# Patient Record
Sex: FEMALE | Race: WHITE | Employment: OTHER | URBAN - METROPOLITAN AREA
[De-identification: names, ages, dates, MRNs, and addresses within clinical notes are randomized per-mention and may not be internally consistent; named-entity substitution may affect disease eponyms.]

---

## 2017-01-17 ENCOUNTER — TELEPHONE (OUTPATIENT)
Dept: INTERNAL MEDICINE CLINIC | Age: 72
End: 2017-01-17

## 2017-01-17 RX ORDER — METHYLPREDNISOLONE 4 MG/1
TABLET ORAL
Qty: 1 KIT | Refills: 0 | Status: SHIPPED | OUTPATIENT
Start: 2017-01-17 | End: 2017-01-23

## 2017-01-17 RX ORDER — AZITHROMYCIN 250 MG/1
TABLET, FILM COATED ORAL
Qty: 1 PACKET | Refills: 0 | Status: SHIPPED | OUTPATIENT
Start: 2017-01-17 | End: 2017-01-27

## 2017-01-20 ENCOUNTER — OFFICE VISIT (OUTPATIENT)
Dept: INTERNAL MEDICINE CLINIC | Age: 72
End: 2017-01-20

## 2017-01-20 VITALS
SYSTOLIC BLOOD PRESSURE: 110 MMHG | DIASTOLIC BLOOD PRESSURE: 60 MMHG | BODY MASS INDEX: 20.89 KG/M2 | WEIGHT: 112.4 LBS | HEART RATE: 80 BPM | RESPIRATION RATE: 16 BRPM

## 2017-01-20 DIAGNOSIS — M19.90 ARTHRITIS: ICD-10-CM

## 2017-01-20 DIAGNOSIS — I10 ESSENTIAL HYPERTENSION: ICD-10-CM

## 2017-01-20 DIAGNOSIS — J44.9 CHRONIC OBSTRUCTIVE PULMONARY DISEASE, UNSPECIFIED COPD TYPE (HCC): ICD-10-CM

## 2017-01-20 DIAGNOSIS — M35.3 PMR (POLYMYALGIA RHEUMATICA) (HCC): Primary | ICD-10-CM

## 2017-01-20 LAB
ANION GAP SERPL CALCULATED.3IONS-SCNC: 12 MMOL/L (ref 3–16)
BUN BLDV-MCNC: 24 MG/DL (ref 7–20)
CALCIUM SERPL-MCNC: 8.5 MG/DL (ref 8.3–10.6)
CHLORIDE BLD-SCNC: 101 MMOL/L (ref 99–110)
CO2: 30 MMOL/L (ref 21–32)
CREAT SERPL-MCNC: 0.9 MG/DL (ref 0.6–1.2)
GFR AFRICAN AMERICAN: >60
GFR NON-AFRICAN AMERICAN: >60
GLUCOSE BLD-MCNC: 90 MG/DL (ref 70–99)
POTASSIUM SERPL-SCNC: 3.6 MMOL/L (ref 3.5–5.1)
SEDIMENTATION RATE, ERYTHROCYTE: 66 MM/HR (ref 0–30)
SODIUM BLD-SCNC: 143 MMOL/L (ref 136–145)

## 2017-01-20 PROCEDURE — 99213 OFFICE O/P EST LOW 20 MIN: CPT | Performed by: INTERNAL MEDICINE

## 2017-01-20 PROCEDURE — 36415 COLL VENOUS BLD VENIPUNCTURE: CPT | Performed by: INTERNAL MEDICINE

## 2017-02-01 LAB
ANA TITER: NORMAL
BASOPHILS ABSOLUTE: 0 /ΜL
BASOPHILS RELATIVE PERCENT: 0 %
C-REACTIVE PROTEIN: 1
EOSINOPHILS ABSOLUTE: 1 /ΜL
EOSINOPHILS RELATIVE PERCENT: 0.2 %
HCT VFR BLD CALC: 35.1 % (ref 36–46)
HEMOGLOBIN: 11.1 G/DL (ref 12–16)
LYMPHOCYTES ABSOLUTE: 61 /ΜL
LYMPHOCYTES RELATIVE PERCENT: 12.7 %
MCH RBC QN AUTO: 28.8 PG
MCHC RBC AUTO-ENTMCNC: 31.8 G/DL
MCV RBC AUTO: 90.8 FL
MONOCYTES ABSOLUTE: 2 /ΜL
MONOCYTES RELATIVE PERCENT: 0.4 %
NEUTROPHILS ABSOLUTE: 36 /ΜL
NEUTROPHILS RELATIVE PERCENT: 7.5 %
PDW BLD-RTO: 13.9 %
PLATELET # BLD: 226 K/ΜL
PMV BLD AUTO: ABNORMAL FL
RBC # BLD: 3.86 10^6/ΜL
RHEUMATOID FACTOR: <10
SEDIMENTATION RATE, ERYTHROCYTE: 54
TOTAL CK: NORMAL U/L
VITAMIN D 25-HYDROXY: 41
VITAMIN D2, 25 HYDROXY: NORMAL
VITAMIN D3,25 HYDROXY: NORMAL
WBC # BLD: 20.9 10^3/ML

## 2017-02-03 RX ORDER — TRIAMTERENE AND HYDROCHLOROTHIAZIDE 37.5; 25 MG/1; MG/1
CAPSULE ORAL
Qty: 90 CAPSULE | Refills: 3 | Status: SHIPPED | OUTPATIENT
Start: 2017-02-03 | End: 2018-01-31 | Stop reason: SDUPTHER

## 2017-02-03 RX ORDER — ESOMEPRAZOLE MAGNESIUM 40 MG/1
CAPSULE, DELAYED RELEASE ORAL
Qty: 90 CAPSULE | Refills: 3 | Status: SHIPPED | OUTPATIENT
Start: 2017-02-03 | End: 2018-01-31 | Stop reason: SDUPTHER

## 2017-02-20 ENCOUNTER — OFFICE VISIT (OUTPATIENT)
Dept: INTERNAL MEDICINE CLINIC | Age: 72
End: 2017-02-20

## 2017-02-20 VITALS
SYSTOLIC BLOOD PRESSURE: 130 MMHG | WEIGHT: 113.2 LBS | BODY MASS INDEX: 21.04 KG/M2 | HEART RATE: 86 BPM | RESPIRATION RATE: 14 BRPM | DIASTOLIC BLOOD PRESSURE: 70 MMHG

## 2017-02-20 DIAGNOSIS — R79.82 ELEVATED C-REACTIVE PROTEIN (CRP): Primary | ICD-10-CM

## 2017-02-20 DIAGNOSIS — I10 ESSENTIAL HYPERTENSION: ICD-10-CM

## 2017-02-20 DIAGNOSIS — M19.90 ARTHRITIS: ICD-10-CM

## 2017-02-20 DIAGNOSIS — J44.9 CHRONIC OBSTRUCTIVE PULMONARY DISEASE, UNSPECIFIED COPD TYPE (HCC): ICD-10-CM

## 2017-02-20 PROCEDURE — 99213 OFFICE O/P EST LOW 20 MIN: CPT | Performed by: INTERNAL MEDICINE

## 2017-02-23 ENCOUNTER — TELEPHONE (OUTPATIENT)
Dept: INTERNAL MEDICINE CLINIC | Age: 72
End: 2017-02-23

## 2017-02-23 ENCOUNTER — HOSPITAL ENCOUNTER (OUTPATIENT)
Dept: OTHER | Age: 72
Discharge: HOME | End: 2017-02-23
Attending: INTERNAL MEDICINE | Admitting: INTERNAL MEDICINE

## 2017-02-23 LAB
ALBUMIN SERPL-MCNC: 3.9 GM/DL (ref 3.4–5)
ALP BLD-CCNC: 92 IU/L (ref 40–128)
ALT SERPL-CCNC: 22 U/L (ref 10–40)
ANION GAP SERPL CALCULATED.3IONS-SCNC: 14 MMOL/L (ref 4–16)
AST SERPL-CCNC: 18 IU/L (ref 15–37)
BANDED NEUTROPHILS ABSOLUTE COUNT: 0.25 K/CU MM
BANDED NEUTROPHILS RELATIVE PERCENT: 1 % (ref 5–11)
BILIRUB SERPL-MCNC: 0.2 MG/DL (ref 0–1)
BUN BLDV-MCNC: 28 MG/DL (ref 6–23)
CALCIUM SERPL-MCNC: 8.8 MG/DL (ref 8.3–10.6)
CHLORIDE BLD-SCNC: 100 MMOL/L (ref 99–110)
CO2: 29 MMOL/L (ref 21–32)
CREAT SERPL-MCNC: 1 MG/DL (ref 0.6–1.1)
DIFFERENTIAL TYPE: ABNORMAL
EOSINOPHILS ABSOLUTE: 0.3 K/CU MM
EOSINOPHILS RELATIVE PERCENT: 1 % (ref 0–3)
GFR AFRICAN AMERICAN: >60 ML/MIN/1.73M2
GFR NON-AFRICAN AMERICAN: 55 ML/MIN/1.73M2
GLUCOSE FASTING: 101 MG/DL (ref 70–99)
HAV IGM SER IA-ACNC: NON REACTIVE
HCT VFR BLD CALC: 36 % (ref 37–47)
HEMOGLOBIN: 11.3 GM/DL (ref 12.5–16)
HEPATITIS B CORE IGM ANTIBODY: NON REACTIVE
HEPATITIS B SURFACE ANTIGEN: NON REACTIVE
HEPATITIS C ANTIBODY: NON REACTIVE
IGA: 84 MG/DL (ref 69–382)
IGG,SERUM: 433 MG/DL (ref 723–1685)
IGM,SERUM: 1792 MG/DL (ref 62–277)
LACTATE DEHYDROGENASE: 134 IU/L (ref 120–246)
LYMPHOCYTES ABSOLUTE: 18.5 K/CU MM
LYMPHOCYTES RELATIVE PERCENT: 73 % (ref 24–44)
MACROCYTES: ABNORMAL
MCH RBC QN AUTO: 30 PG (ref 27–31)
MCHC RBC AUTO-ENTMCNC: 31.4 % (ref 32–36)
MCV RBC AUTO: 95.5 FL (ref 78–100)
MONOCYTES ABSOLUTE: 0.5 K/CU MM
MONOCYTES RELATIVE PERCENT: 2 % (ref 0–4)
PDW BLD-RTO: 12.9 % (ref 11.7–14.9)
PLATELET # BLD: 228 K/CU MM (ref 140–440)
PLT MORPHOLOGY: ABNORMAL
PMV BLD AUTO: 9.6 FL (ref 7.5–11.1)
POLYCHROMASIA: ABNORMAL
POTASSIUM SERPL-SCNC: 3.8 MMOL/L (ref 3.5–5.1)
RBC # BLD: 3.77 M/CU MM (ref 4.2–5.4)
SEGMENTED NEUTROPHILS ABSOLUTE COUNT: 5.8 K/CU MM
SEGMENTED NEUTROPHILS RELATIVE PERCENT: 23 % (ref 36–66)
SMUDGE CELLS: PRESENT
SODIUM BLD-SCNC: 143 MMOL/L (ref 135–145)
TOTAL PROTEIN: 6.9 GM/DL (ref 6.4–8.2)
WBC # BLD: 25.4 K/CU MM (ref 4–10.5)

## 2017-02-24 LAB
ALBUMIN ELP: 3.4 GM/DL (ref 3.2–5.6)
ALPHA-1-GLOBULIN: 0.2 GM/DL (ref 0.1–0.4)
ALPHA-2-GLOBULIN: 0.9 GM/DL (ref 0.4–1.2)
BETA GLOBULIN: 1 GM/DL (ref 0.5–1.3)
GAMMA GLOBULIN: 1.5 GM/DL (ref 0.5–1.6)
IMMUNOFIXATION ELECTROPHORESIS 1: NORMAL
TOTAL PROTEIN: 6.9 GM/DL (ref 6.4–8.2)

## 2017-02-25 LAB
BETA-2 MICROGLOBULIN: 4
KAPPA QUANT FREE LIGHT CHAINS: 1.62
KAPPA/LAMBDA FREE LIGHT CHAIN RATIO: 0.08
LAMBDA FREE LIGHT CHAINS URINE/ VOL: 21.2

## 2017-03-06 ENCOUNTER — OFFICE VISIT (OUTPATIENT)
Dept: INTERNAL MEDICINE CLINIC | Age: 72
End: 2017-03-06

## 2017-03-06 ENCOUNTER — TELEPHONE (OUTPATIENT)
Dept: INTERNAL MEDICINE CLINIC | Age: 72
End: 2017-03-06

## 2017-03-06 VITALS
RESPIRATION RATE: 14 BRPM | SYSTOLIC BLOOD PRESSURE: 130 MMHG | BODY MASS INDEX: 21.15 KG/M2 | DIASTOLIC BLOOD PRESSURE: 62 MMHG | HEART RATE: 80 BPM | WEIGHT: 113.8 LBS

## 2017-03-06 DIAGNOSIS — Z12.31 SCREENING MAMMOGRAM, ENCOUNTER FOR: ICD-10-CM

## 2017-03-06 DIAGNOSIS — D72.820 LYMPHOCYTOSIS: Primary | ICD-10-CM

## 2017-03-06 DIAGNOSIS — J44.9 CHRONIC OBSTRUCTIVE PULMONARY DISEASE, UNSPECIFIED COPD TYPE (HCC): ICD-10-CM

## 2017-03-06 DIAGNOSIS — R06.02 SHORTNESS OF BREATH: ICD-10-CM

## 2017-03-06 PROCEDURE — 99213 OFFICE O/P EST LOW 20 MIN: CPT | Performed by: INTERNAL MEDICINE

## 2017-03-07 ENCOUNTER — TELEPHONE (OUTPATIENT)
Dept: INTERNAL MEDICINE CLINIC | Age: 72
End: 2017-03-07

## 2017-03-09 ENCOUNTER — HOSPITAL ENCOUNTER (OUTPATIENT)
Dept: GENERAL RADIOLOGY | Age: 72
Discharge: HOME | End: 2017-03-09
Attending: INTERNAL MEDICINE | Admitting: INTERNAL MEDICINE

## 2017-03-09 DIAGNOSIS — R06.02 SHORTNESS OF BREATH: ICD-10-CM

## 2017-03-13 ENCOUNTER — TELEPHONE (OUTPATIENT)
Dept: INTERNAL MEDICINE CLINIC | Age: 72
End: 2017-03-13

## 2017-03-13 RX ORDER — LEVOTHYROXINE SODIUM 88 MCG
88 TABLET ORAL DAILY
Qty: 90 TABLET | Refills: 3 | Status: SHIPPED | OUTPATIENT
Start: 2017-03-13 | End: 2018-03-08 | Stop reason: SDUPTHER

## 2017-03-20 ENCOUNTER — TELEPHONE (OUTPATIENT)
Dept: INTERNAL MEDICINE CLINIC | Age: 72
End: 2017-03-20

## 2017-03-21 ENCOUNTER — OFFICE VISIT (OUTPATIENT)
Dept: INTERNAL MEDICINE CLINIC | Age: 72
End: 2017-03-21

## 2017-03-21 VITALS
WEIGHT: 117 LBS | RESPIRATION RATE: 14 BRPM | DIASTOLIC BLOOD PRESSURE: 68 MMHG | SYSTOLIC BLOOD PRESSURE: 120 MMHG | BODY MASS INDEX: 21.75 KG/M2 | HEART RATE: 88 BPM

## 2017-03-21 DIAGNOSIS — J44.9 CHRONIC OBSTRUCTIVE PULMONARY DISEASE, UNSPECIFIED COPD TYPE (HCC): ICD-10-CM

## 2017-03-21 DIAGNOSIS — J40 BRONCHITIS: Primary | ICD-10-CM

## 2017-03-21 DIAGNOSIS — I10 ESSENTIAL HYPERTENSION: ICD-10-CM

## 2017-03-21 DIAGNOSIS — C91.10 CLL (CHRONIC LYMPHOCYTIC LEUKEMIA) (HCC): ICD-10-CM

## 2017-03-21 PROCEDURE — 99213 OFFICE O/P EST LOW 20 MIN: CPT | Performed by: INTERNAL MEDICINE

## 2017-03-21 RX ORDER — AZITHROMYCIN 250 MG/1
TABLET, FILM COATED ORAL
Qty: 1 PACKET | Refills: 0 | Status: SHIPPED | OUTPATIENT
Start: 2017-03-21 | End: 2017-03-31

## 2017-03-23 ENCOUNTER — TELEPHONE (OUTPATIENT)
Dept: INTERNAL MEDICINE CLINIC | Age: 72
End: 2017-03-23

## 2017-03-23 RX ORDER — CEFUROXIME AXETIL 250 MG/1
250 TABLET ORAL 2 TIMES DAILY
Qty: 20 TABLET | Refills: 0 | Status: SHIPPED | OUTPATIENT
Start: 2017-03-23 | End: 2017-04-02

## 2017-03-27 ENCOUNTER — TELEPHONE (OUTPATIENT)
Dept: INTERNAL MEDICINE CLINIC | Age: 72
End: 2017-03-27

## 2017-04-01 PROBLEM — C91.10 CLL (CHRONIC LYMPHOCYTIC LEUKEMIA) (HCC): Status: ACTIVE | Noted: 2017-04-01

## 2017-04-06 RX ORDER — ZOLPIDEM TARTRATE 5 MG/1
5 TABLET ORAL NIGHTLY PRN
Qty: 90 TABLET | Refills: 0 | Status: SHIPPED | OUTPATIENT
Start: 2017-04-06 | End: 2017-07-13 | Stop reason: SDUPTHER

## 2017-04-14 ENCOUNTER — TELEPHONE (OUTPATIENT)
Dept: INTERNAL MEDICINE CLINIC | Age: 72
End: 2017-04-14

## 2017-04-17 ENCOUNTER — HOSPITAL ENCOUNTER (OUTPATIENT)
Dept: WOMENS IMAGING | Age: 72
Discharge: HOME | End: 2017-04-17
Attending: INTERNAL MEDICINE | Admitting: INTERNAL MEDICINE

## 2017-04-17 DIAGNOSIS — Z12.39 SCREENING BREAST EXAMINATION: ICD-10-CM

## 2017-05-03 ENCOUNTER — HOSPITAL ENCOUNTER (OUTPATIENT)
Dept: OTHER | Age: 72
Discharge: HOME | End: 2017-05-03
Attending: INTERNAL MEDICINE | Admitting: INTERNAL MEDICINE

## 2017-05-03 LAB
ALBUMIN SERPL-MCNC: 4 GM/DL (ref 3.4–5)
ALP BLD-CCNC: 73 IU/L (ref 40–129)
ALT SERPL-CCNC: 11 U/L (ref 10–40)
ANION GAP SERPL CALCULATED.3IONS-SCNC: 16 MMOL/L (ref 4–16)
AST SERPL-CCNC: 12 IU/L (ref 15–37)
BASOPHILS ABSOLUTE: ABNORMAL /ΜL
BASOPHILS RELATIVE PERCENT: ABNORMAL %
BILIRUB SERPL-MCNC: 0.5 MG/DL (ref 0–1)
BUN BLDV-MCNC: 22 MG/DL (ref 6–23)
CALCIUM SERPL-MCNC: 9 MG/DL (ref 8.3–10.6)
CHLORIDE BLD-SCNC: 101 MMOL/L (ref 99–110)
CO2: 27 MMOL/L (ref 21–32)
CREAT SERPL-MCNC: 1 MG/DL (ref 0.6–1.1)
EOSINOPHILS ABSOLUTE: ABNORMAL /ΜL
EOSINOPHILS RELATIVE PERCENT: ABNORMAL %
GFR AFRICAN AMERICAN: >60 ML/MIN/1.73M2
GFR NON-AFRICAN AMERICAN: 55 ML/MIN/1.73M2
GLUCOSE BLD-MCNC: 100 MG/DL (ref 70–140)
HCT VFR BLD CALC: 34.4 % (ref 36–46)
HEMOGLOBIN: 11.3 G/DL (ref 12–16)
LACTATE DEHYDROGENASE: 135 IU/L (ref 120–246)
LYMPHOCYTES ABSOLUTE: ABNORMAL /ΜL
LYMPHOCYTES RELATIVE PERCENT: 8.8 %
MCH RBC QN AUTO: 28.9 PG
MCHC RBC AUTO-ENTMCNC: 31.9 G/DL
MCV RBC AUTO: 90.5 FL
MONOCYTES ABSOLUTE: ABNORMAL /ΜL
MONOCYTES RELATIVE PERCENT: 3 %
NEUTROPHILS ABSOLUTE: ABNORMAL /ΜL
NEUTROPHILS RELATIVE PERCENT: 8 %
PDW BLD-RTO: 16.5 %
PLATELET # BLD: 258 K/ΜL
PMV BLD AUTO: 6.8 FL
POTASSIUM SERPL-SCNC: 3.6 MMOL/L (ref 3.5–5.1)
RBC # BLD: 3.9 10^6/ΜL
SODIUM BLD-SCNC: 144 MMOL/L (ref 135–145)
TOTAL PROTEIN: 7.2 GM/DL (ref 6.4–8.2)
WBC # BLD: 17.3 10^3/ML

## 2017-05-05 ENCOUNTER — TELEPHONE (OUTPATIENT)
Dept: INTERNAL MEDICINE CLINIC | Age: 72
End: 2017-05-05

## 2017-05-08 ENCOUNTER — OFFICE VISIT (OUTPATIENT)
Dept: INTERNAL MEDICINE CLINIC | Age: 72
End: 2017-05-08

## 2017-05-08 VITALS
WEIGHT: 114.4 LBS | DIASTOLIC BLOOD PRESSURE: 70 MMHG | HEART RATE: 80 BPM | SYSTOLIC BLOOD PRESSURE: 130 MMHG | BODY MASS INDEX: 22.46 KG/M2 | RESPIRATION RATE: 14 BRPM | HEIGHT: 60 IN

## 2017-05-08 DIAGNOSIS — E55.9 VITAMIN D DEFICIENCY: ICD-10-CM

## 2017-05-08 DIAGNOSIS — I10 ESSENTIAL HYPERTENSION: ICD-10-CM

## 2017-05-08 DIAGNOSIS — R73.02 GLUCOSE INTOLERANCE (IMPAIRED GLUCOSE TOLERANCE): ICD-10-CM

## 2017-05-08 DIAGNOSIS — C91.10 CLL (CHRONIC LYMPHOCYTIC LEUKEMIA) (HCC): Primary | ICD-10-CM

## 2017-05-08 DIAGNOSIS — E78.2 MIXED HYPERLIPIDEMIA: ICD-10-CM

## 2017-05-08 DIAGNOSIS — M19.90 ARTHRITIS: ICD-10-CM

## 2017-05-08 DIAGNOSIS — M81.0 OSTEOPOROSIS: ICD-10-CM

## 2017-05-08 DIAGNOSIS — J44.9 CHRONIC OBSTRUCTIVE PULMONARY DISEASE, UNSPECIFIED COPD TYPE (HCC): ICD-10-CM

## 2017-05-08 DIAGNOSIS — E03.4 HYPOTHYROIDISM DUE TO ACQUIRED ATROPHY OF THYROID: ICD-10-CM

## 2017-05-08 DIAGNOSIS — R00.2 PALPITATIONS: ICD-10-CM

## 2017-05-08 LAB
BILIRUBIN URINE: NEGATIVE
BLOOD, URINE: NEGATIVE
CLARITY: CLEAR
COLOR: YELLOW
EPITHELIAL CELLS, UA: 1 /HPF (ref 0–5)
GLUCOSE URINE: NEGATIVE MG/DL
HYALINE CASTS: 0 /LPF (ref 0–8)
KETONES, URINE: NEGATIVE MG/DL
LEUKOCYTE ESTERASE, URINE: ABNORMAL
MICROSCOPIC EXAMINATION: YES
NITRITE, URINE: NEGATIVE
PH UA: 7
PROTEIN UA: 30 MG/DL
RBC UA: 2 /HPF (ref 0–4)
SPECIFIC GRAVITY UA: 1.02
UROBILINOGEN, URINE: 0.2 E.U./DL
WBC UA: 1 /HPF (ref 0–5)

## 2017-05-08 PROCEDURE — 36415 COLL VENOUS BLD VENIPUNCTURE: CPT | Performed by: INTERNAL MEDICINE

## 2017-05-08 PROCEDURE — 81001 URINALYSIS AUTO W/SCOPE: CPT | Performed by: INTERNAL MEDICINE

## 2017-05-08 PROCEDURE — 99215 OFFICE O/P EST HI 40 MIN: CPT | Performed by: INTERNAL MEDICINE

## 2017-05-08 PROCEDURE — 93000 ELECTROCARDIOGRAM COMPLETE: CPT | Performed by: INTERNAL MEDICINE

## 2017-05-08 RX ORDER — PREDNISONE 1 MG/1
5 TABLET ORAL 2 TIMES DAILY
COMMUNITY
End: 2017-05-15 | Stop reason: SDUPTHER

## 2017-05-08 ASSESSMENT — PATIENT HEALTH QUESTIONNAIRE - PHQ9
1. LITTLE INTEREST OR PLEASURE IN DOING THINGS: 0
SUM OF ALL RESPONSES TO PHQ QUESTIONS 1-9: 0
2. FEELING DOWN, DEPRESSED OR HOPELESS: 0
SUM OF ALL RESPONSES TO PHQ9 QUESTIONS 1 & 2: 0

## 2017-05-09 LAB
CHOLESTEROL, TOTAL: 109 MG/DL (ref 0–199)
ESTIMATED AVERAGE GLUCOSE: 114 MG/DL
HBA1C MFR BLD: 5.6 %
HDLC SERPL-MCNC: 37 MG/DL (ref 40–60)
LDL CHOLESTEROL CALCULATED: 46 MG/DL
SEDIMENTATION RATE, ERYTHROCYTE: 79 MM/HR (ref 0–30)
TRIGL SERPL-MCNC: 129 MG/DL (ref 0–150)
TSH SERPL DL<=0.05 MIU/L-ACNC: 0.19 UIU/ML (ref 0.27–4.2)
VITAMIN D 25-HYDROXY: 38.2 NG/ML
VLDLC SERPL CALC-MCNC: 26 MG/DL

## 2017-05-11 ENCOUNTER — HOSPITAL ENCOUNTER (OUTPATIENT)
Dept: WOMENS IMAGING | Age: 72
Discharge: HOME | End: 2017-05-11
Attending: INTERNAL MEDICINE | Admitting: INTERNAL MEDICINE

## 2017-05-11 DIAGNOSIS — M81.0 OSTEOPOROSIS: ICD-10-CM

## 2017-05-11 DIAGNOSIS — M81.0 AGE-RELATED OSTEOPOROSIS WITHOUT CURRENT PATHOLOGICAL FRACTURE: ICD-10-CM

## 2017-05-15 RX ORDER — PREDNISONE 1 MG/1
5 TABLET ORAL 2 TIMES DAILY
Qty: 60 TABLET | Refills: 5 | Status: SHIPPED | OUTPATIENT
Start: 2017-05-15 | End: 2017-08-22

## 2017-05-19 ENCOUNTER — TELEPHONE (OUTPATIENT)
Dept: INTERNAL MEDICINE CLINIC | Age: 72
End: 2017-05-19

## 2017-05-22 ENCOUNTER — OFFICE VISIT (OUTPATIENT)
Dept: INTERNAL MEDICINE CLINIC | Age: 72
End: 2017-05-22

## 2017-05-22 VITALS — DIASTOLIC BLOOD PRESSURE: 70 MMHG | SYSTOLIC BLOOD PRESSURE: 130 MMHG | HEART RATE: 60 BPM

## 2017-05-22 DIAGNOSIS — R73.02 GLUCOSE INTOLERANCE (IMPAIRED GLUCOSE TOLERANCE): ICD-10-CM

## 2017-05-22 DIAGNOSIS — G89.29 CHRONIC RIGHT SHOULDER PAIN: Primary | ICD-10-CM

## 2017-05-22 DIAGNOSIS — E03.4 HYPOTHYROIDISM DUE TO ACQUIRED ATROPHY OF THYROID: ICD-10-CM

## 2017-05-22 DIAGNOSIS — M25.511 CHRONIC RIGHT SHOULDER PAIN: Primary | ICD-10-CM

## 2017-05-22 DIAGNOSIS — J44.9 CHRONIC OBSTRUCTIVE PULMONARY DISEASE, UNSPECIFIED COPD TYPE (HCC): ICD-10-CM

## 2017-05-22 PROCEDURE — 99213 OFFICE O/P EST LOW 20 MIN: CPT | Performed by: INTERNAL MEDICINE

## 2017-05-23 ENCOUNTER — TELEPHONE (OUTPATIENT)
Dept: INTERNAL MEDICINE CLINIC | Age: 72
End: 2017-05-23

## 2017-05-23 DIAGNOSIS — G89.29 CHRONIC RIGHT SHOULDER PAIN: Primary | ICD-10-CM

## 2017-05-23 DIAGNOSIS — M25.511 CHRONIC RIGHT SHOULDER PAIN: Primary | ICD-10-CM

## 2017-05-26 ENCOUNTER — HOSPITAL ENCOUNTER (OUTPATIENT)
Dept: GENERAL RADIOLOGY | Age: 72
Discharge: HOME | End: 2017-05-26
Attending: INTERNAL MEDICINE | Admitting: INTERNAL MEDICINE

## 2017-05-26 LAB
T4 FREE: 1.61 NG/DL (ref 0.9–1.8)
TSH HIGH SENSITIVITY: 0.63 UIU/ML (ref 0.27–4.2)

## 2017-05-30 ENCOUNTER — TELEPHONE (OUTPATIENT)
Dept: INTERNAL MEDICINE CLINIC | Age: 72
End: 2017-05-30

## 2017-07-13 RX ORDER — ZOLPIDEM TARTRATE 5 MG/1
5 TABLET ORAL NIGHTLY PRN
Qty: 90 TABLET | Refills: 0 | Status: SHIPPED | OUTPATIENT
Start: 2017-07-13 | End: 2017-09-28 | Stop reason: SDUPTHER

## 2017-08-09 ENCOUNTER — HOSPITAL ENCOUNTER (OUTPATIENT)
Dept: OTHER | Age: 72
Discharge: HOME | End: 2017-08-09
Attending: INTERNAL MEDICINE | Admitting: INTERNAL MEDICINE

## 2017-08-09 LAB
ALBUMIN SERPL-MCNC: 3.8 GM/DL (ref 3.4–5)
ALP BLD-CCNC: 85 IU/L (ref 40–129)
ALT SERPL-CCNC: 8 U/L (ref 10–40)
ANION GAP SERPL CALCULATED.3IONS-SCNC: 14 MMOL/L (ref 4–16)
AST SERPL-CCNC: 13 IU/L (ref 15–37)
BILIRUB SERPL-MCNC: 0.5 MG/DL (ref 0–1)
BUN BLDV-MCNC: 22 MG/DL (ref 6–23)
CALCIUM SERPL-MCNC: 9 MG/DL (ref 8.3–10.6)
CHLORIDE BLD-SCNC: 100 MMOL/L (ref 99–110)
CO2: 28 MMOL/L (ref 21–32)
CREAT SERPL-MCNC: 1 MG/DL (ref 0.6–1.1)
FERRITIN: 24 NG/ML (ref 15–150)
FOLATE: >20 NG/ML (ref 3.1–17.5)
GFR AFRICAN AMERICAN: >60 ML/MIN/1.73M2
GFR NON-AFRICAN AMERICAN: 54 ML/MIN/1.73M2
GLUCOSE BLD-MCNC: 99 MG/DL (ref 70–140)
LACTATE DEHYDROGENASE: 121 IU/L (ref 120–246)
POTASSIUM SERPL-SCNC: 4.1 MMOL/L (ref 3.5–5.1)
SODIUM BLD-SCNC: 142 MMOL/L (ref 135–145)
TOTAL PROTEIN: 6.9 GM/DL (ref 6.4–8.2)
VITAMIN B-12: 724.7 PG/ML (ref 211–911)

## 2017-08-10 LAB
ALBUMIN ELP: 3.3 GM/DL (ref 3.2–5.6)
ALPHA-1-GLOBULIN: 0.3 GM/DL (ref 0.1–0.4)
ALPHA-2-GLOBULIN: 0.9 GM/DL (ref 0.4–1.2)
BETA GLOBULIN: 0.9 GM/DL (ref 0.5–1.3)
GAMMA GLOBULIN: 1.5 GM/DL (ref 0.5–1.6)
IMMUNOFIXATION ELECTROPHORESIS 1: NORMAL
TOTAL PROTEIN: 6.9 GM/DL (ref 6.4–8.2)

## 2017-08-11 LAB
KAPPA QUANT FREE LIGHT CHAINS: 1.23
KAPPA/LAMBDA FREE LIGHT CHAIN RATIO: 0.06
LAMBDA FREE LIGHT CHAINS URINE/ VOL: 20.3

## 2017-08-21 ENCOUNTER — TELEPHONE (OUTPATIENT)
Dept: INTERNAL MEDICINE CLINIC | Age: 72
End: 2017-08-21

## 2017-08-22 ENCOUNTER — OFFICE VISIT (OUTPATIENT)
Dept: INTERNAL MEDICINE CLINIC | Age: 72
End: 2017-08-22

## 2017-08-22 VITALS
WEIGHT: 118.2 LBS | DIASTOLIC BLOOD PRESSURE: 60 MMHG | SYSTOLIC BLOOD PRESSURE: 120 MMHG | HEART RATE: 88 BPM | BODY MASS INDEX: 22.89 KG/M2

## 2017-08-22 DIAGNOSIS — C91.10 CLL (CHRONIC LYMPHOCYTIC LEUKEMIA) (HCC): ICD-10-CM

## 2017-08-22 DIAGNOSIS — I10 ESSENTIAL HYPERTENSION: ICD-10-CM

## 2017-08-22 DIAGNOSIS — J44.9 CHRONIC OBSTRUCTIVE PULMONARY DISEASE, UNSPECIFIED COPD TYPE (HCC): Primary | ICD-10-CM

## 2017-08-22 DIAGNOSIS — E03.4 HYPOTHYROIDISM DUE TO ACQUIRED ATROPHY OF THYROID: ICD-10-CM

## 2017-08-22 PROCEDURE — 99213 OFFICE O/P EST LOW 20 MIN: CPT | Performed by: INTERNAL MEDICINE

## 2017-08-22 RX ORDER — TRAMADOL HYDROCHLORIDE 50 MG/1
50 TABLET ORAL 2 TIMES DAILY PRN
Qty: 60 TABLET | Refills: 2 | Status: SHIPPED | OUTPATIENT
Start: 2017-08-22 | End: 2018-04-09 | Stop reason: SDUPTHER

## 2017-09-28 RX ORDER — ZOLPIDEM TARTRATE 5 MG/1
5 TABLET ORAL NIGHTLY PRN
Qty: 90 TABLET | Refills: 0 | Status: SHIPPED | OUTPATIENT
Start: 2017-09-28 | End: 2018-01-03 | Stop reason: SDUPTHER

## 2017-10-26 ENCOUNTER — TELEPHONE (OUTPATIENT)
Dept: INTERNAL MEDICINE CLINIC | Age: 72
End: 2017-10-26

## 2017-10-26 DIAGNOSIS — R60.9 EDEMA, UNSPECIFIED TYPE: ICD-10-CM

## 2017-10-26 DIAGNOSIS — I10 ESSENTIAL HYPERTENSION: ICD-10-CM

## 2017-10-26 DIAGNOSIS — R73.02 GLUCOSE INTOLERANCE (IMPAIRED GLUCOSE TOLERANCE): ICD-10-CM

## 2017-10-26 DIAGNOSIS — E03.8 OTHER SPECIFIED HYPOTHYROIDISM: ICD-10-CM

## 2017-10-26 DIAGNOSIS — E03.4 HYPOTHYROIDISM DUE TO ACQUIRED ATROPHY OF THYROID: ICD-10-CM

## 2017-10-26 DIAGNOSIS — E78.2 MIXED HYPERLIPIDEMIA: Primary | ICD-10-CM

## 2017-11-06 ENCOUNTER — HOSPITAL ENCOUNTER (OUTPATIENT)
Dept: GENERAL RADIOLOGY | Age: 72
Discharge: HOME | End: 2017-11-06
Attending: INTERNAL MEDICINE | Admitting: INTERNAL MEDICINE

## 2017-11-06 LAB
ANION GAP SERPL CALCULATED.3IONS-SCNC: 13 MMOL/L (ref 4–16)
BANDED NEUTROPHILS ABSOLUTE COUNT: 0.11 K/CU MM
BANDED NEUTROPHILS RELATIVE PERCENT: 1 % (ref 5–11)
BUN BLDV-MCNC: 23 MG/DL (ref 6–23)
CALCIUM SERPL-MCNC: 8.7 MG/DL (ref 8.3–10.6)
CHLORIDE BLD-SCNC: 102 MMOL/L (ref 99–110)
CHOLESTEROL: 108 MG/DL
CO2: 29 MMOL/L (ref 21–32)
CREAT SERPL-MCNC: 1 MG/DL (ref 0.6–1.1)
DIFFERENTIAL TYPE: ABNORMAL
ESTIMATED AVERAGE GLUCOSE: 105 MG/DL
GFR AFRICAN AMERICAN: >60 ML/MIN/1.73M2
GFR NON-AFRICAN AMERICAN: 54 ML/MIN/1.73M2
GLUCOSE BLD-MCNC: 92 MG/DL (ref 70–140)
HBA1C MFR BLD: 5.3 % (ref 4.2–6.3)
HCT VFR BLD CALC: 34.6 % (ref 37–47)
HDLC SERPL-MCNC: 31 MG/DL
HEMOGLOBIN: 10.8 GM/DL (ref 12.5–16)
LDL CHOLESTEROL DIRECT: 50 MG/DL
LYMPHOCYTES ABSOLUTE: 5.6 K/CU MM
LYMPHOCYTES RELATIVE PERCENT: 53 % (ref 24–44)
MCH RBC QN AUTO: 30.7 PG (ref 27–31)
MCHC RBC AUTO-ENTMCNC: 31.2 % (ref 32–36)
MCV RBC AUTO: 98.3 FL (ref 78–100)
MONOCYTES ABSOLUTE: 0.1 K/CU MM
MONOCYTES RELATIVE PERCENT: 1 % (ref 0–4)
PDW BLD-RTO: 13.8 % (ref 11.7–14.9)
PLATELET # BLD: 137 K/CU MM (ref 140–440)
PMV BLD AUTO: 10.1 FL (ref 7.5–11.1)
POTASSIUM SERPL-SCNC: 3.8 MMOL/L (ref 3.5–5.1)
RBC # BLD: 3.52 M/CU MM (ref 4.2–5.4)
SEGMENTED NEUTROPHILS ABSOLUTE COUNT: 4.8 K/CU MM
SEGMENTED NEUTROPHILS RELATIVE PERCENT: 45 % (ref 36–66)
SODIUM BLD-SCNC: 144 MMOL/L (ref 135–145)
TRIGL SERPL-MCNC: 190 MG/DL
TSH HIGH SENSITIVITY: 0.98 UIU/ML (ref 0.27–4.2)
WBC # BLD: 10.6 K/CU MM (ref 4–10.5)
WBC # BLD: ABNORMAL 10*3/UL

## 2017-11-08 ENCOUNTER — NURSE ONLY (OUTPATIENT)
Dept: INTERNAL MEDICINE CLINIC | Age: 72
End: 2017-11-08

## 2017-11-08 DIAGNOSIS — Z23 NEED FOR INFLUENZA VACCINATION: Primary | ICD-10-CM

## 2017-11-08 PROCEDURE — 90662 IIV NO PRSV INCREASED AG IM: CPT | Performed by: INTERNAL MEDICINE

## 2017-11-08 PROCEDURE — G0008 ADMIN INFLUENZA VIRUS VAC: HCPCS | Performed by: INTERNAL MEDICINE

## 2017-11-08 NOTE — PROGRESS NOTES
Vaccine Information Sheet, \"Influenza - Inactivated\"  given to Ina Grajeda, or parent/legal guardian of  Ina Grajeda and verbalized understanding. Patient responses:    Have you ever had a reaction to a flu vaccine? NO  Are you able to eat eggs without adverse effects? YES  Do you have any current illness? NO  Have you ever had Guillian [place] Syndrome? NO    Flu vaccine given per order. Please see immunization tab.

## 2017-11-14 ENCOUNTER — HOSPITAL ENCOUNTER (OUTPATIENT)
Dept: OTHER | Age: 72
Discharge: HOME | End: 2017-11-14
Attending: INTERNAL MEDICINE | Admitting: INTERNAL MEDICINE

## 2017-11-14 LAB
ALBUMIN SERPL-MCNC: 4 GM/DL (ref 3.4–5)
ALP BLD-CCNC: 85 IU/L (ref 40–129)
ALT SERPL-CCNC: 11 U/L (ref 10–40)
ANION GAP SERPL CALCULATED.3IONS-SCNC: 14 MMOL/L (ref 4–16)
AST SERPL-CCNC: 19 IU/L (ref 15–37)
BILIRUB SERPL-MCNC: 0.6 MG/DL (ref 0–1)
BUN BLDV-MCNC: 22 MG/DL (ref 6–23)
CALCIUM SERPL-MCNC: 8.8 MG/DL (ref 8.3–10.6)
CHLORIDE BLD-SCNC: 100 MMOL/L (ref 99–110)
CO2: 26 MMOL/L (ref 21–32)
CREAT SERPL-MCNC: 1.1 MG/DL (ref 0.6–1.1)
FERRITIN: 85 NG/ML (ref 15–150)
GFR AFRICAN AMERICAN: 59 ML/MIN/1.73M2
GFR NON-AFRICAN AMERICAN: 49 ML/MIN/1.73M2
GLUCOSE BLD-MCNC: 92 MG/DL (ref 70–140)
POTASSIUM SERPL-SCNC: 4.3 MMOL/L (ref 3.5–5.1)
SODIUM BLD-SCNC: 140 MMOL/L (ref 135–145)
TOTAL PROTEIN: 7 GM/DL (ref 6.4–8.2)

## 2017-11-15 LAB
ALBUMIN ELP: 3.7 GM/DL (ref 3.2–5.6)
ALPHA-1-GLOBULIN: 0.3 GM/DL (ref 0.1–0.4)
ALPHA-2-GLOBULIN: 0.9 GM/DL (ref 0.4–1.2)
BETA GLOBULIN: 0.9 GM/DL (ref 0.5–1.3)
GAMMA GLOBULIN: 1.3 GM/DL (ref 0.5–1.6)
IMMUNOFIXATION ELECTROPHORESIS 1: NORMAL
TOTAL PROTEIN: 7 GM/DL (ref 6.4–8.2)

## 2017-11-17 ENCOUNTER — TELEPHONE (OUTPATIENT)
Dept: INTERNAL MEDICINE CLINIC | Age: 72
End: 2017-11-17

## 2017-11-17 LAB
KAPPA QUANT FREE LIGHT CHAINS: 1.19
KAPPA/LAMBDA FREE LIGHT CHAIN RATIO: 0.06
LAMBDA FREE LIGHT CHAINS URINE/ VOL: 18.9

## 2017-11-20 ENCOUNTER — OFFICE VISIT (OUTPATIENT)
Dept: INTERNAL MEDICINE CLINIC | Age: 72
End: 2017-11-20

## 2017-11-20 VITALS
BODY MASS INDEX: 22.08 KG/M2 | HEART RATE: 80 BPM | SYSTOLIC BLOOD PRESSURE: 112 MMHG | DIASTOLIC BLOOD PRESSURE: 60 MMHG | WEIGHT: 114 LBS

## 2017-11-20 DIAGNOSIS — I10 ESSENTIAL HYPERTENSION: ICD-10-CM

## 2017-11-20 DIAGNOSIS — J44.9 CHRONIC OBSTRUCTIVE PULMONARY DISEASE, UNSPECIFIED COPD TYPE (HCC): ICD-10-CM

## 2017-11-20 DIAGNOSIS — E78.2 MIXED HYPERLIPIDEMIA: Primary | ICD-10-CM

## 2017-11-20 DIAGNOSIS — E03.4 HYPOTHYROIDISM DUE TO ACQUIRED ATROPHY OF THYROID: ICD-10-CM

## 2017-11-20 PROCEDURE — 99213 OFFICE O/P EST LOW 20 MIN: CPT | Performed by: INTERNAL MEDICINE

## 2017-12-01 NOTE — PROGRESS NOTES
S: Patient presents with problems of CLL followed by Dr Joseph Chamberlain, HTN, COPD, Hypothyroidism on replacement, and DJD. For her COPD she has bee using Dulera 200/5 2 inhalations a day with good results. No headache, chest pain, or breathing difficulties. No fever, chills, or night sweats. Dr Joseph Chamberlain is monitoring her CLL. Her GERD symptoms are controlled with Nexium 40 mg qday. No swallowing difficulties. O:Blood pressure 112/60, pulse 80, weight 114 lb (51.7 kg), not currently breastfeeding. HEENT: normal      Neck: No palpable lymph nodes, normal thyroid examination. Lungs: Diffuse decreased BS, no wheezing       Cardio: reg pulse      Ext: no edema        Labs: from 11/6/17 CBC with wbc 10.6 hgb 10.8 platelet 568R, Lytes & Renal normal, Glucose 92, Hgba1c 5.3%, TSH 0.978, LDL 50 HDL 31 Trig 190    A: COPD stable      CLL followed by Dr Joseph Chamberlain      HTN controlled      Hypothyroidism on replacement      Hyperlipidemia control improving     P: copy of labs given      Continue present medications and diet.        Return in May for H&P

## 2017-12-20 RX ORDER — POTASSIUM CHLORIDE 600 MG/1
TABLET, FILM COATED, EXTENDED RELEASE ORAL
Qty: 90 TABLET | Refills: 3 | Status: SHIPPED | OUTPATIENT
Start: 2017-12-20 | End: 2018-12-04 | Stop reason: SDUPTHER

## 2018-01-03 DIAGNOSIS — F51.04 CHRONIC INSOMNIA: Primary | ICD-10-CM

## 2018-01-05 RX ORDER — ZOLPIDEM TARTRATE 5 MG/1
5 TABLET ORAL NIGHTLY PRN
Qty: 90 TABLET | Refills: 0 | Status: SHIPPED | OUTPATIENT
Start: 2018-01-05 | End: 2018-04-06 | Stop reason: SDUPTHER

## 2018-01-31 RX ORDER — TRIAMTERENE AND HYDROCHLOROTHIAZIDE 37.5; 25 MG/1; MG/1
CAPSULE ORAL
Qty: 90 CAPSULE | Refills: 3 | Status: SHIPPED | OUTPATIENT
Start: 2018-01-31 | End: 2019-09-25 | Stop reason: SDUPTHER

## 2018-01-31 RX ORDER — ESOMEPRAZOLE MAGNESIUM 40 MG/1
CAPSULE, DELAYED RELEASE ORAL
Qty: 90 CAPSULE | Refills: 3 | Status: SHIPPED | OUTPATIENT
Start: 2018-01-31 | End: 2019-01-15 | Stop reason: SDUPTHER

## 2018-02-22 ENCOUNTER — OFFICE VISIT (OUTPATIENT)
Dept: INTERNAL MEDICINE CLINIC | Age: 73
End: 2018-02-22

## 2018-02-22 VITALS
DIASTOLIC BLOOD PRESSURE: 60 MMHG | BODY MASS INDEX: 22.27 KG/M2 | HEART RATE: 74 BPM | OXYGEN SATURATION: 98 % | WEIGHT: 115 LBS | SYSTOLIC BLOOD PRESSURE: 110 MMHG

## 2018-02-22 DIAGNOSIS — M50.30 DDD (DEGENERATIVE DISC DISEASE), CERVICAL: ICD-10-CM

## 2018-02-22 DIAGNOSIS — C91.10 CLL (CHRONIC LYMPHOCYTIC LEUKEMIA) (HCC): ICD-10-CM

## 2018-02-22 DIAGNOSIS — I10 ESSENTIAL HYPERTENSION: ICD-10-CM

## 2018-02-22 DIAGNOSIS — R10.11 RUQ ABDOMINAL PAIN: Primary | ICD-10-CM

## 2018-02-22 DIAGNOSIS — R20.2 PARESTHESIAS: ICD-10-CM

## 2018-02-22 DIAGNOSIS — G47.9 SLEEP DISTURBANCE: ICD-10-CM

## 2018-02-22 LAB
ALBUMIN SERPL-MCNC: 4.2 G/DL (ref 3.4–5)
ALP BLD-CCNC: 76 U/L (ref 40–129)
ALT SERPL-CCNC: 8 U/L (ref 10–40)
ANION GAP SERPL CALCULATED.3IONS-SCNC: 16 MMOL/L (ref 3–16)
AST SERPL-CCNC: 14 U/L (ref 15–37)
BILIRUB SERPL-MCNC: 0.5 MG/DL (ref 0–1)
BILIRUBIN DIRECT: <0.2 MG/DL (ref 0–0.3)
BILIRUBIN, INDIRECT: ABNORMAL MG/DL (ref 0–1)
BUN BLDV-MCNC: 23 MG/DL (ref 7–20)
CALCIUM SERPL-MCNC: 9.2 MG/DL (ref 8.3–10.6)
CHLORIDE BLD-SCNC: 101 MMOL/L (ref 99–110)
CO2: 27 MMOL/L (ref 21–32)
CREAT SERPL-MCNC: 1.1 MG/DL (ref 0.6–1.2)
GFR AFRICAN AMERICAN: 59
GFR NON-AFRICAN AMERICAN: 49
GLUCOSE BLD-MCNC: 105 MG/DL (ref 70–99)
POTASSIUM SERPL-SCNC: 4.3 MMOL/L (ref 3.5–5.1)
SODIUM BLD-SCNC: 144 MMOL/L (ref 136–145)
TOTAL PROTEIN: 7 G/DL (ref 6.4–8.2)

## 2018-02-22 PROCEDURE — G8420 CALC BMI NORM PARAMETERS: HCPCS | Performed by: INTERNAL MEDICINE

## 2018-02-22 PROCEDURE — G8427 DOCREV CUR MEDS BY ELIG CLIN: HCPCS | Performed by: INTERNAL MEDICINE

## 2018-02-22 PROCEDURE — G8399 PT W/DXA RESULTS DOCUMENT: HCPCS | Performed by: INTERNAL MEDICINE

## 2018-02-22 PROCEDURE — G8484 FLU IMMUNIZE NO ADMIN: HCPCS | Performed by: INTERNAL MEDICINE

## 2018-02-22 PROCEDURE — 1036F TOBACCO NON-USER: CPT | Performed by: INTERNAL MEDICINE

## 2018-02-22 PROCEDURE — 99213 OFFICE O/P EST LOW 20 MIN: CPT | Performed by: INTERNAL MEDICINE

## 2018-02-22 PROCEDURE — 4040F PNEUMOC VAC/ADMIN/RCVD: CPT | Performed by: INTERNAL MEDICINE

## 2018-02-22 PROCEDURE — 1090F PRES/ABSN URINE INCON ASSESS: CPT | Performed by: INTERNAL MEDICINE

## 2018-02-22 PROCEDURE — 3017F COLORECTAL CA SCREEN DOC REV: CPT | Performed by: INTERNAL MEDICINE

## 2018-02-22 PROCEDURE — 36415 COLL VENOUS BLD VENIPUNCTURE: CPT | Performed by: INTERNAL MEDICINE

## 2018-02-22 PROCEDURE — 3014F SCREEN MAMMO DOC REV: CPT | Performed by: INTERNAL MEDICINE

## 2018-02-22 PROCEDURE — 1123F ACP DISCUSS/DSCN MKR DOCD: CPT | Performed by: INTERNAL MEDICINE

## 2018-02-22 RX ORDER — TRAZODONE HYDROCHLORIDE 50 MG/1
50 TABLET ORAL NIGHTLY
Qty: 30 TABLET | Refills: 5 | Status: SHIPPED | OUTPATIENT
Start: 2018-02-22 | End: 2018-05-14 | Stop reason: CLARIF

## 2018-02-22 NOTE — PROGRESS NOTES
Dr Adelaide Thomas ordered for a lab draw to be done-Cleo BETANCOURT Janeth Kyle was easily drawn from the left antecubital space-band-aid applied-tolerated well  1 sst, 1 lav tubes sent to the lab

## 2018-02-23 LAB
ATYPICAL LYMPHOCYTE RELATIVE PERCENT: 5 % (ref 0–6)
BASOPHILS ABSOLUTE: 0 K/UL (ref 0–0.2)
BASOPHILS RELATIVE PERCENT: 0 %
EOSINOPHILS ABSOLUTE: 0 K/UL (ref 0–0.6)
EOSINOPHILS RELATIVE PERCENT: 0 %
HCT VFR BLD CALC: 33.5 % (ref 36–48)
HEMATOLOGY PATH CONSULT: NO
HEMOGLOBIN: 11.1 G/DL (ref 12–16)
LYMPHOCYTES ABSOLUTE: 7 K/UL (ref 1–5.1)
LYMPHOCYTES RELATIVE PERCENT: 47 %
MCH RBC QN AUTO: 31.6 PG (ref 26–34)
MCHC RBC AUTO-ENTMCNC: 33.2 G/DL (ref 31–36)
MCV RBC AUTO: 95 FL (ref 80–100)
MONOCYTES ABSOLUTE: 0.9 K/UL (ref 0–1.3)
MONOCYTES RELATIVE PERCENT: 7 %
NEUTROPHILS ABSOLUTE: 5.1 K/UL (ref 1.7–7.7)
NEUTROPHILS RELATIVE PERCENT: 39 %
PDW BLD-RTO: 14.4 % (ref 12.4–15.4)
PLATELET # BLD: 183 K/UL (ref 135–450)
PMV BLD AUTO: 8.6 FL (ref 5–10.5)
PROLYMPHOCYTES: 2 %
RBC # BLD: 3.53 M/UL (ref 4–5.2)
WBC # BLD: 13 K/UL (ref 4–11)

## 2018-03-04 NOTE — PROGRESS NOTES
S: Patient presents with problems of CLL followed by Dr Mere Roca, HTN, COPD, Hypothyroidism on replacement, and DJD. For her COPD she has been using Dulera 200/5 2 inhalations a day with good results. No headache, chest pain, or breathing difficulties. No fever, chills, or night sweats. Dr Mere Roca is monitoring her CLL. Her GERD symptoms are controlled with Nexium 40 mg qday but she is having abdominal bloating and nausea episodes. No hematochezia or melanotic stool. She has been having sleeping difficulties and not having good results with her Ambien. We discussed a trial of Trazodone. She is also having paresthesias of her right arm but no muscular weakness or loss of use. She is known to have cervical DDD and DJD. O:Blood pressure 110/60, pulse 74, weight 115 lb (52.2 kg), SpO2 98 %, not currently breastfeeding. HEENT: normal      Neck: No palpable lymph nodes, normal thyroid examination.        Lungs: Diffuse decreased BS, no wheezing       Abd: epigastric tenderness without rebound or guarding      Cardio: reg pulse      Ext: no edema      Neuro: normal DTRs of upper exts, negative Tinel sign of right wrist         Labs: from 11/14/17 Lytes normal, BUN 22 Creat 1.1, Glucose 92, Liver tests normal.     A: COPD stable      CLL followed by Dr Mere Roca      HTN controlled      Abdominal pain evaluate for GB disorder      Sleep disturbance      Right arm paresthesias     P: Basic chem CBC Liver profile and call      Gallbladder ultrasound      Trazodone 50 mg qhs #30 RX5      EMG of right arm and call results      Return in May as scheduled

## 2018-03-08 RX ORDER — LEVOTHYROXINE SODIUM 88 MCG
88 TABLET ORAL DAILY
Qty: 90 TABLET | Refills: 3 | Status: SHIPPED | OUTPATIENT
Start: 2018-03-08 | End: 2019-02-20 | Stop reason: SDUPTHER

## 2018-03-12 ENCOUNTER — HOSPITAL ENCOUNTER (OUTPATIENT)
Dept: ULTRASOUND IMAGING | Age: 73
Discharge: HOME | End: 2018-03-12
Attending: INTERNAL MEDICINE | Admitting: INTERNAL MEDICINE

## 2018-03-12 DIAGNOSIS — R10.11 ABDOMINAL PAIN, RIGHT UPPER QUADRANT: ICD-10-CM

## 2018-03-14 ENCOUNTER — TELEPHONE (OUTPATIENT)
Dept: INTERNAL MEDICINE CLINIC | Age: 73
End: 2018-03-14

## 2018-03-15 ENCOUNTER — TELEPHONE (OUTPATIENT)
Dept: INTERNAL MEDICINE CLINIC | Age: 73
End: 2018-03-15

## 2018-03-15 DIAGNOSIS — R20.2 PARESTHESIA OF RIGHT ARM: Primary | ICD-10-CM

## 2018-03-15 DIAGNOSIS — K80.20 CALCULUS OF GALLBLADDER WITHOUT CHOLECYSTITIS WITHOUT OBSTRUCTION: Primary | ICD-10-CM

## 2018-03-20 ENCOUNTER — OFFICE VISIT (OUTPATIENT)
Dept: PHYSICAL MEDICINE AND REHAB | Age: 73
End: 2018-03-20

## 2018-03-20 DIAGNOSIS — M79.602 PARESTHESIA AND PAIN OF BOTH UPPER EXTREMITIES: ICD-10-CM

## 2018-03-20 DIAGNOSIS — G56.20 ULNAR NEUROPATHY AT WRIST, UNSPECIFIED LATERALITY: ICD-10-CM

## 2018-03-20 DIAGNOSIS — R20.2 PARESTHESIA AND PAIN OF BOTH UPPER EXTREMITIES: ICD-10-CM

## 2018-03-20 DIAGNOSIS — M79.601 PARESTHESIA AND PAIN OF BOTH UPPER EXTREMITIES: ICD-10-CM

## 2018-03-20 DIAGNOSIS — G56.03 BILATERAL CARPAL TUNNEL SYNDROME: Primary | ICD-10-CM

## 2018-03-20 PROCEDURE — 95909 NRV CNDJ TST 5-6 STUDIES: CPT | Performed by: PHYSICAL MEDICINE & REHABILITATION

## 2018-03-20 PROCEDURE — 95886 MUSC TEST DONE W/N TEST COMP: CPT | Performed by: PHYSICAL MEDICINE & REHABILITATION

## 2018-03-20 NOTE — PROGRESS NOTES
EMG REPORT     CHIEF COMPLAINT: Right shoulder pain. HISTORY OF PRESENT ILLNESS: 67 y.o. R hand dominant female with about a full year of right lateral shoulder pain and a restriction when she tries to fully abduct and externally rotate the right upper limb. She reports some radiation of the pain down into her right elbow. She has some trouble holding things in her grasp (bilaterally). No recent traumas. No reported rashes or limb discoloration. She rated the pain severity as 10/10. No similar LE symptoms. H/O a thyroid disorder. No DM. PHYSICAL EXAMINATION: Alert. Only about 65 degrees of active Cspine rotation bilaterally. Neg Spurling's maneuver. Trace/= UE DTR's. Neg Tinel's. 4+/5 right forearm pronation and thumb opposition MMT. No atrophy, tremor or clonus. Blunted perception to light touch in the palms. NERVE CONDUCTION STUDIES:     MOTOR         LATENCY NORMAL AMPLITUDE DISTANCE COND. LANIE. RIGHT  MEDIAN 6.0 < 4.2 msec 1.1 8 cm 39   LEFT  MEDIAN 5.4 < 4.2 msec 1.2 8 cm 44   RIGHT  ULNAR 3.1 < 4.2 msec 4.4 8 cm 54   LEFT  ULNAR 3.4 < 4.2 msec 4.0 8 cm >50      SENSORY  ORTHODROMIC        LATENCY NORMAL AMPLITUDE DISTANCE   RIGHT MEDIAN 4.2 <2.3 msec 6 10 cm   LEFT  MEDIAN 3.1 < 2.3 msec 9 10 cm   RIGHT  ULNAR 2.4 < 2.3 msec 5 10 cm   LEFT  ULNAR 2.9 < 2.3 msec 6 10 cm       Right dorsal ulnar sensory: Absent. NEEDLE EMG:      RIGHT   LEFT     Insertional Activity Spontaneous  Activity Volutional  MUAP's Insertional Activity Spontaneous  Activity Volutional  MUAP's   Cerv Parasp Normal None Normal      Infraspinatus Normal None Normal      Deltoid   Normal None Normal      Biceps   Normal None Normal      Triceps   Normal None Normal      Pronator Teres Normal None Normal      Extensor Indices Normal None Normal      1st Dorsal Interosseus Normal None Normal      ADM Normal None Normal      Abductor Pollicis Br.  Increased ++ Dec #, Larger, Polys          FINDINGS:   EMG of the cervical paraspinals and the right upper limb demonstrated no paraspinal muscle membrane irritability. No positive waves or fibrillations were identified in proximal musculature. Significan positive waves and fibrillations were localized to the APB muscle of the right hand. A diminished number of larger, polyphasic motor units were seen in the right APB muscle. Median sensory and motor latencies were significantly delayed across both wrists. The median motor evoked amplitudes were sharply diminished and the forearm conduction velocities were slowed. Ulnar sensory latencies were also delayed across the wrists with more normal motor conductions. IMPRESSION:      1. Abnormal EMG. 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 2. Minor ulnar sensory neuropathies at the wrists. 3. No evidence of a focal spinal nerve root injury (radiculopathy), plexopathy, generalized peripheral neuropathy or primary muscle disease.          Thank you for this interesting referral.

## 2018-03-29 ENCOUNTER — TELEPHONE (OUTPATIENT)
Dept: INTERNAL MEDICINE CLINIC | Age: 73
End: 2018-03-29

## 2018-03-29 NOTE — TELEPHONE ENCOUNTER
Call bilateral moderately severe carpal tunnel syndrome. Can refer to hand surgeon in [place].

## 2018-04-02 PROBLEM — K80.10 CCC (CHRONIC CALCULOUS CHOLECYSTITIS): Status: ACTIVE | Noted: 2018-04-02

## 2018-04-06 DIAGNOSIS — F51.04 CHRONIC INSOMNIA: ICD-10-CM

## 2018-04-06 RX ORDER — ZOLPIDEM TARTRATE 5 MG/1
5 TABLET ORAL NIGHTLY PRN
Qty: 90 TABLET | Refills: 0 | Status: CANCELLED | OUTPATIENT
Start: 2018-04-06 | End: 2018-07-05

## 2018-04-06 RX ORDER — ZOLPIDEM TARTRATE 5 MG/1
5 TABLET ORAL NIGHTLY PRN
Qty: 90 TABLET | Refills: 0 | Status: SHIPPED | OUTPATIENT
Start: 2018-04-06 | End: 2018-07-12 | Stop reason: SDUPTHER

## 2018-04-09 DIAGNOSIS — M48.061 SPINAL STENOSIS OF LUMBAR REGION WITHOUT NEUROGENIC CLAUDICATION: Primary | ICD-10-CM

## 2018-04-09 RX ORDER — TRAMADOL HYDROCHLORIDE 50 MG/1
50 TABLET ORAL 2 TIMES DAILY PRN
Qty: 60 TABLET | Refills: 2 | Status: SHIPPED | OUTPATIENT
Start: 2018-04-09 | End: 2018-08-27 | Stop reason: SDUPTHER

## 2018-04-19 DIAGNOSIS — Z12.31 SCREENING MAMMOGRAM, ENCOUNTER FOR: Primary | ICD-10-CM

## 2018-05-14 ENCOUNTER — OFFICE VISIT (OUTPATIENT)
Dept: INTERNAL MEDICINE CLINIC | Age: 73
End: 2018-05-14

## 2018-05-14 ENCOUNTER — HOSPITAL ENCOUNTER (OUTPATIENT)
Dept: OTHER | Age: 73
Discharge: HOME | End: 2018-05-14
Attending: INTERNAL MEDICINE | Admitting: INTERNAL MEDICINE

## 2018-05-14 VITALS
WEIGHT: 115 LBS | DIASTOLIC BLOOD PRESSURE: 70 MMHG | HEIGHT: 61 IN | HEART RATE: 80 BPM | SYSTOLIC BLOOD PRESSURE: 120 MMHG | BODY MASS INDEX: 21.71 KG/M2

## 2018-05-14 DIAGNOSIS — I10 ESSENTIAL HYPERTENSION: ICD-10-CM

## 2018-05-14 DIAGNOSIS — R73.02 GLUCOSE INTOLERANCE (IMPAIRED GLUCOSE TOLERANCE): ICD-10-CM

## 2018-05-14 DIAGNOSIS — Z12.11 COLON CANCER SCREENING: ICD-10-CM

## 2018-05-14 DIAGNOSIS — E03.4 HYPOTHYROIDISM DUE TO ACQUIRED ATROPHY OF THYROID: ICD-10-CM

## 2018-05-14 DIAGNOSIS — E55.9 VITAMIN D DEFICIENCY: ICD-10-CM

## 2018-05-14 DIAGNOSIS — C91.10 CLL (CHRONIC LYMPHOCYTIC LEUKEMIA) (HCC): Primary | ICD-10-CM

## 2018-05-14 DIAGNOSIS — E78.2 MIXED HYPERLIPIDEMIA: ICD-10-CM

## 2018-05-14 DIAGNOSIS — R00.2 PALPITATIONS: ICD-10-CM

## 2018-05-14 DIAGNOSIS — J44.9 CHRONIC OBSTRUCTIVE PULMONARY DISEASE, UNSPECIFIED COPD TYPE (HCC): ICD-10-CM

## 2018-05-14 LAB
ALBUMIN SERPL-MCNC: 4 GM/DL (ref 3.4–5)
ALP BLD-CCNC: 91 IU/L (ref 40–129)
ALT SERPL-CCNC: 12 U/L (ref 10–40)
ANION GAP SERPL CALCULATED.3IONS-SCNC: 13 MMOL/L (ref 4–16)
AST SERPL-CCNC: 19 IU/L (ref 15–37)
BASOPHILS ABSOLUTE: 0 /ΜL
BASOPHILS RELATIVE PERCENT: 0 %
BILIRUB SERPL-MCNC: 0.5 MG/DL (ref 0–1)
BILIRUBIN URINE: NEGATIVE
BLOOD, URINE: NEGATIVE
BUN BLDV-MCNC: 17 MG/DL (ref 6–23)
CALCIUM SERPL-MCNC: 9.1 MG/DL (ref 8.3–10.6)
CHLORIDE BLD-SCNC: 102 MMOL/L (ref 99–110)
CHOLESTEROL, TOTAL: 106 MG/DL (ref 0–199)
CLARITY: CLEAR
CO2: 29 MMOL/L (ref 21–32)
COLOR: YELLOW
CREAT SERPL-MCNC: 1.1 MG/DL (ref 0.6–1.1)
EOSINOPHILS ABSOLUTE: 0.06 /ΜL
EOSINOPHILS RELATIVE PERCENT: 0.5 %
EPITHELIAL CELLS, UA: 1 /HPF (ref 0–5)
FERRITIN: 102 NG/ML (ref 15–150)
GFR AFRICAN AMERICAN: 59 ML/MIN/1.73M2
GFR NON-AFRICAN AMERICAN: 49 ML/MIN/1.73M2
GLUCOSE BLD-MCNC: 100 MG/DL (ref 70–99)
GLUCOSE URINE: NEGATIVE MG/DL
HCT VFR BLD CALC: 32.2 % (ref 36–46)
HDLC SERPL-MCNC: 30 MG/DL (ref 40–60)
HEMOGLOBIN: 10.1 G/DL (ref 12–16)
HYALINE CASTS: 0 /LPF (ref 0–8)
KETONES, URINE: NEGATIVE MG/DL
LDL CHOLESTEROL CALCULATED: 28 MG/DL
LEUKOCYTE ESTERASE, URINE: NEGATIVE
LYMPHOCYTES ABSOLUTE: 7.26 /ΜL
LYMPHOCYTES RELATIVE PERCENT: 57.7 %
MCH RBC QN AUTO: 31.4 PG
MCHC RBC AUTO-ENTMCNC: 31.4 G/DL
MCV RBC AUTO: 100 FL
MICROSCOPIC EXAMINATION: NORMAL
MONOCYTES ABSOLUTE: 0.62 /ΜL
MONOCYTES RELATIVE PERCENT: 4.9 %
NEUTROPHILS ABSOLUTE: 4.64 /ΜL
NEUTROPHILS RELATIVE PERCENT: 36.9 %
NITRITE, URINE: NEGATIVE
PDW BLD-RTO: 13.4 %
PH UA: 6.5
PLATELET # BLD: 131 K/ΜL
PMV BLD AUTO: ABNORMAL FL
POTASSIUM SERPL-SCNC: 4.5 MMOL/L (ref 3.5–5.1)
PROTEIN UA: NEGATIVE MG/DL
RBC # BLD: 3.22 10^6/ΜL
RBC UA: 3 /HPF (ref 0–4)
SODIUM BLD-SCNC: 144 MMOL/L (ref 135–145)
SPECIFIC GRAVITY UA: 1.01
TOTAL PROTEIN: 6.7 GM/DL (ref 6.4–8.2)
TRIGL SERPL-MCNC: 240 MG/DL (ref 0–150)
TSH SERPL DL<=0.05 MIU/L-ACNC: 3.9 UIU/ML (ref 0.27–4.2)
UROBILINOGEN, URINE: 0.2 E.U./DL
VITAMIN D 25-HYDROXY: 56.6 NG/ML
VLDLC SERPL CALC-MCNC: 48 MG/DL
WBC # BLD: 12.6 10^3/ML
WBC UA: 1 /HPF (ref 0–5)

## 2018-05-14 PROCEDURE — 3023F SPIROM DOC REV: CPT | Performed by: INTERNAL MEDICINE

## 2018-05-14 PROCEDURE — 93000 ELECTROCARDIOGRAM COMPLETE: CPT | Performed by: INTERNAL MEDICINE

## 2018-05-14 PROCEDURE — 1123F ACP DISCUSS/DSCN MKR DOCD: CPT | Performed by: INTERNAL MEDICINE

## 2018-05-14 PROCEDURE — G8427 DOCREV CUR MEDS BY ELIG CLIN: HCPCS | Performed by: INTERNAL MEDICINE

## 2018-05-14 PROCEDURE — 1090F PRES/ABSN URINE INCON ASSESS: CPT | Performed by: INTERNAL MEDICINE

## 2018-05-14 PROCEDURE — 3017F COLORECTAL CA SCREEN DOC REV: CPT | Performed by: INTERNAL MEDICINE

## 2018-05-14 PROCEDURE — G8926 SPIRO NO PERF OR DOC: HCPCS | Performed by: INTERNAL MEDICINE

## 2018-05-14 PROCEDURE — 3014F SCREEN MAMMO DOC REV: CPT | Performed by: INTERNAL MEDICINE

## 2018-05-14 PROCEDURE — G8399 PT W/DXA RESULTS DOCUMENT: HCPCS | Performed by: INTERNAL MEDICINE

## 2018-05-14 PROCEDURE — G8420 CALC BMI NORM PARAMETERS: HCPCS | Performed by: INTERNAL MEDICINE

## 2018-05-14 PROCEDURE — 1036F TOBACCO NON-USER: CPT | Performed by: INTERNAL MEDICINE

## 2018-05-14 PROCEDURE — 4040F PNEUMOC VAC/ADMIN/RCVD: CPT | Performed by: INTERNAL MEDICINE

## 2018-05-14 PROCEDURE — 99215 OFFICE O/P EST HI 40 MIN: CPT | Performed by: INTERNAL MEDICINE

## 2018-05-14 ASSESSMENT — PATIENT HEALTH QUESTIONNAIRE - PHQ9
SUM OF ALL RESPONSES TO PHQ QUESTIONS 1-9: 0
2. FEELING DOWN, DEPRESSED OR HOPELESS: 0
SUM OF ALL RESPONSES TO PHQ9 QUESTIONS 1 & 2: 0
1. LITTLE INTEREST OR PLEASURE IN DOING THINGS: 0

## 2018-05-15 ENCOUNTER — HOSPITAL ENCOUNTER (OUTPATIENT)
Dept: WOMENS IMAGING | Age: 73
Discharge: HOME | End: 2018-05-15
Attending: INTERNAL MEDICINE | Admitting: INTERNAL MEDICINE

## 2018-05-15 DIAGNOSIS — Z12.31 SCREENING MAMMOGRAM, ENCOUNTER FOR: ICD-10-CM

## 2018-05-15 LAB
ESTIMATED AVERAGE GLUCOSE: 111.2 MG/DL
HBA1C MFR BLD: 5.5 %

## 2018-05-16 LAB
KAPPA QUANT FREE LIGHT CHAINS: 1.36
KAPPA/LAMBDA FREE LIGHT CHAIN RATIO: 0.06
LAMBDA FREE LIGHT CHAINS URINE/ VOL: 22.2

## 2018-05-21 LAB
ALBUMIN ELP: 3.3 GM/DL (ref 3.2–5.6)
ALPHA-1-GLOBULIN: 0.3 GM/DL (ref 0.1–0.4)
ALPHA-2-GLOBULIN: 0.9 GM/DL (ref 0.4–1.2)
BETA GLOBULIN: 0.8 GM/DL (ref 0.5–1.3)
GAMMA GLOBULIN: 1.4 GM/DL (ref 0.5–1.6)
IMMUNOFIXATION ELECTROPHORESIS 1: NORMAL
TOTAL PROTEIN: 6.7 GM/DL (ref 6.4–8.2)

## 2018-06-04 ENCOUNTER — OFFICE VISIT (OUTPATIENT)
Dept: INTERNAL MEDICINE CLINIC | Age: 73
End: 2018-06-04

## 2018-06-04 VITALS — DIASTOLIC BLOOD PRESSURE: 60 MMHG | SYSTOLIC BLOOD PRESSURE: 110 MMHG | HEART RATE: 72 BPM

## 2018-06-04 DIAGNOSIS — M75.21 BICEPS TENDINITIS OF RIGHT UPPER EXTREMITY: Primary | ICD-10-CM

## 2018-06-04 DIAGNOSIS — C91.10 CLL (CHRONIC LYMPHOCYTIC LEUKEMIA) (HCC): ICD-10-CM

## 2018-06-04 DIAGNOSIS — J44.9 CHRONIC OBSTRUCTIVE PULMONARY DISEASE, UNSPECIFIED COPD TYPE (HCC): ICD-10-CM

## 2018-06-04 DIAGNOSIS — D47.2 BICLONAL GAMMOPATHY: ICD-10-CM

## 2018-06-04 PROCEDURE — 3023F SPIROM DOC REV: CPT | Performed by: INTERNAL MEDICINE

## 2018-06-04 PROCEDURE — G8926 SPIRO NO PERF OR DOC: HCPCS | Performed by: INTERNAL MEDICINE

## 2018-06-04 PROCEDURE — 1090F PRES/ABSN URINE INCON ASSESS: CPT | Performed by: INTERNAL MEDICINE

## 2018-06-04 PROCEDURE — 3014F SCREEN MAMMO DOC REV: CPT | Performed by: INTERNAL MEDICINE

## 2018-06-04 PROCEDURE — 4040F PNEUMOC VAC/ADMIN/RCVD: CPT | Performed by: INTERNAL MEDICINE

## 2018-06-04 PROCEDURE — G8420 CALC BMI NORM PARAMETERS: HCPCS | Performed by: INTERNAL MEDICINE

## 2018-06-04 PROCEDURE — 3017F COLORECTAL CA SCREEN DOC REV: CPT | Performed by: INTERNAL MEDICINE

## 2018-06-04 PROCEDURE — 1123F ACP DISCUSS/DSCN MKR DOCD: CPT | Performed by: INTERNAL MEDICINE

## 2018-06-04 PROCEDURE — G8399 PT W/DXA RESULTS DOCUMENT: HCPCS | Performed by: INTERNAL MEDICINE

## 2018-06-04 PROCEDURE — 1036F TOBACCO NON-USER: CPT | Performed by: INTERNAL MEDICINE

## 2018-06-04 PROCEDURE — 99213 OFFICE O/P EST LOW 20 MIN: CPT | Performed by: INTERNAL MEDICINE

## 2018-06-04 PROCEDURE — G8427 DOCREV CUR MEDS BY ELIG CLIN: HCPCS | Performed by: INTERNAL MEDICINE

## 2018-07-12 ENCOUNTER — PATIENT MESSAGE (OUTPATIENT)
Dept: INTERNAL MEDICINE CLINIC | Age: 73
End: 2018-07-12

## 2018-07-12 DIAGNOSIS — F51.04 CHRONIC INSOMNIA: ICD-10-CM

## 2018-07-12 RX ORDER — ZOLPIDEM TARTRATE 5 MG/1
5 TABLET ORAL NIGHTLY PRN
Qty: 90 TABLET | Refills: 0 | Status: SHIPPED | OUTPATIENT
Start: 2018-07-12 | End: 2018-10-18 | Stop reason: SDUPTHER

## 2018-08-13 ENCOUNTER — HOSPITAL ENCOUNTER (OUTPATIENT)
Dept: OTHER | Age: 73
Discharge: HOME | End: 2018-08-13
Attending: INTERNAL MEDICINE | Admitting: INTERNAL MEDICINE

## 2018-08-13 LAB
ALBUMIN SERPL-MCNC: 4.1 GM/DL (ref 3.4–5)
ALP BLD-CCNC: 83 IU/L (ref 40–129)
ALT SERPL-CCNC: 11 U/L (ref 10–40)
ANION GAP SERPL CALCULATED.3IONS-SCNC: 12 MMOL/L (ref 4–16)
AST SERPL-CCNC: 16 IU/L (ref 15–37)
BILIRUB SERPL-MCNC: 0.4 MG/DL (ref 0–1)
BUN BLDV-MCNC: 24 MG/DL (ref 6–23)
CALCIUM SERPL-MCNC: 9.4 MG/DL (ref 8.3–10.6)
CHLORIDE BLD-SCNC: 98 MMOL/L (ref 99–110)
CO2: 29 MMOL/L (ref 21–32)
CREAT SERPL-MCNC: 1.1 MG/DL (ref 0.6–1.1)
FERRITIN: 133 NG/ML (ref 15–150)
GFR AFRICAN AMERICAN: 59 ML/MIN/1.73M2
GFR NON-AFRICAN AMERICAN: 49 ML/MIN/1.73M2
GLUCOSE BLD-MCNC: 106 MG/DL (ref 70–99)
POTASSIUM SERPL-SCNC: 4 MMOL/L (ref 3.5–5.1)
SODIUM BLD-SCNC: 139 MMOL/L (ref 135–145)
TOTAL PROTEIN: 6.9 GM/DL (ref 6.4–8.2)

## 2018-08-15 LAB
KAPPA QUANT FREE LIGHT CHAINS: 1.34
KAPPA/LAMBDA FREE LIGHT CHAIN RATIO: 0.07
LAMBDA FREE LIGHT CHAINS URINE/ VOL: 19.2

## 2018-08-16 LAB — TOTAL PROTEIN: 6.9 GM/DL (ref 6.4–8.2)

## 2018-08-18 LAB
Lab: NORMAL
Lab: NORMAL
TEST NAME: NORMAL
TEST NAME: NORMAL

## 2018-08-27 DIAGNOSIS — M48.061 SPINAL STENOSIS OF LUMBAR REGION WITHOUT NEUROGENIC CLAUDICATION: ICD-10-CM

## 2018-08-28 RX ORDER — TRAMADOL HYDROCHLORIDE 50 MG/1
50 TABLET ORAL 2 TIMES DAILY PRN
Qty: 60 TABLET | Refills: 2 | Status: SHIPPED | OUTPATIENT
Start: 2018-08-28 | End: 2019-01-30 | Stop reason: SDUPTHER

## 2018-09-14 DIAGNOSIS — R60.9 EDEMA, UNSPECIFIED TYPE: ICD-10-CM

## 2018-09-14 DIAGNOSIS — E78.2 MIXED HYPERLIPIDEMIA: ICD-10-CM

## 2018-09-14 DIAGNOSIS — R73.02 GLUCOSE INTOLERANCE (IMPAIRED GLUCOSE TOLERANCE): ICD-10-CM

## 2018-09-14 DIAGNOSIS — E03.4 HYPOTHYROIDISM DUE TO ACQUIRED ATROPHY OF THYROID: ICD-10-CM

## 2018-09-14 DIAGNOSIS — I10 ESSENTIAL HYPERTENSION: Primary | ICD-10-CM

## 2018-09-27 DIAGNOSIS — I10 ESSENTIAL HYPERTENSION: ICD-10-CM

## 2018-09-27 DIAGNOSIS — R60.9 EDEMA, UNSPECIFIED TYPE: ICD-10-CM

## 2018-09-27 DIAGNOSIS — E03.4 HYPOTHYROIDISM DUE TO ACQUIRED ATROPHY OF THYROID: ICD-10-CM

## 2018-09-27 DIAGNOSIS — E78.2 MIXED HYPERLIPIDEMIA: ICD-10-CM

## 2018-09-27 DIAGNOSIS — R73.02 GLUCOSE INTOLERANCE (IMPAIRED GLUCOSE TOLERANCE): ICD-10-CM

## 2018-09-27 LAB
ALBUMIN SERPL-MCNC: 4 G/DL (ref 3.4–5)
ALP BLD-CCNC: 81 U/L (ref 40–129)
ALT SERPL-CCNC: 11 U/L (ref 10–40)
ANION GAP SERPL CALCULATED.3IONS-SCNC: 17 MMOL/L (ref 3–16)
AST SERPL-CCNC: 16 U/L (ref 15–37)
BILIRUB SERPL-MCNC: 0.5 MG/DL (ref 0–1)
BILIRUBIN DIRECT: <0.2 MG/DL (ref 0–0.3)
BILIRUBIN, INDIRECT: NORMAL MG/DL (ref 0–1)
BUN BLDV-MCNC: 21 MG/DL (ref 7–20)
CALCIUM SERPL-MCNC: 9 MG/DL (ref 8.3–10.6)
CHLORIDE BLD-SCNC: 98 MMOL/L (ref 99–110)
CHOLESTEROL, TOTAL: 117 MG/DL (ref 0–199)
CO2: 27 MMOL/L (ref 21–32)
CREAT SERPL-MCNC: 1 MG/DL (ref 0.6–1.2)
GFR AFRICAN AMERICAN: >60
GFR NON-AFRICAN AMERICAN: 54
GLUCOSE BLD-MCNC: 66 MG/DL (ref 70–99)
HDLC SERPL-MCNC: 27 MG/DL (ref 40–60)
LDL CHOLESTEROL CALCULATED: 47 MG/DL
POTASSIUM SERPL-SCNC: 4.6 MMOL/L (ref 3.5–5.1)
SODIUM BLD-SCNC: 142 MMOL/L (ref 136–145)
TOTAL CK: 27 U/L (ref 26–192)
TOTAL PROTEIN: 7 G/DL (ref 6.4–8.2)
TRIGL SERPL-MCNC: 214 MG/DL (ref 0–150)
TSH SERPL DL<=0.05 MIU/L-ACNC: 4.15 UIU/ML (ref 0.27–4.2)
VLDLC SERPL CALC-MCNC: 43 MG/DL

## 2018-09-28 LAB
ATYPICAL LYMPHOCYTE RELATIVE PERCENT: 1 % (ref 0–6)
BASOPHILS ABSOLUTE: 0 K/UL (ref 0–0.2)
BASOPHILS RELATIVE PERCENT: 0 %
EOSINOPHILS ABSOLUTE: 0.1 K/UL (ref 0–0.6)
EOSINOPHILS RELATIVE PERCENT: 1 %
HCT VFR BLD CALC: 33.1 % (ref 36–48)
HEMATOLOGY PATH CONSULT: NO
HEMOGLOBIN: 10.6 G/DL (ref 12–16)
LYMPHOCYTES ABSOLUTE: 7.1 K/UL (ref 1–5.1)
LYMPHOCYTES RELATIVE PERCENT: 63 %
MCH RBC QN AUTO: 30.8 PG (ref 26–34)
MCHC RBC AUTO-ENTMCNC: 31.9 G/DL (ref 31–36)
MCV RBC AUTO: 96.5 FL (ref 80–100)
MONOCYTES ABSOLUTE: 0 K/UL (ref 0–1.3)
MONOCYTES RELATIVE PERCENT: 0 %
NEUTROPHILS ABSOLUTE: 3.9 K/UL (ref 1.7–7.7)
NEUTROPHILS RELATIVE PERCENT: 35 %
PDW BLD-RTO: 14.3 % (ref 12.4–15.4)
PLATELET # BLD: 155 K/UL (ref 135–450)
PMV BLD AUTO: 8.3 FL (ref 5–10.5)
RBC # BLD: 3.43 M/UL (ref 4–5.2)
RBC # BLD: NORMAL 10*6/UL
SLIDE REVIEW: ABNORMAL
WBC # BLD: 11.1 K/UL (ref 4–11)

## 2018-10-04 ENCOUNTER — OFFICE VISIT (OUTPATIENT)
Dept: INTERNAL MEDICINE CLINIC | Age: 73
End: 2018-10-04
Payer: MEDICARE

## 2018-10-04 VITALS
WEIGHT: 110 LBS | DIASTOLIC BLOOD PRESSURE: 70 MMHG | HEART RATE: 80 BPM | SYSTOLIC BLOOD PRESSURE: 120 MMHG | BODY MASS INDEX: 20.96 KG/M2

## 2018-10-04 DIAGNOSIS — Z23 NEEDS FLU SHOT: ICD-10-CM

## 2018-10-04 DIAGNOSIS — I10 ESSENTIAL HYPERTENSION: ICD-10-CM

## 2018-10-04 DIAGNOSIS — J44.9 CHRONIC OBSTRUCTIVE PULMONARY DISEASE, UNSPECIFIED COPD TYPE (HCC): Primary | ICD-10-CM

## 2018-10-04 DIAGNOSIS — C91.10 CLL (CHRONIC LYMPHOCYTIC LEUKEMIA) (HCC): ICD-10-CM

## 2018-10-04 DIAGNOSIS — R73.02 GLUCOSE INTOLERANCE (IMPAIRED GLUCOSE TOLERANCE): ICD-10-CM

## 2018-10-04 DIAGNOSIS — E78.2 MIXED HYPERLIPIDEMIA: ICD-10-CM

## 2018-10-04 PROCEDURE — 4040F PNEUMOC VAC/ADMIN/RCVD: CPT | Performed by: INTERNAL MEDICINE

## 2018-10-04 PROCEDURE — 1036F TOBACCO NON-USER: CPT | Performed by: INTERNAL MEDICINE

## 2018-10-04 PROCEDURE — 1123F ACP DISCUSS/DSCN MKR DOCD: CPT | Performed by: INTERNAL MEDICINE

## 2018-10-04 PROCEDURE — 3017F COLORECTAL CA SCREEN DOC REV: CPT | Performed by: INTERNAL MEDICINE

## 2018-10-04 PROCEDURE — G0008 ADMIN INFLUENZA VIRUS VAC: HCPCS | Performed by: INTERNAL MEDICINE

## 2018-10-04 PROCEDURE — G8427 DOCREV CUR MEDS BY ELIG CLIN: HCPCS | Performed by: INTERNAL MEDICINE

## 2018-10-04 PROCEDURE — 99213 OFFICE O/P EST LOW 20 MIN: CPT | Performed by: INTERNAL MEDICINE

## 2018-10-04 PROCEDURE — G8420 CALC BMI NORM PARAMETERS: HCPCS | Performed by: INTERNAL MEDICINE

## 2018-10-04 PROCEDURE — 3014F SCREEN MAMMO DOC REV: CPT | Performed by: INTERNAL MEDICINE

## 2018-10-04 PROCEDURE — 3023F SPIROM DOC REV: CPT | Performed by: INTERNAL MEDICINE

## 2018-10-04 PROCEDURE — 1101F PT FALLS ASSESS-DOCD LE1/YR: CPT | Performed by: INTERNAL MEDICINE

## 2018-10-04 PROCEDURE — G8399 PT W/DXA RESULTS DOCUMENT: HCPCS | Performed by: INTERNAL MEDICINE

## 2018-10-04 PROCEDURE — 90662 IIV NO PRSV INCREASED AG IM: CPT | Performed by: INTERNAL MEDICINE

## 2018-10-04 PROCEDURE — G8482 FLU IMMUNIZE ORDER/ADMIN: HCPCS | Performed by: INTERNAL MEDICINE

## 2018-10-04 PROCEDURE — 1090F PRES/ABSN URINE INCON ASSESS: CPT | Performed by: INTERNAL MEDICINE

## 2018-10-04 PROCEDURE — G8926 SPIRO NO PERF OR DOC: HCPCS | Performed by: INTERNAL MEDICINE

## 2018-10-15 ENCOUNTER — TELEPHONE (OUTPATIENT)
Dept: INTERNAL MEDICINE CLINIC | Age: 73
End: 2018-10-15

## 2018-10-15 ENCOUNTER — OFFICE VISIT (OUTPATIENT)
Dept: INTERNAL MEDICINE CLINIC | Age: 73
End: 2018-10-15
Payer: MEDICARE

## 2018-10-15 VITALS
HEART RATE: 79 BPM | DIASTOLIC BLOOD PRESSURE: 70 MMHG | OXYGEN SATURATION: 98 % | SYSTOLIC BLOOD PRESSURE: 120 MMHG | RESPIRATION RATE: 15 BRPM

## 2018-10-15 DIAGNOSIS — J40 BRONCHITIS: Primary | ICD-10-CM

## 2018-10-15 DIAGNOSIS — I10 ESSENTIAL HYPERTENSION: ICD-10-CM

## 2018-10-15 DIAGNOSIS — C91.10 CLL (CHRONIC LYMPHOCYTIC LEUKEMIA) (HCC): ICD-10-CM

## 2018-10-15 DIAGNOSIS — J44.1 COPD EXACERBATION (HCC): ICD-10-CM

## 2018-10-15 PROCEDURE — 3014F SCREEN MAMMO DOC REV: CPT | Performed by: INTERNAL MEDICINE

## 2018-10-15 PROCEDURE — G8482 FLU IMMUNIZE ORDER/ADMIN: HCPCS | Performed by: INTERNAL MEDICINE

## 2018-10-15 PROCEDURE — 1090F PRES/ABSN URINE INCON ASSESS: CPT | Performed by: INTERNAL MEDICINE

## 2018-10-15 PROCEDURE — 1123F ACP DISCUSS/DSCN MKR DOCD: CPT | Performed by: INTERNAL MEDICINE

## 2018-10-15 PROCEDURE — 1101F PT FALLS ASSESS-DOCD LE1/YR: CPT | Performed by: INTERNAL MEDICINE

## 2018-10-15 PROCEDURE — 4040F PNEUMOC VAC/ADMIN/RCVD: CPT | Performed by: INTERNAL MEDICINE

## 2018-10-15 PROCEDURE — G8926 SPIRO NO PERF OR DOC: HCPCS | Performed by: INTERNAL MEDICINE

## 2018-10-15 PROCEDURE — G8427 DOCREV CUR MEDS BY ELIG CLIN: HCPCS | Performed by: INTERNAL MEDICINE

## 2018-10-15 PROCEDURE — G8420 CALC BMI NORM PARAMETERS: HCPCS | Performed by: INTERNAL MEDICINE

## 2018-10-15 PROCEDURE — 1036F TOBACCO NON-USER: CPT | Performed by: INTERNAL MEDICINE

## 2018-10-15 PROCEDURE — 3023F SPIROM DOC REV: CPT | Performed by: INTERNAL MEDICINE

## 2018-10-15 PROCEDURE — 3017F COLORECTAL CA SCREEN DOC REV: CPT | Performed by: INTERNAL MEDICINE

## 2018-10-15 PROCEDURE — G8399 PT W/DXA RESULTS DOCUMENT: HCPCS | Performed by: INTERNAL MEDICINE

## 2018-10-15 PROCEDURE — 99213 OFFICE O/P EST LOW 20 MIN: CPT | Performed by: INTERNAL MEDICINE

## 2018-10-15 RX ORDER — AZITHROMYCIN 250 MG/1
TABLET, FILM COATED ORAL
Qty: 1 PACKET | Refills: 0 | Status: SHIPPED | OUTPATIENT
Start: 2018-10-15 | End: 2018-10-19

## 2018-10-15 RX ORDER — METHYLPREDNISOLONE 4 MG/1
TABLET ORAL
Qty: 1 KIT | Refills: 0 | Status: SHIPPED | OUTPATIENT
Start: 2018-10-15 | End: 2018-10-21

## 2018-10-16 RX ORDER — ALBUTEROL SULFATE 90 UG/1
2 AEROSOL, METERED RESPIRATORY (INHALATION) EVERY 6 HOURS PRN
Qty: 3 INHALER | Refills: 3 | Status: SHIPPED | OUTPATIENT
Start: 2018-10-16

## 2018-10-18 DIAGNOSIS — F51.04 CHRONIC INSOMNIA: ICD-10-CM

## 2018-10-19 RX ORDER — ZOLPIDEM TARTRATE 5 MG/1
5 TABLET ORAL NIGHTLY PRN
Qty: 90 TABLET | Refills: 0 | Status: SHIPPED | OUTPATIENT
Start: 2018-10-19 | End: 2019-01-24 | Stop reason: SDUPTHER

## 2018-11-01 ENCOUNTER — TELEPHONE (OUTPATIENT)
Dept: INTERNAL MEDICINE CLINIC | Age: 73
End: 2018-11-01

## 2018-11-01 RX ORDER — PREDNISONE 10 MG/1
10 TABLET ORAL 2 TIMES DAILY
Qty: 14 TABLET | Refills: 0 | Status: SHIPPED | OUTPATIENT
Start: 2018-11-01 | End: 2018-11-08

## 2018-11-12 ENCOUNTER — HOSPITAL ENCOUNTER (OUTPATIENT)
Age: 73
Setting detail: SPECIMEN
Discharge: HOME | End: 2018-11-12
Payer: MEDICARE

## 2018-11-12 LAB
ALBUMIN SERPL-MCNC: 4 GM/DL (ref 3.4–5)
ALP BLD-CCNC: 84 IU/L (ref 40–128)
ALT SERPL-CCNC: 12 U/L (ref 10–40)
ANION GAP SERPL CALCULATED.3IONS-SCNC: 13 MMOL/L (ref 4–16)
AST SERPL-CCNC: 14 IU/L (ref 15–37)
BILIRUB SERPL-MCNC: 0.5 MG/DL (ref 0–1)
BUN BLDV-MCNC: 21 MG/DL (ref 6–23)
CALCIUM SERPL-MCNC: 8.8 MG/DL (ref 8.3–10.6)
CHLORIDE BLD-SCNC: 101 MMOL/L (ref 99–110)
CO2: 29 MMOL/L (ref 21–32)
CREAT SERPL-MCNC: 1 MG/DL (ref 0.6–1.1)
FERRITIN: 165 NG/ML (ref 15–150)
GFR AFRICAN AMERICAN: >60 ML/MIN/1.73M2
GFR NON-AFRICAN AMERICAN: 54 ML/MIN/1.73M2
GLUCOSE BLD-MCNC: 107 MG/DL (ref 70–99)
IGA: <50 MG/DL (ref 69–382)
IGG,SERUM: 328 MG/DL (ref 723–1685)
IGM,SERUM: 1744 MG/DL (ref 62–277)
LACTATE DEHYDROGENASE: 128 IU/L (ref 120–246)
POTASSIUM SERPL-SCNC: 3.9 MMOL/L (ref 3.5–5.1)
SODIUM BLD-SCNC: 143 MMOL/L (ref 135–145)
TOTAL PROTEIN: 6.8 GM/DL (ref 6.4–8.2)

## 2018-11-12 PROCEDURE — 83883 ASSAY NEPHELOMETRY NOT SPEC: CPT

## 2018-11-12 PROCEDURE — 82728 ASSAY OF FERRITIN: CPT

## 2018-11-12 PROCEDURE — 80053 COMPREHEN METABOLIC PANEL: CPT

## 2018-11-12 PROCEDURE — 83615 LACTATE (LD) (LDH) ENZYME: CPT

## 2018-11-12 PROCEDURE — 86320 SERUM IMMUNOELECTROPHORESIS: CPT

## 2018-11-12 PROCEDURE — 82232 ASSAY OF BETA-2 PROTEIN: CPT

## 2018-11-12 PROCEDURE — 84165 PROTEIN E-PHORESIS SERUM: CPT

## 2018-11-12 PROCEDURE — 82784 ASSAY IGA/IGD/IGG/IGM EACH: CPT

## 2018-11-15 LAB
BETA-2 MICROGLOBULIN: 4.8
KAPPA QUANT FREE LIGHT CHAINS: 0.91
KAPPA/LAMBDA FREE LIGHT CHAIN RATIO: 0.05
LAMBDA FREE LIGHT CHAINS URINE/ VOL: 20.1

## 2018-11-20 LAB
ALBUMIN ELP: 3.3 GM/DL (ref 3.2–5.6)
ALPHA-1-GLOBULIN: 0.3 GM/DL (ref 0.1–0.4)
ALPHA-2-GLOBULIN: 0.9 GM/DL (ref 0.4–1.2)
BETA GLOBULIN: 0.8 GM/DL (ref 0.5–1.3)
GAMMA GLOBULIN: 1.5 GM/DL (ref 0.5–1.6)
TOTAL PROTEIN: 6.8 GM/DL (ref 6.4–8.2)

## 2018-12-04 RX ORDER — POTASSIUM CHLORIDE 600 MG/1
TABLET, FILM COATED, EXTENDED RELEASE ORAL
Qty: 90 TABLET | Refills: 3 | Status: SHIPPED | OUTPATIENT
Start: 2018-12-04 | End: 2019-11-21 | Stop reason: SDUPTHER

## 2018-12-13 ENCOUNTER — OFFICE VISIT (OUTPATIENT)
Dept: INTERNAL MEDICINE CLINIC | Age: 73
End: 2018-12-13
Payer: MEDICARE

## 2018-12-13 VITALS
DIASTOLIC BLOOD PRESSURE: 70 MMHG | BODY MASS INDEX: 21.22 KG/M2 | HEART RATE: 80 BPM | WEIGHT: 111.4 LBS | SYSTOLIC BLOOD PRESSURE: 110 MMHG

## 2018-12-13 DIAGNOSIS — H65.192 OTHER ACUTE NONSUPPURATIVE OTITIS MEDIA OF LEFT EAR, RECURRENCE NOT SPECIFIED: ICD-10-CM

## 2018-12-13 DIAGNOSIS — J01.00 ACUTE NON-RECURRENT MAXILLARY SINUSITIS: Primary | ICD-10-CM

## 2018-12-13 DIAGNOSIS — J44.9 CHRONIC OBSTRUCTIVE PULMONARY DISEASE, UNSPECIFIED COPD TYPE (HCC): ICD-10-CM

## 2018-12-13 DIAGNOSIS — C91.10 CLL (CHRONIC LYMPHOCYTIC LEUKEMIA) (HCC): ICD-10-CM

## 2018-12-13 PROCEDURE — 4040F PNEUMOC VAC/ADMIN/RCVD: CPT | Performed by: INTERNAL MEDICINE

## 2018-12-13 PROCEDURE — 1036F TOBACCO NON-USER: CPT | Performed by: INTERNAL MEDICINE

## 2018-12-13 PROCEDURE — G8420 CALC BMI NORM PARAMETERS: HCPCS | Performed by: INTERNAL MEDICINE

## 2018-12-13 PROCEDURE — 3023F SPIROM DOC REV: CPT | Performed by: INTERNAL MEDICINE

## 2018-12-13 PROCEDURE — 1101F PT FALLS ASSESS-DOCD LE1/YR: CPT | Performed by: INTERNAL MEDICINE

## 2018-12-13 PROCEDURE — 1090F PRES/ABSN URINE INCON ASSESS: CPT | Performed by: INTERNAL MEDICINE

## 2018-12-13 PROCEDURE — G8482 FLU IMMUNIZE ORDER/ADMIN: HCPCS | Performed by: INTERNAL MEDICINE

## 2018-12-13 PROCEDURE — 1123F ACP DISCUSS/DSCN MKR DOCD: CPT | Performed by: INTERNAL MEDICINE

## 2018-12-13 PROCEDURE — G8926 SPIRO NO PERF OR DOC: HCPCS | Performed by: INTERNAL MEDICINE

## 2018-12-13 PROCEDURE — G8399 PT W/DXA RESULTS DOCUMENT: HCPCS | Performed by: INTERNAL MEDICINE

## 2018-12-13 PROCEDURE — 3017F COLORECTAL CA SCREEN DOC REV: CPT | Performed by: INTERNAL MEDICINE

## 2018-12-13 PROCEDURE — 3014F SCREEN MAMMO DOC REV: CPT | Performed by: INTERNAL MEDICINE

## 2018-12-13 PROCEDURE — 99213 OFFICE O/P EST LOW 20 MIN: CPT | Performed by: INTERNAL MEDICINE

## 2018-12-13 PROCEDURE — G8427 DOCREV CUR MEDS BY ELIG CLIN: HCPCS | Performed by: INTERNAL MEDICINE

## 2018-12-13 RX ORDER — AMOXICILLIN AND CLAVULANATE POTASSIUM 875; 125 MG/1; MG/1
1 TABLET, FILM COATED ORAL 2 TIMES DAILY
Qty: 20 TABLET | Refills: 0 | Status: SHIPPED | OUTPATIENT
Start: 2018-12-13 | End: 2018-12-23

## 2019-01-03 ENCOUNTER — OFFICE VISIT (OUTPATIENT)
Dept: INTERNAL MEDICINE CLINIC | Age: 74
End: 2019-01-03
Payer: MEDICARE

## 2019-01-03 VITALS — HEART RATE: 64 BPM | RESPIRATION RATE: 15 BRPM | DIASTOLIC BLOOD PRESSURE: 72 MMHG | SYSTOLIC BLOOD PRESSURE: 118 MMHG

## 2019-01-03 DIAGNOSIS — J44.9 CHRONIC OBSTRUCTIVE PULMONARY DISEASE, UNSPECIFIED COPD TYPE (HCC): ICD-10-CM

## 2019-01-03 DIAGNOSIS — H72.92 LEFT OTITIS MEDIA WITH SPONTANEOUS RUPTURE OF EARDRUM: Primary | ICD-10-CM

## 2019-01-03 DIAGNOSIS — C91.10 CLL (CHRONIC LYMPHOCYTIC LEUKEMIA) (HCC): ICD-10-CM

## 2019-01-03 DIAGNOSIS — I10 ESSENTIAL HYPERTENSION: ICD-10-CM

## 2019-01-03 DIAGNOSIS — H66.92 LEFT OTITIS MEDIA WITH SPONTANEOUS RUPTURE OF EARDRUM: Primary | ICD-10-CM

## 2019-01-03 PROCEDURE — 1036F TOBACCO NON-USER: CPT | Performed by: INTERNAL MEDICINE

## 2019-01-03 PROCEDURE — 1101F PT FALLS ASSESS-DOCD LE1/YR: CPT | Performed by: INTERNAL MEDICINE

## 2019-01-03 PROCEDURE — G8427 DOCREV CUR MEDS BY ELIG CLIN: HCPCS | Performed by: INTERNAL MEDICINE

## 2019-01-03 PROCEDURE — 4040F PNEUMOC VAC/ADMIN/RCVD: CPT | Performed by: INTERNAL MEDICINE

## 2019-01-03 PROCEDURE — G8926 SPIRO NO PERF OR DOC: HCPCS | Performed by: INTERNAL MEDICINE

## 2019-01-03 PROCEDURE — 1123F ACP DISCUSS/DSCN MKR DOCD: CPT | Performed by: INTERNAL MEDICINE

## 2019-01-03 PROCEDURE — 3023F SPIROM DOC REV: CPT | Performed by: INTERNAL MEDICINE

## 2019-01-03 PROCEDURE — 99213 OFFICE O/P EST LOW 20 MIN: CPT | Performed by: INTERNAL MEDICINE

## 2019-01-03 PROCEDURE — G8399 PT W/DXA RESULTS DOCUMENT: HCPCS | Performed by: INTERNAL MEDICINE

## 2019-01-03 PROCEDURE — 1090F PRES/ABSN URINE INCON ASSESS: CPT | Performed by: INTERNAL MEDICINE

## 2019-01-03 PROCEDURE — G8420 CALC BMI NORM PARAMETERS: HCPCS | Performed by: INTERNAL MEDICINE

## 2019-01-03 PROCEDURE — 3017F COLORECTAL CA SCREEN DOC REV: CPT | Performed by: INTERNAL MEDICINE

## 2019-01-03 PROCEDURE — G8482 FLU IMMUNIZE ORDER/ADMIN: HCPCS | Performed by: INTERNAL MEDICINE

## 2019-01-15 RX ORDER — ESOMEPRAZOLE MAGNESIUM 40 MG/1
CAPSULE, DELAYED RELEASE ORAL
Qty: 90 CAPSULE | Refills: 3 | Status: SHIPPED | OUTPATIENT
Start: 2019-01-15 | End: 2020-10-21 | Stop reason: SDUPTHER

## 2019-01-24 DIAGNOSIS — F51.04 CHRONIC INSOMNIA: ICD-10-CM

## 2019-01-24 RX ORDER — ZOLPIDEM TARTRATE 5 MG/1
5 TABLET ORAL NIGHTLY PRN
Qty: 90 TABLET | Refills: 0 | Status: SHIPPED | OUTPATIENT
Start: 2019-01-24 | End: 2019-04-12 | Stop reason: SDUPTHER

## 2019-01-30 DIAGNOSIS — M48.061 SPINAL STENOSIS OF LUMBAR REGION WITHOUT NEUROGENIC CLAUDICATION: ICD-10-CM

## 2019-01-31 RX ORDER — TRAMADOL HYDROCHLORIDE 50 MG/1
50 TABLET ORAL 2 TIMES DAILY PRN
Qty: 60 TABLET | Refills: 2 | Status: SHIPPED | OUTPATIENT
Start: 2019-01-31 | End: 2019-05-01

## 2019-02-20 RX ORDER — LEVOTHYROXINE SODIUM 88 MCG
TABLET ORAL
Qty: 90 TABLET | Refills: 3 | Status: SHIPPED | OUTPATIENT
Start: 2019-02-20 | End: 2020-02-05 | Stop reason: SDUPTHER

## 2019-03-11 ENCOUNTER — HOSPITAL ENCOUNTER (OUTPATIENT)
Age: 74
Setting detail: SPECIMEN
Discharge: HOME | End: 2019-03-11
Payer: MEDICARE

## 2019-03-11 LAB
ALBUMIN SERPL-MCNC: 4 GM/DL (ref 3.4–5)
ALP BLD-CCNC: 79 IU/L (ref 40–129)
ALT SERPL-CCNC: 9 U/L (ref 10–40)
ANION GAP SERPL CALCULATED.3IONS-SCNC: 11 MMOL/L (ref 4–16)
AST SERPL-CCNC: 13 IU/L (ref 15–37)
BASOPHILS ABSOLUTE: 0.01 /ΜL
BASOPHILS RELATIVE PERCENT: 0.1 %
BILIRUB SERPL-MCNC: 0.4 MG/DL (ref 0–1)
BUN BLDV-MCNC: 22 MG/DL (ref 6–23)
CALCIUM SERPL-MCNC: 8.5 MG/DL (ref 8.3–10.6)
CHLORIDE BLD-SCNC: 103 MMOL/L (ref 99–110)
CO2: 26 MMOL/L (ref 21–32)
CREAT SERPL-MCNC: 1 MG/DL (ref 0.6–1.1)
EOSINOPHILS ABSOLUTE: 0.07 /ΜL
EOSINOPHILS RELATIVE PERCENT: 0.6 %
FERRITIN: 113 NG/ML (ref 15–150)
GFR AFRICAN AMERICAN: >60 ML/MIN/1.73M2
GFR NON-AFRICAN AMERICAN: 54 ML/MIN/1.73M2
GLUCOSE BLD-MCNC: 110 MG/DL (ref 70–99)
HCT VFR BLD CALC: 31.2 % (ref 36–46)
HEMOGLOBIN: 9.7 G/DL (ref 12–16)
IGA: <50 MG/DL (ref 69–382)
IGG,SERUM: <300 MG/DL (ref 723–1685)
IGM,SERUM: 2035 MG/DL (ref 62–277)
LACTATE DEHYDROGENASE: 117 IU/L (ref 120–246)
LYMPHOCYTES ABSOLUTE: 6.76 /ΜL
LYMPHOCYTES RELATIVE PERCENT: 59.1 %
MCH RBC QN AUTO: 30.4 PG
MCHC RBC AUTO-ENTMCNC: 31.1 G/DL
MCV RBC AUTO: 97.8 FL
MONOCYTES ABSOLUTE: 0.6 /ΜL
MONOCYTES RELATIVE PERCENT: 5.2 %
NEUTROPHILS ABSOLUTE: 4 /ΜL
NEUTROPHILS RELATIVE PERCENT: 35 %
PDW BLD-RTO: 13.9 %
PLATELET # BLD: 125 K/ΜL
PMV BLD AUTO: ABNORMAL FL
POTASSIUM SERPL-SCNC: 3.9 MMOL/L (ref 3.5–5.1)
RBC # BLD: 3.19 10^6/ΜL
SODIUM BLD-SCNC: 140 MMOL/L (ref 135–145)
TOTAL PROTEIN: 6.7 GM/DL (ref 6.4–8.2)
WBC # BLD: 11.4 10^3/ML

## 2019-03-11 PROCEDURE — 82728 ASSAY OF FERRITIN: CPT

## 2019-03-11 PROCEDURE — 82784 ASSAY IGA/IGD/IGG/IGM EACH: CPT

## 2019-03-11 PROCEDURE — 80053 COMPREHEN METABOLIC PANEL: CPT

## 2019-03-11 PROCEDURE — 83615 LACTATE (LD) (LDH) ENZYME: CPT

## 2019-03-11 PROCEDURE — 83883 ASSAY NEPHELOMETRY NOT SPEC: CPT

## 2019-03-11 PROCEDURE — 86320 SERUM IMMUNOELECTROPHORESIS: CPT

## 2019-03-11 PROCEDURE — 84165 PROTEIN E-PHORESIS SERUM: CPT

## 2019-03-12 LAB
ALBUMIN ELP: 3.4 GM/DL (ref 3.2–5.6)
ALPHA-1-GLOBULIN: 0.3 GM/DL (ref 0.1–0.4)
ALPHA-2-GLOBULIN: 0.9 GM/DL (ref 0.4–1.2)
BETA GLOBULIN: 0.8 GM/DL (ref 0.5–1.3)
GAMMA GLOBULIN: 1.4 GM/DL (ref 0.5–1.6)
SPEP INTERPRETATION: NORMAL
SPEP INTERPRETATION: NORMAL
TOTAL PROTEIN: 6.7 GM/DL (ref 6.4–8.2)

## 2019-03-13 ENCOUNTER — TELEPHONE (OUTPATIENT)
Dept: INTERNAL MEDICINE CLINIC | Age: 74
End: 2019-03-13

## 2019-03-13 RX ORDER — AZITHROMYCIN 250 MG/1
TABLET, FILM COATED ORAL
Qty: 1 PACKET | Refills: 0 | Status: SHIPPED | OUTPATIENT
Start: 2019-03-13 | End: 2019-03-23

## 2019-03-13 RX ORDER — METHYLPREDNISOLONE 4 MG/1
TABLET ORAL
Qty: 1 KIT | Refills: 0 | Status: SHIPPED | OUTPATIENT
Start: 2019-03-13 | End: 2019-03-19

## 2019-03-14 LAB
KAPPA QUANT FREE LIGHT CHAINS: 1.04 MG/DL (ref 0.33–1.94)
KAPPA/LAMBDA FREE LIGHT CHAIN RATIO: 0.05 (ref 0.26–1.65)
KAPPA/LAMBDA FREE LIGHT CHAIN RATIO: ABNORMAL (ref 0.26–1.65)
LAMBDA FREE LIGHT CHAINS URINE/ VOL: 23.1 MG/DL (ref 0.57–2.63)

## 2019-03-18 ENCOUNTER — TELEPHONE (OUTPATIENT)
Dept: INTERNAL MEDICINE CLINIC | Age: 74
End: 2019-03-18

## 2019-03-19 ENCOUNTER — OFFICE VISIT (OUTPATIENT)
Dept: INTERNAL MEDICINE CLINIC | Age: 74
End: 2019-03-19
Payer: MEDICARE

## 2019-03-19 VITALS
SYSTOLIC BLOOD PRESSURE: 124 MMHG | HEART RATE: 82 BPM | BODY MASS INDEX: 20.57 KG/M2 | DIASTOLIC BLOOD PRESSURE: 76 MMHG | OXYGEN SATURATION: 92 % | WEIGHT: 108 LBS | RESPIRATION RATE: 16 BRPM

## 2019-03-19 DIAGNOSIS — I10 ESSENTIAL HYPERTENSION: ICD-10-CM

## 2019-03-19 DIAGNOSIS — C91.10 CLL (CHRONIC LYMPHOCYTIC LEUKEMIA) (HCC): ICD-10-CM

## 2019-03-19 DIAGNOSIS — J40 BRONCHITIS: ICD-10-CM

## 2019-03-19 DIAGNOSIS — J44.1 COPD EXACERBATION (HCC): Primary | ICD-10-CM

## 2019-03-19 PROCEDURE — 3023F SPIROM DOC REV: CPT | Performed by: INTERNAL MEDICINE

## 2019-03-19 PROCEDURE — G8926 SPIRO NO PERF OR DOC: HCPCS | Performed by: INTERNAL MEDICINE

## 2019-03-19 PROCEDURE — G8482 FLU IMMUNIZE ORDER/ADMIN: HCPCS | Performed by: INTERNAL MEDICINE

## 2019-03-19 PROCEDURE — G8420 CALC BMI NORM PARAMETERS: HCPCS | Performed by: INTERNAL MEDICINE

## 2019-03-19 PROCEDURE — G8427 DOCREV CUR MEDS BY ELIG CLIN: HCPCS | Performed by: INTERNAL MEDICINE

## 2019-03-19 PROCEDURE — 3017F COLORECTAL CA SCREEN DOC REV: CPT | Performed by: INTERNAL MEDICINE

## 2019-03-19 PROCEDURE — 1090F PRES/ABSN URINE INCON ASSESS: CPT | Performed by: INTERNAL MEDICINE

## 2019-03-19 PROCEDURE — 99213 OFFICE O/P EST LOW 20 MIN: CPT | Performed by: INTERNAL MEDICINE

## 2019-03-19 PROCEDURE — 1036F TOBACCO NON-USER: CPT | Performed by: INTERNAL MEDICINE

## 2019-03-19 PROCEDURE — 1123F ACP DISCUSS/DSCN MKR DOCD: CPT | Performed by: INTERNAL MEDICINE

## 2019-03-19 PROCEDURE — G8399 PT W/DXA RESULTS DOCUMENT: HCPCS | Performed by: INTERNAL MEDICINE

## 2019-03-19 PROCEDURE — 4040F PNEUMOC VAC/ADMIN/RCVD: CPT | Performed by: INTERNAL MEDICINE

## 2019-03-19 RX ORDER — CEFUROXIME AXETIL 250 MG/1
250 TABLET ORAL 2 TIMES DAILY
Qty: 20 TABLET | Refills: 0 | Status: SHIPPED | OUTPATIENT
Start: 2019-03-19 | End: 2019-03-29

## 2019-03-19 RX ORDER — PREDNISONE 10 MG/1
TABLET ORAL
Qty: 20 TABLET | Refills: 0 | Status: SHIPPED | OUTPATIENT
Start: 2019-03-19 | End: 2019-03-29

## 2019-03-19 ASSESSMENT — PATIENT HEALTH QUESTIONNAIRE - PHQ9
SUM OF ALL RESPONSES TO PHQ QUESTIONS 1-9: 0
SUM OF ALL RESPONSES TO PHQ9 QUESTIONS 1 & 2: 0
SUM OF ALL RESPONSES TO PHQ QUESTIONS 1-9: 0
2. FEELING DOWN, DEPRESSED OR HOPELESS: 0
1. LITTLE INTEREST OR PLEASURE IN DOING THINGS: 0

## 2019-04-12 ENCOUNTER — OFFICE VISIT (OUTPATIENT)
Dept: INTERNAL MEDICINE CLINIC | Age: 74
End: 2019-04-12
Payer: MEDICARE

## 2019-04-12 VITALS — RESPIRATION RATE: 15 BRPM | DIASTOLIC BLOOD PRESSURE: 78 MMHG | HEART RATE: 76 BPM | SYSTOLIC BLOOD PRESSURE: 130 MMHG

## 2019-04-12 DIAGNOSIS — J44.9 CHRONIC OBSTRUCTIVE PULMONARY DISEASE, UNSPECIFIED COPD TYPE (HCC): Primary | ICD-10-CM

## 2019-04-12 DIAGNOSIS — I10 ESSENTIAL HYPERTENSION: ICD-10-CM

## 2019-04-12 DIAGNOSIS — C91.10 CLL (CHRONIC LYMPHOCYTIC LEUKEMIA) (HCC): ICD-10-CM

## 2019-04-12 DIAGNOSIS — F51.04 CHRONIC INSOMNIA: ICD-10-CM

## 2019-04-12 PROCEDURE — 1036F TOBACCO NON-USER: CPT | Performed by: INTERNAL MEDICINE

## 2019-04-12 PROCEDURE — G8427 DOCREV CUR MEDS BY ELIG CLIN: HCPCS | Performed by: INTERNAL MEDICINE

## 2019-04-12 PROCEDURE — 3023F SPIROM DOC REV: CPT | Performed by: INTERNAL MEDICINE

## 2019-04-12 PROCEDURE — 3017F COLORECTAL CA SCREEN DOC REV: CPT | Performed by: INTERNAL MEDICINE

## 2019-04-12 PROCEDURE — 1123F ACP DISCUSS/DSCN MKR DOCD: CPT | Performed by: INTERNAL MEDICINE

## 2019-04-12 PROCEDURE — 1090F PRES/ABSN URINE INCON ASSESS: CPT | Performed by: INTERNAL MEDICINE

## 2019-04-12 PROCEDURE — 99213 OFFICE O/P EST LOW 20 MIN: CPT | Performed by: INTERNAL MEDICINE

## 2019-04-12 PROCEDURE — G8420 CALC BMI NORM PARAMETERS: HCPCS | Performed by: INTERNAL MEDICINE

## 2019-04-12 PROCEDURE — G8926 SPIRO NO PERF OR DOC: HCPCS | Performed by: INTERNAL MEDICINE

## 2019-04-12 PROCEDURE — G8399 PT W/DXA RESULTS DOCUMENT: HCPCS | Performed by: INTERNAL MEDICINE

## 2019-04-12 PROCEDURE — 4040F PNEUMOC VAC/ADMIN/RCVD: CPT | Performed by: INTERNAL MEDICINE

## 2019-04-12 RX ORDER — ZOLPIDEM TARTRATE 5 MG/1
5 TABLET ORAL NIGHTLY PRN
Qty: 90 TABLET | Refills: 0 | Status: SHIPPED | OUTPATIENT
Start: 2019-04-12 | End: 2019-07-18 | Stop reason: SDUPTHER

## 2019-04-17 ENCOUNTER — TELEPHONE (OUTPATIENT)
Dept: INTERNAL MEDICINE CLINIC | Age: 74
End: 2019-04-17

## 2019-04-17 NOTE — TELEPHONE ENCOUNTER
Patient sent message via [email or web address]:    Doc,     Last visit, 04/12, you wrote prescription for Zolpedem, 5mg. I chose to refill at [address] vs. mail-in.   [facility] says they can't refill till 04/28.  Today, 04/17, I have 4 pills left.  This leaves me a gap of  about 8 days with no coverage.  Can you write a prescription for a min. of 8 days till I can have the original prescription refilled;- OR, can you suggest something across-counter really strong enough to equal the [place] came up short because sometimes, not too often, one 5-mg Zolpedem will not put me to sleep and about 1 a.m. I take another.      Thanks. Caitlin Agustin

## 2019-04-18 NOTE — TELEPHONE ENCOUNTER
Can not write for extra Ambien, the current monitoring will not allow me. She can try OTC Tylenol PM for sleep.

## 2019-05-16 ENCOUNTER — OFFICE VISIT (OUTPATIENT)
Dept: INTERNAL MEDICINE CLINIC | Age: 74
End: 2019-05-16
Payer: MEDICARE

## 2019-05-16 VITALS
BODY MASS INDEX: 21.14 KG/M2 | HEIGHT: 61 IN | DIASTOLIC BLOOD PRESSURE: 64 MMHG | RESPIRATION RATE: 15 BRPM | WEIGHT: 112 LBS | SYSTOLIC BLOOD PRESSURE: 118 MMHG | HEART RATE: 76 BPM

## 2019-05-16 DIAGNOSIS — Z12.31 BREAST CANCER SCREENING BY MAMMOGRAM: ICD-10-CM

## 2019-05-16 DIAGNOSIS — C91.10 CLL (CHRONIC LYMPHOCYTIC LEUKEMIA) (HCC): Primary | ICD-10-CM

## 2019-05-16 DIAGNOSIS — I10 ESSENTIAL HYPERTENSION: ICD-10-CM

## 2019-05-16 DIAGNOSIS — R73.09 ELEVATED HEMOGLOBIN A1C: ICD-10-CM

## 2019-05-16 DIAGNOSIS — Z12.11 COLON CANCER SCREENING: ICD-10-CM

## 2019-05-16 DIAGNOSIS — E03.4 HYPOTHYROIDISM DUE TO ACQUIRED ATROPHY OF THYROID: ICD-10-CM

## 2019-05-16 DIAGNOSIS — J43.8 OTHER EMPHYSEMA (HCC): ICD-10-CM

## 2019-05-16 DIAGNOSIS — E78.2 MIXED HYPERLIPIDEMIA: ICD-10-CM

## 2019-05-16 DIAGNOSIS — E55.9 VITAMIN D DEFICIENCY: ICD-10-CM

## 2019-05-16 DIAGNOSIS — R00.2 PALPITATIONS: ICD-10-CM

## 2019-05-16 PROCEDURE — 3017F COLORECTAL CA SCREEN DOC REV: CPT | Performed by: INTERNAL MEDICINE

## 2019-05-16 PROCEDURE — 1036F TOBACCO NON-USER: CPT | Performed by: INTERNAL MEDICINE

## 2019-05-16 PROCEDURE — G8399 PT W/DXA RESULTS DOCUMENT: HCPCS | Performed by: INTERNAL MEDICINE

## 2019-05-16 PROCEDURE — 99215 OFFICE O/P EST HI 40 MIN: CPT | Performed by: INTERNAL MEDICINE

## 2019-05-16 PROCEDURE — G8427 DOCREV CUR MEDS BY ELIG CLIN: HCPCS | Performed by: INTERNAL MEDICINE

## 2019-05-16 PROCEDURE — 3023F SPIROM DOC REV: CPT | Performed by: INTERNAL MEDICINE

## 2019-05-16 PROCEDURE — G8420 CALC BMI NORM PARAMETERS: HCPCS | Performed by: INTERNAL MEDICINE

## 2019-05-16 PROCEDURE — G8926 SPIRO NO PERF OR DOC: HCPCS | Performed by: INTERNAL MEDICINE

## 2019-05-16 PROCEDURE — 1090F PRES/ABSN URINE INCON ASSESS: CPT | Performed by: INTERNAL MEDICINE

## 2019-05-16 PROCEDURE — 1123F ACP DISCUSS/DSCN MKR DOCD: CPT | Performed by: INTERNAL MEDICINE

## 2019-05-16 PROCEDURE — 4040F PNEUMOC VAC/ADMIN/RCVD: CPT | Performed by: INTERNAL MEDICINE

## 2019-05-16 PROCEDURE — 93000 ELECTROCARDIOGRAM COMPLETE: CPT | Performed by: INTERNAL MEDICINE

## 2019-05-16 ASSESSMENT — PATIENT HEALTH QUESTIONNAIRE - PHQ9
SUM OF ALL RESPONSES TO PHQ9 QUESTIONS 1 & 2: 0
2. FEELING DOWN, DEPRESSED OR HOPELESS: 0
SUM OF ALL RESPONSES TO PHQ QUESTIONS 1-9: 0
SUM OF ALL RESPONSES TO PHQ QUESTIONS 1-9: 0
1. LITTLE INTEREST OR PLEASURE IN DOING THINGS: 0

## 2019-05-16 NOTE — PROGRESS NOTES
esomeprazole (NEXIUM) 40 MG delayed release capsule TAKE 1 CAPSULE DAILY 90 capsule 3    potassium chloride (KLOR-CON) 8 MEQ extended release tablet TAKE 1 TABLET DAILY 90 tablet 3    albuterol sulfate HFA (VENTOLIN HFA) 108 (90 Base) MCG/ACT inhaler Inhale 2 puffs into the lungs every 6 hours as needed for Wheezing 3 Inhaler 3    zoster recombinant adjuvanted vaccine (SHINGRIX) 50 MCG SUSR injection 50 MCG IM then repeat 2-6 months. 0.5 mL 1    triamterene-hydrochlorothiazide (DYAZIDE) 37.5-25 MG per capsule TAKE 1 CAPSULE ONCE DAILY 90 capsule 3    Cholecalciferol (VITAMIN D-3) 5000 UNITS TABS Take  by mouth twice a week.  Multiple Vitamins-Minerals (CENTRUM SILVER PO) Take 1 tablet by mouth daily.  Calcium Carbonate-Vit D-Min (CALTRATE 600+D PLUS PO) Take 600 mg by mouth 2 times daily. No current facility-administered medications for this visit. ALL:  No known drug allergies. Intolerant to [employer], flexeril, evista. Failed Prilosec therapy. SH:  Quit smoking  with 30 pack years. No alcohol usage and avoids caffeine. FH:   Mother  age [de-identified] of NIDDM. Father  age 80 of prostate cancer. ROS:  General:      No weight loss or anorexia. No fever or night sweats. Heent:         No headaches, voice change, hoarseness, or swallowing difficulties. No visual disturbance or diplopia. Resp:          No Wheezing, coughing, shortness of breath, or hemoptysis. CV:              No rest or exertional chest pain. No dizziness, or syncope. She has noted some palpitations. GI:               No abdominal pain, change in bowel habits, hematochezia, or melanotic stools. :             No frequency, urgency, or hematuria. Neuro:         No symptoms of cranial nerve dysfunction, gait difficulties, or extremities weakness.               Immun:       Up to date on TD, zostavax, pneumovax, prevnar, and influenza. PE:      VS:              Blood pressure 118/64, pulse 76, resp. rate 15, height 5' 1\" (1.549 m), weight 112 lb (50.8 kg), not currently breastfeeding. GEN:           In no acute distress. Alert & oriented X 3. HEENT:       Left cerumen impaction. Right TM and canal are clear. Pupils equal and reactive to light. EOMs intact. Fundi were clear and the discs were flat. Oropharynx is unremarkable. No facial rash or lesions. NECK:        Supple and without palpable lymph nodes. Thyroid exam is normal.              LUNGS:      Diffuse decreased BS, no wheezing               CV:              Regular pulse. Normal S1 & S2. No murmur. No carotid bruits. BREASTS:  Palpation of breasts and axillary areas reveal no palpable lymphadenopathy or skin lesions. No skin retraction or dimpling is noted. ABD:           Bowel sounds are normal.  There is no distention. No bruits were auscultated. No area of tenderness, guarding, rebound, masses, or organomegaly. PELVIC:      Deferred              RECTAL:    No masses palpated, stool guaiac negative & non melanotic               EXT:           Mild lower ext edema L>R, no calf tenderness               SKIN:          No abnormal skin lesions. Back:          Tenderness and spasms of lumbar paraspinal muscles               NEURO:     Cranial Nerves II-XII were grossly intact. The gait was normal and the patient was moving all extremities well. IMP:     1. Hypertension              2. Hyperlipidemia              3. Hypothyroidism              4. GERD              5. Osteopenia               6. COPD              7. Lumbar DDD & DJD              8. Glucose intolerance              9. CLL                              PLAN:  1. Chem Profile, Lipid Profile, CBC, TSH, Urinalysis, CPK, Vitamin D, and EKG.               2. Hgba1c              3. Ten year screening colonoscopy with Dr Josue Francisco              4. Mammogram              5. Return in two weeks                      Forty-five minutes were spent with the patient by myself. Greater than 50% of the time was in discussion with the patient to  and coordination of care.  To review medical problems,  treatment of medical problems, review of systems, update problem list, and conduct a comprehensive physical exam.

## 2019-05-17 DIAGNOSIS — E78.2 MIXED HYPERLIPIDEMIA: ICD-10-CM

## 2019-05-17 DIAGNOSIS — R73.09 ELEVATED HEMOGLOBIN A1C: ICD-10-CM

## 2019-05-17 DIAGNOSIS — E03.4 HYPOTHYROIDISM DUE TO ACQUIRED ATROPHY OF THYROID: ICD-10-CM

## 2019-05-17 DIAGNOSIS — C91.10 CLL (CHRONIC LYMPHOCYTIC LEUKEMIA) (HCC): ICD-10-CM

## 2019-05-17 DIAGNOSIS — E55.9 VITAMIN D DEFICIENCY: ICD-10-CM

## 2019-05-17 DIAGNOSIS — I10 ESSENTIAL HYPERTENSION: ICD-10-CM

## 2019-05-17 LAB
ANION GAP SERPL CALCULATED.3IONS-SCNC: 15 MMOL/L (ref 3–16)
ANISOCYTOSIS: ABNORMAL
BASOPHILS ABSOLUTE: 0 K/UL (ref 0–0.2)
BASOPHILS RELATIVE PERCENT: 0 %
BILIRUBIN URINE: NEGATIVE
BLOOD, URINE: NEGATIVE
BUN BLDV-MCNC: 21 MG/DL (ref 7–20)
CALCIUM SERPL-MCNC: 9 MG/DL (ref 8.3–10.6)
CHLORIDE BLD-SCNC: 102 MMOL/L (ref 99–110)
CHOLESTEROL, TOTAL: 111 MG/DL (ref 0–199)
CLARITY: ABNORMAL
CO2: 25 MMOL/L (ref 21–32)
COLOR: YELLOW
CREAT SERPL-MCNC: 1 MG/DL (ref 0.6–1.2)
EOSINOPHILS ABSOLUTE: 0.1 K/UL (ref 0–0.6)
EOSINOPHILS RELATIVE PERCENT: 1 %
EPITHELIAL CELLS, UA: 1 /HPF (ref 0–5)
GFR AFRICAN AMERICAN: >60
GFR NON-AFRICAN AMERICAN: 54
GLUCOSE BLD-MCNC: 114 MG/DL (ref 70–99)
GLUCOSE URINE: NEGATIVE MG/DL
HCT VFR BLD CALC: 30.4 % (ref 36–48)
HDLC SERPL-MCNC: 18 MG/DL (ref 40–60)
HEMATOLOGY PATH CONSULT: NO
HEMOGLOBIN: 10.3 G/DL (ref 12–16)
HYALINE CASTS: 3 /LPF (ref 0–8)
KETONES, URINE: NEGATIVE MG/DL
LDL CHOLESTEROL CALCULATED: ABNORMAL MG/DL
LDL CHOLESTEROL DIRECT: 23 MG/DL
LEUKOCYTE ESTERASE, URINE: NEGATIVE
LYMPHOCYTES ABSOLUTE: 5.4 K/UL (ref 1–5.1)
LYMPHOCYTES RELATIVE PERCENT: 54 %
MCH RBC QN AUTO: 31.3 PG (ref 26–34)
MCHC RBC AUTO-ENTMCNC: 33.8 G/DL (ref 31–36)
MCV RBC AUTO: 92.7 FL (ref 80–100)
MICROSCOPIC EXAMINATION: YES
MONOCYTES ABSOLUTE: 0.2 K/UL (ref 0–1.3)
MONOCYTES RELATIVE PERCENT: 2 %
NEUTROPHILS ABSOLUTE: 4.3 K/UL (ref 1.7–7.7)
NEUTROPHILS RELATIVE PERCENT: 43 %
NITRITE, URINE: NEGATIVE
PDW BLD-RTO: 14.2 % (ref 12.4–15.4)
PH UA: 6 (ref 5–8)
PLATELET # BLD: 162 K/UL (ref 135–450)
PMV BLD AUTO: 8.1 FL (ref 5–10.5)
POTASSIUM SERPL-SCNC: 3.8 MMOL/L (ref 3.5–5.1)
PROTEIN UA: ABNORMAL MG/DL
RBC # BLD: 3.29 M/UL (ref 4–5.2)
RBC UA: 3 /HPF (ref 0–4)
SLIDE REVIEW: ABNORMAL
SODIUM BLD-SCNC: 142 MMOL/L (ref 136–145)
SPECIFIC GRAVITY UA: 1.02 (ref 1–1.03)
TRIGL SERPL-MCNC: 404 MG/DL (ref 0–150)
TSH SERPL DL<=0.05 MIU/L-ACNC: 6.46 UIU/ML (ref 0.27–4.2)
UROBILINOGEN, URINE: 0.2 E.U./DL
VITAMIN D 25-HYDROXY: 48.7 NG/ML
VLDLC SERPL CALC-MCNC: ABNORMAL MG/DL
WBC # BLD: 10 K/UL (ref 4–11)
WBC UA: 1 /HPF (ref 0–5)

## 2019-05-18 LAB
ESTIMATED AVERAGE GLUCOSE: 116.9 MG/DL
HBA1C MFR BLD: 5.7 %

## 2019-05-23 ENCOUNTER — APPOINTMENT (OUTPATIENT)
Dept: GENERAL RADIOLOGY | Age: 74
End: 2019-05-23
Payer: MEDICARE

## 2019-05-23 ENCOUNTER — HOSPITAL ENCOUNTER (EMERGENCY)
Age: 74
Discharge: HOME | End: 2019-05-23
Payer: MEDICARE

## 2019-05-23 VITALS
BODY MASS INDEX: 20.58 KG/M2 | DIASTOLIC BLOOD PRESSURE: 62 MMHG | OXYGEN SATURATION: 98 % | HEART RATE: 80 BPM | RESPIRATION RATE: 16 BRPM | SYSTOLIC BLOOD PRESSURE: 135 MMHG | HEIGHT: 61 IN | TEMPERATURE: 97.9 F | WEIGHT: 109 LBS

## 2019-05-23 DIAGNOSIS — S52.502A CLOSED FRACTURE OF DISTAL END OF LEFT RADIUS, UNSPECIFIED FRACTURE MORPHOLOGY, INITIAL ENCOUNTER: Primary | ICD-10-CM
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
## 2019-05-23 PROCEDURE — 6370000000 HC RX 637 (ALT 250 FOR IP): Performed by: PHYSICIAN ASSISTANT

## 2019-05-23 PROCEDURE — 4500000028 HC INTERMEDIATE PROCEDURE

## 2019-05-23 PROCEDURE — 99283 EMERGENCY DEPT VISIT LOW MDM: CPT

## 2019-05-23 PROCEDURE — 73110 X-RAY EXAM OF WRIST: CPT

## 2019-05-23 RX ORDER — ACETAMINOPHEN 500 MG
500 TABLET ORAL EVERY 6 HOURS PRN
Qty: 20 TABLET | Refills: 0 | Status: SHIPPED | OUTPATIENT
Start: 2019-05-23 | End: 2019-05-23 | Stop reason: ALTCHOICE

## 2019-05-23 RX ORDER — TRAMADOL HYDROCHLORIDE 50 MG/1
50 TABLET ORAL ONCE
Status: DISCONTINUED | OUTPATIENT
Start: 2019-05-23 | End: 2019-05-24 | Stop reason: HOSPADM

## 2019-05-23 RX ORDER — POLYETHYLENE GLYCOL 3350 17 G/17G
17 POWDER, FOR SOLUTION ORAL 2 TIMES DAILY
Qty: 1020 G | Refills: 0 | Status: ON HOLD | OUTPATIENT
Start: 2019-05-23 | End: 2019-06-06

## 2019-05-23 RX ORDER — HYDROCODONE BITARTRATE AND ACETAMINOPHEN 5; 325 MG/1; MG/1
1 TABLET ORAL ONCE
Status: COMPLETED | OUTPATIENT
Start: 2019-05-23 | End: 2019-05-23

## 2019-05-23 RX ORDER — HYDROCODONE BITARTRATE AND ACETAMINOPHEN 5; 325 MG/1; MG/1
1 TABLET ORAL EVERY 8 HOURS PRN
Qty: 9 TABLET | Refills: 0 | Status: SHIPPED | OUTPATIENT
Start: 2019-05-23 | End: 2019-05-26

## 2019-05-23 RX ORDER — ACETAMINOPHEN 500 MG
500 TABLET ORAL ONCE
Status: DISCONTINUED | OUTPATIENT
Start: 2019-05-23 | End: 2019-05-24 | Stop reason: HOSPADM

## 2019-05-23 RX ORDER — TRAMADOL HYDROCHLORIDE 50 MG/1
50 TABLET ORAL EVERY 6 HOURS PRN
Qty: 12 TABLET | Refills: 0 | Status: SHIPPED | OUTPATIENT
Start: 2019-05-23 | End: 2019-05-23 | Stop reason: ALTCHOICE

## 2019-05-23 RX ADMIN — HYDROCODONE BITARTRATE AND ACETAMINOPHEN 1 TABLET: 5; 325 TABLET ORAL at 20:23

## 2019-05-23 ASSESSMENT — PAIN SCALES - GENERAL
PAINLEVEL_OUTOF10: 6
PAINLEVEL_OUTOF10: 9
PAINLEVEL_OUTOF10: 9
PAINLEVEL_OUTOF10: 5

## 2019-05-23 ASSESSMENT — PAIN DESCRIPTION - PAIN TYPE: TYPE: ACUTE PAIN

## 2019-05-23 ASSESSMENT — PAIN DESCRIPTION - LOCATION: LOCATION: ARM

## 2019-05-23 ASSESSMENT — PAIN DESCRIPTION - ORIENTATION: ORIENTATION: LEFT

## 2019-05-24 NOTE — ED PROVIDER NOTES
eMERGENCY dEPARTMENT eNCOUnter      PCP: Rashaad Kenny MD    [facility]    Chief Complaint   Patient presents with    Arm Injury       HPI    Lindsay Marques is a 75 y.o. female who presents with Left wrist pain. Onset was a few hours PTA. The context is patient reports that she slipped and grass and fell landing on her left wrist. Patient denies symptoms preceding fall. The pain is localized to the wrist region. The pain severity is 6/10. The patient has no associated other injuries. The pain is aggravated by wrist movement and direct palpation. Patient denies hitting her head and denies any loss consciousness. Patient denies taking blood thinners. Patient denies other injuries. Patient denies distal numbness, tingling, weakness. Patient denies headache, nausea, vomiting, vision changes, hearing changes, speech changes, gait changes, memory loss, confusion, lightheadedness, dizziness. REVIEW OF SYSTEMS    General: Denies fever or chills  Cardiac: Denies chest pain or chestwall pain. Pulmonary: Denies shortness of breath  GI: Denies abdominal pain, vomiting, or diarrhea  : No dysuria or hematuria  Musculoskeletal: See HPI. No other injuries. Denies any other musculoskeletal injuries including elbow, shoulder,chest wall and back.     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 pylori infection 8/1996    S/P Biaxin and Prilosec    Hiatal hernia 8/1994    with reflux by UGI    Hyperlipidemia     Hypertension     Hypothyroidism     Osteoporosis     Smoking hx      Past Surgical History:   Procedure Prilosec [Omeprazole]      Pt sts meds didn't work    Skelaxin [Metaxalone]      Itching    Spiriva Handihaler [Tiotropium Bromide Monohydrate]        SOCIAL & FAMILY HISTORY    Social History     Socioeconomic History    Marital status:      Spouse name: None    Number of children: None    Years of education: None    Highest education level: None   Occupational History    None   Social Needs    Financial resource strain: None    Food insecurity:     Worry: None     Inability: None    Transportation needs:     Medical: None     Non-medical: None   Tobacco Use    Smoking status: Former Smoker     Packs/day: 2.00     Years: 30.00     Pack years: 60.00     Types: Cigarettes     Start date: 1965     Last attempt to quit: 1997     Years since quittin.4    Smokeless tobacco: Never Used   Substance and Sexual Activity    Alcohol use: No    Drug use: No    Sexual activity: None   Lifestyle    Physical activity:     Days per week: None     Minutes per session: None    Stress: None   Relationships    Social connections:     Talks on phone: None     Gets together: None     Attends Scientologist service: None     Active member of club or organization: None     Attends meetings of clubs or organizations: None     Relationship status: None    Intimate partner violence:     Fear of current or ex partner: None     Emotionally abused: None     Physically abused: None     Forced sexual activity: None   Other Topics Concern    None   Social History Narrative    None     History reviewed. No pertinent family history. PHYSICAL EXAM    VITAL SIGNS: /62   Pulse 80   Temp 97.9 °F (36.6 °C)   Resp 16   Ht 5' 1\" (1.549 m)   Wt 109 lb (49.4 kg)   SpO2 98%   BMI 20.60 kg/m²   Constitutional:  Well developed, well nourished, no acute distress, non-toxic appearance   HENT:  Atraumatic    Musculoskeletal:    Left  Wrist:  No gross deformity. No discoloration.  There is moderate swelling over the dorsal aspect. Distal dorsal radius is tender to palpation. No ulnar tenderness to palpation. No snuffbox tenderness. Range of motion of left wrist is limited due to pain. Distal sensation and capillary refill intact. No mid to proximal radius ulna tenderness to palpation. Elbow, including radial head is non-tender. No swelling, discoloration, or tenderness to palpation of the ipsilateral elbow and hand/fingers. Distal motor, sensation, capillary refill intact. SPINE: There is no cervical, thoracic or lumbar midline tenderness to palpation, step-offs, or acute deformities. No posterior midline cervical pain on ROM. Integument:  Well hydrated, no rash. Skin intact. Vascular: affected extremity distally neurovascularly intact - sensation and capillary refill intact. Neurologic:  Awake alert, normal flow of speech. Affected extremity is distally neurovascularly intact and strength 5/5 in affected extremity. Psychiatric: Cooperative, pleasant affect    RADIOLOGY/PROCEDURES    XR WRIST LEFT (MIN 3 VIEWS)   Final Result   Fracture of the distal radial metaphysis with intra-articular extension and   dorsal tilt. PROCEDURES   SPLINT PLACEMENT     A sugar tong splint was applied by myself to left upper extremity. The splint is in good position. The patient's extremity is neurovascularly intact after the splint placement. ED COURSE & MEDICAL DECISION MAKING       Vital signs and nursing notes reviewed during ED course. I have independently evaluated this patient. Supervising physician present in the Emergency Department, available for consultation, throughout entirety of patient care. History and exam consistent with mechanical fall resulting in left distal radius fracture. While in the ED today, left wrist xray was obtained and reveals last distal radial metaphyseal fracture with intra-articular extension and mild dorsal tilt.  Patient placed in sugar tong splint department for any new or worsening symptoms - strict return precautions given. Clinical  IMPRESSION    1. Closed fracture of distal end of left radius, unspecified fracture morphology, initial encounter    2. Fall from slip, trip, or stumble, initial encounter        Disposition referral (if applicable): Marie Chavez MD  [address] [place] [address], [address]  [facility]   [phone]    Schedule an appointment as soon as possible for a visit   Recheck as soon as possible    [facility] Emergency Department  [place]  [id number]  [phone]  Go to   Return to ED if symptoms worsen or new symptoms    Al Sanderson MD  [address] [place] [address]  [phone]    Call   Recheck as needed      Disposition medications (if applicable):  New Prescriptions    HYDROCODONE-ACETAMINOPHEN (NORCO) 5-325 MG PER TABLET    Take 1 tablet by mouth every 8 hours as needed for Pain for up to 3 days. Take the least effective dose for the at least amount of time possible. Do not take within 12 hours of taking tramadol. POLYETHYLENE GLYCOL (MIRALAX) POWDER    Take 17 g by mouth 2 times daily When beginning to have regular daily bowel movements, then changed to 17 g by mouth once daily       Comment: Please note this report has been produced using speech recognition software and may contain errors related to that system including errors in grammar, punctuation, and spelling, as well as words and phrases that may be inappropriate. If there are any questions or concerns please feel free to contact the dictating provider for clarification.               Phoebe Aceves PA-C  05/23/19 2649

## 2019-05-24 NOTE — ED NOTES
This RN at bedside to medicate pt per orders and pt and pt's  feel like the Tramadol and Tylenol will not be effective. Wolf Lozada PA aware and verbalized for Norco 5/325 mg x 1 dose. Order placed and pt and  updated.       Lili Olszewski, RN  05/23/19 2016

## 2019-05-30 ENCOUNTER — OFFICE VISIT (OUTPATIENT)
Dept: INTERNAL MEDICINE CLINIC | Age: 74
End: 2019-05-30
Payer: MEDICARE

## 2019-05-30 VITALS — DIASTOLIC BLOOD PRESSURE: 80 MMHG | HEART RATE: 62 BPM | SYSTOLIC BLOOD PRESSURE: 134 MMHG

## 2019-05-30 DIAGNOSIS — I10 ESSENTIAL HYPERTENSION: ICD-10-CM

## 2019-05-30 DIAGNOSIS — E78.1 HYPERTRIGLYCERIDEMIA: ICD-10-CM

## 2019-05-30 DIAGNOSIS — E03.4 HYPOTHYROIDISM DUE TO ACQUIRED ATROPHY OF THYROID: Primary | ICD-10-CM

## 2019-05-30 DIAGNOSIS — S52.591D OTHER CLOSED FRACTURE OF DISTAL END OF RIGHT RADIUS WITH ROUTINE HEALING, SUBSEQUENT ENCOUNTER: ICD-10-CM

## 2019-05-30 DIAGNOSIS — K21.9 GASTROESOPHAGEAL REFLUX DISEASE WITHOUT ESOPHAGITIS: ICD-10-CM

## 2019-05-30 DIAGNOSIS — C91.10 CLL (CHRONIC LYMPHOCYTIC LEUKEMIA) (HCC): ICD-10-CM

## 2019-05-30 PROCEDURE — 4040F PNEUMOC VAC/ADMIN/RCVD: CPT | Performed by: INTERNAL MEDICINE

## 2019-05-30 PROCEDURE — G8399 PT W/DXA RESULTS DOCUMENT: HCPCS | Performed by: INTERNAL MEDICINE

## 2019-05-30 PROCEDURE — 3017F COLORECTAL CA SCREEN DOC REV: CPT | Performed by: INTERNAL MEDICINE

## 2019-05-30 PROCEDURE — 1036F TOBACCO NON-USER: CPT | Performed by: INTERNAL MEDICINE

## 2019-05-30 PROCEDURE — G8420 CALC BMI NORM PARAMETERS: HCPCS | Performed by: INTERNAL MEDICINE

## 2019-05-30 PROCEDURE — 1090F PRES/ABSN URINE INCON ASSESS: CPT | Performed by: INTERNAL MEDICINE

## 2019-05-30 PROCEDURE — 1123F ACP DISCUSS/DSCN MKR DOCD: CPT | Performed by: INTERNAL MEDICINE

## 2019-05-30 PROCEDURE — 99213 OFFICE O/P EST LOW 20 MIN: CPT | Performed by: INTERNAL MEDICINE

## 2019-05-30 PROCEDURE — G8427 DOCREV CUR MEDS BY ELIG CLIN: HCPCS | Performed by: INTERNAL MEDICINE

## 2019-05-30 RX ORDER — HYDROCODONE BITARTRATE AND ACETAMINOPHEN 5; 325 MG/1; MG/1
1 TABLET ORAL EVERY 8 HOURS PRN
Qty: 18 TABLET | Refills: 0 | Status: SHIPPED | OUTPATIENT
Start: 2019-05-30 | End: 2019-06-06

## 2019-06-02 NOTE — PROGRESS NOTES
S: Patient presents with problems of CLL followed by Dr Sara Amaya, HTN, COPD, Hypothyroidism on replacement, GERD, and DJD. On 5/23/19 she slipped on her grass at home landing on her left wrist. Xray revealed a fracture of the distal radial metaphysis. She is requesting a refill on her [place]. She has seen Dr Kuldip Dolan and now has a case. No headache, chest pain, or breathing difficulties. No palpitations, dizziness, or syncope. She is scheduled for her colonoscopy with Dr Aracelis Sierra on 6/6/19. She is taking her synthroid every AM as directed. No symptoms of hypothyroidism. Her GERD in controlled with Nexium and no swallowing difficulties. O:Blood pressure 134/80, pulse 62, not currently breastfeeding. HEENT: TMs and canals were clear, oral pharynx clear      Neck: No palpable lymph nodes, normal thyroid examination. Lungs: Diffuse decreased BS, no wheezing      Cardio: reg pulse      Ext: mild bilateral leg ext edema with support stockins on. Left forearm case on. LABS: from 5/17/19 UA with trace protein, Na 142 K 3.8 Cl 102 BUN 21 Creat 1.0, Glucose 114, Hgba1c 5.7%, TSH 6.46, Vit D 49, LDL 23 HDL 18 Trig 404        EKG: from 5/16/19 NSR, normal       A: COPD stable      GERD controlled with Nexium      Hypothyroidism on replacement with normal TSH       HTN controlled       CLL       Left distal radial fracture with case       Hypertriglyceridemia     P: copy of labs and EKG given      Continue present medications       Intensify low carb diet      Norco-5 1 tab q8hrs prn fracture pain #18 NR       Add Zantac 150 mg qPM for additional GERD control      OV in one month, H&P one year    Controlled Substances Monitoring:     RX Monitoring 5/30/2019   Attestation The Prescription Monitoring Report for this patient was reviewed today.    Chronic Pain Routine Monitoring No signs of potential drug abuse or diversion identified: otherwise, see note documentation

## 2019-06-03 NOTE — PROGRESS NOTES
.1. Hx of anesthesia complications? --N  2. Hx of difficult airway/intubation? --N  3. Hx of changed chest pain/CHF exacerbation in last 6 mo. ? --N  4. Home O2 dependent? --N  5. Able to climb one flight of stairs w/o SOB? --Y  6. Recent COPD exacerbation or pneumonia/bronchitis that has been treated in last      month? --  Bronchitis in 3/2019, denies any SOB, no coughing noted during PAT call.

## 2019-06-05 ENCOUNTER — ANESTHESIA EVENT (OUTPATIENT)
Dept: OPERATING ROOM | Age: 74
End: 2019-06-05
Payer: MEDICARE

## 2019-06-05 NOTE — ANESTHESIA PRE PROCEDURE
Department of Anesthesiology  Preprocedure Note       Name:  Ethel Pierce   Age:  75 y.o.  :  1945                                          MRN:  [id number]         Date:  2019      Surgeon: Daniel Lacey):  Noa Saldaña MD    Procedure: COLORECTAL CANCER SCREENING, NOT HIGH RISK (N/A )    Medications prior to admission:   Prior to Admission medications    Medication Sig Start Date End Date Taking? Authorizing Provider   HYDROcodone-acetaminophen (NORCO) 5-325 MG per tablet Take 1 tablet by mouth every 8 hours as needed for Pain for up to 7 days. Intended supply: 7 days. Take lowest dose possible to manage pain 19  Ga Velázquez MD   polyethylene glycol [facility]) powder Take 17 g by mouth 2 times daily When beginning to have regular daily bowel movements, then changed to 17 g by mouth once daily 19  Devan Kramer PA-C   zolpidem (AMBIEN) 5 MG tablet Take 1 tablet by mouth nightly as needed for Sleep for up to 90 days. 19  Ga Velázquez MD   SYNTHROID 88 MCG tablet TAKE 1 TABLET DAILY 19   Ga Velázquez MD   esomeprazole ([address]) 40 MG delayed release capsule TAKE 1 CAPSULE DAILY 1/15/19   Ga Velázquez MD   potassium chloride (KLOR-CON) 8 MEQ extended release tablet TAKE 1 TABLET DAILY 18   Ga Velázquez MD   albuterol sulfate HFA (VENTOLIN HFA) 108 (90 Base) MCG/ACT inhaler Inhale 2 puffs into the lungs every 6 hours as needed for Wheezing 10/16/18   Ga Velázquez MD   zoster recombinant adjuvanted vaccine [facility]) 50 MCG SUSR injection 50 MCG IM then repeat 2-6 months. 10/4/18 10/4/19  Ga Velázquez MD   triamterene-hydrochlorothiazide (DYAZIDE) 37.5-25 MG per capsule TAKE 1 CAPSULE ONCE DAILY 18   Ga Velázquez MD   Cholecalciferol (VITAMIN D-3) 5000 UNITS TABS Take  by mouth twice a week. Historical Provider, MD   Multiple Vitamins-Minerals (CENTRUM SILVER PO) Take 1 tablet by mouth daily.     Historical Provider, MD   Calcium Carbonate-Vit D-Min (CALTRATE 600+D PLUS PO) Take 600 mg by mouth 2 times daily. Historical Provider, MD       Current medications:    No current facility-administered medications for this encounter. Current Outpatient Medications   Medication Sig Dispense Refill    HYDROcodone-acetaminophen (NORCO) 5-325 MG per tablet Take 1 tablet by mouth every 8 hours as needed for Pain for up to 7 days. Intended supply: 7 days. Take lowest dose possible to manage pain 18 tablet 0    polyethylene glycol (MIRALAX) powder Take 17 g by mouth 2 times daily When beginning to have regular daily bowel movements, then changed to 17 g by mouth once daily 1020 g 0    zolpidem (AMBIEN) 5 MG tablet Take 1 tablet by mouth nightly as needed for Sleep for up to 90 days. 90 tablet 0    SYNTHROID 88 MCG tablet TAKE 1 TABLET DAILY 90 tablet 3    esomeprazole (NEXIUM) 40 MG delayed release capsule TAKE 1 CAPSULE DAILY 90 capsule 3    potassium chloride (KLOR-CON) 8 MEQ extended release tablet TAKE 1 TABLET DAILY 90 tablet 3    albuterol sulfate HFA (VENTOLIN HFA) 108 (90 Base) MCG/ACT inhaler Inhale 2 puffs into the lungs every 6 hours as needed for Wheezing 3 Inhaler 3    zoster recombinant adjuvanted vaccine (SHINGRIX) 50 MCG SUSR injection 50 MCG IM then repeat 2-6 months. 0.5 mL 1    triamterene-hydrochlorothiazide (DYAZIDE) 37.5-25 MG per capsule TAKE 1 CAPSULE ONCE DAILY 90 capsule 3    Cholecalciferol (VITAMIN D-3) 5000 UNITS TABS Take  by mouth twice a week.  Multiple Vitamins-Minerals (CENTRUM SILVER PO) Take 1 tablet by mouth daily.  Calcium Carbonate-Vit D-Min (CALTRATE 600+D PLUS PO) Take 600 mg by mouth 2 times daily. Allergies:     Allergies   Allergen Reactions    Amitiza [Lubiprostone]     Clindamycin/Lincomycin      GI intolerance    Evista [Raloxifene Hydrochloride]     Flexeril [Cyclobenzaprine Hcl]      CNS    Omega-3 Fatty Acids [Fish Oil] Diarrhea    Pril[address]c [address]    Anesthesia Evaluation  Patient summary reviewed and Nursing notes reviewed no history of anesthetic complications:   Airway:   TM distance: >3 FB   Neck ROM: full  Mouth opening: > = 3 FB Dental:          Pulmonary:normal exam    (+) pneumonia: resolved,  COPD:                            ROS comment: Bronchitis yearly last 3/19  Former Smoker - 61 pack years  Quit Smokin97       Cardiovascular:  Exercise tolerance: good (>4 METS),   (+) hypertension:, hyperlipidemia         Beta Blocker:  Not on Beta Blocker      ROS comment: Edema  ekg  nsr     Neuro/Psych:               GI/Hepatic/Renal:   (+) hiatal hernia, bowel prep,          ROS comment: Chronic calculous cholecystitis. Endo/Other:    (+) hypothyroidism, blood dyscrasia: anemia, arthritis: rheumatoid. , malignancy/cancer. ROS comment: cll Abdominal:           Vascular:   + PVD, aortic or cerebral, . ROS comment: Great saphenous vein incompetence bl. Anesthesia Plan      MAC     ASA 3       Induction: intravenous. Anesthetic plan and risks discussed with patient. RODRIGUEZ Guthrie CRNA   2019        Pre Anesthesia Evaluation complete. Anesthesia plan, risks, benefits, alternatives, and personnel discussed with patient and/or legal guardian. Patient and/or legal guardian verbalized an understanding and agreed to proceed. Anesthesia plan discussed with care team members and agreed upon.   RODRIGUEZ Jha CRNA  2019

## 2019-06-06 ENCOUNTER — ANESTHESIA (OUTPATIENT)
Dept: OPERATING ROOM | Age: 74
End: 2019-06-06
Payer: MEDICARE

## 2019-06-06 ENCOUNTER — HOSPITAL ENCOUNTER (OUTPATIENT)
Age: 74
Setting detail: OUTPATIENT SURGERY
Discharge: HOME | End: 2019-06-06
Attending: SPECIALIST | Admitting: SPECIALIST
Payer: MEDICARE

## 2019-06-06 VITALS — SYSTOLIC BLOOD PRESSURE: 95 MMHG | OXYGEN SATURATION: 100 % | DIASTOLIC BLOOD PRESSURE: 51 MMHG

## 2019-06-06 VITALS
HEART RATE: 61 BPM | HEIGHT: 61 IN | SYSTOLIC BLOOD PRESSURE: 132 MMHG | BODY MASS INDEX: 19.83 KG/M2 | OXYGEN SATURATION: 100 % | RESPIRATION RATE: 16 BRPM | DIASTOLIC BLOOD PRESSURE: 73 MMHG | WEIGHT: 105 LBS | TEMPERATURE: 97.8 F

## 2019-06-06 PROCEDURE — 3700000001 HC ADD 15 MINUTES (ANESTHESIA): Performed by: SPECIALIST

## 2019-06-06 PROCEDURE — 3700000000 HC ANESTHESIA ATTENDED CARE: Performed by: SPECIALIST

## 2019-06-06 PROCEDURE — 2500000003 HC RX 250 WO HCPCS: Performed by: NURSE ANESTHETIST, CERTIFIED REGISTERED

## 2019-06-06 PROCEDURE — 6360000002 HC RX W HCPCS: Performed by: NURSE ANESTHETIST, CERTIFIED REGISTERED

## 2019-06-06 PROCEDURE — 3609027000 HC COLONOSCOPY: Performed by: SPECIALIST

## 2019-06-06 PROCEDURE — 2580000003 HC RX 258: Performed by: SPECIALIST

## 2019-06-06 PROCEDURE — 7100000010 HC PHASE II RECOVERY - FIRST 15 MIN: Performed by: SPECIALIST

## 2019-06-06 PROCEDURE — 7100000011 HC PHASE II RECOVERY - ADDTL 15 MIN: Performed by: SPECIALIST

## 2019-06-06 PROCEDURE — 2709999900 HC NON-CHARGEABLE SUPPLY: Performed by: SPECIALIST

## 2019-06-06 RX ORDER — SODIUM CHLORIDE, SODIUM LACTATE, POTASSIUM CHLORIDE, CALCIUM CHLORIDE 600; 310; 30; 20 MG/100ML; MG/100ML; MG/100ML; MG/100ML
INJECTION, SOLUTION INTRAVENOUS CONTINUOUS
Status: DISCONTINUED | OUTPATIENT
Start: 2019-06-06 | End: 2019-06-06 | Stop reason: HOSPADM

## 2019-06-06 RX ORDER — LIDOCAINE HYDROCHLORIDE 20 MG/ML
INJECTION, SOLUTION EPIDURAL; INFILTRATION; INTRACAUDAL; PERINEURAL PRN
Status: DISCONTINUED | OUTPATIENT
Start: 2019-06-06 | End: 2019-06-06 | Stop reason: SDUPTHER

## 2019-06-06 RX ORDER — PROPOFOL 10 MG/ML
INJECTION, EMULSION INTRAVENOUS PRN
Status: DISCONTINUED | OUTPATIENT
Start: 2019-06-06 | End: 2019-06-06 | Stop reason: SDUPTHER

## 2019-06-06 RX ADMIN — PROPOFOL 40 MG: 10 INJECTION, EMULSION INTRAVENOUS at 11:11

## 2019-06-06 RX ADMIN — PROPOFOL 20 MG: 10 INJECTION, EMULSION INTRAVENOUS at 11:22

## 2019-06-06 RX ADMIN — PROPOFOL 20 MG: 10 INJECTION, EMULSION INTRAVENOUS at 11:18

## 2019-06-06 RX ADMIN — PROPOFOL 20 MG: 10 INJECTION, EMULSION INTRAVENOUS at 11:16

## 2019-06-06 RX ADMIN — PROPOFOL 20 MG: 10 INJECTION, EMULSION INTRAVENOUS at 11:19

## 2019-06-06 RX ADMIN — SODIUM CHLORIDE, POTASSIUM CHLORIDE, SODIUM LACTATE AND CALCIUM CHLORIDE: 600; 310; 30; 20 INJECTION, SOLUTION INTRAVENOUS at 10:33

## 2019-06-06 RX ADMIN — PROPOFOL 20 MG: 10 INJECTION, EMULSION INTRAVENOUS at 11:20

## 2019-06-06 RX ADMIN — PROPOFOL 20 MG: 10 INJECTION, EMULSION INTRAVENOUS at 11:13

## 2019-06-06 RX ADMIN — LIDOCAINE HYDROCHLORIDE 100 MG: 20 INJECTION, SOLUTION EPIDURAL; INFILTRATION; INTRACAUDAL; PERINEURAL at 11:11

## 2019-06-06 RX ADMIN — PROPOFOL 20 MG: 10 INJECTION, EMULSION INTRAVENOUS at 11:14

## 2019-06-06 ASSESSMENT — PAIN SCALES - GENERAL
PAINLEVEL_OUTOF10: 0
PAINLEVEL_OUTOF10: 0

## 2019-06-06 ASSESSMENT — PAIN - FUNCTIONAL ASSESSMENT: PAIN_FUNCTIONAL_ASSESSMENT: 0-10

## 2019-06-06 NOTE — OP NOTE
Makenna Zaman   6/6/2019    Procedure:  Colonoscopy  Endoscopist: [facility]. Lane Monterroso MD  Referring Physician: Spencer Franklin MD  Preprocedure Dx: routine screening  Postprocedure Dx: internal hemorrhoids   Medications: MAC-all meds per anesthesia provider    Technique:   The Olympus video colonoscope was introduced and passed to the cecum. The ileo-cecal valve and appendiceal opening were both identified. The valve was entered and the distal 5-10 cm of terminal ileum appeared normal.The scope was then gradually withdrawn and good visualization was obtained in a well-prepared patient. No abnormality was identified at any point other than small internal hemorrhoids. Specifically there was no evidence of neoplasia, angiodysplasia, inflammation, significant diverticulosis, or ischemia. The scope was retro-flexed in the rectum, revealing the small internal hemorrhoids. The patient tolerated the procedure well. Specimens: were not obtained  Complications: none  Implants: none  Drains: none  Estimated Blood Loss: <5cc    Final Impression:   1) small internal hemorrhoids   2) otherwise normal colonoscopy to the cecum     Disposition:    1) repeat colonoscopy is suggested in 10 years    NOTE: PHOTOS CAN BE FOUND NOW IN THE SOFT CHART AND IN EPIC IN A FEW DAYS-- GO TO \"CHART REVIEW\" THEN \"MEDIA\"    Fer Monterroso MD

## 2019-06-06 NOTE — PROGRESS NOTES
1138 Received from OR, placed on monitor. Vital signs stable. Denies pain, nausea. Call light in reach. 1155 Family at bedside. Repositioned in bed, sitting up sipping Pepsi without difficulty. 1201 Discharge instructions reviewed with patient/family. 36 Dr. Sandy Brown at bedside updating family and patient on procedure. 1255 Discharge to car.   Abimael Solano

## 2019-06-06 NOTE — ANESTHESIA POSTPROCEDURE EVALUATION
Department of Anesthesiology  Postprocedure Note    Patient: Lindsay Marques  MRN: [id number]  YOB: 1945  Date of evaluation: 6/6/2019  Time:  11:33 AM     Procedure Summary     Date:  06/06/19 Room / Location:  [facility] OR 01 / [facility] OR    Anesthesia Start:  1109 Anesthesia Stop:  1133    Procedure:  COLORECTAL CANCER SCREENING, NOT HIGH RISK (N/A ) Diagnosis:  (Screening)    Surgeon:  Rose Dodson MD Responsible Provider:  RODRIGUEZ Rosa CRNA    Anesthesia Type:  MAC ASA Status:  3          Anesthesia Type: MAC    Ciaran Phase I:  10    Ciaran Phase II:  10    Last vitals: Reviewed and per EMR flowsheets.        Anesthesia Post Evaluation    Patient location during evaluation: bedside  Patient participation: complete - patient participated  Level of consciousness: awake and alert  Pain score: 0  Airway patency: patent  Nausea & Vomiting: no nausea and no vomiting  Complications: no  Cardiovascular status: hemodynamically stable  Respiratory status: acceptable, nonlabored ventilation, room air and spontaneous ventilation  Hydration status: euvolemic

## 2019-07-09 ENCOUNTER — OFFICE VISIT (OUTPATIENT)
Dept: INTERNAL MEDICINE CLINIC | Age: 74
End: 2019-07-09
Payer: MEDICARE

## 2019-07-09 VITALS
HEART RATE: 84 BPM | SYSTOLIC BLOOD PRESSURE: 120 MMHG | WEIGHT: 111.4 LBS | BODY MASS INDEX: 21.05 KG/M2 | DIASTOLIC BLOOD PRESSURE: 62 MMHG

## 2019-07-09 DIAGNOSIS — R60.0 BILATERAL LOWER EXTREMITY EDEMA: ICD-10-CM

## 2019-07-09 DIAGNOSIS — R73.09 ELEVATED HEMOGLOBIN A1C: ICD-10-CM

## 2019-07-09 DIAGNOSIS — I10 ESSENTIAL HYPERTENSION: ICD-10-CM

## 2019-07-09 DIAGNOSIS — E78.1 HYPERTRIGLYCERIDEMIA: ICD-10-CM

## 2019-07-09 DIAGNOSIS — E78.2 MIXED HYPERLIPIDEMIA: ICD-10-CM

## 2019-07-09 DIAGNOSIS — E03.4 HYPOTHYROIDISM DUE TO ACQUIRED ATROPHY OF THYROID: Primary | ICD-10-CM

## 2019-07-09 DIAGNOSIS — J44.9 CHRONIC OBSTRUCTIVE PULMONARY DISEASE, UNSPECIFIED COPD TYPE (HCC): ICD-10-CM

## 2019-07-09 DIAGNOSIS — K21.9 GASTROESOPHAGEAL REFLUX DISEASE WITHOUT ESOPHAGITIS: Primary | ICD-10-CM

## 2019-07-09 PROCEDURE — 4040F PNEUMOC VAC/ADMIN/RCVD: CPT | Performed by: INTERNAL MEDICINE

## 2019-07-09 PROCEDURE — G8399 PT W/DXA RESULTS DOCUMENT: HCPCS | Performed by: INTERNAL MEDICINE

## 2019-07-09 PROCEDURE — 3017F COLORECTAL CA SCREEN DOC REV: CPT | Performed by: INTERNAL MEDICINE

## 2019-07-09 PROCEDURE — G8926 SPIRO NO PERF OR DOC: HCPCS | Performed by: INTERNAL MEDICINE

## 2019-07-09 PROCEDURE — 1036F TOBACCO NON-USER: CPT | Performed by: INTERNAL MEDICINE

## 2019-07-09 PROCEDURE — 3023F SPIROM DOC REV: CPT | Performed by: INTERNAL MEDICINE

## 2019-07-09 PROCEDURE — G8420 CALC BMI NORM PARAMETERS: HCPCS | Performed by: INTERNAL MEDICINE

## 2019-07-09 PROCEDURE — 1123F ACP DISCUSS/DSCN MKR DOCD: CPT | Performed by: INTERNAL MEDICINE

## 2019-07-09 PROCEDURE — 99213 OFFICE O/P EST LOW 20 MIN: CPT | Performed by: INTERNAL MEDICINE

## 2019-07-09 PROCEDURE — G8427 DOCREV CUR MEDS BY ELIG CLIN: HCPCS | Performed by: INTERNAL MEDICINE

## 2019-07-09 PROCEDURE — 1090F PRES/ABSN URINE INCON ASSESS: CPT | Performed by: INTERNAL MEDICINE

## 2019-07-16 ENCOUNTER — HOSPITAL ENCOUNTER (OUTPATIENT)
Dept: PHYSICAL THERAPY | Age: 74
Setting detail: THERAPIES SERIES
Discharge: HOME | End: 2019-07-16
Payer: MEDICARE

## 2019-07-16 PROCEDURE — 97161 PT EVAL LOW COMPLEX 20 MIN: CPT

## 2019-07-16 PROCEDURE — 97110 THERAPEUTIC EXERCISES: CPT

## 2019-07-16 ASSESSMENT — PAIN DESCRIPTION - DESCRIPTORS: DESCRIPTORS: ACHING;DULL

## 2019-07-16 ASSESSMENT — PAIN DESCRIPTION - FREQUENCY: FREQUENCY: INTERMITTENT

## 2019-07-16 ASSESSMENT — PAIN DESCRIPTION - PROGRESSION: CLINICAL_PROGRESSION: GRADUALLY IMPROVING

## 2019-07-16 ASSESSMENT — PAIN SCALES - GENERAL: PAINLEVEL_OUTOF10: 0

## 2019-07-16 ASSESSMENT — PAIN DESCRIPTION - LOCATION: LOCATION: WRIST

## 2019-07-16 ASSESSMENT — PAIN - FUNCTIONAL ASSESSMENT: PAIN_FUNCTIONAL_ASSESSMENT: PREVENTS OR INTERFERES SOME ACTIVE ACTIVITIES AND ADLS

## 2019-07-16 ASSESSMENT — PAIN DESCRIPTION - PAIN TYPE: TYPE: CHRONIC PAIN

## 2019-07-16 ASSESSMENT — PAIN DESCRIPTION - ORIENTATION: ORIENTATION: DISTAL

## 2019-07-16 NOTE — FLOWSHEET NOTE
Outpatient Physical Therapy  [facility]           [x] Phone: [phone]   Fax: [phone]  Shoaib Christina           [] Phone: [phone]   Fax: [phone]        Physical Therapy Daily Treatment Note  Date:  2019    Patient Name:  Felicia Zapata    :  1945  MRN: [id number]  Restrictions/Precautions:  --  Diagnosis:   Diagnosis: L Radial Colles Fx   Date of Injury/Surgery: --  Treatment Diagnosis: Treatment Diagnosis: L wrist weakness, stiffness, pain     Insurance/Certification information:   [facility] Medicare   Referring Physician:  Referring Practitioner: Dr. Styles Getting   Next Doctor Visit:    Plan of care signed (Y/N): N, sent 19    Outcome Measure: Quick DASH: 45% disability   Visit# / total visits:   5-10 per POC  Pain level: 0/10   Goals:       Short term goals  Time Frame for Short term goals: Defer to [address] term goals  Time Frame for Long term goals : 5 weeks 19   Long term goal 1: Pt will demo I with HEP/symptom management. Long term goal 2: Pt will demo >10 deg improvement in AROM to ease ADLS. Long term goal 3: Pt will demo >8# improvement in  strength to ease light lifting. Long term goal 4: Pt will demo >4/5 strength in L wrist to ease light reaching. Long term goal 5: Pt will report >20% disability per Quick DASH. Summary of Evaluation   Pt is 75year old female with 2 month sudden onset of L wrist pain after non-surgical management for Colles fracture. Pt now has difficulties completing ADLs with primarily lifting with L UE. Pt demo deficits this date that include wrist A/PROM, strength and min pain. Pt will benefit with PT services with progression of strength/ROM and modalities to return to PLOF. Pt prior to onset of current condition had no pain with able to complete full ADLs and work activities. Patient agrees with established plan of care and assisted in the development of their short term and long term goals.  Patient had no adverse mental or cognitive disorder. End session pain: /10      Plan for Next Session: Specific instructions for Next Treatment: Review HEP, passive wrist stretching, strengthening with iso->light resistance as tolerated,  strength. modalities PRN.        Time In / Time Out:     1030/1114      Timed Code/Total Treatment Minutes:      12/44'   12' TE, 1 PT eval       Next Progress Note due:  Kassidy 7/16/19  Visit 10       Plan of Care Interventions:  [x] Therapeutic Exercise  [x] Modalities:  [x] Therapeutic Activity     [] Ultrasound  [] Estim  [] Gait Training      [] Cervical Traction [] Lumbar Traction  [] Neuromuscular Re-education    [x] Cold/hotpack [] Iontophoresis   [x] Instruction in HEP      [x] Vasopneumatic   [] Dry Needling    [x] Manual Therapy               [] Aquatic Therapy              Electronically signed by:  Virginia Briones PT, DPT, OCS  7/16/2019, 12:47 PM    7/16/2019 12:47 PM

## 2019-07-16 NOTE — PROGRESS NOTES
0-90: 90  L Wrist Flexion 0-80: 27  L Wrist Extension 0-70: 44  L Wrist Radial Deviation 0-20: 21  L Wrist Ulnar Deviation 0-45: 24    Strength RUE  Strength RUE: WNL  Comment: 5/5 in all directions   Strength LUE  L Forearm Pron: 3+/5  L Forearm Sup: 3+/5  L Wrist Flexion: 3+/5  L Wrist Extension: 3+/5  L Wrist Radial Deviation: 3+/5  L Wrist Ulnar Deviation: 3+/5  Strength Other  Other: Handheld dynamomter at neutral:   R UE: 30#                 L UE: 5#      Additional Measures  Other: Quick DASH: 46% disability           Assessment   Conditions Requiring Skilled Therapeutic Intervention  Body structures, Functions, Activity limitations: Decreased functional mobility ; Decreased strength;Decreased ROM; Decreased endurance  Pt is 75year old female with 2 month sudden onset of L wrist pain after non-surgical management for Colles fracture. Pt now has difficulties completing ADLs with primarily lifting with L UE. Pt demo deficits this date that include wrist A/PROM, strength and min pain. Pt will benefit with PT services with progression of strength/ROM and modalities to return to PLOF. Pt prior to onset of current condition had no pain with able to complete full ADLs and work activities. Patient agrees with established plan of care and assisted in the development of their short term and long term goals. Patient had no adverse reaction with initial treatment and there are no barriers to learning. Demonstrates no mental or cognitive disorder. Treatment Diagnosis: L wrist weakness, stiffness, pain   Prognosis: Good  Decision Making: Low Complexity  REQUIRES PT FOLLOW UP: Yes  Activity Tolerance  Activity Tolerance: Patient Tolerated treatment well         Plan   Plan  Times per week: 1-2  Plan weeks: 5  Specific instructions for Next Treatment: Review HEP, passive wrist stretching, strengthening with iso->light resistance as tolerated,  strength. modalities PRN.    Current Treatment Recommendations: Strengthening, ROM, Modalities, Manual Therapy - Soft Tissue Mobilization, Home Exercise Program, Neuromuscular Re-education    Goals  Short term goals  Time Frame for Short term goals: Defer to [address] term goals  Time Frame for Long term goals : 5 weeks 8/25/19   Long term goal 1: Pt will demo I with HEP/symptom management. Long term goal 2: Pt will demo >10 deg improvement in AROM to ease ADLS. Long term goal 3: Pt will demo >8# improvement in  strength to ease light lifting. Long term goal 4: Pt will demo >4/5 strength in L wrist to ease light reaching. Long term goal 5: Pt will report >20% disability per Quick DASH. Patient Goals   Patient goals : Improve strength to ease ADLs.        Dinorah Solo, PT, DPT, OCS     7/16/2019 12:43 PM

## 2019-07-18 DIAGNOSIS — F51.04 CHRONIC INSOMNIA: ICD-10-CM

## 2019-07-18 RX ORDER — ZOLPIDEM TARTRATE 5 MG/1
5 TABLET ORAL NIGHTLY PRN
Qty: 90 TABLET | Refills: 0 | Status: SHIPPED | OUTPATIENT
Start: 2019-07-18 | End: 2019-10-17 | Stop reason: SDUPTHER

## 2019-07-18 NOTE — TELEPHONE ENCOUNTER
Controlled Substance Monitoring:    Acute and Chronic Pain Monitoring:   RX Monitoring 7/18/2019   Attestation -   Periodic Controlled Substance Monitoring No signs of potential drug abuse or diversion identified.

## 2019-07-19 NOTE — PROGRESS NOTES
S: Patient presents with problems of CLL followed by Dr Morro Temple, HTN, COPD, Hypothyroidism on replacement, GERD, and DJD. She completed her colonoscopy with Dr Kayla Coleman on 6/6/19 that was normal to the cecum except for small internal hemorrhoids. A ten year follow up was recommended. No abdominal pain, change in bowel habits, hematochezia, or melanotic stools. On the last OV she was having GERD symptoms despite her Nexium 40 mg qam. No swallowing difficulties. Zantac 150 mg qPM was added with control of her GERD symptoms. No headache, chest pain, or breathing difficulties. Her COPD is controlled with her current bronchodilator therapy. O:Blood pressure 120/62, pulse 84, weight 111 lb 6.4 oz (50.5 kg), not currently breastfeeding. HEENT: TMs and canals were clear, oral pharynx clear      Neck: No palpable lymph nodes, normal thyroid examination.        Lungs: Diffuse decreased BS, no wheezing      Cardio: reg pulse      Abd: non tender, BS normal, no distention       Ext: mild bilateral leg ext edema           LABS: from 5/17/19 UA with trace protein, Na 142 K 3.8 Cl 102 BUN 21 Creat 1.0, Glucose 114, Hgba1c 5.7%, TSH 6.46, Vit D 49, LDL 23 HDL 18 Trig 404      A: COPD stable      GERD controlled with Nexium & Zantac      HTN controlled       Normal screening colonoscopy       Lower ext edema    P: colonoscopy report reviewed       Continue Nexium & Zantac for GERD       Continue low salt low fat low carb diet       OV in 3M with Basic chem lipid liver cpk cbc tsh

## 2019-07-30 ENCOUNTER — HOSPITAL ENCOUNTER (OUTPATIENT)
Dept: PHYSICAL THERAPY | Age: 74
Setting detail: THERAPIES SERIES
Discharge: HOME | End: 2019-07-30
Payer: MEDICARE

## 2019-07-30 PROCEDURE — 97110 THERAPEUTIC EXERCISES: CPT

## 2019-07-30 NOTE — FLOWSHEET NOTE
Outpatient Physical Therapy  [place]           [x] Phone: [phone]   Fax: [phone]  [facility] Toney           [] Phone: [phone]   Fax: [phone]        Physical Therapy Daily Treatment Note  Date:  2019    Patient Name:  Gita Whitaker    :  1945  MRN: [id number]  Restrictions/Precautions:  --  Diagnosis:   Diagnosis: L Radial Colles Fx   Date of Injury/Surgery: --  Treatment Diagnosis: Treatment Diagnosis: L wrist weakness, stiffness, pain     Insurance/Certification information:   [facility] Medicare   Referring Physician:  Referring Practitioner: Dr. Napoleon Ledesma   Next Doctor Visit:    Plan of care signed (Y/N): N, sent 19    Outcome Measure: Quick DASH: 45% disability   Visit# / total visits:  2 / 5-10 per POC  Pain level: 3-4/10   Goals:       Short term goals  Time Frame for Short term goals: Defer to [address] term goals  Time Frame for Long term goals : 5 weeks 19   Long term goal 1: Pt will demo I with HEP/symptom management. Long term goal 2: Pt will demo >10 deg improvement in AROM to ease ADLS. Long term goal 3: Pt will demo >8# improvement in  strength to ease light lifting. Long term goal 4: Pt will demo >4/5 strength in L wrist to ease light reaching. Long term goal 5: Pt will report >20% disability per Quick DASH. Summary of Evaluation   Pt is 75year old female with 2 month sudden onset of L wrist pain after non-surgical management for Colles fracture. Pt now has difficulties completing ADLs with primarily lifting with L UE. Pt demo deficits this date that include wrist A/PROM, strength and min pain. Pt will benefit with PT services with progression of strength/ROM and modalities to return to PLOF. Pt prior to onset of current condition had no pain with able to complete full ADLs and work activities. Patient agrees with established plan of care and assisted in the development of their short term and long term goals.  Patient had no adverse modalities PRN.        Time In / Time Out:     1120/1200      Timed Code/Total Treatment Minutes:     40/40'       40' TE      Next Progress Note due:  Sitaal 7/16/19  Visit 10       Plan of Care Interventions:  [x] Therapeutic Exercise  [x] Modalities:  [x] Therapeutic Activity     [] Ultrasound  [] Estim  [] Gait Training      [] Cervical Traction [] Lumbar Traction  [] Neuromuscular Re-education    [x] Cold/hotpack [] Iontophoresis   [x] Instruction in HEP      [x] Vasopneumatic   [] Dry Needling    [x] Manual Therapy               [] Aquatic Therapy              Electronically signed by:  Kelvin Glasgow PT, DPT, OCS  7/30/2019, 6:54 AM    7/30/2019 6:54 AM

## 2019-08-05 ENCOUNTER — HOSPITAL ENCOUNTER (OUTPATIENT)
Dept: PHYSICAL THERAPY | Age: 74
Setting detail: THERAPIES SERIES
Discharge: HOME | End: 2019-08-05
Payer: MEDICARE

## 2019-08-05 PROCEDURE — 97112 NEUROMUSCULAR REEDUCATION: CPT

## 2019-08-05 PROCEDURE — 97110 THERAPEUTIC EXERCISES: CPT

## 2019-08-05 PROCEDURE — 97140 MANUAL THERAPY 1/> REGIONS: CPT

## 2019-08-05 NOTE — FLOWSHEET NOTE
Outpatient Physical Therapy  [place]           [x] Phone: [phone]   Fax: [phone]  Dinah Baker           [] Phone: [phone]   Fax: [phone]        Physical Therapy Daily Treatment Note  Date:  2019    Patient Name:  Scott Hummel    :  1945  MRN: [id number]  Restrictions/Precautions:  --  Diagnosis:   Diagnosis: L Radial Colles Fx   Date of Injury/Surgery: --  Treatment Diagnosis: Treatment Diagnosis: L wrist weakness, stiffness, pain     Insurance/Certification information:   [facility] Medicare   Referring Physician:  Referring Practitioner: Dr. Geno Wong   Next Doctor Visit:    Plan of care signed (Y/N): N, sent 19    Outcome Measure: Quick DASH: 45% disability   Visit# / total visits:  3 / 5-10 per POC  Pain level: 4/10   Goals:       Short term goals  Time Frame for Short term goals: Defer to [address] term goals  Time Frame for Long term goals : 5 weeks 19   Long term goal 1: Pt will demo I with HEP/symptom management. Long term goal 2: Pt will demo >10 deg improvement in AROM to ease ADLS. Long term goal 3: Pt will demo >8# improvement in  strength to ease light lifting. Long term goal 4: Pt will demo >4/5 strength in L wrist to ease light reaching. Long term goal 5: Pt will report >20% disability per Quick DASH. Summary of Evaluation   Pt is 75year old female with 2 month sudden onset of L wrist pain after non-surgical management for Colles fracture. Pt now has difficulties completing ADLs with primarily lifting with L UE. Pt demo deficits this date that include wrist A/PROM, strength and min pain. Pt will benefit with PT services with progression of strength/ROM and modalities to return to PLOF. Pt prior to onset of current condition had no pain with able to complete full ADLs and work activities. Patient agrees with established plan of care and assisted in the development of their short term and long term goals.  Patient had no adverse reaction

## 2019-08-06 ENCOUNTER — HOSPITAL ENCOUNTER (OUTPATIENT)
Age: 74
Setting detail: SPECIMEN
Discharge: HOME | End: 2019-08-06
Payer: MEDICARE

## 2019-08-06 LAB
ALBUMIN SERPL-MCNC: 3.9 GM/DL (ref 3.4–5)
ALP BLD-CCNC: 74 IU/L (ref 40–128)
ALT SERPL-CCNC: 9 U/L (ref 10–40)
ANION GAP SERPL CALCULATED.3IONS-SCNC: 10 MMOL/L (ref 4–16)
AST SERPL-CCNC: 15 IU/L (ref 15–37)
BILIRUB SERPL-MCNC: 0.5 MG/DL (ref 0–1)
BUN BLDV-MCNC: 23 MG/DL (ref 6–23)
CALCIUM SERPL-MCNC: 9.1 MG/DL (ref 8.3–10.6)
CHLORIDE BLD-SCNC: 99 MMOL/L (ref 99–110)
CO2: 29 MMOL/L (ref 21–32)
CREAT SERPL-MCNC: 1.2 MG/DL (ref 0.6–1.1)
FERRITIN: 108 NG/ML (ref 15–150)
GFR AFRICAN AMERICAN: 53 ML/MIN/1.73M2
GFR NON-AFRICAN AMERICAN: 44 ML/MIN/1.73M2
GLUCOSE BLD-MCNC: 96 MG/DL (ref 70–99)
IGA: <50 MG/DL (ref 69–382)
IGG,SERUM: 355 MG/DL (ref 723–1685)
IGM,SERUM: 2020 MG/DL (ref 40–230)
IGM,SERUM: ABNORMAL MG/DL (ref 40–230)
LACTATE DEHYDROGENASE: 122 IU/L (ref 120–246)
POTASSIUM SERPL-SCNC: 4.1 MMOL/L (ref 3.5–5.1)
SODIUM BLD-SCNC: 138 MMOL/L (ref 135–145)
TOTAL PROTEIN: 6.9 GM/DL (ref 6.4–8.2)

## 2019-08-06 PROCEDURE — 80053 COMPREHEN METABOLIC PANEL: CPT

## 2019-08-06 PROCEDURE — 83615 LACTATE (LD) (LDH) ENZYME: CPT

## 2019-08-06 PROCEDURE — 83883 ASSAY NEPHELOMETRY NOT SPEC: CPT

## 2019-08-06 PROCEDURE — 82728 ASSAY OF FERRITIN: CPT

## 2019-08-06 PROCEDURE — 86320 SERUM IMMUNOELECTROPHORESIS: CPT

## 2019-08-06 PROCEDURE — 82784 ASSAY IGA/IGD/IGG/IGM EACH: CPT

## 2019-08-06 PROCEDURE — 84165 PROTEIN E-PHORESIS SERUM: CPT

## 2019-08-09 LAB
ALBUMIN ELP: 3.4 GM/DL (ref 3.2–5.6)
ALPHA-1-GLOBULIN: 0.3 GM/DL (ref 0.1–0.4)
ALPHA-2-GLOBULIN: 0.8 GM/DL (ref 0.4–1.2)
BETA GLOBULIN: 0.8 GM/DL (ref 0.5–1.3)
GAMMA GLOBULIN: 1.7 GM/DL (ref 0.5–1.6)
KAPPA QUANT FREE LIGHT CHAINS: 1.19 MG/DL (ref 0.33–1.94)
KAPPA/LAMBDA FREE LIGHT CHAIN RATIO: 0.05 (ref 0.26–1.65)
KAPPA/LAMBDA FREE LIGHT CHAIN RATIO: ABNORMAL (ref 0.26–1.65)
LAMBDA FREE LIGHT CHAINS URINE/ VOL: 26.2 MG/DL (ref 0.57–2.63)
SPEP INTERPRETATION: ABNORMAL
SPEP INTERPRETATION: NORMAL
TOTAL PROTEIN: 6.9 GM/DL (ref 6.4–8.2)

## 2019-08-12 ENCOUNTER — HOSPITAL ENCOUNTER (OUTPATIENT)
Dept: PHYSICAL THERAPY | Age: 74
Setting detail: THERAPIES SERIES
Discharge: HOME | End: 2019-08-12
Payer: MEDICARE

## 2019-08-12 PROCEDURE — 97112 NEUROMUSCULAR REEDUCATION: CPT

## 2019-08-12 PROCEDURE — 97140 MANUAL THERAPY 1/> REGIONS: CPT

## 2019-08-12 PROCEDURE — 97110 THERAPEUTIC EXERCISES: CPT

## 2019-08-12 NOTE — FLOWSHEET NOTE
reaction with initial treatment and there are no barriers to learning. Demonstrates no mental or cognitive disorder. Subjective Pt stated that she did ok after our last visit. Pt stated that her wrist is very stiff today. Pt stated that she has been trying to use it as much as possible. Any changes in Ambulatory Summary Sheet? None        Objective:  Decreased wrist flexion and difficulty abducting thumb today. Also noted moderate swelling in hand and fingers. 58°    Wrist Extension after manual   60°   Wrist  Flexion after manual     Exercises:  Exercise/Equipment 7/16/19 7/30/19 8/5/19 8/12/19            WARM UP       UBE     1/1' F/B 1/1' F/B 2'/2'          TABLE       PROM wrist flexion stretch  10x2   3\" 10x2   3\" 10x2 3\"  10x2 3\"   Prayer stretch  10x2  5\" 10x  5\" 10x 5\"  10X2 5\"   iso wrist flex/ext  10x2  2\" 10x  3\" each  10x 3\" ea dir 10x 3\" ea dir   Resisted wrist flex/ext  2# 10x2 2# 10x2 ea  2# 10x2 ea    Power  of towel  10x2   2\" 1 rubber band 10x2 1 rubber band 10x2 1 rubber band 10x2   Resisted radial deviation      3# 10x2 3# 10x2 3# 10X2                                      STANDING              Small rubber ball rolls on table   6# 10x2 each dir  6# 10x2 each dir 6# 10x2 each dir   Rubber ball push downs on table   10x2  2\" 10x2  2\" 10x2  2\"                                    PROPRIOCEPTION                                          MODALITIES                           Other Therapeutic Activities/Education:  --      Home Exercise Program:  HO issued, reviewed and discussed with patient. Pt agreed to comply. Manual Treatments  Gentle flexion and extension of L wrist and L thumb x 8'        Modalities:  --      Communication with other providers:  POC sent 7/16/19       Assessment:    Pt tolerated treatment fairly well. Pt is making nice gains with ROM and strength. t rated pain at 0/10 after manual treatment.      End session pain:0 /10      Plan for Next Session:

## 2019-08-15 ENCOUNTER — HOSPITAL ENCOUNTER (OUTPATIENT)
Age: 74
Setting detail: SPECIMEN
Discharge: HOME | End: 2019-08-15
Payer: MEDICARE

## 2019-08-15 LAB
ALBUMIN SERPL-MCNC: 3.8 GM/DL (ref 3.4–5)
ALP BLD-CCNC: 76 IU/L (ref 40–129)
ALT SERPL-CCNC: 9 U/L (ref 10–40)
ANION GAP SERPL CALCULATED.3IONS-SCNC: 10 MMOL/L (ref 4–16)
AST SERPL-CCNC: 16 IU/L (ref 15–37)
BILIRUB SERPL-MCNC: 0.6 MG/DL (ref 0–1)
BUN BLDV-MCNC: 30 MG/DL (ref 6–23)
CALCIUM SERPL-MCNC: 8.8 MG/DL (ref 8.3–10.6)
CHLORIDE BLD-SCNC: 101 MMOL/L (ref 99–110)
CO2: 28 MMOL/L (ref 21–32)
CREAT SERPL-MCNC: 1.4 MG/DL (ref 0.6–1.1)
GFR AFRICAN AMERICAN: 44 ML/MIN/1.73M2
GFR NON-AFRICAN AMERICAN: 37 ML/MIN/1.73M2
GLUCOSE BLD-MCNC: 104 MG/DL (ref 70–99)
POTASSIUM SERPL-SCNC: 3.9 MMOL/L (ref 3.5–5.1)
SODIUM BLD-SCNC: 139 MMOL/L (ref 135–145)
TOTAL PROTEIN: 6.7 GM/DL (ref 6.4–8.2)

## 2019-08-15 PROCEDURE — 80053 COMPREHEN METABOLIC PANEL: CPT

## 2019-08-20 ENCOUNTER — HOSPITAL ENCOUNTER (OUTPATIENT)
Dept: PHYSICAL THERAPY | Age: 74
Setting detail: THERAPIES SERIES
Discharge: HOME | End: 2019-08-20
Payer: MEDICARE

## 2019-08-20 DIAGNOSIS — M48.061 SPINAL STENOSIS OF LUMBAR REGION WITHOUT NEUROGENIC CLAUDICATION: Primary | ICD-10-CM

## 2019-08-20 PROCEDURE — 97112 NEUROMUSCULAR REEDUCATION: CPT

## 2019-08-20 PROCEDURE — 97110 THERAPEUTIC EXERCISES: CPT

## 2019-08-20 PROCEDURE — 97140 MANUAL THERAPY 1/> REGIONS: CPT

## 2019-08-20 RX ORDER — TRAMADOL HYDROCHLORIDE 50 MG/1
TABLET ORAL
Qty: 60 TABLET | Refills: 2 | Status: SHIPPED | OUTPATIENT
Start: 2019-08-20 | End: 2019-09-25 | Stop reason: SDUPTHER

## 2019-08-20 NOTE — TELEPHONE ENCOUNTER
Controlled Substance Monitoring:    Acute and Chronic Pain Monitoring:   RX Monitoring 8/20/2019   Attestation -   Periodic Controlled Substance Monitoring No signs of potential drug abuse or diversion identified.

## 2019-08-20 NOTE — FLOWSHEET NOTE
Therapeutic Exercise  [x] Modalities:  [x] Therapeutic Activity     [] Ultrasound  [] Estim  [] Gait Training      [] Cervical Traction [] Lumbar Traction  [] Neuromuscular Re-education    [x] Cold/hotpack [] Iontophoresis   [x] Instruction in HEP      [x] Vasopneumatic   [] Dry Needling    [x] Manual Therapy               [] Aquatic Therapy              Electronically signed by:  Jeremy Ventura PTA       8/20/2019,1:36 PM        8/20/2019,3:16 PM

## 2019-08-27 ENCOUNTER — HOSPITAL ENCOUNTER (OUTPATIENT)
Dept: PHYSICAL THERAPY | Age: 74
Setting detail: THERAPIES SERIES
Discharge: HOME | End: 2019-08-27
Payer: MEDICARE

## 2019-08-27 PROCEDURE — 97110 THERAPEUTIC EXERCISES: CPT

## 2019-08-27 NOTE — DISCHARGE SUMMARY
Outpatient Physical Therapy           [place]           [] Phone: [phone]   Fax: [phone]  [facility]           [] Phone: [phone]   Fax: [phone]      To:   Dr. Maria C Horton       From: Rachell Angel, PT, DPT, OCS      Patient: Rk Guerrero                  : 1945  Diagnosis:    L Radial Colles Fx     Date: 2019  Treatment Diagnosis:   L wrist weakness, stiffness, pain         []  Progress Note                [x]  Discharge Note    Evaluation Date: 19    Total Visits to date:   6 Cancels/No-shows to date: 0     Subjective:     Pt stated that she only has dull pain at 1/10 at most. Completing all activities with exception of heavy activities. Feels 85-90% return to PLOF. Plan of Care/Treatment to date:  [x] Therapeutic Exercise    [x] Modalities:  [x] Therapeutic Activity     [] Ultrasound  [] Electrical Stimulation  [] Gait Training      [] Cervical Traction   [] Lumbar Traction  [x] Neuromuscular Re-education  [x] Cold/hotpack [] Iontophoresis  [x] Instruction in HEP      Other:  [x] Manual Therapy       [x]  Vasopneumatic  [] Aquatic Therapy       []                    ? Objective/Significant Findings At Last Visit/Comments:     Denies pain with palpation.     L Forearm Pron 0-90: 85  L Forearm Supination  0-90: 90  L Wrist Flexion 0-80: 40  L Wrist Extension 0-70: 30  L Wrist Radial Deviation 0-20: 30  L Wrist Ulnar Deviation 0-45: 25     Strength LUE  L Forearm Pron: 4+/5  L Forearm Sup: 4+/5  L Wrist Flexion: 5/5  L Wrist Extension: 4+/5  L Wrist Radial Deviation: 4+/5  L Wrist Ulnar Deviation: 4+/5        L: 20#     R: R: 30# per  dynamometer      Quick DASH:  7% disability          Assessment:  Pt has completed 6 visits since start of therapy on 19. Pt has increased ease completing all ADLs with L wrist with exception of heavy lifting . Pt demo improvements that include L wrist A/PROM,  strength and pain.  Pt has minor deficits with expected improvement with completing HEP. Pt demo I with HEP and no longer requires PT services. Pt will be discharged at this time.           Goal Status:  [x] Achieved [] Partially Achieved  [] Not Achieved      Short term goals  Time Frame for Short term goals: Defer to [address] term goals  Time Frame for Long term goals : 5 weeks 8/25/19   Long term goal 1: Pt will demo I with HEP/symptom management. MET   Long term goal 2: Pt will demo >10 deg improvement in AROM to ease ADLS. MET  Long term goal 3: Pt will demo >8# improvement in  strength to ease light lifting. MET   Long term goal 4: Pt will demo >4/5 strength in L wrist to ease light reaching. MET   Long term goal 5: Pt will report >20% disability per Quick DASH. MET               Patient Status: [] Continue per initial plan of Care     [x] Patient now discharged     [] Additional visits requested, Please re-certify for additional visits: If we are requesting more visits, we fully anticipate the patient's condition is expected to improve within the treatment timeframe we are requesting. Electronically signed by:  Jon Fu, PT, DPT, OCS  8/27/2019, 4:56 PM    8/27/2019 4:56 PM     If you have any questions or concerns, please don't hesitate to call.   Thank you for your referral.    Physician Signature:______________________ Date:______ Time: ________  By signing above, therapists plan is approved by physician

## 2019-09-25 DIAGNOSIS — M48.061 SPINAL STENOSIS OF LUMBAR REGION WITHOUT NEUROGENIC CLAUDICATION: ICD-10-CM

## 2019-09-26 RX ORDER — TRIAMTERENE AND HYDROCHLOROTHIAZIDE 37.5; 25 MG/1; MG/1
CAPSULE ORAL
Qty: 90 CAPSULE | Refills: 3 | Status: SHIPPED | OUTPATIENT
Start: 2019-09-26 | End: 2020-10-05 | Stop reason: SDUPTHER

## 2019-09-26 RX ORDER — TRAMADOL HYDROCHLORIDE 50 MG/1
TABLET ORAL
Qty: 60 TABLET | Refills: 2 | Status: SHIPPED | OUTPATIENT
Start: 2019-09-26 | End: 2019-12-25

## 2019-10-11 ENCOUNTER — OFFICE VISIT (OUTPATIENT)
Dept: INTERNAL MEDICINE CLINIC | Age: 74
End: 2019-10-11
Payer: MEDICARE

## 2019-10-11 VITALS
SYSTOLIC BLOOD PRESSURE: 130 MMHG | DIASTOLIC BLOOD PRESSURE: 60 MMHG | BODY MASS INDEX: 21.09 KG/M2 | HEART RATE: 84 BPM | WEIGHT: 111.6 LBS

## 2019-10-11 DIAGNOSIS — Z23 NEED FOR INFLUENZA VACCINATION: ICD-10-CM

## 2019-10-11 DIAGNOSIS — R73.09 ELEVATED HEMOGLOBIN A1C: Primary | ICD-10-CM

## 2019-10-11 DIAGNOSIS — E78.2 MIXED HYPERLIPIDEMIA: ICD-10-CM

## 2019-10-11 DIAGNOSIS — I10 ESSENTIAL HYPERTENSION: ICD-10-CM

## 2019-10-11 DIAGNOSIS — E03.4 HYPOTHYROIDISM DUE TO ACQUIRED ATROPHY OF THYROID: ICD-10-CM

## 2019-10-11 DIAGNOSIS — C91.10 CLL (CHRONIC LYMPHOCYTIC LEUKEMIA) (HCC): Primary | ICD-10-CM

## 2019-10-11 PROCEDURE — G8482 FLU IMMUNIZE ORDER/ADMIN: HCPCS | Performed by: INTERNAL MEDICINE

## 2019-10-11 PROCEDURE — G8420 CALC BMI NORM PARAMETERS: HCPCS | Performed by: INTERNAL MEDICINE

## 2019-10-11 PROCEDURE — 1123F ACP DISCUSS/DSCN MKR DOCD: CPT | Performed by: INTERNAL MEDICINE

## 2019-10-11 PROCEDURE — G0008 ADMIN INFLUENZA VIRUS VAC: HCPCS | Performed by: INTERNAL MEDICINE

## 2019-10-11 PROCEDURE — G8427 DOCREV CUR MEDS BY ELIG CLIN: HCPCS | Performed by: INTERNAL MEDICINE

## 2019-10-11 PROCEDURE — 3017F COLORECTAL CA SCREEN DOC REV: CPT | Performed by: INTERNAL MEDICINE

## 2019-10-11 PROCEDURE — 99213 OFFICE O/P EST LOW 20 MIN: CPT | Performed by: INTERNAL MEDICINE

## 2019-10-11 PROCEDURE — 4040F PNEUMOC VAC/ADMIN/RCVD: CPT | Performed by: INTERNAL MEDICINE

## 2019-10-11 PROCEDURE — 90653 IIV ADJUVANT VACCINE IM: CPT | Performed by: INTERNAL MEDICINE

## 2019-10-11 PROCEDURE — 1090F PRES/ABSN URINE INCON ASSESS: CPT | Performed by: INTERNAL MEDICINE

## 2019-10-11 PROCEDURE — 1036F TOBACCO NON-USER: CPT | Performed by: INTERNAL MEDICINE

## 2019-10-11 PROCEDURE — G8399 PT W/DXA RESULTS DOCUMENT: HCPCS | Performed by: INTERNAL MEDICINE

## 2019-10-17 DIAGNOSIS — F51.04 CHRONIC INSOMNIA: ICD-10-CM

## 2019-10-17 RX ORDER — ZOLPIDEM TARTRATE 5 MG/1
5 TABLET ORAL NIGHTLY PRN
Qty: 90 TABLET | Refills: 0 | Status: SHIPPED | OUTPATIENT
Start: 2019-10-17 | End: 2020-01-22 | Stop reason: SDUPTHER

## 2019-11-20 ENCOUNTER — TELEPHONE (OUTPATIENT)
Dept: INTERNAL MEDICINE CLINIC | Age: 74
End: 2019-11-20

## 2019-11-21 ENCOUNTER — HOSPITAL ENCOUNTER (OUTPATIENT)
Age: 74
Setting detail: SPECIMEN
Discharge: HOME | End: 2019-11-21
Payer: MEDICARE

## 2019-11-21 LAB
ALBUMIN SERPL-MCNC: 3.7 GM/DL (ref 3.4–5)
ALP BLD-CCNC: 74 IU/L (ref 40–129)
ALT SERPL-CCNC: 11 U/L (ref 10–40)
ANION GAP SERPL CALCULATED.3IONS-SCNC: 15 MMOL/L (ref 4–16)
AST SERPL-CCNC: 17 IU/L (ref 15–37)
BILIRUB SERPL-MCNC: 0.5 MG/DL (ref 0–1)
BUN BLDV-MCNC: 26 MG/DL (ref 6–23)
CALCIUM SERPL-MCNC: 8.7 MG/DL (ref 8.3–10.6)
CHLORIDE BLD-SCNC: 99 MMOL/L (ref 99–110)
CO2: 28 MMOL/L (ref 21–32)
CREAT SERPL-MCNC: 1.2 MG/DL (ref 0.6–1.1)
GFR AFRICAN AMERICAN: 53 ML/MIN/1.73M2
GFR NON-AFRICAN AMERICAN: 44 ML/MIN/1.73M2
GLUCOSE BLD-MCNC: 97 MG/DL (ref 70–99)
IGA: <50 MG/DL (ref 69–382)
IGG,SERUM: 353 MG/DL (ref 723–1685)
IGM,SERUM: 2048 MG/DL (ref 62–277)
LACTATE DEHYDROGENASE: 141 IU/L (ref 120–246)
POTASSIUM SERPL-SCNC: 4.3 MMOL/L (ref 3.5–5.1)
SODIUM BLD-SCNC: 142 MMOL/L (ref 135–145)
TOTAL PROTEIN: 7 GM/DL (ref 6.4–8.2)

## 2019-11-21 PROCEDURE — 84165 PROTEIN E-PHORESIS SERUM: CPT

## 2019-11-21 PROCEDURE — 80053 COMPREHEN METABOLIC PANEL: CPT

## 2019-11-21 PROCEDURE — 82784 ASSAY IGA/IGD/IGG/IGM EACH: CPT

## 2019-11-21 PROCEDURE — 83615 LACTATE (LD) (LDH) ENZYME: CPT

## 2019-11-21 PROCEDURE — 83883 ASSAY NEPHELOMETRY NOT SPEC: CPT

## 2019-11-21 PROCEDURE — 86320 SERUM IMMUNOELECTROPHORESIS: CPT

## 2019-11-21 RX ORDER — POTASSIUM CHLORIDE 600 MG/1
TABLET, FILM COATED, EXTENDED RELEASE ORAL
Qty: 90 TABLET | Refills: 3 | Status: SHIPPED | OUTPATIENT
Start: 2019-11-21 | End: 2020-11-10 | Stop reason: SDUPTHER

## 2019-11-21 RX ORDER — AZITHROMYCIN 250 MG/1
TABLET, FILM COATED ORAL
Qty: 1 PACKET | Refills: 0 | Status: SHIPPED | OUTPATIENT
Start: 2019-11-21 | End: 2019-11-25 | Stop reason: ALTCHOICE

## 2019-11-22 LAB
ALBUMIN ELP: 3.2 GM/DL (ref 3.2–5.6)
ALPHA-1-GLOBULIN: 0.3 GM/DL (ref 0.1–0.4)
ALPHA-2-GLOBULIN: 1 GM/DL (ref 0.4–1.2)
BETA GLOBULIN: 0.8 GM/DL (ref 0.5–1.3)
GAMMA GLOBULIN: 1.6 GM/DL (ref 0.5–1.6)
SPEP INTERPRETATION: NORMAL
SPEP INTERPRETATION: NORMAL
TOTAL PROTEIN: 7 GM/DL (ref 6.4–8.2)

## 2019-11-24 LAB
KAPPA QUANT FREE LIGHT CHAINS: 1.34 MG/DL (ref 0.33–1.94)
KAPPA/LAMBDA FREE LIGHT CHAIN RATIO: 0.03 (ref 0.26–1.65)
KAPPA/LAMBDA FREE LIGHT CHAIN RATIO: ABNORMAL (ref 0.26–1.65)
LAMBDA FREE LIGHT CHAINS URINE/ VOL: 52.8 MG/DL (ref 0.57–2.63)

## 2019-11-25 ENCOUNTER — OFFICE VISIT (OUTPATIENT)
Dept: FAMILY MEDICINE CLINIC | Age: 74
End: 2019-11-25
Payer: MEDICARE

## 2019-11-25 ENCOUNTER — HOSPITAL ENCOUNTER (OUTPATIENT)
Age: 74
Discharge: HOME | End: 2019-11-25
Payer: MEDICARE

## 2019-11-25 ENCOUNTER — HOSPITAL ENCOUNTER (OUTPATIENT)
Dept: GENERAL RADIOLOGY | Age: 74
Discharge: HOME | End: 2019-11-25
Payer: MEDICARE

## 2019-11-25 VITALS
TEMPERATURE: 98.5 F | HEART RATE: 83 BPM | DIASTOLIC BLOOD PRESSURE: 64 MMHG | SYSTOLIC BLOOD PRESSURE: 130 MMHG | WEIGHT: 106 LBS | HEIGHT: 62 IN | BODY MASS INDEX: 19.51 KG/M2 | OXYGEN SATURATION: 93 %

## 2019-11-25 DIAGNOSIS — J40 BRONCHITIS: Primary | ICD-10-CM

## 2019-11-25 DIAGNOSIS — J40 BRONCHITIS: ICD-10-CM

## 2019-11-25 PROCEDURE — 1123F ACP DISCUSS/DSCN MKR DOCD: CPT | Performed by: NURSE PRACTITIONER

## 2019-11-25 PROCEDURE — 1036F TOBACCO NON-USER: CPT | Performed by: NURSE PRACTITIONER

## 2019-11-25 PROCEDURE — G8482 FLU IMMUNIZE ORDER/ADMIN: HCPCS | Performed by: NURSE PRACTITIONER

## 2019-11-25 PROCEDURE — 99213 OFFICE O/P EST LOW 20 MIN: CPT | Performed by: NURSE PRACTITIONER

## 2019-11-25 PROCEDURE — 3017F COLORECTAL CA SCREEN DOC REV: CPT | Performed by: NURSE PRACTITIONER

## 2019-11-25 PROCEDURE — G8399 PT W/DXA RESULTS DOCUMENT: HCPCS | Performed by: NURSE PRACTITIONER

## 2019-11-25 PROCEDURE — 71046 X-RAY EXAM CHEST 2 VIEWS: CPT

## 2019-11-25 PROCEDURE — 4040F PNEUMOC VAC/ADMIN/RCVD: CPT | Performed by: NURSE PRACTITIONER

## 2019-11-25 PROCEDURE — G8427 DOCREV CUR MEDS BY ELIG CLIN: HCPCS | Performed by: NURSE PRACTITIONER

## 2019-11-25 PROCEDURE — 1090F PRES/ABSN URINE INCON ASSESS: CPT | Performed by: NURSE PRACTITIONER

## 2019-11-25 PROCEDURE — G8420 CALC BMI NORM PARAMETERS: HCPCS | Performed by: NURSE PRACTITIONER

## 2019-11-25 ASSESSMENT — ENCOUNTER SYMPTOMS
COUGH: 1
SINUS PRESSURE: 0
SORE THROAT: 0
WHEEZING: 0
DIARRHEA: 0
RHINORRHEA: 0
NAUSEA: 0
HEMOPTYSIS: 0
CHEST TIGHTNESS: 0
SHORTNESS OF BREATH: 1
HEARTBURN: 0
VOMITING: 0
SINUS PAIN: 0

## 2019-11-26 ENCOUNTER — TELEPHONE (OUTPATIENT)
Dept: FAMILY MEDICINE CLINIC | Age: 74
End: 2019-11-26

## 2019-11-26 RX ORDER — DOXYCYCLINE HYCLATE 100 MG
100 TABLET ORAL 2 TIMES DAILY
Qty: 20 TABLET | Refills: 0 | Status: SHIPPED | OUTPATIENT
Start: 2019-11-26 | End: 2019-12-06

## 2019-11-26 RX ORDER — PREDNISONE 20 MG/1
TABLET ORAL
Qty: 18 TABLET | Refills: 0 | Status: SHIPPED | OUTPATIENT
Start: 2019-11-26 | End: 2020-01-13

## 2019-12-23 DIAGNOSIS — I10 ESSENTIAL HYPERTENSION: ICD-10-CM

## 2019-12-23 DIAGNOSIS — R73.09 ELEVATED HEMOGLOBIN A1C: ICD-10-CM

## 2019-12-23 DIAGNOSIS — E03.4 HYPOTHYROIDISM DUE TO ACQUIRED ATROPHY OF THYROID: ICD-10-CM

## 2019-12-23 DIAGNOSIS — E78.2 MIXED HYPERLIPIDEMIA: ICD-10-CM

## 2019-12-23 LAB
ALBUMIN SERPL-MCNC: 3.7 G/DL (ref 3.4–5)
ALP BLD-CCNC: 73 U/L (ref 40–129)
ALT SERPL-CCNC: 12 U/L (ref 10–40)
ANION GAP SERPL CALCULATED.3IONS-SCNC: 14 MMOL/L (ref 3–16)
AST SERPL-CCNC: 12 U/L (ref 15–37)
BILIRUB SERPL-MCNC: 0.4 MG/DL (ref 0–1)
BILIRUBIN DIRECT: <0.2 MG/DL (ref 0–0.3)
BILIRUBIN, INDIRECT: ABNORMAL MG/DL (ref 0–1)
BUN BLDV-MCNC: 23 MG/DL (ref 7–20)
CALCIUM SERPL-MCNC: 8.8 MG/DL (ref 8.3–10.6)
CHLORIDE BLD-SCNC: 104 MMOL/L (ref 99–110)
CHOLESTEROL, TOTAL: 102 MG/DL (ref 0–199)
CO2: 25 MMOL/L (ref 21–32)
CREAT SERPL-MCNC: 0.8 MG/DL (ref 0.6–1.2)
GFR AFRICAN AMERICAN: >60
GFR NON-AFRICAN AMERICAN: >60
GLUCOSE BLD-MCNC: 87 MG/DL (ref 70–99)
HDLC SERPL-MCNC: 28 MG/DL (ref 40–60)
LDL CHOLESTEROL CALCULATED: 46 MG/DL
POTASSIUM SERPL-SCNC: 4 MMOL/L (ref 3.5–5.1)
SODIUM BLD-SCNC: 143 MMOL/L (ref 136–145)
TOTAL PROTEIN: 6.6 G/DL (ref 6.4–8.2)
TRIGL SERPL-MCNC: 142 MG/DL (ref 0–150)
TSH SERPL DL<=0.05 MIU/L-ACNC: 4.08 UIU/ML (ref 0.27–4.2)
VLDLC SERPL CALC-MCNC: 28 MG/DL

## 2020-01-13 ENCOUNTER — OFFICE VISIT (OUTPATIENT)
Dept: INTERNAL MEDICINE CLINIC | Age: 75
End: 2020-01-13
Payer: MEDICARE

## 2020-01-13 VITALS
SYSTOLIC BLOOD PRESSURE: 110 MMHG | BODY MASS INDEX: 20.63 KG/M2 | WEIGHT: 111 LBS | HEART RATE: 87 BPM | OXYGEN SATURATION: 96 % | DIASTOLIC BLOOD PRESSURE: 62 MMHG

## 2020-01-13 PROBLEM — D47.2 MONOCLONAL GAMMOPATHY: Status: ACTIVE | Noted: 2020-01-13

## 2020-01-13 PROCEDURE — 99203 OFFICE O/P NEW LOW 30 MIN: CPT | Performed by: FAMILY MEDICINE

## 2020-01-13 ASSESSMENT — ENCOUNTER SYMPTOMS
COUGH: 0
CHEST TIGHTNESS: 0
SORE THROAT: 0
EYE REDNESS: 0
SHORTNESS OF BREATH: 0
EYE ITCHING: 0
DIARRHEA: 0
ABDOMINAL DISTENTION: 0
VOMITING: 0
BACK PAIN: 0
WHEEZING: 0
ABDOMINAL PAIN: 0
CONSTIPATION: 0
NAUSEA: 0
SINUS PAIN: 0
TROUBLE SWALLOWING: 0

## 2020-01-13 ASSESSMENT — PATIENT HEALTH QUESTIONNAIRE - PHQ9
SUM OF ALL RESPONSES TO PHQ QUESTIONS 1-9: 0
SUM OF ALL RESPONSES TO PHQ9 QUESTIONS 1 & 2: 0
2. FEELING DOWN, DEPRESSED OR HOPELESS: 0
1. LITTLE INTEREST OR PLEASURE IN DOING THINGS: 0
SUM OF ALL RESPONSES TO PHQ QUESTIONS 1-9: 0

## 2020-01-13 NOTE — PROGRESS NOTES
Subjective:      Chief Complaint: Establish care, sleep disturbance, recently diagnosed bladder cancer    HPI:  Virgil Granado is a 75 y.o. female who presents today because her chronic medical conditions mentioned below:    Monoclonal gammopathy: Patient was recently was diagnosed with monoclonal gammopathy. Patient has establish care with hematologist.  Pending bone marrow biopsy. Sleep disturbance: She is chronically taking Ambien at night for sleep. Without medication, she is up all night long. Hypothyroidism: Stable on current home medications Synthroid 88 mmHg. Last TSH was 4.08 on 12/23/2019. COPD: Stable on current home medication    Hypertension: Stable on current home medication triamterene-hydrochlorothiazide.       COPD: Stable and may be using I per day albuterol   Patient Active Problem List   Diagnosis    Edema    Hypothyroidism    Hyperlipidemia    Vitamin D deficiency    Osteopenia    Essential hypertension    [facility]ch[facility] obstructive pulmonary disease) [facility]    Macular degeneration, dry-left eye    Macular degeneration, right eye-wet     PMR (polymyalgia rheumatica) ([facility])    CLL [facility]ocytic leukemia) ([facility]    CCC (chronic calculous cholecystitis)    Monoclonal gammopathy       Past Medical History:   Diagnosis Date    Arm fracture, left 05/16/2019    Casted - no surgery - Dr. 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 pylori infection 8/1996    S/P Biaxin and Prilosec    Hiatal hernia 8/1994    with reflux by UGI    Hyperlipidemia     Hypertension     Hypothyroidism 1990's    MDRO (multiple drug resistant organisms) resistance 2005    Nasal passages    Osteoporosis     Smoking hx         Social History     Tobacco Use    Smoking status: Former Smoker     Packs/day: 2.00     Years: 30.00     Pack years: 60.00     Types: Cigarettes     Start date: 1965     Last attempt to quit: 1997     Years since quittin.0    Smokeless tobacco: Never Used   Substance Use Topics    Alcohol use: No        Review of Systems   Constitutional: Negative for appetite change, chills, fatigue, fever and unexpected weight change. HENT: Negative for congestion, ear pain, sinus pain, sore throat and trouble swallowing. Eyes: Negative for redness and itching. Respiratory: Negative for cough, chest tightness, shortness of breath and wheezing. Cardiovascular: Negative for chest pain and palpitations. Gastrointestinal: Negative for abdominal distention, abdominal pain, constipation, diarrhea, nausea and vomiting. Endocrine: Negative for polyuria. Genitourinary: Negative for difficulty urinating, dysuria, frequency and urgency. Musculoskeletal: Positive for arthralgias. Negative for back pain and myalgias. Skin: Negative for rash. Neurological: Negative for dizziness and headaches. Psychiatric/Behavioral: Negative for behavioral problems, confusion and suicidal ideas. Objective:      /62   Pulse 87   Wt 111 lb (50.3 kg)   SpO2 96%   BMI 20.63 kg/m²      Physical Exam  Vitals signs reviewed. Constitutional:       Appearance: Normal appearance. She is well-developed. HENT:      Head: Normocephalic and atraumatic. Mouth/Throat:      Mouth: Mucous membranes are moist.      Pharynx: Oropharynx is clear. Eyes:      Conjunctiva/sclera: Conjunctivae normal.      Pupils: Pupils are equal, round, and reactive to light. Neck:      Musculoskeletal: Normal range of motion and neck supple. Thyroid: No thyromegaly. Cardiovascular:      Rate and Rhythm: Normal rate and regular rhythm. Heart sounds: Normal heart sounds.

## 2020-01-22 RX ORDER — ZOLPIDEM TARTRATE 5 MG/1
5 TABLET ORAL NIGHTLY PRN
Qty: 30 TABLET | Refills: 0 | Status: SHIPPED | OUTPATIENT
Start: 2020-01-22 | End: 2020-02-17 | Stop reason: SDUPTHER

## 2020-02-05 RX ORDER — LEVOTHYROXINE SODIUM 88 MCG
TABLET ORAL
Qty: 90 TABLET | Refills: 3 | Status: ON HOLD | OUTPATIENT
Start: 2020-02-05 | End: 2022-02-10 | Stop reason: HOSPADM

## 2020-02-11 RX ORDER — TRAMADOL HYDROCHLORIDE 50 MG/1
TABLET ORAL
OUTPATIENT
Start: 2020-02-11

## 2020-02-11 RX ORDER — TRAMADOL HYDROCHLORIDE 50 MG/1
TABLET ORAL
COMMUNITY
Start: 2019-12-28 | End: 2020-02-17

## 2020-02-11 RX ORDER — PREDNISONE 10 MG/1
TABLET ORAL
COMMUNITY
Start: 2019-11-26 | End: 2021-01-15

## 2020-02-17 ENCOUNTER — HOSPITAL ENCOUNTER (OUTPATIENT)
Dept: CT IMAGING | Age: 75
Discharge: HOME | End: 2020-02-17
Payer: MEDICARE

## 2020-02-17 ENCOUNTER — OFFICE VISIT (OUTPATIENT)
Dept: INTERNAL MEDICINE CLINIC | Age: 75
End: 2020-02-17
Payer: MEDICARE

## 2020-02-17 VITALS
SYSTOLIC BLOOD PRESSURE: 130 MMHG | HEART RATE: 84 BPM | DIASTOLIC BLOOD PRESSURE: 80 MMHG | WEIGHT: 113 LBS | OXYGEN SATURATION: 98 % | BODY MASS INDEX: 21.01 KG/M2

## 2020-02-17 PROBLEM — K21.9 GERD (GASTROESOPHAGEAL REFLUX DISEASE): Status: ACTIVE | Noted: 2020-02-17

## 2020-02-17 PROBLEM — F51.04 CHRONIC INSOMNIA: Status: ACTIVE | Noted: 2020-02-17

## 2020-02-17 LAB
GFR AFRICAN AMERICAN: >60 ML/MIN/1.73M2
GFR NON-AFRICAN AMERICAN: 58 ML/MIN/1.73M2
POC CREATININE: 0.9 MG/DL (ref 0.6–1.1)

## 2020-02-17 PROCEDURE — 3023F SPIROM DOC REV: CPT | Performed by: FAMILY MEDICINE

## 2020-02-17 PROCEDURE — G8926 SPIRO NO PERF OR DOC: HCPCS | Performed by: FAMILY MEDICINE

## 2020-02-17 PROCEDURE — G8420 CALC BMI NORM PARAMETERS: HCPCS | Performed by: FAMILY MEDICINE

## 2020-02-17 PROCEDURE — G8427 DOCREV CUR MEDS BY ELIG CLIN: HCPCS | Performed by: FAMILY MEDICINE

## 2020-02-17 PROCEDURE — 3017F COLORECTAL CA SCREEN DOC REV: CPT | Performed by: FAMILY MEDICINE

## 2020-02-17 PROCEDURE — 4040F PNEUMOC VAC/ADMIN/RCVD: CPT | Performed by: FAMILY MEDICINE

## 2020-02-17 PROCEDURE — 2580000003 HC RX 258: Performed by: INTERNAL MEDICINE

## 2020-02-17 PROCEDURE — G8399 PT W/DXA RESULTS DOCUMENT: HCPCS | Performed by: FAMILY MEDICINE

## 2020-02-17 PROCEDURE — G8482 FLU IMMUNIZE ORDER/ADMIN: HCPCS | Performed by: FAMILY MEDICINE

## 2020-02-17 PROCEDURE — 1036F TOBACCO NON-USER: CPT | Performed by: FAMILY MEDICINE

## 2020-02-17 PROCEDURE — 1123F ACP DISCUSS/DSCN MKR DOCD: CPT | Performed by: FAMILY MEDICINE

## 2020-02-17 PROCEDURE — 74177 CT ABD & PELVIS W/CONTRAST: CPT

## 2020-02-17 PROCEDURE — 6360000004 HC RX CONTRAST MEDICATION: Performed by: INTERNAL MEDICINE

## 2020-02-17 PROCEDURE — 99214 OFFICE O/P EST MOD 30 MIN: CPT | Performed by: FAMILY MEDICINE

## 2020-02-17 PROCEDURE — 71260 CT THORAX DX C+: CPT

## 2020-02-17 PROCEDURE — 1090F PRES/ABSN URINE INCON ASSESS: CPT | Performed by: FAMILY MEDICINE

## 2020-02-17 RX ORDER — SODIUM CHLORIDE 0.9 % (FLUSH) 0.9 %
10 SYRINGE (ML) INJECTION 2 TIMES DAILY
Status: DISCONTINUED | OUTPATIENT
Start: 2020-02-17 | End: 2020-02-18 | Stop reason: HOSPADM

## 2020-02-17 RX ORDER — ZOLPIDEM TARTRATE 5 MG/1
5 TABLET ORAL NIGHTLY PRN
Qty: 30 TABLET | Refills: 0 | Status: CANCELLED | OUTPATIENT
Start: 2020-02-17 | End: 2020-03-18

## 2020-02-17 RX ORDER — TRAMADOL HYDROCHLORIDE 50 MG/1
50 TABLET ORAL EVERY 6 HOURS PRN
Qty: 30 TABLET | Refills: 0 | Status: CANCELLED | OUTPATIENT
Start: 2020-02-17 | End: 2020-03-18

## 2020-02-17 RX ORDER — ZOLPIDEM TARTRATE 5 MG/1
5 TABLET ORAL NIGHTLY PRN
Qty: 30 TABLET | Refills: 1 | Status: SHIPPED | OUTPATIENT
Start: 2020-02-17 | End: 2020-03-18 | Stop reason: SDUPTHER

## 2020-02-17 RX ADMIN — IOHEXOL 50 ML: 240 INJECTION, SOLUTION INTRATHECAL; INTRAVASCULAR; INTRAVENOUS; ORAL at 09:20

## 2020-02-17 RX ADMIN — Medication 10 ML: at 10:30

## 2020-02-17 RX ADMIN — IOPAMIDOL 75 ML: 755 INJECTION, SOLUTION INTRAVENOUS at 10:30

## 2020-02-17 ASSESSMENT — ENCOUNTER SYMPTOMS
WHEEZING: 0
SINUS PAIN: 0
DIARRHEA: 0
EYE REDNESS: 0
CONSTIPATION: 0
NAUSEA: 0
ABDOMINAL PAIN: 0
TROUBLE SWALLOWING: 0
SHORTNESS OF BREATH: 0
COUGH: 0
VOMITING: 0
CHEST TIGHTNESS: 0
ABDOMINAL DISTENTION: 0
BACK PAIN: 0
SORE THROAT: 0
EYE ITCHING: 0

## 2020-02-17 NOTE — PROGRESS NOTES
(polymyalgia rheumatica) (HCC)    [facility]    CCC (chronic calculous cholecystitis)    Monoclonal gammopathy    Chronic insomnia    GERD (gastroesophageal reflux disease)       Past Medical History:   Diagnosis Date    Arm fracture, left 2019    Casted - no surgery - Dr. 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 pylori infection 1996    S/P Biaxin and Prilosec    Hiatal hernia 1994    with reflux by UGI    Hyperlipidemia     Hypertension     Hypothyroidism     MDRO (multiple drug resistant organisms) resistance 2005    Nasal passages    Osteoporosis     Smoking hx         Social History     Tobacco Use    Smoking status: Former Smoker     Packs/day: 2.00     Years: 30.00     Pack years: 60.00     Types: Cigarettes     Start date: 1965     Last attempt to quit: 1997     Years since quittin.1    Smokeless tobacco: Never Used   Substance Use Topics    Alcohol use: No        Review of Systems   Constitutional: Negative for appetite change, chills, fatigue, fever and unexpected weight change. HENT: Negative for congestion, ear pain, sinus pain, sore throat and trouble swallowing. Eyes: Negative for redness and itching. Respiratory: Negative for cough, chest tightness, shortness of breath and wheezing. Cardiovascular: Negative for chest pain and palpitations. Gastrointestinal: Negative for abdominal distention, abdominal pain, constipation, diarrhea, nausea and vomiting. Endocrine: Negative for polyuria. Genitourinary: Negative for difficulty urinating, dysuria, frequency and urgency.    Musculoskeletal: Positive for arthralgias and myalgias. Negative for back pain. Skin: Negative for rash. Neurological: Negative for dizziness and headaches. Psychiatric/Behavioral: Positive for sleep disturbance. Negative for behavioral problems, confusion and suicidal ideas. Objective:      /80   Pulse 84   Wt 113 lb (51.3 kg)   SpO2 98%   BMI 21.01 kg/m²      Physical Exam  Vitals signs reviewed. Constitutional:       Appearance: Normal appearance. She is well-developed. HENT:      Head: Normocephalic and atraumatic. Mouth/Throat:      Mouth: Mucous membranes are moist.      Pharynx: Oropharynx is clear. Eyes:      Conjunctiva/sclera: Conjunctivae normal.      Pupils: Pupils are equal, round, and reactive to light. Neck:      Musculoskeletal: Normal range of motion and neck supple. Thyroid: No thyromegaly. Cardiovascular:      Rate and Rhythm: Normal rate and regular rhythm. Heart sounds: Normal heart sounds. Pulmonary:      Effort: Pulmonary effort is normal.      Breath sounds: Normal breath sounds. Abdominal:      General: Bowel sounds are normal. There is no distension. Palpations: Abdomen is soft. Tenderness: There is no abdominal tenderness. There is no guarding. Musculoskeletal: Normal range of motion. Right lower leg: No edema. Left lower leg: No edema. Comments: 5-5 muscular strength throughout and bilateral.   Skin:     General: Skin is warm and dry. Neurological:      General: No focal deficit present. Mental Status: She is alert and oriented to person, place, and time. Psychiatric:         Mood and Affect: Mood normal.         Behavior: Behavior normal.         Thought Content: Thought content normal.         Judgment: Judgment normal.            Assessment / Plan:      1. Chronic insomnia  - Patient tried to cut down on her medication Ambien and failed to do so.   Patient was again encouraged to try dribbled on her medication for discontinuation plan

## 2020-02-17 NOTE — LETTER
stimulants, such as caffeine pills or energy drinks, certain weight loss supplements and oral decongestants. Dependence withdrawal symptoms may include depressed mood, loss of interest, suicidal thoughts, anxiety, fatigue, appetite changes and agitation. Testosterone replacement therapy:  Potential side effects include increased risk of stroke and heart attack, blood clots, increased blood pressure, increased cholesterol, enlarged prostate, sleep apnea, irritability/aggression and other mood disorders, and decreased fertility. Other:     1. I understand that I have the following responsibilities:  · I will take medications at the dose and frequency prescribed. · I will not increase or change how I take my medications without the approval of the health care provider who signs this Medication Agreement. · I will arrange for refills at the prescribed interval ONLY during regular office hours. I will not ask for refills earlier than agreed, after-hours, on holidays or on weekends. · I will obtain all refills for these medications at  ·  ____________________________________  pharmacy (phone number  ·  ________________________), with full consent for my provider and pharmacist to exchange information in writing or verbally. · I will not request any pain medications or controlled substances from other providers and will inform this provider of all other medications I am taking. · I will inform my other health care providers that I am taking these medications and of the existence of this [address]. In the event of an emergency, I will provide the same information to the emergency department providers. · I will protect my prescriptions and medications. I understand that lost or misplaced prescriptions will not be replaced. · I will keep medications only for my own use and will not share them with others. I will keep all medications away from children. · I agree to participate in any medical, psychological or psychiatric assessments recommended by my provider. · I will actively participate in any program designed to improve function, including social, physical, psychological and daily or work activities. 2. I will not use illegal or street drugs or another person's prescription. If I have an addiction problem with drugs or alcohol and my provider asks me to enter a program to address this issue, I agree to follow through. Such programs may include:  · 12-Step program and securing a sponsor  · Individual counseling   · Inpatient or outpatient treatment  · Other:_____________________________________________________________________________________________________________________________________________    If in treatment, I will request that a copy of the programs initial evaluation and treatment recommendations be sent to this provider and will not expect refills until that is received. I will also request written monthly updates be sent to this provider to verify my continuing treatment. 3. I will consent to drug screening upon my providers request to assure I am only taking the prescribed drugs, described in this MEDICATION AGREEMENT. I understand that a drug screen is a laboratory test in which a sample of my urine, blood or saliva is checked to see what drugs I have been taking. 4. I agree that I will treat the providers and staff at this office with respect at all times. I will keep all of my scheduled appointments, but if I need to cancel my appointment, I will do so a minimum of 24 hours before it is scheduled. 5. I understand that this provider may stop prescribing the medications listed if:  · I do not show any improvement in pain, or my activity has not improved. · I develop rapid tolerance or loss of improvement, as described in my treatment plan. · I develop significant side effects from the medication. · My behavior is inconsistent with the responsibilities outlined above, which may also result in my being prevented from receiving further care from this office. · Other:____________________________________________________________________    AGREEMENT:    I have read the above and have had all of my questions answered. For chronic disease management, I know that my symptoms can be managed with many types of treatments. A chronic medication trial may be part of my treatment, but I must be an active participant in my care. Medication therapy is only one part of my symptom management plan. In some cases, there may be limited scientific evidence to support the chronic use of certain medications to improve symptoms and daily function. Furthermore, in certain circumstances, there may be scientific information that suggests that use of chronic controlled substances may actually worsen my symptoms and increase my risk of unintentional death directly related to this medication therapy. I know that if my provider feels my risk from controlled medications is greater than my benefit, I will have my controlled substance medication(s) compassionately lowered or removed altogether. I agree to a controlled substance medication trial.      I further agree to allow this office to contact my HIPAA contact on file if there are concerns about my safety and use of controlled medications. I have agreed to use the following medications above as instructed by my physician and as stated in this [address].      Patient Signature:  ______________________  Date:2/17/2020 or _____________    Provider Signature:______________________  Date:2/17/2020 or _____________

## 2020-02-27 ENCOUNTER — HOSPITAL ENCOUNTER (OUTPATIENT)
Dept: INFUSION THERAPY | Age: 75
Discharge: HOME | End: 2020-02-27
Payer: MEDICARE

## 2020-02-27 LAB
ALBUMIN SERPL-MCNC: 3.9 GM/DL (ref 3.4–5)
ALP BLD-CCNC: 86 IU/L (ref 40–129)
ALT SERPL-CCNC: 13 U/L (ref 10–40)
ANION GAP SERPL CALCULATED.3IONS-SCNC: 15 MMOL/L (ref 4–16)
AST SERPL-CCNC: 13 IU/L (ref 15–37)
BASOPHILS ABSOLUTE: 0 K/CU MM
BASOPHILS RELATIVE PERCENT: 0.1 % (ref 0–1)
BILIRUB SERPL-MCNC: 0.4 MG/DL (ref 0–1)
BUN BLDV-MCNC: 20 MG/DL (ref 6–23)
CALCIUM SERPL-MCNC: 8.8 MG/DL (ref 8.3–10.6)
CHLORIDE BLD-SCNC: 102 MMOL/L (ref 99–110)
CO2: 27 MMOL/L (ref 21–32)
CREAT SERPL-MCNC: 1 MG/DL (ref 0.6–1.1)
DIFFERENTIAL TYPE: ABNORMAL
EOSINOPHILS ABSOLUTE: 0 K/CU MM
EOSINOPHILS RELATIVE PERCENT: 0.5 % (ref 0–3)
ERYTHROCYTE SEDIMENTATION RATE: 111 MM/HR (ref 0–30)
GFR AFRICAN AMERICAN: >60 ML/MIN/1.73M2
GFR NON-AFRICAN AMERICAN: 54 ML/MIN/1.73M2
GLUCOSE BLD-MCNC: 104 MG/DL (ref 70–99)
HCT VFR BLD CALC: 34.1 % (ref 37–47)
HEMOGLOBIN: 10.7 GM/DL (ref 12.5–16)
IGA: <50 MG/DL (ref 69–382)
IGG,SERUM: <300 MG/DL (ref 723–1685)
IGM,SERUM: 2446 MG/DL (ref 62–277)
LACTATE DEHYDROGENASE: 112 IU/L (ref 120–246)
LYMPHOCYTES ABSOLUTE: 4.4 K/CU MM
LYMPHOCYTES RELATIVE PERCENT: 50.7 % (ref 24–44)
MCH RBC QN AUTO: 29.2 PG (ref 27–31)
MCHC RBC AUTO-ENTMCNC: 31.4 % (ref 32–36)
MCV RBC AUTO: 92.9 FL (ref 78–100)
MONOCYTES ABSOLUTE: 0.4 K/CU MM
MONOCYTES RELATIVE PERCENT: 5 % (ref 0–4)
PDW BLD-RTO: 14 % (ref 11.7–14.9)
PLATELET # BLD: 171 K/CU MM (ref 140–440)
PMV BLD AUTO: 9.1 FL (ref 7.5–11.1)
POTASSIUM SERPL-SCNC: 4.1 MMOL/L (ref 3.5–5.1)
RBC # BLD: 3.67 M/CU MM (ref 4.2–5.4)
SEGMENTED NEUTROPHILS ABSOLUTE COUNT: 3.8 K/CU MM
SEGMENTED NEUTROPHILS RELATIVE PERCENT: 43.7 % (ref 36–66)
SODIUM BLD-SCNC: 144 MMOL/L (ref 135–145)
TOTAL PROTEIN: 7.1 GM/DL (ref 6.4–8.2)
WBC # BLD: 8.7 K/CU MM (ref 4–10.5)

## 2020-02-27 PROCEDURE — 84165 PROTEIN E-PHORESIS SERUM: CPT

## 2020-02-27 PROCEDURE — 36415 COLL VENOUS BLD VENIPUNCTURE: CPT

## 2020-02-27 PROCEDURE — 85025 COMPLETE CBC W/AUTO DIFF WBC: CPT

## 2020-02-27 PROCEDURE — 86320 SERUM IMMUNOELECTROPHORESIS: CPT

## 2020-02-27 PROCEDURE — 85652 RBC SED RATE AUTOMATED: CPT

## 2020-02-27 PROCEDURE — 82784 ASSAY IGA/IGD/IGG/IGM EACH: CPT

## 2020-02-27 PROCEDURE — 83615 LACTATE (LD) (LDH) ENZYME: CPT

## 2020-02-27 PROCEDURE — 83883 ASSAY NEPHELOMETRY NOT SPEC: CPT

## 2020-02-27 PROCEDURE — 80053 COMPREHEN METABOLIC PANEL: CPT

## 2020-02-28 LAB
ALBUMIN ELP: 3.4 GM/DL (ref 3.2–5.6)
ALPHA-1-GLOBULIN: 0.3 GM/DL (ref 0.1–0.4)
ALPHA-2-GLOBULIN: 0.9 GM/DL (ref 0.4–1.2)
BETA GLOBULIN: 0.8 GM/DL (ref 0.5–1.3)
GAMMA GLOBULIN: 1.7 GM/DL (ref 0.5–1.6)
SPEP INTERPRETATION: ABNORMAL
SPEP INTERPRETATION: NORMAL
TOTAL PROTEIN: 7.1 GM/DL (ref 6.4–8.2)

## 2020-02-29 LAB
KAPPA QUANT FREE LIGHT CHAINS: 10.5 MG/L (ref 3.3–19.4)
KAPPA/LAMBDA FREE LIGHT CHAIN RATIO: 0.03 (ref 0.26–1.65)
KAPPA/LAMBDA FREE LIGHT CHAIN RATIO: ABNORMAL (ref 0.26–1.65)
LAMBDA FREE LIGHT CHAINS URINE/ VOL: 328.54 MG/L (ref 5.71–26.3)

## 2020-03-18 RX ORDER — ZOLPIDEM TARTRATE 5 MG/1
5 TABLET ORAL NIGHTLY PRN
Qty: 30 TABLET | Refills: 0 | Status: SHIPPED | OUTPATIENT
Start: 2020-03-18 | End: 2020-04-15 | Stop reason: SDUPTHER

## 2020-04-14 ENCOUNTER — HOSPITAL ENCOUNTER (OUTPATIENT)
Dept: INFUSION THERAPY | Age: 75
Discharge: HOME | End: 2020-04-14
Payer: MEDICARE

## 2020-04-14 LAB
ALBUMIN SERPL-MCNC: 3.9 GM/DL (ref 3.4–5)
ALP BLD-CCNC: 89 IU/L (ref 40–128)
ALT SERPL-CCNC: 13 U/L (ref 10–40)
ANION GAP SERPL CALCULATED.3IONS-SCNC: 14 MMOL/L (ref 4–16)
AST SERPL-CCNC: 17 IU/L (ref 15–37)
BASOPHILS ABSOLUTE: 0 K/CU MM
BASOPHILS RELATIVE PERCENT: 0.1 % (ref 0–1)
BILIRUB SERPL-MCNC: 0.5 MG/DL (ref 0–1)
BUN BLDV-MCNC: 19 MG/DL (ref 6–23)
CALCIUM SERPL-MCNC: 9 MG/DL (ref 8.3–10.6)
CHLORIDE BLD-SCNC: 104 MMOL/L (ref 99–110)
CO2: 26 MMOL/L (ref 21–32)
CREAT SERPL-MCNC: 1.1 MG/DL (ref 0.6–1.1)
DIFFERENTIAL TYPE: ABNORMAL
EOSINOPHILS ABSOLUTE: 0 K/CU MM
EOSINOPHILS RELATIVE PERCENT: 0.3 % (ref 0–3)
GFR AFRICAN AMERICAN: 59 ML/MIN/1.73M2
GFR NON-AFRICAN AMERICAN: 48 ML/MIN/1.73M2
GLUCOSE BLD-MCNC: 107 MG/DL (ref 70–99)
HCT VFR BLD CALC: 34.1 % (ref 37–47)
HEMOGLOBIN: 10.4 GM/DL (ref 12.5–16)
LYMPHOCYTES ABSOLUTE: 5.5 K/CU MM
LYMPHOCYTES RELATIVE PERCENT: 53.4 % (ref 24–44)
MCH RBC QN AUTO: 28.6 PG (ref 27–31)
MCHC RBC AUTO-ENTMCNC: 30.5 % (ref 32–36)
MCV RBC AUTO: 93.7 FL (ref 78–100)
MONOCYTES ABSOLUTE: 0.6 K/CU MM
MONOCYTES RELATIVE PERCENT: 5.7 % (ref 0–4)
PDW BLD-RTO: 14.5 % (ref 11.7–14.9)
PLATELET # BLD: 150 K/CU MM (ref 140–440)
PMV BLD AUTO: 9.1 FL (ref 7.5–11.1)
POTASSIUM SERPL-SCNC: 4.2 MMOL/L (ref 3.5–5.1)
RBC # BLD: 3.64 M/CU MM (ref 4.2–5.4)
SEGMENTED NEUTROPHILS ABSOLUTE COUNT: 4.1 K/CU MM
SEGMENTED NEUTROPHILS RELATIVE PERCENT: 40.5 % (ref 36–66)
SODIUM BLD-SCNC: 144 MMOL/L (ref 135–145)
TOTAL PROTEIN: 7.3 GM/DL (ref 6.4–8.2)
WBC # BLD: 10.2 K/CU MM (ref 4–10.5)

## 2020-04-14 PROCEDURE — 83883 ASSAY NEPHELOMETRY NOT SPEC: CPT

## 2020-04-14 PROCEDURE — 36415 COLL VENOUS BLD VENIPUNCTURE: CPT

## 2020-04-14 PROCEDURE — 80053 COMPREHEN METABOLIC PANEL: CPT

## 2020-04-14 PROCEDURE — 85025 COMPLETE CBC W/AUTO DIFF WBC: CPT

## 2020-04-15 RX ORDER — ZOLPIDEM TARTRATE 5 MG/1
5 TABLET ORAL NIGHTLY PRN
Qty: 30 TABLET | Refills: 0 | Status: SHIPPED | OUTPATIENT
Start: 2020-04-15 | End: 2020-05-19 | Stop reason: SDUPTHER

## 2020-04-16 LAB
KAPPA QUANT FREE LIGHT CHAINS: 10.13 MG/L (ref 3.3–19.4)
KAPPA/LAMBDA FREE LIGHT CHAIN RATIO: 0.02 (ref 0.26–1.65)
KAPPA/LAMBDA FREE LIGHT CHAIN RATIO: ABNORMAL (ref 0.26–1.65)
LAMBDA FREE LIGHT CHAINS URINE/ VOL: 412.39 MG/L (ref 5.71–26.3)

## 2020-04-21 ENCOUNTER — HOSPITAL ENCOUNTER (OUTPATIENT)
Dept: INFUSION THERAPY | Age: 75
Discharge: HOME | End: 2020-04-21
Payer: MEDICARE

## 2020-04-21 PROCEDURE — 99211 OFF/OP EST MAY X REQ PHY/QHP: CPT

## 2020-05-18 ENCOUNTER — TELEPHONE (OUTPATIENT)
Dept: INTERNAL MEDICINE CLINIC | Age: 75
End: 2020-05-18

## 2020-05-19 ENCOUNTER — TELEPHONE (OUTPATIENT)
Dept: INTERNAL MEDICINE CLINIC | Age: 75
End: 2020-05-19

## 2020-05-19 RX ORDER — ZOLPIDEM TARTRATE 5 MG/1
5 TABLET ORAL NIGHTLY PRN
Qty: 30 TABLET | Refills: 0 | Status: SHIPPED | OUTPATIENT
Start: 2020-05-19 | End: 2020-06-17 | Stop reason: SDUPTHER

## 2020-05-19 NOTE — TELEPHONE ENCOUNTER
Patient is needing her Zolpedem refilled  Number to call once it's ready to be picked up is [phone], message can be left if no answer.

## 2020-06-17 RX ORDER — ZOLPIDEM TARTRATE 5 MG/1
5 TABLET ORAL NIGHTLY PRN
Qty: 30 TABLET | Refills: 0 | Status: SHIPPED | OUTPATIENT
Start: 2020-06-17 | End: 2020-07-13

## 2020-07-13 ENCOUNTER — OFFICE VISIT (OUTPATIENT)
Dept: INTERNAL MEDICINE CLINIC | Age: 75
End: 2020-07-13
Payer: MEDICARE

## 2020-07-13 VITALS
SYSTOLIC BLOOD PRESSURE: 138 MMHG | RESPIRATION RATE: 16 BRPM | BODY MASS INDEX: 21.16 KG/M2 | TEMPERATURE: 97.2 F | HEART RATE: 79 BPM | DIASTOLIC BLOOD PRESSURE: 68 MMHG | OXYGEN SATURATION: 99 % | HEIGHT: 62 IN | WEIGHT: 115 LBS

## 2020-07-13 VITALS
HEART RATE: 79 BPM | OXYGEN SATURATION: 99 % | SYSTOLIC BLOOD PRESSURE: 138 MMHG | BODY MASS INDEX: 21.38 KG/M2 | WEIGHT: 115 LBS | TEMPERATURE: 97.2 F | DIASTOLIC BLOOD PRESSURE: 68 MMHG

## 2020-07-13 PROCEDURE — 1123F ACP DISCUSS/DSCN MKR DOCD: CPT | Performed by: FAMILY MEDICINE

## 2020-07-13 PROCEDURE — 93000 ELECTROCARDIOGRAM COMPLETE: CPT | Performed by: FAMILY MEDICINE

## 2020-07-13 PROCEDURE — 4040F PNEUMOC VAC/ADMIN/RCVD: CPT | Performed by: FAMILY MEDICINE

## 2020-07-13 PROCEDURE — 90715 TDAP VACCINE 7 YRS/> IM: CPT | Performed by: FAMILY MEDICINE

## 2020-07-13 PROCEDURE — G0438 PPPS, INITIAL VISIT: HCPCS | Performed by: FAMILY MEDICINE

## 2020-07-13 PROCEDURE — 90471 IMMUNIZATION ADMIN: CPT | Performed by: FAMILY MEDICINE

## 2020-07-13 PROCEDURE — 99397 PER PM REEVAL EST PAT 65+ YR: CPT | Performed by: FAMILY MEDICINE

## 2020-07-13 RX ORDER — ZOLPIDEM TARTRATE 5 MG/1
5 TABLET ORAL NIGHTLY PRN
Qty: 30 TABLET | Refills: 0 | Status: SHIPPED | OUTPATIENT
Start: 2020-07-13 | End: 2020-08-16 | Stop reason: SDUPTHER

## 2020-07-13 ASSESSMENT — ENCOUNTER SYMPTOMS
SINUS PAIN: 0
ABDOMINAL DISTENTION: 0
EYE ITCHING: 0
SORE THROAT: 0
VOMITING: 0
TROUBLE SWALLOWING: 0
CHEST TIGHTNESS: 0
CONSTIPATION: 0
NAUSEA: 0
WHEEZING: 0
EYE REDNESS: 0
DIARRHEA: 0
ABDOMINAL PAIN: 0
SHORTNESS OF BREATH: 0
COUGH: 0
BACK PAIN: 1

## 2020-07-13 ASSESSMENT — PATIENT HEALTH QUESTIONNAIRE - PHQ9
SUM OF ALL RESPONSES TO PHQ QUESTIONS 1-9: 0
SUM OF ALL RESPONSES TO PHQ QUESTIONS 1-9: 0

## 2020-07-13 ASSESSMENT — LIFESTYLE VARIABLES: HOW OFTEN DO YOU HAVE A DRINK CONTAINING ALCOHOL: 0

## 2020-07-13 NOTE — PROGRESS NOTES
Subjective:      Chief Complaint: Annual physical examination    HPI:  Rhea Penn is a 67 y.o. female who presents today for annual physical examination. Patient has a history of chronic insomnia currently taking Ambien every night. Patient also has history of polymyalgia rheumatica and was on tramadol. She stopped taking tramadol for last 5 months. Patient also history of COPD, monoclonal gammopathy, hypertension and hypothyroidism    Discontinuation plan: Per plan, patient discontinued her use of tramadol for probably polymyalgia rheumatica. She is off of tramadol for past 5 months. I agreed with the patient to continue her medication, Ambien 5 mg 1 tablet nightly for chronic insomnia. Patient is received 30-day supply.       Patient is due for mammogram:  Patient DEXA scan shows osteopenia 5-2017      Patient Active Problem List   Diagnosis    Hypothyroidism    Hyperlipidemia    Vitamin D deficiency    Osteopenia    Essential hypertension    [facility]    Macular degeneration, dry-left eye    Macular degeneration, right eye-wet     [facility]    CLL (chronic lymphocytic leukemia) ([facility]    CCC (chronic calculous cholecystitis)    Monoclonal gammopathy    Chronic insomnia    GERD (gastroesophageal reflux disease)       Past Medical History:   Diagnosis Date    Arm fracture, left 05/16/2019    Casted - no surgery - Dr. 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 pylori infection 8/1996    S/P Biaxin and Prilosec    Hiatal hernia 8/1994    with reflux by UGI    Hyperlipidemia     Hypertension     Hypothyroidism 1990's    MDRO (multiple drug resistant organisms) resistance 2005    Nasal passages    Osteoporosis     Smoking hx         Social History     Tobacco Use    Smoking status: Former Smoker     Packs/day: 2.00     Years: 30.00     Pack years: 60.00     Types: Cigarettes     Start date: 1965     Last attempt to quit: 1997     Years since quittin.5    Smokeless tobacco: Never Used   Substance Use Topics    Alcohol use: No        Family History   Problem Relation Age of Onset    High Blood Pressure Mother     High Blood Pressure Father        Review of Systems   Constitutional: Negative for appetite change, chills, fatigue, fever and unexpected weight change. HENT: Negative for congestion, ear pain, sinus pain, sore throat and trouble swallowing. Eyes: Negative for redness and itching. Respiratory: Negative for cough, chest tightness, shortness of breath and wheezing. Cardiovascular: Negative for chest pain and palpitations. Gastrointestinal: Negative for abdominal distention, abdominal pain, constipation, diarrhea, nausea and vomiting. Endocrine: Negative for polyuria. Genitourinary: Negative for difficulty urinating, dysuria, frequency and urgency. Musculoskeletal: Positive for back pain. Negative for myalgias. Skin: Negative for rash. Neurological: Negative for dizziness and headaches. Psychiatric/Behavioral: Negative for behavioral problems, confusion and suicidal ideas. Objective:      /68   Pulse 79   Temp 97.2 °F (36.2 °C)   Wt 115 lb (52.2 kg)   SpO2 99%   BMI 21.38 kg/m²      Physical Exam  Vitals signs reviewed. Constitutional:       Appearance: Normal appearance. She is well-developed. HENT:      Head: Normocephalic and atraumatic. Right Ear: External ear normal.      Left Ear: External ear normal.      Mouth/Throat:      Mouth: Mucous membranes are moist.      Pharynx: Oropharynx is clear.    Eyes:      Extraocular Movements: Extraocular movements intact. Conjunctiva/sclera: Conjunctivae normal.      Pupils: Pupils are equal, round, and reactive to light. Neck:      Musculoskeletal: Normal range of motion and neck supple. Thyroid: No thyromegaly or thyroid tenderness. Vascular: No carotid bruit. Cardiovascular:      Rate and Rhythm: Normal rate and regular rhythm. Pulses: Normal pulses. Heart sounds: Normal heart sounds, S1 normal and S2 normal. No murmur. Pulmonary:      Effort: Pulmonary effort is normal.      Breath sounds: Normal breath sounds. Abdominal:      General: Bowel sounds are normal. There is no distension. Palpations: Abdomen is soft. Tenderness: There is no abdominal tenderness. There is no guarding. Hernia: No hernia is present. Comments: Soft, no hepatosplenomegaly   Genitourinary:     Rectum: Guaiac result negative. Musculoskeletal: Normal range of motion. Right lower leg: No edema. Left lower leg: No edema. Comments: 5-5 muscular strength throughout and bilateral.   Lymphadenopathy:      Cervical: No cervical adenopathy. Skin:     General: Skin is warm and dry. Findings: No rash. Neurological:      General: No focal deficit present. Mental Status: She is alert and oriented to person, place, and time. Sensory: No sensory deficit. Motor: No weakness. Psychiatric:         Mood and Affect: Mood normal.         Behavior: Behavior normal.         Thought Content: Thought content normal.         Judgment: Judgment normal.            Assessment / Plan:      1. Osteopenia of multiple sites  - Suggested long bone broth. - DEXA BONE DENSITY AXIAL SKELETON; Future    2. Chronic insomnia  -Refill patient medication per discontinuation plan below    Discontinuation plan: Per plan, patient discontinued her use of tramadol for probably polymyalgia rheumatica. She is off of tramadol for past 5 months.   I agreed with the patient to continue her medication, Ambien 5 mg 1 tablet nightly for chronic insomnia. Patient is received 30-day supply. OARS report was checked before dispensing the medication. OARS report is consistent with the patient statement and appropriate. - zolpidem (AMBIEN) 5 MG tablet; Take 1 tablet by mouth nightly as needed for Sleep for up to 30 days. Dispense: 30 tablet; Refill: 0    3. Vitamin D deficiency  - Vitamin D 25 Hydroxy    4. Hypothyroidism due to acquired atrophy of thyroid  - T4, Free  - TSH without Reflex    5. Breast cancer screening by mammogram  - DORA Screening Bilateral    6. Monoclonal gammopathy  - CBC Auto Differential    7. Encounter for annual physical exam  - Patient normal physical examination without any abnormal finding. Patient EKG results shows sinus rhythm without any ST segment elevation or depression.    - EKG 12 Lead  - Comprehensive Metabolic Panel  - Lipid, Fasting  - Hemoglobin A1C          Note:   Patient understand the assessment and agreed to the plan. Medication side effects was discussed during the encounter. Before discharging the patient, all questions and concern were addressed. After visit summary was provided. Advice to call clinic or me for any further questions or concern.        Albaro Sy, DO

## 2020-07-13 NOTE — LETTER
MEDICATION AGREEMENT     Doulgas Nachobaylee  [birth year]      For certain conditions, multiple classes of medications may be used to help better manage your symptoms, and to improve your ability to function at home, work and in social settings. However, these medications do have risks, which will be discussed with you, including addiction and dependency. The following prescribed medications need frequent monitoring and will require you to partner and assist in your healthcare. Medication  Dose, instructions and quantity as indicated on current prescription bottle Diagnosis/Reason(s) for Taking Category                                  Benefits and goals of Controlled Substance Medications: There are two potential goals for your treatment: (1) decreased pain and suffering (2) improved daily life functions. There are many possible treatments for your chronic condition(s), and, in addition to controlled substance medications, we will try alternatives such as physical therapy, yoga, massage, home daily exercise, meditation, relaxation techniques, injections, chiropractic manipulations, surgery, cognitive therapy, hypnosis and many medications that are not habit-forming. Use of controlled substance medications may be helpful, but they are unlikely to resolve all of your symptoms or restore all function. Risks of Controlled Substance Medications:    Opioid pain medications: These medications can lead to problems such as addiction/dependence, sedation, lightheadedness/dizziness, memory issues, falls, constipation, nausea, or vomiting. They may also impair the ability to drive or operate machinery. Additionally, these medications may lower testosterone levels, leading to loss of bone strength, stamina and sex drive. They may cause problems with breathing, sleep apnea and reduced coughing, which are especially dangerous for patients with lung disease.   Overdose or dangerous interactions with alcohol and other medications may occur, leading to death. Hyperalgesia may develop, in which patients receiving opioids for the treatment of pain may actually become more sensitive to certain painful stimuli, and in some cases, experience pain from ordinarily non-painful stimuli. Women between the ages of 14-53 who could become pregnant should carefully weigh the risks and benefits of opioids with their physicians, as these medications increase the risk of pregnancy complications, including miscarriage,  delivery and stillbirth. It is also possible for babies to be born addicted to opioids. Opioid dependence withdrawal symptoms may include; feelings of uneasiness, increased pain, irritability, belly pain, diarrhea, sweats and goose-flesh. Benzodiazepines and non-benzodiazepine sleep medications: These medications can lead to problems such as addiction/dependence, sedation, fatigue, lightheadedness, dizziness, incoordination, falls, depression, hallucinations, and impaired judgment, memory and concentration. The ability to drive and operate machinery may also be affected. Abnormal sleep-related behaviors have been reported, including sleep walking, driving, making telephone calls, eating, or having sex while not fully awake. These medications can suppress breathing and worsen sleep apnea, particularly when combined with alcohol or other sedating medications, potentially leading to death. Dependence withdrawal symptoms may include tremors, anxiety, hallucinations and seizures. Stimulants:  Common adverse effects include addiction/dependence, increased blood pressure and heart rate, decreased appetite, nausea, involuntary weight loss, insomnia, irritability, and headaches. These risks may increase when these medications are combined with other stimulants, such as caffeine pills or energy drinks, certain weight loss supplements and oral decongestants.   Dependence withdrawal symptoms may include depressed mood, loss of interest, suicidal thoughts, anxiety, fatigue, appetite changes and agitation. Testosterone replacement therapy:  Potential side effects include increased risk of stroke and heart attack, blood clots, increased blood pressure, increased cholesterol, enlarged prostate, sleep apnea, irritability/aggression and other mood disorders, and decreased fertility. Other:     1. I understand that I have the following responsibilities:  · I will take medications at the dose and frequency prescribed. · I will not increase or change how I take my medications without the approval of the health care provider who signs this Medication Agreement. · I will arrange for refills at the prescribed interval ONLY during regular office hours. I will not ask for refills earlier than agreed, after-hours, on holidays or on weekends. · I will obtain all refills for these medications at  ·  ____________________________________  pharmacy (phone number  ·  ________________________), with full consent for my provider and pharmacist to exchange information in writing or verbally. · I will not request any pain medications or controlled substances from other providers and will inform this provider of all other medications I am taking. · I will inform my other health care providers that I am taking these medications and of the existence of this [address]. In the event of an emergency, I will provide the same information to the emergency department providers. · I will protect my prescriptions and medications. I understand that lost or misplaced prescriptions will not be replaced. · I will keep medications only for my own use and will not share them with others. I will keep all medications away from children. · I agree to participate in any medical, psychological or psychiatric assessments recommended by my provider.   · I will actively participate in any program designed to improve function, Other:____________________________________________________________________    AGREEMENT:    I have read the above and have had all of my questions answered. For chronic disease management, I know that my symptoms can be managed with many types of treatments. A chronic medication trial may be part of my treatment, but I must be an active participant in my care. Medication therapy is only one part of my symptom management plan. In some cases, there may be limited scientific evidence to support the chronic use of certain medications to improve symptoms and daily function. Furthermore, in certain circumstances, there may be scientific information that suggests that use of chronic controlled substances may actually worsen my symptoms and increase my risk of unintentional death directly related to this medication therapy. I know that if my provider feels my risk from controlled medications is greater than my benefit, I will have my controlled substance medication(s) compassionately lowered or removed altogether. I agree to a controlled substance medication trial.      I further agree to allow this office to contact my HIPAA contact on file if there are concerns about my safety and use of controlled medications. I have agreed to use the following medications above as instructed by my physician and as stated in this [address].      Patient Signature:  ______________________  Date:7/13/2020 or _____________    Provider Signature:______________________  Date:7/13/2020 or _____________

## 2020-07-13 NOTE — PROGRESS NOTES
Medicare Annual Wellness Visit  Name: Richard Ruiz Date: 2020   MRN: [id number] Sex: Female   Age: 67 y.o. Ethnicity: Non-/Non    : 1945 Race: James Avila is here for Medicare AWV    Screenings for behavioral, psychosocial and functional/safety risks, and cognitive dysfunction are all negative except as indicated below. These results, as well as other patient data from the [address] form, are documented in Flowsheets linked to this Encounter. Allergies   Allergen Reactions    Amitiza [Lubiprostone]     Clindamycin/Lincomycin      GI intolerance    Evista [Raloxifene Hydrochloride]     Flexeril [Cyclobenzaprine Hcl]      CNS    Omega-3 Fatty Acids [Fish Oil] Diarrhea    Prilosec [Omeprazole]      Pt sts meds didn't work - states not an allergy 6/3/19    Skelaxin [Metaxalone]      Itching    Spiriva Handihaler [Tiotropium Bromide Monohydrate]      \"Made my throat dry\" - not allergic too. 6/3/19       Prior to Visit Medications    Medication Sig Taking? Authorizing Provider   zolpidem (AMBIEN) 5 MG tablet Take 1 tablet by mouth nightly as needed for Sleep for up to 30 days.  Yes Albaro Montalvo, DO   predniSONE (DELTASONE) 10 MG tablet  Yes Historical Provider, MD   SYNTHROID 88 MCG tablet TAKE 1 TABLET DAILY Yes Albaro Montalvo, DO   potassium chloride (KLOR-CON) 8 MEQ extended release tablet TAKE 1 TABLET DAILY Yes Larry Rodriguez MD   mometasone-formoterol [facility]) 200-5 MCG/ACT inhaler Inhale 2 puffs into the lungs every 12 hours Yes Larry Rodriguez MD   triamterene-hydrochlorothiazide (DYAZIDE) 37.5-25 MG per capsule TAKE 1 CAPSULE ONCE DAILY Yes Larry Rodriguez MD   esomeprazole (NEXIUM) 40 MG delayed release capsule TAKE 1 CAPSULE DAILY Yes Larry Rodrgiuez MD   albuterol sulfate HFA (VENTOLIN HFA) 108 (90 Base) MCG/ACT inhaler Inhale 2 puffs into the lungs every 6 hours as needed for Wheezing Yes Larry Rodriguez MD Cholecalciferol (VITAMIN D-3) 5000 UNITS TABS Take  by mouth twice a week. Yes Historical Provider, MD   Multiple Vitamins-Minerals (CENTRUM SILVER PO) Take 1 tablet by mouth daily. Yes Historical Provider, MD   Calcium Carbonate-Vit D-Min (CALTRATE 600+D PLUS PO) Take 600 mg by mouth 2 times daily. Yes Historical Provider, MD       Past Medical History:   Diagnosis Date    Arm fracture, left 05/16/2019    Casted - no surgery - Dr. 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 pylori infection 8/1996    S/P Biaxin and Prilosec    Hiatal hernia 8/1994    with reflux by UGI    Hyperlipidemia     Hypertension     Hypothyroidism 1's    MDRO (multiple drug resistant organisms) resistance 2005    Nasal passages    Osteoporosis     Smoking hx        Past Surgical History:   Procedure Laterality Date    CHOLECYSTECTOMY, LAPAROSCOPIC  04/17/2018    Dr Carlos Mcdonald    COLONOSCOPY  10/26/2007    Normal exam - Dr. Karyna Chowdhury COLONOSCOPY  06/06/2019    COLONOSCOPY N/A 6/6/2019    COLORECTAL CANCER SCREENING, NOT HIGH RISK performed by Frantz Chen MD at [address] Left 10/21/2013    Lesion excision-squamous papillomaDr Francesco    SKIN BIOPSY  1960's    Moles removed - face, neck       No family history on file.     CareTeam (Including outside providers/suppliers regularly involved in providing care):   Patient Care Team:  Albaro Wolfe DO as PCP - General (Family Medicine)  Albaro Wolfe DO as PCP - [facility] Empaneled Provider  Andrew De Los Santos MD as Consulting Physician (Hematology and Oncology)    [employer] Readings from Last 3 Encounters:   07/13/20 115 lb (52.2 kg)   07/13/20 115 lb (52.2 kg)   02/17/20 113 lb (51.3 kg)     Vitals:    07/13/20 1023   BP: 138/68   Pulse: 79   Resp: 16   Temp: 97.2 °F (36.2 °C)   TempSrc: Temporal   SpO2: 99%   Weight: 115 lb (52.2 kg)   Height: 5' 1.5\" (1.562 m)     Body mass index is 21.38 kg/m². Based upon direct observation of the patient, evaluation of cognition reveals recent and remote memory intact. Patient's complete Health Risk Assessment and screening values have been reviewed and are found in Flowsheets. The following problems were reviewed today and where indicated follow up appointments were made and/or referrals ordered. Positive Risk Factor Screenings with Interventions:     Health Habits/Nutrition:  Health Habits/Nutrition  Do you exercise for at least 20 minutes 2-3 times per week?: (!) No  Have you lost any weight without trying in the past 3 months?: (!) Yes  Do you eat fewer than 2 meals per day?: (!) Yes  Have you seen a dentist within the past year?: Yes  Body mass index is 21.38 kg/m².   Health Habits/Nutrition Interventions:  · Inadequate physical activity:  patient is not ready to increase his/her physical activity level at this time  · Nutritional issues:  educational materials for healthy, well-balanced diet provided    Hearing/Vision:  No exam data present  Hearing/Vision  Do you or your family notice any trouble with your hearing?: (!) Yes  Do you have difficulty driving, watching TV, or doing any of your daily activities because of your eyesight?: No  Have you had an eye exam within the past year?: Yes  Hearing/Vision Interventions:  · Hearing concerns:  patient declines any further evaluation/treatment for hearing issues    Personalized Preventive Plan   Current Health Maintenance Status  Immunization History   Administered Date(s) Administered    Influenza A (W2D2-64) Vaccine IM 02/19/2010    Influenza Vaccine, unspecified formulation 10/01/2013, 10/01/2014, 11/23/2015, 10/05/2016    Influenza, High Dose (Fluzone 65 yrs and older) 10/06/2011, 10/09/2012, 10/01/2013, 10/01/2014, 11/23/2015, 10/05/2016, 11/08/2017, 10/04/2018    Influenza, Triv, inactivated, subunit, adjuvanted, IM (Fluad 65 yrs and older) 10/11/2019    Pneumococcal Conjugate 13-valent ([id number]) 03/31/2015    Pneumococcal Polysaccharide ([id number]) 10/09/2009    Td vaccine (adult) 02/19/2010    Td, unspecified formulation 02/19/2010    Tdap (Boostrix, Adacel) 07/13/2020    Zoster Live (Zostavax) 02/19/2010        Health Maintenance   Topic Date Due    Shingles Vaccine (2 of 3) 04/16/2010    Annual Wellness Visit (AWV)  05/29/2019    Flu vaccine (1) 09/01/2020    TSH testing  12/23/2020    Potassium monitoring  04/14/2021    Creatinine monitoring  04/14/2021    DTaP/Tdap/Td vaccine (2 - Td) 07/13/2030    DEXA (modify frequency per FRAX score)  Completed    Pneumococcal 65+ yrs at Risk Vaccine  Completed    Hepatitis A vaccine  Aged Out    Hepatitis B vaccine  Aged Out    Hib vaccine  Aged Out    Meningococcal (ACWY) vaccine  Aged Out     Recommendations for [employer] Due: see orders and patient instructions/AVS.  . Recommended screening schedule for the next 5-10 years is provided to the patient in written form: see Patient Instructions/AVS.    Genna Mcgovern LPN, 9/39/0610, performed the documented evaluation under the direct supervision of the attending physician.

## 2020-07-16 ENCOUNTER — HOSPITAL ENCOUNTER (OUTPATIENT)
Dept: INFUSION THERAPY | Age: 75
Discharge: HOME | End: 2020-07-16
Payer: MEDICARE

## 2020-07-16 LAB
ALBUMIN SERPL-MCNC: 4 GM/DL (ref 3.4–5)
ALP BLD-CCNC: 79 IU/L (ref 40–128)
ALT SERPL-CCNC: 12 U/L (ref 10–40)
ANION GAP SERPL CALCULATED.3IONS-SCNC: 15 MMOL/L (ref 4–16)
AST SERPL-CCNC: 12 IU/L (ref 15–37)
BASOPHILS ABSOLUTE: 0 K/CU MM
BASOPHILS RELATIVE PERCENT: 0.1 % (ref 0–1)
BILIRUB SERPL-MCNC: 0.4 MG/DL (ref 0–1)
BUN BLDV-MCNC: 25 MG/DL (ref 6–23)
CALCIUM SERPL-MCNC: 9 MG/DL (ref 8.3–10.6)
CHLORIDE BLD-SCNC: 103 MMOL/L (ref 99–110)
CO2: 25 MMOL/L (ref 21–32)
CREAT SERPL-MCNC: 1.1 MG/DL (ref 0.6–1.1)
DIFFERENTIAL TYPE: ABNORMAL
EOSINOPHILS ABSOLUTE: 0.1 K/CU MM
EOSINOPHILS RELATIVE PERCENT: 0.6 % (ref 0–3)
GFR AFRICAN AMERICAN: 58 ML/MIN/1.73M2
GFR NON-AFRICAN AMERICAN: 48 ML/MIN/1.73M2
GLUCOSE BLD-MCNC: 102 MG/DL (ref 70–99)
HCT VFR BLD CALC: 32.3 % (ref 37–47)
HEMOGLOBIN: 10.3 GM/DL (ref 12.5–16)
IGA: <50 MG/DL (ref 69–382)
IGG,SERUM: <300 MG/DL (ref 723–1685)
IGM,SERUM: 2430 MG/DL (ref 62–277)
LACTATE DEHYDROGENASE: 121 IU/L (ref 120–246)
LYMPHOCYTES ABSOLUTE: 3.5 K/CU MM
LYMPHOCYTES RELATIVE PERCENT: 40.4 % (ref 24–44)
MCH RBC QN AUTO: 29.4 PG (ref 27–31)
MCHC RBC AUTO-ENTMCNC: 31.9 % (ref 32–36)
MCV RBC AUTO: 92.3 FL (ref 78–100)
MONOCYTES ABSOLUTE: 0.7 K/CU MM
MONOCYTES RELATIVE PERCENT: 8 % (ref 0–4)
PDW BLD-RTO: 13.7 % (ref 11.7–14.9)
PLATELET # BLD: 171 K/CU MM (ref 140–440)
PMV BLD AUTO: 9.5 FL (ref 7.5–11.1)
POTASSIUM SERPL-SCNC: 4.2 MMOL/L (ref 3.5–5.1)
RBC # BLD: 3.5 M/CU MM (ref 4.2–5.4)
SEGMENTED NEUTROPHILS ABSOLUTE COUNT: 4.4 K/CU MM
SEGMENTED NEUTROPHILS RELATIVE PERCENT: 50.9 % (ref 36–66)
SODIUM BLD-SCNC: 143 MMOL/L (ref 135–145)
TOTAL PROTEIN: 7.5 GM/DL (ref 6.4–8.2)
WBC # BLD: 8.7 K/CU MM (ref 4–10.5)

## 2020-07-16 PROCEDURE — 36415 COLL VENOUS BLD VENIPUNCTURE: CPT

## 2020-07-16 PROCEDURE — 86320 SERUM IMMUNOELECTROPHORESIS: CPT

## 2020-07-16 PROCEDURE — 84165 PROTEIN E-PHORESIS SERUM: CPT

## 2020-07-16 PROCEDURE — 80053 COMPREHEN METABOLIC PANEL: CPT

## 2020-07-16 PROCEDURE — 82784 ASSAY IGA/IGD/IGG/IGM EACH: CPT

## 2020-07-16 PROCEDURE — 83883 ASSAY NEPHELOMETRY NOT SPEC: CPT

## 2020-07-16 PROCEDURE — 83615 LACTATE (LD) (LDH) ENZYME: CPT

## 2020-07-16 PROCEDURE — 85025 COMPLETE CBC W/AUTO DIFF WBC: CPT

## 2020-07-17 LAB
ALBUMIN ELP: 3.7 GM/DL (ref 3.2–5.6)
ALPHA-1-GLOBULIN: 0.3 GM/DL (ref 0.1–0.4)
ALPHA-2-GLOBULIN: 1 GM/DL (ref 0.4–1.2)
BETA GLOBULIN: 0.8 GM/DL (ref 0.5–1.3)
GAMMA GLOBULIN: 1.7 GM/DL (ref 0.5–1.6)
SPEP INTERPRETATION: ABNORMAL
SPEP INTERPRETATION: NORMAL
TOTAL PROTEIN: 7.5 GM/DL (ref 6.4–8.2)

## 2020-07-18 LAB
KAPPA QUANT FREE LIGHT CHAINS: 11.42 MG/L (ref 3.3–19.4)
KAPPA/LAMBDA FREE LIGHT CHAIN RATIO: 0.02 (ref 0.26–1.65)
LAMBDA FREE LIGHT CHAINS URINE/ VOL: 465.38 MG/L (ref 5.71–26.3)

## 2020-07-23 ENCOUNTER — HOSPITAL ENCOUNTER (OUTPATIENT)
Dept: INFUSION THERAPY | Age: 75
Discharge: HOME | End: 2020-07-23
Payer: MEDICARE

## 2020-07-23 PROCEDURE — 99211 OFF/OP EST MAY X REQ PHY/QHP: CPT

## 2020-08-16 RX ORDER — ZOLPIDEM TARTRATE 5 MG/1
5 TABLET ORAL NIGHTLY PRN
Qty: 30 TABLET | Refills: 0 | Status: SHIPPED | OUTPATIENT
Start: 2020-08-16 | End: 2020-09-15 | Stop reason: SDUPTHER

## 2020-09-15 RX ORDER — ZOLPIDEM TARTRATE 5 MG/1
5 TABLET ORAL NIGHTLY PRN
Qty: 30 TABLET | Refills: 1 | Status: SHIPPED | OUTPATIENT
Start: 2020-09-15 | End: 2020-10-13 | Stop reason: SDUPTHER

## 2020-10-05 RX ORDER — TRIAMTERENE AND HYDROCHLOROTHIAZIDE 37.5; 25 MG/1; MG/1
1 CAPSULE ORAL DAILY
Qty: 90 CAPSULE | Refills: 0 | Status: ON HOLD | OUTPATIENT
Start: 2020-10-05 | End: 2022-02-10 | Stop reason: HOSPADM

## 2020-10-13 RX ORDER — ZOLPIDEM TARTRATE 5 MG/1
5 TABLET ORAL NIGHTLY PRN
Qty: 30 TABLET | Refills: 0 | Status: SHIPPED | OUTPATIENT
Start: 2020-10-13 | End: 2020-11-12 | Stop reason: SDUPTHER

## 2020-10-13 NOTE — TELEPHONE ENCOUNTER
I will provide 1 month supply but future refills will need to come from patient's new PCP as I do not know this patient and this is a controlled medication.

## 2020-10-20 PROBLEM — D72.820 LYMPHOCYTOSIS (SYMPTOMATIC): Status: ACTIVE | Noted: 2020-10-20

## 2020-10-20 PROBLEM — R70.0 ELEVATED ERYTHROCYTE SEDIMENTATION RATE: Status: ACTIVE | Noted: 2020-10-20

## 2020-10-22 RX ORDER — ESOMEPRAZOLE MAGNESIUM 40 MG/1
CAPSULE, DELAYED RELEASE ORAL
Qty: 90 CAPSULE | Refills: 0 | Status: SHIPPED | OUTPATIENT
Start: 2020-10-22

## 2020-10-22 RX ORDER — ESOMEPRAZOLE MAGNESIUM 40 MG/1
CAPSULE, DELAYED RELEASE ORAL
Qty: 90 CAPSULE | Refills: 0 | Status: SHIPPED | OUTPATIENT
Start: 2020-10-22 | End: 2020-10-22 | Stop reason: SDUPTHER

## 2020-11-10 RX ORDER — ZOLPIDEM TARTRATE 5 MG/1
5 TABLET ORAL NIGHTLY PRN
Qty: 30 TABLET | Refills: 0 | Status: CANCELLED | OUTPATIENT
Start: 2020-11-10 | End: 2020-12-10

## 2020-11-11 RX ORDER — POTASSIUM CHLORIDE 600 MG/1
8 TABLET, FILM COATED, EXTENDED RELEASE ORAL DAILY
Qty: 90 TABLET | Refills: 0 | Status: SHIPPED | OUTPATIENT
Start: 2020-11-11 | End: 2021-07-12 | Stop reason: SDUPTHER

## 2020-11-12 ENCOUNTER — TELEPHONE (OUTPATIENT)
Dept: INTERNAL MEDICINE CLINIC | Age: 75
End: 2020-11-12

## 2020-11-12 RX ORDER — ZOLPIDEM TARTRATE 5 MG/1
5 TABLET ORAL NIGHTLY PRN
Qty: 30 TABLET | Refills: 0 | Status: SHIPPED | OUTPATIENT
Start: 2020-11-12 | End: 2020-12-12

## 2020-11-12 RX ORDER — ZOLPIDEM TARTRATE 5 MG/1
5 TABLET ORAL NIGHTLY PRN
Qty: 30 TABLET | Refills: 0 | Status: CANCELLED | OUTPATIENT
Start: 2020-11-12 | End: 2020-12-12

## 2020-11-12 NOTE — TELEPHONE ENCOUNTER
Patient calling for a refill zolpidem 5 mg. Patient is out. Previous Dr. Luna Zapata made appointment with Dr. Abbey Bravo in January.

## 2020-11-24 ENCOUNTER — HOSPITAL ENCOUNTER (OUTPATIENT)
Dept: INFUSION THERAPY | Age: 75
Discharge: HOME | End: 2020-11-24
Payer: MEDICARE

## 2020-11-24 DIAGNOSIS — E03.4 HYPOTHYROIDISM DUE TO ACQUIRED ATROPHY OF THYROID: ICD-10-CM

## 2020-11-24 LAB
ALBUMIN SERPL-MCNC: 3.8 GM/DL (ref 3.4–5)
ALP BLD-CCNC: 74 IU/L (ref 40–129)
ALT SERPL-CCNC: 10 U/L (ref 10–40)
ANION GAP SERPL CALCULATED.3IONS-SCNC: 11 MMOL/L (ref 4–16)
AST SERPL-CCNC: 14 IU/L (ref 15–37)
BASOPHILS ABSOLUTE: 0 K/CU MM
BASOPHILS RELATIVE PERCENT: 0.1 % (ref 0–1)
BILIRUB SERPL-MCNC: 0.4 MG/DL (ref 0–1)
BUN BLDV-MCNC: 23 MG/DL (ref 6–23)
CALCIUM SERPL-MCNC: 8.9 MG/DL (ref 8.3–10.6)
CHLORIDE BLD-SCNC: 101 MMOL/L (ref 99–110)
CO2: 28 MMOL/L (ref 21–32)
CREAT SERPL-MCNC: 1.1 MG/DL (ref 0.6–1.1)
DIFFERENTIAL TYPE: ABNORMAL
EOSINOPHILS ABSOLUTE: 0 K/CU MM
EOSINOPHILS RELATIVE PERCENT: 0.4 % (ref 0–3)
GFR AFRICAN AMERICAN: 58 ML/MIN/1.73M2
GFR NON-AFRICAN AMERICAN: 48 ML/MIN/1.73M2
GLUCOSE BLD-MCNC: 113 MG/DL (ref 70–99)
HCT VFR BLD CALC: 32.2 % (ref 37–47)
HEMOGLOBIN: 10.2 GM/DL (ref 12.5–16)
IGA: <50 MG/DL (ref 69–382)
IGG,SERUM: <300 MG/DL (ref 723–1685)
IGM,SERUM: 2513 MG/DL (ref 62–277)
LACTATE DEHYDROGENASE: 124 IU/L (ref 120–246)
LYMPHOCYTES ABSOLUTE: 5.2 K/CU MM
LYMPHOCYTES RELATIVE PERCENT: 50.4 % (ref 24–44)
MCH RBC QN AUTO: 29.6 PG (ref 27–31)
MCHC RBC AUTO-ENTMCNC: 31.7 % (ref 32–36)
MCV RBC AUTO: 93.3 FL (ref 78–100)
MONOCYTES ABSOLUTE: 0.8 K/CU MM
MONOCYTES RELATIVE PERCENT: 7.8 % (ref 0–4)
PDW BLD-RTO: 13.8 % (ref 11.7–14.9)
PLATELET # BLD: 153 K/CU MM (ref 140–440)
PMV BLD AUTO: 9 FL (ref 7.5–11.1)
POTASSIUM SERPL-SCNC: 4.1 MMOL/L (ref 3.5–5.1)
RBC # BLD: 3.45 M/CU MM (ref 4.2–5.4)
SEGMENTED NEUTROPHILS ABSOLUTE COUNT: 4.3 K/CU MM
SEGMENTED NEUTROPHILS RELATIVE PERCENT: 41.3 % (ref 36–66)
SODIUM BLD-SCNC: 140 MMOL/L (ref 135–145)
TOTAL PROTEIN: 7.3 GM/DL (ref 6.4–8.2)
WBC # BLD: 10.4 K/CU MM (ref 4–10.5)

## 2020-11-24 PROCEDURE — 84165 PROTEIN E-PHORESIS SERUM: CPT

## 2020-11-24 PROCEDURE — 36415 COLL VENOUS BLD VENIPUNCTURE: CPT

## 2020-11-24 PROCEDURE — 85025 COMPLETE CBC W/AUTO DIFF WBC: CPT

## 2020-11-24 PROCEDURE — 82784 ASSAY IGA/IGD/IGG/IGM EACH: CPT

## 2020-11-24 PROCEDURE — 83615 LACTATE (LD) (LDH) ENZYME: CPT

## 2020-11-24 PROCEDURE — 86320 SERUM IMMUNOELECTROPHORESIS: CPT

## 2020-11-24 PROCEDURE — 80053 COMPREHEN METABOLIC PANEL: CPT

## 2020-11-24 PROCEDURE — 83883 ASSAY NEPHELOMETRY NOT SPEC: CPT

## 2020-11-26 LAB
KAPPA QUANT FREE LIGHT CHAINS: 9.97 MG/L (ref 3.3–19.4)
KAPPA/LAMBDA FREE LIGHT CHAIN RATIO: 0.02 (ref 0.26–1.65)
LAMBDA FREE LIGHT CHAINS QNT: 531.12 MG/L (ref 5.71–26.3)

## 2020-11-29 NOTE — PROGRESS NOTES
Patient Name: Jorge Bess  Patient : 1945  Patient MRN: [id number]     Primary Oncologist: Saritha Aldrich MD  Referring Provider: Naeem Machado DO     Date of Service: 2020      Chief Complaint: No chief complaint on file. Patient Active Problem List:     Hypothyroidism     Hyperlipidemia     Vitamin D deficiency     Osteopenia     Essential hypertension     COPD (chronic obstructive pulmonary disease) ([address]     Macular degeneration, dry-left eye     Macular degeneration, right eye-wet      PMR (polymyalgia rheumatica) (HCC)     CLL (chronic lymphocytic leukemia) (HCC)     CCC (chronic calculous cholecystitis)     Monoclonal gammopathy     Chronic insomnia     GERD (gastroesophageal reflux disease)     Elevated erythrocyte sedimentation rate     Lymphocytosis (symptomatic)    HPI:  Ms. 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 She stated that she was initially referred to Dr. Ezekiel Dakin because of elevated ESR and arthritis. She has been following regularly with Dr. Ezekiel Dakin for that. Recent blood tests on 2017, showed significant elevation in her white blood cell count with elevated absolute lymphocyte count. Manual differential revealed atypical lymphocytes and Pathologist recommend sending peripheral blood flow cytometry for further analysis. Peripheral blood flow cytometry was sent on 2017, and it revealed monoclonal B-cell population, surface immunoglobulin M and D, Lambda, co-expressing 25, is detected. CD20 is strongly positive. The lack of CD5 co-expression by lesional lymphocytes effectively excludes circulating Mantel cell lymphoma and B cell chronic lymphocytic leukemia.    and CD11C are negative morphology, and cytology studies. FISH analysis for t(11;14) was sent to rule out Mantle cell lymphoma on 8/9/17 and it was a normal study. Pathogenic alteration is detected in MYD88 gene. Genomic alterations of uncertain significance are detected in javon SETD2 and TET2 genes. She stated that she will have colonoscopy at the end of 2017. She had mammogram in April 2017 and it was a negative study. She also had a Pap smear and it also was a normal study. Bone marrow biopsy done on August 15, 2019 showed mature B-cell neoplasm with plasmacytic differentiation [overall 80 to 85% of the cells]. 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 FISH studies for B-cell neoplasm reveal 18q+, which is a recurrent finding in many types of non-Hodgkin lymphoma. CT scan of the chest, abdomen and pelvis done on 2/17/20 showed splenomegaly with multiple hypoenhancing splenic lesions the largest measuring up to 1.6 x 1.2 cm. Given history of Waldenstroms macroglobulinemia, findings suggest lymphomatous involvement. Prominent but otherwise not pathologically enlarged axillary lymph nodes bilaterally. No other adenopathy in the chest, abdomen or pelvis. These can be followed on subsequent imaging. Multiple 2-3 mm pulmonary nodules which can be followed per clinical protocol or in 3 months. Mild emphysematous changes. On December 1, 2020, she presented to me for followup. I have been following Ms. Baylee Marquis for IgM lambda monoclonal gammopathy. He was also found to have monoclonal lymphocytosis and it is due to Waldenstrom macroglobulinemia since MYD88 gene was detected. 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  I recognized that her IgM level is quite stable on blood test done on 11/24/20 (2513 mg/dl) and she doesn't have any hyperviscosity state. She doesn't have lymph adenopathy or hepatosplenomegaly on physical exams. Since she is asymptomatic and she has stable mild andmia and normal kidney function, I recommend to continue with close observation. I will see her back in four months and I will repeat all the blood tests a week before her next appointment. She does not have any other significant symptoms on today's visit. PAST MEDICAL HISTORY:  Past medical history significant for:  1. Osteoarthritis. 2.      Hypothyroidism. 3.      Hyperlipidemia. 4.      Gastroesophageal reflux disease. 5.      Hypertension. PAST SURGICAL HISTORY:  No significant surgical history. FAMILY HISTORY:  Significant for prostate cancer in her father. Her maternal grandmother might have had lymphoproliferative disorder. She passed away in 1940. SOCIAL HISTORY:  She was a former smoker and she quit smoking in 1997. She used to smoke two packs a day for 20 years. She socially drinks alcohol and denies illicit drug abuse. She is  and she is currently living in [facility]. She is a retired [employer]. She does not have any children. ALLERGIES:  No known drug allergies. Oncology History    No history exists. Review of Systems: \"Per interval history; otherwise 10 point ROS is negative. \"  Her energy level is pretty good, appetite, and sleep are stable. She denies fever, chills, night sweats, cough, shortness of breath, chest pain, hemoptysis, or palpitations. Her bowel and bladder functions are normal. She denies nausea, vomiting, abdominal pain, diarrhea, constipation, dysuria, loss of appetite, or weight loss. She doesn't have neuropathy and she denies bleeding or clotting issues. She denies any pain in her body. No anxiety or depression. The rest of the systems are unremarkable. Vital Signs: There were no vitals taken for this visit.     Physical Exam:  CONSTITUTIONAL: awake, no apparent BUN 23 11/24/2020    CREATININE 1.1 11/24/2020    GLUCOSE 113 (H) 11/24/2020    CALCIUM 8.9 11/24/2020    PROT 7.3 11/24/2020    PROT 7.3 11/24/2020    LABALBU 3.8 11/24/2020    BILITOT 0.4 11/24/2020    ALKPHOS 74 11/24/2020    AST 14 (L) 11/24/2020    ALT 10 11/24/2020    LABGLOM 48 (L) 11/24/2020    GFRAA 58 (L) 11/24/2020    AGRATIO 1.3 05/02/2016    GLOB 3.0 05/02/2016     Lab Results   Component Value Date     11/24/2020     No components found for: LD  Lab Results   Component Value Date    TSHHS 0.978 11/06/2017    T4FREE 1.61 05/26/2017     Immunology:  Lab Results   Component Value Date    PROT 7.3 11/24/2020    PROT 7.3 11/24/2020    SPEP  07/16/2020     INTERPRETATION - Monoclonal gammopathy, 1300 mg/dl IgM kappa, which has decreased slightlly from 1400 mg/dl on 2/28/20. RS    ALBUMINELP 3.8 11/24/2020    LABALPH 0.2 11/24/2020    LABALPH 0.8 11/24/2020    LABBETA 0.8 11/24/2020    GAMGLOB 1.7 (H) 11/24/2020     Lab Results   Component Value Date    KAPPAUVOL 9.97 11/24/2020    LAMBDAUVOL 531.12 (H) 11/24/2020    KLFLCR 0.02 (L) 11/24/2020     Lab Results   Component Value Date    B2M 4.8 (H) 11/12/2018     Coagulation Panel:  No results found for: PROTIME, INR, APTT, DDIMER  Anemia Panel:  Lab Results   Component Value Date    AXTGDTME93 724.7 08/09/2017    FOLATE >20.0 (H) 08/09/2017     Tumor Markers:  Lab Results   Component Value Date     6.2 06/11/2013     Observations:  No data recorded      Assessment & Plan:   1. Chronic lymphocytic leukemia  2. Monoclonal gammopathy (IgM Lambda). PLAN:  Overall Ms. Isaias Rodriguez is feeling quite well and does not have any significant new symptoms on today's visit. I have been following Ms. Jolan Emperor for IgM lambda monoclonal gammopathy. He was also found to have monoclonal lymphocytosis and it is due to Waldenstrom macroglobulinemia since MYD88 gene was detected.      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 I recognized that her IgM level is quite stable on blood test done on 11/24/20 (2513 mg/dl) and she doesn't have any hyperviscosity state. She doesn't have lymph adenopathy or hepatosplenomegaly on physical exams. Since she is asymptomatic and she has stable mild andmia and normal kidney function, I recommend to continue with close observation. I will see her back in four months and I will repeat all the blood tests a week before her next appointment. I answered all her questions and concerns for today. I asked her to follow up with primary care physician on regular basis. Recent imaging and labs were reviewed and discussed with the patient.

## 2020-11-30 LAB
ALBUMIN ELP: 3.8 GM/DL (ref 3.2–5.6)
ALPHA-1-GLOBULIN: 0.2 GM/DL (ref 0.1–0.4)
ALPHA-2-GLOBULIN: 0.8 GM/DL (ref 0.4–1.2)
BETA GLOBULIN: 0.8 GM/DL (ref 0.5–1.3)
GAMMA GLOBULIN: 1.7 GM/DL (ref 0.5–1.6)
SPEP INTERPRETATION: ABNORMAL
SPEP INTERPRETATION: NORMAL
TOTAL PROTEIN: 7.3 GM/DL (ref 6.4–8.2)

## 2020-12-01 ENCOUNTER — HOSPITAL ENCOUNTER (OUTPATIENT)
Dept: INFUSION THERAPY | Age: 75
Discharge: HOME | End: 2020-12-01
Payer: MEDICARE

## 2020-12-01 ENCOUNTER — OFFICE VISIT (OUTPATIENT)
Dept: ONCOLOGY | Age: 75
End: 2020-12-01
Payer: MEDICARE

## 2020-12-01 VITALS
HEIGHT: 62 IN | TEMPERATURE: 98.3 F | DIASTOLIC BLOOD PRESSURE: 56 MMHG | SYSTOLIC BLOOD PRESSURE: 100 MMHG | BODY MASS INDEX: 21.6 KG/M2 | WEIGHT: 117.4 LBS | HEART RATE: 85 BPM | OXYGEN SATURATION: 94 %

## 2020-12-01 PROBLEM — C88.0 WALDENSTROM MACROGLOBULINEMIA (HCC): Status: ACTIVE | Noted: 2020-12-01

## 2020-12-01 PROBLEM — C88.00 WALDENSTROM MACROGLOBULINEMIA: Status: ACTIVE | Noted: 2020-12-01

## 2020-12-01 PROCEDURE — G8420 CALC BMI NORM PARAMETERS: HCPCS | Performed by: INTERNAL MEDICINE

## 2020-12-01 PROCEDURE — G8399 PT W/DXA RESULTS DOCUMENT: HCPCS | Performed by: INTERNAL MEDICINE

## 2020-12-01 PROCEDURE — 99211 OFF/OP EST MAY X REQ PHY/QHP: CPT

## 2020-12-01 PROCEDURE — G8427 DOCREV CUR MEDS BY ELIG CLIN: HCPCS | Performed by: INTERNAL MEDICINE

## 2020-12-01 PROCEDURE — 99213 OFFICE O/P EST LOW 20 MIN: CPT | Performed by: INTERNAL MEDICINE

## 2020-12-01 PROCEDURE — 1123F ACP DISCUSS/DSCN MKR DOCD: CPT | Performed by: INTERNAL MEDICINE

## 2020-12-01 PROCEDURE — 1036F TOBACCO NON-USER: CPT | Performed by: INTERNAL MEDICINE

## 2020-12-01 PROCEDURE — 4040F PNEUMOC VAC/ADMIN/RCVD: CPT | Performed by: INTERNAL MEDICINE

## 2020-12-01 PROCEDURE — G8484 FLU IMMUNIZE NO ADMIN: HCPCS | Performed by: INTERNAL MEDICINE

## 2020-12-01 PROCEDURE — 1090F PRES/ABSN URINE INCON ASSESS: CPT | Performed by: INTERNAL MEDICINE

## 2020-12-01 RX ORDER — IBUPROFEN 800 MG/1
TABLET ORAL
COMMUNITY
Start: 2020-10-22 | End: 2021-01-15

## 2020-12-01 ASSESSMENT — PATIENT HEALTH QUESTIONNAIRE - PHQ9
SUM OF ALL RESPONSES TO PHQ QUESTIONS 1-9: 0
1. LITTLE INTEREST OR PLEASURE IN DOING THINGS: 0
SUM OF ALL RESPONSES TO PHQ QUESTIONS 1-9: 0
SUM OF ALL RESPONSES TO PHQ9 QUESTIONS 1 & 2: 0
SUM OF ALL RESPONSES TO PHQ QUESTIONS 1-9: 0
2. FEELING DOWN, DEPRESSED OR HOPELESS: 0

## 2021-01-11 ENCOUNTER — TELEPHONE (OUTPATIENT)
Dept: ONCOLOGY | Age: 76
End: 2021-01-11

## 2021-01-11 NOTE — TELEPHONE ENCOUNTER
Called pt per Dr. Lizette Sotelo to have pt come in tomorrow @ 10:30 AM to discuss labs. Pt verbalized understanding. Called Carol to put on schedule.

## 2021-01-12 ENCOUNTER — HOSPITAL ENCOUNTER (OUTPATIENT)
Dept: INFUSION THERAPY | Age: 76
Discharge: HOME | End: 2021-01-12
Payer: MEDICARE

## 2021-01-12 ENCOUNTER — OFFICE VISIT (OUTPATIENT)
Dept: ONCOLOGY | Age: 76
End: 2021-01-12
Payer: MEDICARE

## 2021-01-12 VITALS
TEMPERATURE: 98.6 F | OXYGEN SATURATION: 98 % | HEIGHT: 62 IN | DIASTOLIC BLOOD PRESSURE: 55 MMHG | SYSTOLIC BLOOD PRESSURE: 123 MMHG | WEIGHT: 114.6 LBS | BODY MASS INDEX: 21.09 KG/M2 | HEART RATE: 82 BPM | RESPIRATION RATE: 16 BRPM

## 2021-01-12 DIAGNOSIS — C88.0 WALDENSTROM MACROGLOBULINEMIA (HCC): Primary | ICD-10-CM

## 2021-01-12 DIAGNOSIS — C88.0 WALDENSTROM MACROGLOBULINEMIA (HCC): ICD-10-CM

## 2021-01-12 LAB
ALBUMIN SERPL-MCNC: 3.7 GM/DL (ref 3.4–5)
ALP BLD-CCNC: 69 IU/L (ref 40–128)
ALT SERPL-CCNC: 8 U/L (ref 10–40)
ANION GAP SERPL CALCULATED.3IONS-SCNC: 12 MMOL/L (ref 4–16)
AST SERPL-CCNC: 11 IU/L (ref 15–37)
BASOPHILS ABSOLUTE: 0 K/CU MM
BASOPHILS RELATIVE PERCENT: 0.1 % (ref 0–1)
BILIRUB SERPL-MCNC: 0.2 MG/DL (ref 0–1)
BUN BLDV-MCNC: 22 MG/DL (ref 6–23)
CALCIUM SERPL-MCNC: 8.3 MG/DL (ref 8.3–10.6)
CHLORIDE BLD-SCNC: 109 MMOL/L (ref 99–110)
CO2: 21 MMOL/L (ref 21–32)
CREAT SERPL-MCNC: 1.5 MG/DL (ref 0.6–1.1)
DIFFERENTIAL TYPE: ABNORMAL
EOSINOPHILS ABSOLUTE: 0.1 K/CU MM
EOSINOPHILS RELATIVE PERCENT: 0.8 % (ref 0–3)
ERYTHROCYTE SEDIMENTATION RATE: >140 MM/HR (ref 0–30)
FERRITIN: 138 NG/ML (ref 15–150)
FOLATE: >20 NG/ML (ref 3.1–17.5)
GFR AFRICAN AMERICAN: 41 ML/MIN/1.73M2
GFR NON-AFRICAN AMERICAN: 34 ML/MIN/1.73M2
GLUCOSE BLD-MCNC: 107 MG/DL (ref 70–99)
HCT VFR BLD CALC: 24.7 % (ref 37–47)
HEMOGLOBIN: 7.6 GM/DL (ref 12.5–16)
IGA: <50 MG/DL (ref 69–382)
IGG,SERUM: <300 MG/DL (ref 723–1685)
IGM,SERUM: 3262 MG/DL (ref 62–277)
IRON: 25 UG/DL (ref 37–145)
LACTATE DEHYDROGENASE: 114 IU/L (ref 120–246)
LYMPHOCYTES ABSOLUTE: 4 K/CU MM
LYMPHOCYTES RELATIVE PERCENT: 45.8 % (ref 24–44)
MCH RBC QN AUTO: 29.9 PG (ref 27–31)
MCHC RBC AUTO-ENTMCNC: 30.8 % (ref 32–36)
MCV RBC AUTO: 97.2 FL (ref 78–100)
MONOCYTES ABSOLUTE: 0.3 K/CU MM
MONOCYTES RELATIVE PERCENT: 3.3 % (ref 0–4)
PCT TRANSFERRIN: 13 % (ref 10–44)
PDW BLD-RTO: 15.1 % (ref 11.7–14.9)
PLATELET # BLD: 154 K/CU MM (ref 140–440)
PMV BLD AUTO: 8.7 FL (ref 7.5–11.1)
POTASSIUM SERPL-SCNC: 3.9 MMOL/L (ref 3.5–5.1)
RBC # BLD: 2.54 M/CU MM (ref 4.2–5.4)
RETICULOCYTE COUNT PCT: 3 % (ref 0.2–2.2)
SEGMENTED NEUTROPHILS ABSOLUTE COUNT: 4.4 K/CU MM
SEGMENTED NEUTROPHILS RELATIVE PERCENT: 50 % (ref 36–66)
SODIUM BLD-SCNC: 142 MMOL/L (ref 135–145)
TOTAL IRON BINDING CAPACITY: 194 UG/DL (ref 250–450)
TOTAL PROTEIN: 7.4 GM/DL (ref 6.4–8.2)
UNSATURATED IRON BINDING CAPACITY: 169 UG/DL (ref 110–370)
VITAMIN B-12: 371.1 PG/ML (ref 211–911)
WBC # BLD: 8.7 K/CU MM (ref 4–10.5)

## 2021-01-12 PROCEDURE — 83540 ASSAY OF IRON: CPT

## 2021-01-12 PROCEDURE — 82784 ASSAY IGA/IGD/IGG/IGM EACH: CPT

## 2021-01-12 PROCEDURE — 84155 ASSAY OF PROTEIN SERUM: CPT

## 2021-01-12 PROCEDURE — 82232 ASSAY OF BETA-2 PROTEIN: CPT

## 2021-01-12 PROCEDURE — 99211 OFF/OP EST MAY X REQ PHY/QHP: CPT

## 2021-01-12 PROCEDURE — 99214 OFFICE O/P EST MOD 30 MIN: CPT | Performed by: INTERNAL MEDICINE

## 2021-01-12 PROCEDURE — 85652 RBC SED RATE AUTOMATED: CPT

## 2021-01-12 PROCEDURE — G8420 CALC BMI NORM PARAMETERS: HCPCS | Performed by: INTERNAL MEDICINE

## 2021-01-12 PROCEDURE — 86320 SERUM IMMUNOELECTROPHORESIS: CPT

## 2021-01-12 PROCEDURE — G8427 DOCREV CUR MEDS BY ELIG CLIN: HCPCS | Performed by: INTERNAL MEDICINE

## 2021-01-12 PROCEDURE — 85045 AUTOMATED RETICULOCYTE COUNT: CPT

## 2021-01-12 PROCEDURE — 80053 COMPREHEN METABOLIC PANEL: CPT

## 2021-01-12 PROCEDURE — 84165 PROTEIN E-PHORESIS SERUM: CPT

## 2021-01-12 PROCEDURE — 1090F PRES/ABSN URINE INCON ASSESS: CPT | Performed by: INTERNAL MEDICINE

## 2021-01-12 PROCEDURE — 82728 ASSAY OF FERRITIN: CPT

## 2021-01-12 PROCEDURE — 36415 COLL VENOUS BLD VENIPUNCTURE: CPT

## 2021-01-12 PROCEDURE — G8399 PT W/DXA RESULTS DOCUMENT: HCPCS | Performed by: INTERNAL MEDICINE

## 2021-01-12 PROCEDURE — 4040F PNEUMOC VAC/ADMIN/RCVD: CPT | Performed by: INTERNAL MEDICINE

## 2021-01-12 PROCEDURE — 82746 ASSAY OF FOLIC ACID SERUM: CPT

## 2021-01-12 PROCEDURE — 83883 ASSAY NEPHELOMETRY NOT SPEC: CPT

## 2021-01-12 PROCEDURE — 83615 LACTATE (LD) (LDH) ENZYME: CPT

## 2021-01-12 PROCEDURE — 83550 IRON BINDING TEST: CPT

## 2021-01-12 PROCEDURE — 85025 COMPLETE CBC W/AUTO DIFF WBC: CPT

## 2021-01-12 PROCEDURE — 1036F TOBACCO NON-USER: CPT | Performed by: INTERNAL MEDICINE

## 2021-01-12 PROCEDURE — 1123F ACP DISCUSS/DSCN MKR DOCD: CPT | Performed by: INTERNAL MEDICINE

## 2021-01-12 PROCEDURE — 86334 IMMUNOFIX E-PHORESIS SERUM: CPT

## 2021-01-12 PROCEDURE — 83010 ASSAY OF HAPTOGLOBIN QUANT: CPT

## 2021-01-12 PROCEDURE — 82607 VITAMIN B-12: CPT

## 2021-01-12 PROCEDURE — G8484 FLU IMMUNIZE NO ADMIN: HCPCS | Performed by: INTERNAL MEDICINE

## 2021-01-12 RX ORDER — ZOLPIDEM TARTRATE 5 MG/1
TABLET ORAL
COMMUNITY
Start: 2021-01-06

## 2021-01-12 NOTE — PROGRESS NOTES
MA Rooming Questions  Patient: Raul Monae  MRN: [id number]    Date: 1/12/2021        1. Do you have any new issues?   no         2. Do you need any refills on medications?    no    3. Have you had any imaging done since your last visit?   no    4. Have you been hospitalized or seen in the emergency room since your last visit here?   no    5. Did the patient have a depression screening completed today?  No    No data recorded     PHQ-9 Given to (if applicable):               PHQ-9 Score (if applicable):                     [] Positive     []  Negative              Does question #9 need addressed (if applicable)                     [] Yes    []  No               Jhon Burrell MA

## 2021-01-12 NOTE — PROGRESS NOTES
and CD11C are negative which exclude hairy cell leukemia. 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 She complains of some tiredness and about 13 pound weight loss over three months duration. She claims that she has tiredness and weight loss because she recently moved to [facility]. She denies fever and night sweats. She denies hyperviscosity symptoms (headache, lightheadedness, blurry vision, or double vision). Her family history is significant for leukocytosis in her paternal grandmother and she passed away in 80. She was not diagnosed with leukemia or lymphoma at that time; however, family has suspected that she might have leukemia or lymphoma. No other family history of lymphoproliferative disorder. Her father has prostate cancer. Laboratory workups done on February 23, 2017, showed persistent lymphocytosis (WBC 25.4 and absolute lymphocyte count was 18.5), mild anemia (hemoglobin 11.3), and she has a normal complete metabolic panel. Her LDH is normal and hepatitis panels are negative. Serum protein electrophoresis and immunofixation revealed biclonal gammopathy (800 mg/dL of IgM Lambda, IgG Kappa, and free Lambda). Peripheral blood flow cytometry was done on February 23, 2017, and it revealed peripheral blood with CD5 positive monoclonal B cells (53 percent of the nucleated cells), consistent with mature B cell neoplasm. B cells are predominantly small, but scattered properties, positive for CD5 (partial), CD19, CD20, CD23 (partial), FMC-7 (partial), and CD38. B cells are negative for  and CD10. These findings are consistent with mature B cell neoplasm. Differential diagnosis includes, but is not limited to, atypical chronic lymphocytic leukemia/small lymphocytic lymphoma, mantel cell lymphoma, and marginal zone B-cell lymphoma.   Pathologist recommends correlating with clinical data, morphology, and cytology studies. FISH analysis for t(11;14) was sent to rule out Mantle cell lymphoma on 8/9/17 and it was a normal study. Pathogenic alteration is detected in MYD88 gene. Genomic alterations of uncertain significance are detected in javon SETD2 and TET2 genes. She stated that she will have colonoscopy at the end of 2017. She had mammogram in April 2017 and it was a negative study. She also had a Pap smear and it also was a normal study. Bone marrow biopsy done on August 15, 2019 showed mature B-cell neoplasm with plasmacytic differentiation [overall 80 to 85% of the cells]. 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 FISH studies for B-cell neoplasm reveal 18q+, which is a recurrent finding in many types of non-Hodgkin lymphoma. CT scan of the chest, abdomen and pelvis done on 2/17/20 showed splenomegaly with multiple hypoenhancing splenic lesions the largest measuring up to 1.6 x 1.2 cm. Given history of Waldenstroms macroglobulinemia, findings suggest lymphomatous involvement. Prominent but otherwise not pathologically enlarged axillary lymph nodes bilaterally. No other adenopathy in the chest, abdomen or pelvis. These can be followed on subsequent imaging. Multiple 2-3 mm pulmonary nodules which can be followed per clinical protocol or in 3 months. Mild emphysematous changes. On January 12, 2021, I requested her to see me since she is found to have worsening anemia and elevated creatinine (Cr 1.9 mg/dl). I have been following MsErendira Cramer for IgM lambda monoclonal gammopathy. He was also found to have monoclonal lymphocytosis and it is due to Waldenstrom macroglobulinemia since MYD88 gene was detected. 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  I recognized that her IgM level is quite stable on blood test done on 11/24/20 (2513 mg/dl). She recently established care with Dr. Swati Slaughter and he requested routine blood test. I discussed with Dr. Swati Slaughter over the phone. Since she has worsening anemia, I am concerned about progression of her lymphoplasmacytic lymphoma/Waldenström macroglobulinemia. It could be due to progression of her disease and bone marrow. I also concern about kidney damage due to macroglobulinemia. I recommend her to have all the blood tests today including anemia panel and I will see her back next week. If laboratory work-ups are consistent with progression of her lymphoplasmacytic lymphoma, I will consider to start chemotherapy with ibrutinib. She stated that she has been taking care of her  who has been in the hospital since November 24, 2020. She does not have any other significant symptoms on today's visit. PAST MEDICAL HISTORY:  Past medical history significant for:  1. Osteoarthritis. 2.      Hypothyroidism. 3.      Hyperlipidemia. 4.      Gastroesophageal reflux disease. 5.      Hypertension. PAST SURGICAL HISTORY:  No significant surgical history. FAMILY HISTORY:  Significant for prostate cancer in her father. Her maternal grandmother might have had lymphoproliferative disorder. She passed away in 1940. SOCIAL HISTORY:  She was a former smoker and she quit smoking in 1997. She used to smoke two packs a day for 20 years. She socially drinks alcohol and denies illicit drug abuse. She is  and she is currently living in [facility]. She is a retired . She does not have any children. ALLERGIES:  No known drug allergies. Oncology History    No history exists. Review of Systems: \"Per interval history; otherwise 10 point ROS is negative. \"  Her energy level is okay, appetite, and sleep are fair.  She doesn't have fever, chills, night sweats, cough, shortness of breath, chest pain, hemoptysis, or palpitations. Her bowel and bladder functions are normal. She doesn't have nausea, vomiting, abdominal pain, diarrhea, constipation, dysuria, loss of appetite, or weight loss. She denies neuropathy and she doesn't have bleeding or clotting issues. She doesn't have any pain in her body. Denies anxiety or depression. The rest of the systems are unremarkable. Vital Signs:  BP (!) 123/55 (Site: Right Upper Arm, Position: Sitting, Cuff Size: Medium Adult)   Pulse 82   Temp 98.6 °F (37 °C) (Infrared)   Resp 16   Ht 5' 1.5\" (1.562 m)   Wt 114 lb 9.6 oz (52 kg)   SpO2 98%   BMI 21.30 kg/m²     Physical Exam:  CONSTITUTIONAL: awake, no apparent distress, alert, cooperative,    EYES: pupils equal, round and reactive to light, sclera clear and conjunctiva normal  ENT: Normocephalic, without obvious abnormality, atraumatic  NECK: supple, symmetrical, no jugular venous distension and no carotid bruits   HEMATOLOGIC/LYMPHATIC: no cervical, supraclavicular or axillary lymphadenopathy   LUNGS: VBS, no rhonchi, no increased work of breathing and clear to auscultation, no wheezes, no crackles,    CARDIOVASCULAR: regular rate and rhythm, normal S1 and S2, no murmur noted  ABDOMEN: soft, non-distended, normal bowel sounds x 4, non-tender, no masses palpated, no hepatosplenomegaly   MUSCULOSKELETAL: full range of motion noted, tone is normal  NEUROLOGIC: awake, alert, oriented to name, place and time. Motor skills grossly intact. SKIN: Normal skin color, texture, turgor and no jaundice.  appears intact   EXTREMITIES: no leg swelling, no cyanosis, no LE edema, no clubbing     Labs:  Hematology:  Lab Results   Component Value Date    WBC 8.7 01/12/2021    RBC 2.54 (L) 01/12/2021    HGB 7.6 (L) 01/12/2021    HCT 24.7 (L) 01/12/2021    MCV 97.2 01/12/2021    MCH 29.9 01/12/2021    MCHC 30.8 (L) 01/12/2021    RDW 15.1 (H) 01/12/2021     01/12/2021    MPV 8.7 01/12/2021    BANDSPCT 1 09/30/2019 results found for: PROTIME, INR, APTT, DDIMER  Anemia Panel:  Lab Results   Component Value Date    IWCEHQCP91 371.1 01/12/2021    FOLATE >20.0 (H) 01/12/2021     Tumor Markers:  Lab Results   Component Value Date     6.2 06/11/2013     Observations:  No data recorded      Assessment & Plan:   1. Chronic lymphocytic leukemia  2. Monoclonal gammopathy (IgM Lambda). PLAN:  Ms. Rod Byrd has been followed for IgM lambda monoclonal gammopathy. He was also found to have monoclonal lymphocytosis and it is due to Waldenstrom macroglobulinemia since MYD88 gene was detected. On January 12, 2021, I requested her to see me since she is found to have worsening anemia and elevated creatinine (Cr 1.9 mg/dl). I have been following Ms. Rod Byrd for IgM lambda monoclonal gammopathy. He was also found to have monoclonal lymphocytosis and it is due to Waldenstrom macroglobulinemia since MYD88 gene was detected. 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 I recognized that her IgM level is quite stable on blood test done on 11/24/20 (2513 mg/dl). She recently established care with Dr. Lieutenant Contreras and he requested routine blood test. I discussed with Dr. Lieutenant Contreras over the phone. Since she has worsening anemia, I am concerned about progression of her lymphoplasmacytic lymphoma/Waldenström macroglobulinemia. It could be due to progression of her disease and bone marrow. I also concern about kidney damage due to macroglobulinemia. I recommend her to have all the blood tests today including anemia panel and I will see her back next week. If laboratory work-ups are consistent with progression of her lymphoplasmacytic lymphoma, I will consider to start chemotherapy with ibrutinib. She stated that she has been taking care of her  who has been in the hospital since November 24, 2020. I answered all her questions and concerns for today.  I asked her to follow up with

## 2021-01-13 LAB
BETA-2 MICROGLOBULIN: 6.9 MG/L (ref 1.1–2.4)
HAPTOGLOBIN: 224 MG/DL (ref 30–200)

## 2021-01-14 LAB
KAPPA QUANT FREE LIGHT CHAINS: 11.1 MG/L (ref 3.3–19.4)
KAPPA/LAMBDA FREE LIGHT CHAIN RATIO: 0.01 (ref 0.26–1.65)
LAMBDA FREE LIGHT CHAINS QNT: 824.18 MG/L (ref 5.71–26.3)

## 2021-01-15 ENCOUNTER — TELEPHONE (OUTPATIENT)
Dept: ONCOLOGY | Age: 76
End: 2021-01-15

## 2021-01-15 LAB
ALBUMIN SERPL-MCNC: 3.32 G/DL (ref 3.75–5.01)
ALPHA-1-GLOBULIN: 0.44 G/DL (ref 0.19–0.46)
ALPHA-2-GLOBULIN: 0.88 G/DL (ref 0.48–1.05)
BETA GLOBULIN: 2.17 G/DL (ref 0.48–1.1)
GAMMA: 0.58 G/DL (ref 0.62–1.51)
IMMUNOFIXATION REFLEX: ABNORMAL
PROTEIN ELECTROPHORESIS, SERUM: ABNORMAL
SPE/IFE INTERPRETATION: ABNORMAL
TOTAL PROTEIN: 7.4 G/DL (ref 6.3–8.2)

## 2021-01-15 NOTE — TELEPHONE ENCOUNTER
Sarah Pena from [facility] lab call with abnormal lab on immunoglobulin G of 212.

## 2021-01-19 NOTE — PROGRESS NOTES
Patient Name: Nola Clement  Patient : 1945  Patient MRN: [id number]     Primary Oncologist: Neelam Meneses MD  Referring Provider: Author Rosendo MD     Date of Service: 2021      Chief Complaint:   Chief Complaint   Patient presents with    Follow-up     Patient Active Problem List:     Hypothyroidism     Hyperlipidemia     Vitamin D deficiency     Osteopenia     Essential hypertension     COPD (chronic obstructive pulmonary disease) ([facility] [address]     Macular degeneration, dry-left eye     Macular degeneration, right eye-wet      PMR (polymyalgia rheumatica) (HCC)     CLL (chronic lymphocytic leukemia) ([facility] [address]     CCC (chronic calculous cholecystitis)     Monoclonal gammopathy     Chronic insomnia     GERD (gastroesophageal reflux disease)     Elevated erythrocyte sedimentation rate     Lymphocytosis (symptomatic)    HPI:  Ms. Linda Carrizales is a very pleasant 67-year-old patient with medical history significant for osteoarthritis, hypothyroidism, hyperlipidemia, gastroesophageal reflux disease, initially referred to me on 2017, for evaluation of monoclonal B-cell lymphocytosis and monoclonal gammopathy (IgM Lambda). She stated that she was initially referred to Dr. Sin Rangel because of elevated ESR and arthritis. She has been following regularly with Dr. Sin Rangel for that. Recent blood tests on 2017, showed significant elevation in her white blood cell count with elevated absolute lymphocyte count. Manual differential revealed atypical lymphocytes and Pathologist recommend sending peripheral blood flow cytometry for further analysis. Peripheral blood flow cytometry was sent on 2017, and it revealed monoclonal B-cell population, surface immunoglobulin M and D, Lambda, co-expressing 25, is detected. CD20 is strongly positive. The lack of CD5 co-expression by lesional lymphocytes effectively excludes circulating Mantel cell lymphoma and B cell chronic lymphocytic leukemia. with clinical data, morphology, and cytology studies. FISH analysis for t(11;14) was sent to rule out Mantle cell lymphoma on 8/9/17 and it was a normal study. Pathogenic alteration is detected in MYD88 gene. Genomic alterations of uncertain significance are detected in javon SETD2 and TET2 genes. She stated that she will have colonoscopy at the end of 2017. She had mammogram in April 2017 and it was a negative study. She also had a Pap smear and it also was a normal study. Bone marrow biopsy done on August 15, 2019 showed mature B-cell neoplasm with plasmacytic differentiation [overall 80 to 85% of the cells]. 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 FISH studies for B-cell neoplasm reveal 18q+, which is a recurrent finding in many types of non-Hodgkin lymphoma. CT scan of the chest, abdomen and pelvis done on 2/17/20 showed splenomegaly with multiple hypoenhancing splenic lesions the largest measuring up to 1.6 x 1.2 cm. Given history of Waldenstroms macroglobulinemia, findings suggest lymphomatous involvement. Prominent but otherwise not pathologically enlarged axillary lymph nodes bilaterally. No other adenopathy in the chest, abdomen or pelvis. These can be followed on subsequent imaging. Multiple 2-3 mm pulmonary nodules which can be followed per clinical protocol or in 3 months. Mild emphysematous changes. On January 22, 2021, she presented to me for follow up. I have been following Ms. Issac Deluna for IgM lambda monoclonal gammopathy and monoclonal lymphocytosis. Further work ups with bone marrow biopsy revealed that they all are due to Waldenstrom's macroglobulinemia (MYD88 gene was detected).      Repeat work ups on 1/12/2021 because of worsening anemia and elevated serum creatinine showed increasing IgM level (3262 mg/dl from 2513mg in 11/24/20 and 2430 mg/dl in 7/16/20). Her M protein is also went up (2130 mg/dl from 1400 mg/dl in 11/24/20 and 1300 mg/dl in 7/16/20). Other anemia work ups were normal. ESR was significantly elevated (ESR >140) due to Waldenstrom's macroglobulinemia. Since she becomes more anemia with mildly elevated serum creatinine level (most likely due to hemoconcentration/hyperviscosity from macroglobulinemia), I recommend her to start first line chemotherapy with Ibrutinib. On today visit, I reviewed with her all the potential side effects as well as the benefits of first-line chemotherapy with ibrutinib. After long discussion with her, she is in agreement to start first-line chemotherapy. I will set up for chemo education and we will plan to start chemotherapy as soon as possible. She is going to receive her first dose of Covid vaccine on January 26, 2021 and we will plan to start ibrutinib approximately 2 weeks after she received her second dose of Covid vaccine. Meanwhile, I recommend her to drink plenty of water. She is going to see nephrologist soon and I will follow-up with nephrology's recommendation. She does not have any other significant symptoms on today's visit. PAST MEDICAL HISTORY:  Past medical history significant for:  1. Osteoarthritis. 2.      Hypothyroidism. 3.      Hyperlipidemia. 4.      Gastroesophageal reflux disease. 5.      Hypertension. PAST SURGICAL HISTORY:  No significant surgical history. FAMILY HISTORY:  Significant for prostate cancer in her father. Her maternal grandmother might have had lymphoproliferative disorder. She passed away in 1940. SOCIAL HISTORY:  She was a former smoker and she quit smoking in 1997. She used to smoke two packs a day for 20 years. She socially drinks alcohol and denies illicit drug abuse. She is  and she is currently living in [place] [id number]. She is a retired [employer]. She does not have any children. ALLERGIES:  No known drug allergies. Oncology History    No history exists. Review of Systems: \"Per interval history; otherwise 10 point ROS is negative. \"  Her energy level is pretty stable, appetite, and sleep are good. She denies fever, chills, night sweats, cough, shortness of breath, chest pain, hemoptysis, or palpitations. Her bowel and bladder functions are normal. She denies nausea, vomiting, abdominal pain, diarrhea, constipation, dysuria, loss of appetite, or weight loss. She doesn't have neuropathy and she denies bleeding or clotting issues. She denies any pain in her body. No anxiety or depression. The rest of the systems are unremarkable. Vital Signs:  /62 (Site: Right Upper Arm, Position: Sitting, Cuff Size: Medium Adult)   Pulse 74   Resp 16   Ht 5' 1.5\" (1.562 m)   Wt 118 lb 12.8 oz (53.9 kg)   SpO2 97%   BMI 22.08 kg/m²     Physical Exam:  CONSTITUTIONAL: awake, alert, cooperative, no apparent distress,      EYES: pupils equal, round and reactive to light, sclera clear and conjunctiva normal  ENT: Normocephalic, without obvious abnormality, atraumatic  NECK: supple, symmetrical, no jugular venous distension and no carotid bruits   HEMATOLOGIC/LYMPHATIC: no cervical, supraclavicular or axillary lymphadenopathy   LUNGS: VBS, no rhonchi, no increased work of breathing and clear to auscultation, no wheezes, no crackles,    CARDIOVASCULAR: regular rate and rhythm, normal S1 and S2, no murmur noted  ABDOMEN: soft, non-distended, normal bowel sounds x 4, non-tender, no masses palpated, no hepatosplenomegaly   MUSCULOSKELETAL: full range of motion noted, tone is normal  NEUROLOGIC: awake, alert, oriented to name, place and time. Motor skills grossly intact. SKIN: Normal skin color, texture, turgor and no jaundice.  appears intact   EXTREMITIES: no cyanosis, no LE edema, no leg swelling, no clubbing     Labs:  Hematology:  Lab Results   Component Value Date    WBC 8.7 01/12/2021 RBC 2.54 (L) 01/12/2021    HGB 7.6 (L) 01/12/2021    HCT 24.7 (L) 01/12/2021    MCV 97.2 01/12/2021    MCH 29.9 01/12/2021    MCHC 30.8 (L) 01/12/2021    RDW 15.1 (H) 01/12/2021     01/12/2021    MPV 8.7 01/12/2021    BANDSPCT 1 09/30/2019    SEGSPCT 50.0 01/12/2021    EOSRELPCT 0.8 01/12/2021    BASOPCT 0.1 01/12/2021    LYMPHOPCT 45.8 (H) 01/12/2021    MONOPCT 3.3 01/12/2021    BANDABS 0.11 11/06/2017    SEGSABS 4.4 01/12/2021    EOSABS 0.1 01/12/2021    BASOSABS 0.0 01/12/2021    LYMPHSABS 4.0 01/12/2021    MONOSABS 0.3 01/12/2021    DIFFTYPE AUTOMATED DIFFERENTIAL 01/12/2021    ANISOCYTOSIS Occasional (A) 05/17/2019    POLYCHROM 1+ 02/23/2017    WBCMORP FEW 11/06/2017    PLTM PLATELETS APPEAR NORMAL 02/23/2017     Lab Results   Component Value Date    ESR >140 (H) 01/12/2021     Chemistry:  Lab Results   Component Value Date     01/12/2021    K 3.9 01/12/2021     01/12/2021    CO2 21 01/12/2021    BUN 22 01/12/2021    CREATININE 1.5 (H) 01/12/2021    GLUCOSE 107 (H) 01/12/2021    CALCIUM 8.3 01/12/2021    PROT 7.4 01/12/2021    PROT 7.4 01/12/2021    LABALBU 3.7 01/12/2021    LABALBU 3.32 (L) 01/12/2021    BILITOT 0.2 01/12/2021    ALKPHOS 69 01/12/2021    AST 11 (L) 01/12/2021    ALT 8 (L) 01/12/2021    LABGLOM 34 (L) 01/12/2021    GFRAA 41 (L) 01/12/2021    AGRATIO 1.3 05/02/2016    GLOB 3.0 05/02/2016     Lab Results   Component Value Date     (L) 01/12/2021     No components found for: LD  Lab Results   Component Value Date    TSHHS 0.978 11/06/2017    T4FREE 1.61 05/26/2017     Immunology:  Lab Results   Component Value Date    PROT 7.4 01/12/2021    PROT 7.4 01/12/2021    SPEP  11/24/2020     INTERPRETATION - Monoclonal gammpathy, IgM lambda. Aidan Lopez MD    SPEP  11/24/2020     INTERPRETATION - Monoclonal gammopathy, 1400 mg/dL, identified as IgM lambda in concurrent KAI, which has slightly increased from 1300 mg/dL on 7/16/20.   Aidan Lopez MD    ALBUMINELP 3.8 11/24/2020 chemotherapy. I will set up for chemo education and we will plan to start chemotherapy as soon as possible. She is going to receive her first dose of Covid vaccine on January 26, 2021 and we will plan to start ibrutinib approximately 2 weeks after she received her second dose of Covid vaccine. Meanwhile, I recommend her to drink plenty of water. She is going to see nephrologist soon and I will follow-up with nephrology's recommendation. I answered all her questions and concerns for today. I asked her to follow up with primary care physician on regular basis. Recent imaging and labs were reviewed and discussed with the patient.

## 2021-01-22 ENCOUNTER — HOSPITAL ENCOUNTER (OUTPATIENT)
Dept: INFUSION THERAPY | Age: 76
Discharge: HOME | End: 2021-01-22
Payer: MEDICARE

## 2021-01-22 ENCOUNTER — OFFICE VISIT (OUTPATIENT)
Dept: ONCOLOGY | Age: 76
End: 2021-01-22
Payer: MEDICARE

## 2021-01-22 VITALS
OXYGEN SATURATION: 97 % | WEIGHT: 118.8 LBS | HEART RATE: 74 BPM | HEIGHT: 62 IN | SYSTOLIC BLOOD PRESSURE: 126 MMHG | BODY MASS INDEX: 21.86 KG/M2 | RESPIRATION RATE: 16 BRPM | DIASTOLIC BLOOD PRESSURE: 62 MMHG

## 2021-01-22 DIAGNOSIS — C88.0 WALDENSTROM MACROGLOBULINEMIA (HCC): Primary | ICD-10-CM

## 2021-01-22 PROCEDURE — 4040F PNEUMOC VAC/ADMIN/RCVD: CPT | Performed by: INTERNAL MEDICINE

## 2021-01-22 PROCEDURE — G8399 PT W/DXA RESULTS DOCUMENT: HCPCS | Performed by: INTERNAL MEDICINE

## 2021-01-22 PROCEDURE — G8420 CALC BMI NORM PARAMETERS: HCPCS | Performed by: INTERNAL MEDICINE

## 2021-01-22 PROCEDURE — 99211 OFF/OP EST MAY X REQ PHY/QHP: CPT

## 2021-01-22 PROCEDURE — G8427 DOCREV CUR MEDS BY ELIG CLIN: HCPCS | Performed by: INTERNAL MEDICINE

## 2021-01-22 PROCEDURE — 1090F PRES/ABSN URINE INCON ASSESS: CPT | Performed by: INTERNAL MEDICINE

## 2021-01-22 PROCEDURE — 99215 OFFICE O/P EST HI 40 MIN: CPT | Performed by: INTERNAL MEDICINE

## 2021-01-22 PROCEDURE — G8484 FLU IMMUNIZE NO ADMIN: HCPCS | Performed by: INTERNAL MEDICINE

## 2021-01-22 PROCEDURE — 1036F TOBACCO NON-USER: CPT | Performed by: INTERNAL MEDICINE

## 2021-01-22 PROCEDURE — 1123F ACP DISCUSS/DSCN MKR DOCD: CPT | Performed by: INTERNAL MEDICINE

## 2021-01-22 NOTE — PROGRESS NOTES
MA Rooming Questions  Patient: Arvie Opitz  MRN: [id number]    Date: 1/22/2021        1. Do you have any new issues?   no         2. Do you need any refills on medications?    no    3. Have you had any imaging done since your last visit?   no    4. Have you been hospitalized or seen in the emergency room since your last visit here?   no    5. Did the patient have a depression screening completed today?  No    No data recorded     PHQ-9 Given to (if applicable):               PHQ-9 Score (if applicable):                     [] Positive     []  Negative              Does question #9 need addressed (if applicable)                     [] Yes    []  No               Betito Garcia MA

## 2021-01-25 ENCOUNTER — CLINICAL DOCUMENTATION (OUTPATIENT)
Dept: ONCOLOGY | Age: 76
End: 2021-01-25

## 2021-01-25 DIAGNOSIS — C88.0 WALDENSTROM MACROGLOBULINEMIA (HCC): Primary | ICD-10-CM

## 2021-01-25 RX ORDER — IBRUTINIB 140 MG/1
CAPSULE ORAL
Qty: 63 CAPSULE | Refills: 0 | Status: SHIPPED | OUTPATIENT
Start: 2021-01-25 | End: 2021-01-25 | Stop reason: SDUPTHER

## 2021-01-25 RX ORDER — IBRUTINIB 140 MG/1
CAPSULE ORAL
Qty: 63 CAPSULE | Refills: 0 | Status: SHIPPED | OUTPATIENT
Start: 2021-01-25 | End: 2021-02-22 | Stop reason: SDUPTHER

## 2021-01-25 NOTE — PROGRESS NOTES
[facility] pharmacy called and asked that Rx for Imbruvica was changed to capsules since the tablets are in blister packs.    Rx for capsules sent per request.

## 2021-01-26 ENCOUNTER — TELEPHONE (OUTPATIENT)
Dept: ONCOLOGY | Age: 76
End: 2021-01-26

## 2021-01-26 NOTE — TELEPHONE ENCOUNTER
Called patient to schedule her for chemo ed, schedule for Fri. 01/29 @ 1300, patient states understanding

## 2021-01-27 ENCOUNTER — TELEPHONE (OUTPATIENT)
Dept: INTERNAL MEDICINE CLINIC | Age: 76
End: 2021-01-27

## 2021-01-29 ENCOUNTER — OFFICE VISIT (OUTPATIENT)
Dept: ONCOLOGY | Age: 76
End: 2021-01-29
Payer: MEDICARE

## 2021-01-29 ENCOUNTER — HOSPITAL ENCOUNTER (OUTPATIENT)
Dept: INFUSION THERAPY | Age: 76
Discharge: HOME | End: 2021-01-29
Payer: MEDICARE

## 2021-01-29 VITALS
TEMPERATURE: 96.9 F | HEART RATE: 82 BPM | DIASTOLIC BLOOD PRESSURE: 57 MMHG | SYSTOLIC BLOOD PRESSURE: 134 MMHG | WEIGHT: 113.2 LBS | HEIGHT: 62 IN | OXYGEN SATURATION: 94 % | RESPIRATION RATE: 16 BRPM | BODY MASS INDEX: 20.83 KG/M2

## 2021-01-29 DIAGNOSIS — C88.0 WALDENSTROM MACROGLOBULINEMIA (HCC): ICD-10-CM

## 2021-01-29 DIAGNOSIS — C88.0 WALDENSTROM MACROGLOBULINEMIA (HCC): Primary | ICD-10-CM

## 2021-01-29 LAB
BASOPHILS ABSOLUTE: 0 K/CU MM
BASOPHILS RELATIVE PERCENT: 0.1 % (ref 0–1)
DIFFERENTIAL TYPE: ABNORMAL
EOSINOPHILS ABSOLUTE: 0.1 K/CU MM
EOSINOPHILS RELATIVE PERCENT: 0.9 % (ref 0–3)
HCT VFR BLD CALC: 26.8 % (ref 37–47)
HEMOGLOBIN: 8 GM/DL (ref 12.5–16)
LYMPHOCYTES ABSOLUTE: 4.6 K/CU MM
LYMPHOCYTES RELATIVE PERCENT: 50.2 % (ref 24–44)
MCH RBC QN AUTO: 29.5 PG (ref 27–31)
MCHC RBC AUTO-ENTMCNC: 29.9 % (ref 32–36)
MCV RBC AUTO: 98.9 FL (ref 78–100)
MONOCYTES ABSOLUTE: 0.6 K/CU MM
MONOCYTES RELATIVE PERCENT: 6.1 % (ref 0–4)
PDW BLD-RTO: 16.4 % (ref 11.7–14.9)
PLATELET # BLD: 169 K/CU MM (ref 140–440)
PMV BLD AUTO: 9.7 FL (ref 7.5–11.1)
RBC # BLD: 2.71 M/CU MM (ref 4.2–5.4)
SEGMENTED NEUTROPHILS ABSOLUTE COUNT: 3.9 K/CU MM
SEGMENTED NEUTROPHILS RELATIVE PERCENT: 42.7 % (ref 36–66)
WBC # BLD: 9.2 K/CU MM (ref 4–10.5)

## 2021-01-29 PROCEDURE — 1123F ACP DISCUSS/DSCN MKR DOCD: CPT | Performed by: NURSE PRACTITIONER

## 2021-01-29 PROCEDURE — 99213 OFFICE O/P EST LOW 20 MIN: CPT

## 2021-01-29 PROCEDURE — G8427 DOCREV CUR MEDS BY ELIG CLIN: HCPCS | Performed by: NURSE PRACTITIONER

## 2021-01-29 PROCEDURE — 1036F TOBACCO NON-USER: CPT | Performed by: NURSE PRACTITIONER

## 2021-01-29 PROCEDURE — 1090F PRES/ABSN URINE INCON ASSESS: CPT | Performed by: NURSE PRACTITIONER

## 2021-01-29 PROCEDURE — G8484 FLU IMMUNIZE NO ADMIN: HCPCS | Performed by: NURSE PRACTITIONER

## 2021-01-29 PROCEDURE — 36415 COLL VENOUS BLD VENIPUNCTURE: CPT

## 2021-01-29 PROCEDURE — 99215 OFFICE O/P EST HI 40 MIN: CPT | Performed by: NURSE PRACTITIONER

## 2021-01-29 PROCEDURE — G8399 PT W/DXA RESULTS DOCUMENT: HCPCS | Performed by: NURSE PRACTITIONER

## 2021-01-29 PROCEDURE — 85025 COMPLETE CBC W/AUTO DIFF WBC: CPT

## 2021-01-29 PROCEDURE — G8420 CALC BMI NORM PARAMETERS: HCPCS | Performed by: NURSE PRACTITIONER

## 2021-01-29 PROCEDURE — 4040F PNEUMOC VAC/ADMIN/RCVD: CPT | Performed by: NURSE PRACTITIONER

## 2021-01-29 PROCEDURE — 99211 OFF/OP EST MAY X REQ PHY/QHP: CPT

## 2021-01-29 RX ORDER — PROCHLORPERAZINE MALEATE 10 MG
10 TABLET ORAL EVERY 6 HOURS PRN
Qty: 40 TABLET | Refills: 1 | Status: SHIPPED | OUTPATIENT
Start: 2021-01-29 | End: 2021-11-23

## 2021-01-29 NOTE — PROGRESS NOTES
Patient Name: Brett Beck    Patient : 1945     Patient MRN: [id number]       Date: 2021                                                                                                   CHEMOTHERAPY TREATMENT PLAN    Care Team  Medical Oncologist: Nneka Henry MD  Surgeon:   Radiation Oncologist:   Primary Care Physician: Benjamin Buck MD     Learning Needs Assessment  Before the treatment plan was discussed and the consent was signed, the care team assessed the patient's learning needs. This includes their ability to assume responsibility for managing their therapy. Diagnosis    Waldenstrom macroblobulinemia    The Goal of My Therapy is    (  ) To cure my cancer  (  ) To shrink the tumor prior to surgery  (  ) Increase my chance of cure after surgery  (x) Help me live as long as possible with the highest quality of life. I know that a cure is not possible. Prognosis    (  ) Curable  (x) Palliative    Plan Of Treatment    Intent:  (  ) Neoadjuvant   (  ) Adjuvant   (x) Control    Treatment Regimen  (including supportive meds)                             Frequency                                               Duration     Ibrutinib by mouth daily    Expected Response to Treatment    (  ) This therapy could cure your disease  (x) This therapy has a good chance of helping you live longer                       (  ) This therapy has a good chance of helping you feel better or making you live months longer compared to without it     Quality Of Life    (  ) Expect that you will be able to continue all normal activities  (X) Expect that you will have some side effects but should be able to maintain normal activities  (  ) Expect that you will be unable to work but able to perform light duties at home  (  ) Expect that you will be able to perform light duties and will need assistance with self care    Treatment Benefits and Harms     1.     Patient taught on all common and rare toxicities regarding their with the patient discussing the intent and schedule of their treatment. The patient was given a copy of their treatment summary. Questions and concerns were addressed with the patient. Time spent with the patient face to face today was 60 minutes, counseling and coordination of care dominated more than 50% of this patient encounter.

## 2021-01-29 NOTE — PROGRESS NOTES
MA Rooming Questions  Patient: Benjamín Malloy  MRN: [id number]    Date: 1/29/2021        1. Do you have any new issues?   no       Here for tX planning2. Do you need any refills on medications?     no                 Ngozi Flores

## 2021-01-30 NOTE — PROGRESS NOTES
Shayan Loving presented for education regarding ibrutinib. We discussed potential AE including, but not limited to: myelosuppresion, diarrhea, fatigue, musculoskeletal pain, edema, URI, nausea, ecchymosis, dyspnea, rash, abdominal pain, anorexia, muscle spasms, sinusitis, headache, petechia, epistaxsis, etc. She is asked to call for any uncontrolled AE or with questions or concerns. She is given the on call number. Compazine to pharmacy of record. Advance directive: reviewed; forms provided. Stress-  s/p covid, now in ECF. Has not been home since November. Declined counseling    Nausea: compazine    Appetite: encouraged small frequent meals; boost or ensure as needed. BP (!) 134/57 (Site: Right Upper Arm, Position: Sitting, Cuff Size: Medium Adult)   Pulse 82   Temp 96.9 °F (36.1 °C) (Infrared)   Resp 16   Ht 5' 1.5\" (1.562 m)   Wt 113 lb 3.2 oz (51.3 kg)   SpO2 94%   BMI 21.04 kg/m²     Physical Exam  Constitutional:       Comments: thin   HENT:      Head: Normocephalic. Mouth/Throat:      Mouth: Mucous membranes are moist.   Eyes:      Extraocular Movements: Extraocular movements intact. Conjunctiva/sclera: Conjunctivae normal.   Cardiovascular:      Rate and Rhythm: Normal rate and regular rhythm. Heart sounds: Normal heart sounds. Pulmonary:      Effort: Pulmonary effort is normal.      Breath sounds: Normal breath sounds. Abdominal:      General: Abdomen is flat. Bowel sounds are normal.      Palpations: Abdomen is soft. Musculoskeletal: Normal range of motion. Skin:     General: Skin is warm and dry. Neurological:      General: No focal deficit present. Mental Status: She is alert and oriented to person, place, and time. Psychiatric:         Mood and Affect: Mood normal.         Behavior: Behavior normal.       To return to office as previously scheduled.

## 2021-02-02 ENCOUNTER — TELEPHONE (OUTPATIENT)
Dept: ONCOLOGY | Age: 76
End: 2021-02-02

## 2021-02-02 NOTE — TELEPHONE ENCOUNTER
Phoned pt to let her know that per new NCCN guidelines, no contraindication to taking the COVID vaccine while on treatment and Dr Ellen Nathan would like her to start the medication as soon as she gets it. Pt expressed understanding and stated she hasn't received the medication yet. Phoned pharmacy regarding [facility]ej[facility] Whitt and they stated they will call her today to set up delivery.

## 2021-02-10 PROBLEM — D47.2 MONOCLONAL GAMMOPATHY OF UNKNOWN SIGNIFICANCE (MGUS): Status: ACTIVE | Noted: 2021-02-10

## 2021-02-19 ENCOUNTER — HOSPITAL ENCOUNTER (OUTPATIENT)
Dept: ULTRASOUND IMAGING | Age: 76
Discharge: HOME | End: 2021-02-19
Payer: MEDICARE

## 2021-02-19 DIAGNOSIS — N18.32 STAGE 3B CHRONIC KIDNEY DISEASE (HCC): ICD-10-CM

## 2021-02-19 DIAGNOSIS — D47.2 MONOCLONAL GAMMOPATHY OF UNKNOWN SIGNIFICANCE (MGUS): ICD-10-CM

## 2021-02-19 PROCEDURE — 76775 US EXAM ABDO BACK WALL LIM: CPT

## 2021-02-22 DIAGNOSIS — C88.0 WALDENSTROM MACROGLOBULINEMIA (HCC): Primary | ICD-10-CM

## 2021-02-22 RX ORDER — IBRUTINIB 140 MG/1
CAPSULE ORAL
Qty: 90 CAPSULE | Refills: 3 | Status: SHIPPED | OUTPATIENT
Start: 2021-02-22 | End: 2021-06-29 | Stop reason: SDUPTHER

## 2021-02-24 ENCOUNTER — TELEPHONE (OUTPATIENT)
Dept: ONCOLOGY | Age: 76
End: 2021-02-24

## 2021-03-08 ENCOUNTER — HOSPITAL ENCOUNTER (OUTPATIENT)
Age: 76
Setting detail: SPECIMEN
Discharge: HOME | End: 2021-03-08
Payer: MEDICARE

## 2021-03-08 PROBLEM — E87.6 HYPOPOTASSEMIA: Status: ACTIVE | Noted: 2021-03-08

## 2021-03-08 PROBLEM — N18.32 STAGE 3B CHRONIC KIDNEY DISEASE (HCC): Status: ACTIVE | Noted: 2021-03-08

## 2021-03-08 LAB
BACTERIA: NEGATIVE /HPF
BILIRUBIN URINE: NEGATIVE MG/DL
BLOOD, URINE: NEGATIVE
CLARITY: CLEAR
COLOR: YELLOW
GLUCOSE, URINE: NEGATIVE MG/DL
KETONES, URINE: NEGATIVE MG/DL
LEUKOCYTE ESTERASE, URINE: NEGATIVE
MUCUS: ABNORMAL HPF
NITRITE URINE, QUANTITATIVE: NEGATIVE
PH, URINE: 6 (ref 5–8)
PROTEIN UA: NEGATIVE MG/DL
RBC URINE: <1 /HPF (ref 0–6)
SPECIFIC GRAVITY UA: 1.02 (ref 1–1.03)
SQUAMOUS EPITHELIAL: <1 /HPF
TRICHOMONAS: ABNORMAL /HPF
UROBILINOGEN, URINE: NEGATIVE MG/DL (ref 0.2–1)
WBC UA: <1 /HPF (ref 0–5)

## 2021-03-08 PROCEDURE — 81001 URINALYSIS AUTO W/SCOPE: CPT

## 2021-03-16 ENCOUNTER — HOSPITAL ENCOUNTER (OUTPATIENT)
Dept: INFUSION THERAPY | Age: 76
Discharge: HOME | End: 2021-03-16
Payer: MEDICARE

## 2021-03-16 DIAGNOSIS — D47.2 MONOCLONAL GAMMOPATHY: ICD-10-CM

## 2021-03-16 LAB
ALBUMIN SERPL-MCNC: 4 GM/DL (ref 3.4–5)
ALP BLD-CCNC: 69 IU/L (ref 40–128)
ALT SERPL-CCNC: 8 U/L (ref 10–40)
ANION GAP SERPL CALCULATED.3IONS-SCNC: 8 MMOL/L (ref 4–16)
AST SERPL-CCNC: 12 IU/L (ref 15–37)
BASOPHILS ABSOLUTE: 0 K/CU MM
BASOPHILS RELATIVE PERCENT: 0.2 % (ref 0–1)
BILIRUB SERPL-MCNC: 0.5 MG/DL (ref 0–1)
BUN BLDV-MCNC: 22 MG/DL (ref 6–23)
CALCIUM SERPL-MCNC: 9.1 MG/DL (ref 8.3–10.6)
CHLORIDE BLD-SCNC: 104 MMOL/L (ref 99–110)
CO2: 30 MMOL/L (ref 21–32)
CREAT SERPL-MCNC: 1.3 MG/DL (ref 0.6–1.1)
DIFFERENTIAL TYPE: ABNORMAL
EOSINOPHILS ABSOLUTE: 0 K/CU MM
EOSINOPHILS RELATIVE PERCENT: 0.5 % (ref 0–3)
ERYTHROCYTE SEDIMENTATION RATE: 60 MM/HR (ref 0–30)
GFR AFRICAN AMERICAN: 48 ML/MIN/1.73M2
GFR NON-AFRICAN AMERICAN: 40 ML/MIN/1.73M2
GLUCOSE BLD-MCNC: 92 MG/DL (ref 70–99)
HCT VFR BLD CALC: 31.9 % (ref 37–47)
HEMOGLOBIN: 9.9 GM/DL (ref 12.5–16)
IGA: <50 MG/DL (ref 69–382)
IGG,SERUM: <300 MG/DL (ref 723–1685)
IGM,SERUM: 2011 MG/DL (ref 62–277)
LACTATE DEHYDROGENASE: 100 IU/L (ref 120–246)
LYMPHOCYTES ABSOLUTE: 3.8 K/CU MM
LYMPHOCYTES RELATIVE PERCENT: 46.8 % (ref 24–44)
MCH RBC QN AUTO: 30.5 PG (ref 27–31)
MCHC RBC AUTO-ENTMCNC: 31 % (ref 32–36)
MCV RBC AUTO: 98.2 FL (ref 78–100)
MONOCYTES ABSOLUTE: 0.5 K/CU MM
MONOCYTES RELATIVE PERCENT: 6.5 % (ref 0–4)
PDW BLD-RTO: 13.5 % (ref 11.7–14.9)
PLATELET # BLD: 120 K/CU MM (ref 140–440)
PMV BLD AUTO: 9.7 FL (ref 7.5–11.1)
POTASSIUM SERPL-SCNC: 4.2 MMOL/L (ref 3.5–5.1)
RBC # BLD: 3.25 M/CU MM (ref 4.2–5.4)
SEGMENTED NEUTROPHILS ABSOLUTE COUNT: 3.8 K/CU MM
SEGMENTED NEUTROPHILS RELATIVE PERCENT: 46 % (ref 36–66)
SODIUM BLD-SCNC: 142 MMOL/L (ref 135–145)
TOTAL PROTEIN: 7.1 GM/DL (ref 6.4–8.2)
WBC # BLD: 8.2 K/CU MM (ref 4–10.5)

## 2021-03-16 PROCEDURE — 83883 ASSAY NEPHELOMETRY NOT SPEC: CPT

## 2021-03-16 PROCEDURE — 84165 PROTEIN E-PHORESIS SERUM: CPT

## 2021-03-16 PROCEDURE — 85652 RBC SED RATE AUTOMATED: CPT

## 2021-03-16 PROCEDURE — 80053 COMPREHEN METABOLIC PANEL: CPT

## 2021-03-16 PROCEDURE — 82784 ASSAY IGA/IGD/IGG/IGM EACH: CPT

## 2021-03-16 PROCEDURE — 85025 COMPLETE CBC W/AUTO DIFF WBC: CPT

## 2021-03-16 PROCEDURE — 83615 LACTATE (LD) (LDH) ENZYME: CPT

## 2021-03-16 PROCEDURE — 36415 COLL VENOUS BLD VENIPUNCTURE: CPT

## 2021-03-16 PROCEDURE — 86320 SERUM IMMUNOELECTROPHORESIS: CPT

## 2021-03-18 LAB
ALBUMIN ELP: 3.6 GM/DL (ref 3.2–5.6)
ALPHA-1-GLOBULIN: 0.3 GM/DL (ref 0.1–0.4)
ALPHA-2-GLOBULIN: 0.8 GM/DL (ref 0.4–1.2)
BETA GLOBULIN: 0.9 GM/DL (ref 0.5–1.3)
GAMMA GLOBULIN: 1.5 GM/DL (ref 0.5–1.6)
SPEP INTERPRETATION: NORMAL
SPEP INTERPRETATION: NORMAL
TOTAL PROTEIN: 7.1 GM/DL (ref 6.4–8.2)

## 2021-03-19 LAB
KAPPA QUANT FREE LIGHT CHAINS: 11.79 MG/L (ref 3.3–19.4)
KAPPA/LAMBDA FREE LIGHT CHAIN RATIO: 0.06 (ref 0.26–1.65)
LAMBDA FREE LIGHT CHAINS QNT: 199.64 MG/L (ref 5.71–26.3)

## 2021-03-23 ENCOUNTER — HOSPITAL ENCOUNTER (OUTPATIENT)
Dept: INFUSION THERAPY | Age: 76
Discharge: HOME | End: 2021-03-23
Payer: MEDICARE

## 2021-03-23 ENCOUNTER — OFFICE VISIT (OUTPATIENT)
Dept: ONCOLOGY | Age: 76
End: 2021-03-23
Payer: MEDICARE

## 2021-03-23 VITALS
HEART RATE: 67 BPM | BODY MASS INDEX: 21.41 KG/M2 | OXYGEN SATURATION: 96 % | DIASTOLIC BLOOD PRESSURE: 51 MMHG | HEIGHT: 61 IN | SYSTOLIC BLOOD PRESSURE: 133 MMHG | WEIGHT: 113.4 LBS | TEMPERATURE: 98.2 F

## 2021-03-23 DIAGNOSIS — C88.0 WALDENSTROM MACROGLOBULINEMIA (HCC): Primary | ICD-10-CM

## 2021-03-23 PROCEDURE — G8420 CALC BMI NORM PARAMETERS: HCPCS | Performed by: INTERNAL MEDICINE

## 2021-03-23 PROCEDURE — 99214 OFFICE O/P EST MOD 30 MIN: CPT | Performed by: INTERNAL MEDICINE

## 2021-03-23 PROCEDURE — G8484 FLU IMMUNIZE NO ADMIN: HCPCS | Performed by: INTERNAL MEDICINE

## 2021-03-23 PROCEDURE — 1123F ACP DISCUSS/DSCN MKR DOCD: CPT | Performed by: INTERNAL MEDICINE

## 2021-03-23 PROCEDURE — 4040F PNEUMOC VAC/ADMIN/RCVD: CPT | Performed by: INTERNAL MEDICINE

## 2021-03-23 PROCEDURE — 1036F TOBACCO NON-USER: CPT | Performed by: INTERNAL MEDICINE

## 2021-03-23 PROCEDURE — 1090F PRES/ABSN URINE INCON ASSESS: CPT | Performed by: INTERNAL MEDICINE

## 2021-03-23 PROCEDURE — G8399 PT W/DXA RESULTS DOCUMENT: HCPCS | Performed by: INTERNAL MEDICINE

## 2021-03-23 PROCEDURE — G8427 DOCREV CUR MEDS BY ELIG CLIN: HCPCS | Performed by: INTERNAL MEDICINE

## 2021-03-23 PROCEDURE — 99211 OFF/OP EST MAY X REQ PHY/QHP: CPT

## 2021-03-23 NOTE — PROGRESS NOTES
Patient Name: Yenifer Gonzalez  Patient : 1945  Patient MRN: [id number]     Primary Oncologist: Merlyn Naranjo MD  Referring Provider: Dalia Breaux MD     Date of Service: 3/23/2021      Chief Complaint:   Chief Complaint   Patient presents with    Follow-up     Patient Active Problem List:     Hypothyroidism     Hyperlipidemia     Vitamin D deficiency     Osteopenia     Essential hypertension     COPD (chronic obstructive pulmonary disease) ([facility]     Macular degeneration, dry-left eye     Macular degeneration, right eye-wet      PMR (polymyalgia rheumatica) (HCC)     CLL (chronic lymphocytic leukemia) ([facility]     CCC (chronic calculous cholecystitis)     Monoclonal gammopathy     Chronic insomnia     GERD (gastroesophageal reflux disease)     Elevated erythrocyte sedimentation rate     Lymphocytosis (symptomatic)    HPI:  Ms. 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 She stated that she was initially referred to Dr. Rikki Severino because of elevated ESR and arthritis. She has been following regularly with Dr. Rikki Severino for that. Recent blood tests on 2017, showed significant elevation in her white blood cell count with elevated absolute lymphocyte count. Manual differential revealed atypical lymphocytes and Pathologist recommend sending peripheral blood flow cytometry for further analysis. Peripheral blood flow cytometry was sent on 2017, and it revealed monoclonal B-cell population, surface immunoglobulin M and D, Lambda, co-expressing 25, is detected. CD20 is strongly positive. The lack of CD5 co-expression by lesional lymphocytes effectively excludes circulating Mantel cell lymphoma and B cell chronic lymphocytic leukemia.  and CD11C are negative which exclude hairy cell leukemia. 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 She complains of some tiredness and about 13 pound weight loss over three months duration. She claims that she has tiredness and weight loss because she recently moved to [facility]. She denies fever and night sweats. She denies hyperviscosity symptoms (headache, lightheadedness, blurry vision, or double vision). Her family history is significant for leukocytosis in her paternal grandmother and she passed away in 80. She was not diagnosed with leukemia or lymphoma at that time; however, family has suspected that she might have leukemia or lymphoma. No other family history of lymphoproliferative disorder. Her father has prostate cancer. Laboratory workups done on February 23, 2017, showed persistent lymphocytosis (WBC 25.4 and absolute lymphocyte count was 18.5), mild anemia (hemoglobin 11.3), and she has a normal complete metabolic panel. Her LDH is normal and hepatitis panels are negative. Serum protein electrophoresis and immunofixation revealed biclonal gammopathy (800 mg/dL of IgM Lambda, IgG Kappa, and free Lambda). Peripheral blood flow cytometry was done on February 23, 2017, and it revealed peripheral blood with CD5 positive monoclonal B cells (53 percent of the nucleated cells), consistent with mature B cell neoplasm. B cells are predominantly small, but scattered properties, positive for CD5 (partial), CD19, CD20, CD23 (partial), FMC-7 (partial), and CD38. B cells are negative for  and CD10. These findings are consistent with mature B cell neoplasm. Differential diagnosis includes, but is not limited to, atypical chronic lymphocytic leukemia/small lymphocytic lymphoma, mantel cell lymphoma, and marginal zone B-cell lymphoma.   Pathologist recommends correlating with clinical data, morphology, and cytology studies. FISH analysis for t(11;14) was sent to rule out Mantle cell lymphoma on 8/9/17 and it was a normal study. Pathogenic alteration is detected in MYD88 gene. Genomic alterations of uncertain significance are detected in javon SETD2 and TET2 genes. She stated that she will have colonoscopy at the end of 2017. She had mammogram in April 2017 and it was a negative study. She also had a Pap smear and it also was a normal study. Bone marrow biopsy done on August 15, 2019 showed mature B-cell neoplasm with plasmacytic differentiation [overall 80 to 85% of the cells]. 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 FISH studies for B-cell neoplasm reveal 18q+, which is a recurrent finding in many types of non-Hodgkin lymphoma. CT scan of the chest, abdomen and pelvis done on 2/17/20 showed splenomegaly with multiple hypoenhancing splenic lesions the largest measuring up to 1.6 x 1.2 cm. Given history of Waldenstroms macroglobulinemia, findings suggest lymphomatous involvement. Prominent but otherwise not pathologically enlarged axillary lymph nodes bilaterally. No other adenopathy in the chest, abdomen or pelvis. These can be followed on subsequent imaging. Multiple 2-3 mm pulmonary nodules which can be followed per clinical protocol or in 3 months. Mild emphysematous changes. Since she becomes more anemia with mildly elevated serum creatinine level (most likely due to  hemoconcentration/hyperviscosity from macroglobulinemia), I recommend her to start first line chemotherapy with Ibrutinib. Ibrutinib was started on 1/30/21. [facility] she presented to me for follow up. I have been following MsErendira Madhuri Arcos for IgM lambda monoclonal gammopathy and monoclonal lymphocytosis.  Further work ups with bone marrow biopsy revealed that they all are due to Waldenstrom's macroglobulinemia (MYD88 gene was detected). Repeat work ups on 1/12/2021 because of worsening anemia and elevated serum creatinine showed increasing IgM level (3262 mg/dl from 2513mg in 11/24/20 and 2430 mg/dl in 7/16/20). Her M protein is also went up (2130 mg/dl from 1400 mg/dl in 11/24/20 and 1300 mg/dl in 7/16/20). Other anemia work ups were normal. ESR was significantly elevated (ESR >140) due to Waldenstrom's macroglobulinemia. Since she becomes more anemia with mildly elevated serum creatinine level (most likely due to  hemoconcentration/hyperviscosity from macroglobulinemia), I recommend her to start first line chemotherapy with Ibrutinib. It ws started on 1/30/21. I recognize that her hemoglobin is getting better after starting ibrutinib [9.9 g from 8 g before]. Her IgM level and monoclonal protein are also going down nicely with chemotherapy. Since her parameters are getting better and she is tolerating well to ibrutinib, I recommend that he continue with ibrutinib on a regular basis. I will repeat all the blood test again in 2 months and I will see her again after that. Elevated serum creatinine - it is getting better. Recommend to follow up with her nephrologist regularly. Chemo induced nausea - recommend to continue with zofran prn. Hypertension - her BP is stable. Recommend to continue with triamterene/HCTZ 37.5-25 mg daily. Hypothyroidism - also recommend to continue with synthroid 88 mcg daily. Health maintenance -I recommend for age-appropriate cancer screening, exercise, low-salt and low-fat diet. She does not have any other significant symptoms on today's visit. PAST MEDICAL HISTORY:  Past medical history significant for:  1. Osteoarthritis. 2.      Hypothyroidism. 3.      Hyperlipidemia. 4.      Gastroesophageal reflux disease. 5.      Hypertension.     PAST SURGICAL HISTORY:  No significant surgical history. FAMILY HISTORY:  Significant for prostate cancer in her father. Her maternal grandmother might have had lymphoproliferative disorder. She passed away in 1940. SOCIAL HISTORY:  She was a former smoker and she quit smoking in 1997. She used to smoke two packs a day for 20 years. She socially drinks alcohol and denies illicit drug abuse. She is  and she is currently living in [facility]. She is a retired [employer]. She does not have any children. ALLERGIES:  No known drug allergies. Oncology History    No history exists. Review of Systems: \"Per interval history; otherwise 10 point ROS is negative. \"  Her energy level is better, appetite, and sleep are pretty good. She doesn't have fever, chills, night sweats, cough, shortness of breath, chest pain, hemoptysis, or palpitations. Her bowel and bladder functions are normal. She doesn't have nausea, vomiting, abdominal pain, diarrhea, constipation, dysuria, loss of appetite, or weight loss. She denies neuropathy and she doesn't have bleeding or clotting issues. She doesn't have any pain in her body. Denies anxiety or depression. The rest of the systems are unremarkable.      Vital Signs:  BP (!) 133/51 (Site: Right Upper Arm, Position: Sitting, Cuff Size: Medium Adult)   Pulse 67   Temp 98.2 °F (36.8 °C) (Temporal)   Ht 5' 1\" (1.549 m)   Wt 113 lb 6.4 oz (51.4 kg)   SpO2 96%   BMI 21.43 kg/m²     Physical Exam:  CONSTITUTIONAL: awake, alert, cooperative, no apparent distress,      EYES: pupils equal, round and reactive to light, sclera clear and conjunctiva normal  ENT: Normocephalic, without obvious abnormality, atraumatic  NECK: supple, symmetrical, no jugular venous distension and no carotid bruits   HEMATOLOGIC/LYMPHATIC: no cervical, supraclavicular or axillary lymphadenopathy   LUNGS: VBS, no rhonchi, no increased work of breathing, no LE edema, no leg swelling, clear to auscultation, no wheezes, no crackles,    CARDIOVASCULAR: regular rate and rhythm, normal S1 and S2, no murmur noted  ABDOMEN: soft, non-distended, normal bowel sounds x 4, non-tender, no masses palpated, no hepatosplenomegaly   MUSCULOSKELETAL: full range of motion noted, tone is normal  NEUROLOGIC: awake, alert, oriented to name, place and time. Motor skills grossly intact. SKIN: Normal skin color, texture, turgor and no jaundice.  appears intact   EXTREMITIES: no LE edema, no leg swelling, no cyanosis, no clubbing     Labs:  Hematology:  Lab Results   Component Value Date    WBC 8.2 03/16/2021    RBC 3.25 (L) 03/16/2021    HGB 9.9 (L) 03/16/2021    HCT 31.9 (L) 03/16/2021    MCV 98.2 03/16/2021    MCH 30.5 03/16/2021    MCHC 31.0 (L) 03/16/2021    RDW 13.5 03/16/2021     (L) 03/16/2021    MPV 9.7 03/16/2021    BANDSPCT 1 09/30/2019    SEGSPCT 46.0 03/16/2021    EOSRELPCT 0.5 03/16/2021    BASOPCT 0.2 03/16/2021    LYMPHOPCT 46.8 (H) 03/16/2021    MONOPCT 6.5 (H) 03/16/2021    BANDABS 0.11 11/06/2017    SEGSABS 3.8 03/16/2021    EOSABS 0.0 03/16/2021    BASOSABS 0.0 03/16/2021    LYMPHSABS 3.8 03/16/2021    MONOSABS 0.5 03/16/2021    DIFFTYPE AUTOMATED DIFFERENTIAL 03/16/2021    ANISOCYTOSIS Occasional (A) 05/17/2019    POLYCHROM 1+ 02/23/2017    WBCMORP FEW 11/06/2017    PLTM PLATELETS APPEAR NORMAL 02/23/2017     Lab Results   Component Value Date    ESR 60 (H) 03/16/2021     Chemistry:  Lab Results   Component Value Date     03/16/2021    K 4.2 03/16/2021     03/16/2021    CO2 30 03/16/2021    BUN 22 03/16/2021    CREATININE 1.3 (H) 03/16/2021    GLUCOSE 92 03/16/2021    CALCIUM 9.1 03/16/2021    PROT 7.1 03/16/2021    PROT 7.1 03/16/2021    LABALBU 4.0 03/16/2021    BILITOT 0.5 03/16/2021    ALKPHOS 69 03/16/2021    AST 12 (L) 03/16/2021    ALT 8 (L) 03/16/2021    LABGLOM 40 (L) 03/16/2021    GFRAA 48 (L) 03/16/2021    AGRATIO 1.3 05/02/2016    GLOB 3.0 05/02/2016     Lab Results   Component Value Date  (L) 03/16/2021     No components found for: LD  Lab Results   Component Value Date    TSHHS 0.978 11/06/2017    T4FREE 1.61 05/26/2017     Immunology:  Lab Results   Component Value Date    PROT 7.1 03/16/2021    PROT 7.1 03/16/2021    SPEP  03/16/2021     INTERPRETATION - Monoclonal IgM lambda proteins. LP.    ALBUMINELP 3.6 03/16/2021    LABALPH 0.3 03/16/2021    LABALPH 0.8 03/16/2021    LABBETA 0.9 03/16/2021    GAMGLOB 1.5 03/16/2021     Lab Results   Component Value Date    KAPPAUVOL 11.79 03/16/2021    LAMBDAUVOL 465.38 (H) 07/16/2020    KLFLCR 0.06 (L) 03/16/2021     Lab Results   Component Value Date    B2M 6.9 (H) 01/12/2021     Coagulation Panel:  No results found for: PROTIME, INR, APTT, DDIMER  Anemia Panel:  Lab Results   Component Value Date    EOZXOKTC82 371.1 01/12/2021    FOLATE >20.0 (H) 01/12/2021     Tumor Markers:  Lab Results   Component Value Date     6.2 06/11/2013     Observations:  No data recorded      Assessment & Plan:   1. Chronic lymphocytic leukemia  2. Monoclonal gammopathy (IgM Lambda). PLAN:  Ms. Kavin Alvarez has been followed for Waldenstrom's macroglobulinemia (MYD88 gene was detected). Since she becomes more anemia with mildly elevated serum creatinine level (most likely due to  hemoconcentration/hyperviscosity from macroglobulinemia), I recommend her to start first line chemotherapy with Ibrutinib. Ibrutinib was started on 1/30/21. On [facility] 23, 2021, she presented to me for follow up. I have been following Ms. Kavin Alvarez for IgM lambda monoclonal gammopathy and monoclonal lymphocytosis. Further work ups with bone marrow biopsy revealed that they all are due to Waldenstrom's macroglobulinemia (MYD88 gene was detected). Repeat work ups on 1/12/2021 because of worsening anemia and elevated serum creatinine showed increasing IgM level (3262 mg/dl from 2513mg in 11/24/20 and 2430 mg/dl in 7/16/20).  Her M protein is also went up (2130 mg/dl from 1400 mg/dl in 11/24/20 and 1300 mg/dl in 7/16/20). Other anemia work ups were normal. ESR was significantly elevated (ESR >140) due to Waldenstrom's macroglobulinemia. Since she becomes more anemia with mildly elevated serum creatinine level (most likely due to  hemoconcentration/hyperviscosity from macroglobulinemia), I recommend her to start first line chemotherapy with Ibrutinib. It ws started on 1/30/21. I recognize that her hemoglobin is getting better after starting ibrutinib [9.9 g from 8 g before]. Her IgM level and monoclonal protein are also going down nicely with chemotherapy. Since her parameters are getting better and she is tolerating well to ibrutinib, I recommend that he continue with ibrutinib on a regular basis. I will repeat all the blood test again in 2 months and I will see her again after that. Elevated serum creatinine - it is getting better. Recommend to follow up with her nephrologist regularly. Chemo induced nausea - recommend to continue with zofran prn. Hypertension - her BP is stable. Recommend to continue with triamterene/HCTZ 37.5-25 mg daily. Hypothyroidism - also recommend to continue with synthroid 88 mcg daily. Health maintenance -I recommend for age-appropriate cancer screening, exercise, low-salt and low-fat diet. I answered all her questions and concerns for today. I asked her to follow up with primary care physician on regular basis. Recent imaging and labs were reviewed and discussed with the patient.

## 2021-03-23 NOTE — PROGRESS NOTES
MA Rooming Questions  Patient: Randall Camilo  MRN: [id number]    Date: 3/23/2021        1. Do you have any new issues?   no         2. Do you need any refills on medications?    no    3. Have you had any imaging done since your last visit?   no    4. Have you been hospitalized or seen in the emergency room since your last visit here?   no    5. Did the patient have a depression screening completed today?  No    No data recorded     PHQ-9 Given to (if applicable):               PHQ-9 Score (if applicable):                     [] Positive     []  Negative              Does question #9 need addressed (if applicable)                     [] Yes    []  No               Deborah Gray MA

## 2021-03-29 LAB
EER IMMUNOFIX ELECTROPHORESIS GEL: NORMAL
IMMUNOFIX ELECTROPHORESIS GEL: NORMAL

## 2021-05-18 ENCOUNTER — HOSPITAL ENCOUNTER (OUTPATIENT)
Dept: INFUSION THERAPY | Age: 76
Discharge: HOME | End: 2021-05-18
Payer: MEDICARE

## 2021-05-18 DIAGNOSIS — C88.0 WALDENSTROM MACROGLOBULINEMIA (HCC): ICD-10-CM

## 2021-05-18 LAB
ALBUMIN SERPL-MCNC: 4.1 GM/DL (ref 3.4–5)
ALP BLD-CCNC: 76 IU/L (ref 40–129)
ALT SERPL-CCNC: 9 U/L (ref 10–40)
ANION GAP SERPL CALCULATED.3IONS-SCNC: 9 MMOL/L (ref 4–16)
AST SERPL-CCNC: 12 IU/L (ref 15–37)
BASOPHILS ABSOLUTE: 0 K/CU MM
BASOPHILS RELATIVE PERCENT: 0.1 % (ref 0–1)
BILIRUB SERPL-MCNC: 0.8 MG/DL (ref 0–1)
BUN BLDV-MCNC: 26 MG/DL (ref 6–23)
CALCIUM SERPL-MCNC: 9.4 MG/DL (ref 8.3–10.6)
CHLORIDE BLD-SCNC: 104 MMOL/L (ref 99–110)
CO2: 27 MMOL/L (ref 21–32)
CREAT SERPL-MCNC: 1.3 MG/DL (ref 0.6–1.1)
DIFFERENTIAL TYPE: ABNORMAL
EOSINOPHILS ABSOLUTE: 0.1 K/CU MM
EOSINOPHILS RELATIVE PERCENT: 0.4 % (ref 0–3)
ERYTHROCYTE SEDIMENTATION RATE: 28 MM/HR (ref 0–30)
GFR AFRICAN AMERICAN: 48 ML/MIN/1.73M2
GFR NON-AFRICAN AMERICAN: 40 ML/MIN/1.73M2
GLUCOSE BLD-MCNC: 89 MG/DL (ref 70–99)
HCT VFR BLD CALC: 36.8 % (ref 37–47)
HEMOGLOBIN: 11.9 GM/DL (ref 12.5–16)
IGA: <50 MG/DL (ref 69–382)
IGG,SERUM: <300 MG/DL (ref 723–1685)
IGM,SERUM: 1439 MG/DL (ref 62–277)
LACTATE DEHYDROGENASE: 113 IU/L (ref 120–246)
LYMPHOCYTES ABSOLUTE: 9.5 K/CU MM
LYMPHOCYTES RELATIVE PERCENT: 58.4 % (ref 24–44)
MCH RBC QN AUTO: 30.4 PG (ref 27–31)
MCHC RBC AUTO-ENTMCNC: 32.3 % (ref 32–36)
MCV RBC AUTO: 93.9 FL (ref 78–100)
MONOCYTES ABSOLUTE: 0.8 K/CU MM
MONOCYTES RELATIVE PERCENT: 4.6 % (ref 0–4)
PDW BLD-RTO: 13.4 % (ref 11.7–14.9)
PLATELET # BLD: 180 K/CU MM (ref 140–440)
PMV BLD AUTO: 10.5 FL (ref 7.5–11.1)
POTASSIUM SERPL-SCNC: 4.2 MMOL/L (ref 3.5–5.1)
RBC # BLD: 3.92 M/CU MM (ref 4.2–5.4)
SEGMENTED NEUTROPHILS ABSOLUTE COUNT: 5.9 K/CU MM
SEGMENTED NEUTROPHILS RELATIVE PERCENT: 36.5 % (ref 36–66)
SODIUM BLD-SCNC: 140 MMOL/L (ref 135–145)
TOTAL PROTEIN: 6.7 GM/DL (ref 6.4–8.2)
WBC # BLD: 16.2 K/CU MM (ref 4–10.5)

## 2021-05-18 PROCEDURE — 85025 COMPLETE CBC W/AUTO DIFF WBC: CPT

## 2021-05-18 PROCEDURE — 86320 SERUM IMMUNOELECTROPHORESIS: CPT

## 2021-05-18 PROCEDURE — 80053 COMPREHEN METABOLIC PANEL: CPT

## 2021-05-18 PROCEDURE — 83615 LACTATE (LD) (LDH) ENZYME: CPT

## 2021-05-18 PROCEDURE — 82784 ASSAY IGA/IGD/IGG/IGM EACH: CPT

## 2021-05-18 PROCEDURE — 36415 COLL VENOUS BLD VENIPUNCTURE: CPT

## 2021-05-18 PROCEDURE — 84165 PROTEIN E-PHORESIS SERUM: CPT

## 2021-05-18 PROCEDURE — 85652 RBC SED RATE AUTOMATED: CPT

## 2021-05-19 LAB
ALBUMIN ELP: 3.7 GM/DL (ref 3.2–5.6)
ALPHA-1-GLOBULIN: 0.2 GM/DL (ref 0.1–0.4)
ALPHA-2-GLOBULIN: 0.7 GM/DL (ref 0.4–1.2)
BETA GLOBULIN: 0.8 GM/DL (ref 0.5–1.3)
GAMMA GLOBULIN: 1.2 GM/DL (ref 0.5–1.6)
SPEP INTERPRETATION: NORMAL
SPEP INTERPRETATION: NORMAL
TOTAL PROTEIN: 6.7 GM/DL (ref 6.4–8.2)

## 2021-05-25 ENCOUNTER — HOSPITAL ENCOUNTER (OUTPATIENT)
Dept: INFUSION THERAPY | Age: 76
Discharge: HOME | End: 2021-05-25
Payer: MEDICARE

## 2021-05-25 ENCOUNTER — OFFICE VISIT (OUTPATIENT)
Dept: ONCOLOGY | Age: 76
End: 2021-05-25
Payer: MEDICARE

## 2021-05-25 VITALS
WEIGHT: 115.8 LBS | SYSTOLIC BLOOD PRESSURE: 132 MMHG | DIASTOLIC BLOOD PRESSURE: 93 MMHG | TEMPERATURE: 98.3 F | OXYGEN SATURATION: 95 % | HEIGHT: 61 IN | BODY MASS INDEX: 21.86 KG/M2 | RESPIRATION RATE: 16 BRPM | HEART RATE: 64 BPM

## 2021-05-25 DIAGNOSIS — C88.0 WALDENSTROM MACROGLOBULINEMIA (HCC): Primary | ICD-10-CM

## 2021-05-25 PROCEDURE — G8427 DOCREV CUR MEDS BY ELIG CLIN: HCPCS | Performed by: INTERNAL MEDICINE

## 2021-05-25 PROCEDURE — 1036F TOBACCO NON-USER: CPT | Performed by: INTERNAL MEDICINE

## 2021-05-25 PROCEDURE — 1090F PRES/ABSN URINE INCON ASSESS: CPT | Performed by: INTERNAL MEDICINE

## 2021-05-25 PROCEDURE — G8399 PT W/DXA RESULTS DOCUMENT: HCPCS | Performed by: INTERNAL MEDICINE

## 2021-05-25 PROCEDURE — 1123F ACP DISCUSS/DSCN MKR DOCD: CPT | Performed by: INTERNAL MEDICINE

## 2021-05-25 PROCEDURE — 4040F PNEUMOC VAC/ADMIN/RCVD: CPT | Performed by: INTERNAL MEDICINE

## 2021-05-25 PROCEDURE — 99211 OFF/OP EST MAY X REQ PHY/QHP: CPT

## 2021-05-25 PROCEDURE — 99214 OFFICE O/P EST MOD 30 MIN: CPT | Performed by: INTERNAL MEDICINE

## 2021-05-25 PROCEDURE — G8420 CALC BMI NORM PARAMETERS: HCPCS | Performed by: INTERNAL MEDICINE

## 2021-05-25 NOTE — PROGRESS NOTES
MA Rooming Questions  Patient: Mariel Deluna  MRN: [id number]    Date: 5/25/2021        1. Do you have any new issues?   no         2. Do you need any refills on medications?    no    3. Have you had any imaging done since your last visit?   no    4. Have you been hospitalized or seen in the emergency room since your last visit here?   no    5. Did the patient have a depression screening completed today?  No    No data recorded     PHQ-9 Given to (if applicable):               PHQ-9 Score (if applicable):                     [] Positive     []  Negative              Does question #9 need addressed (if applicable)                     [] Yes    []  No               Steph Jarrett, CMA

## 2021-05-25 NOTE — PROGRESS NOTES
Patient Name: Kylha Bradley  Patient : 1945  Patient MRN: [id number]     Primary Oncologist: Sushma Cedillo MD  Referring Provider: Theo Dudley MD     Date of Service: 2021      Chief Complaint:   Chief Complaint   Patient presents with   [address]     Patient Active Problem List:     Hypothyroidism     Hyperlipidemia     Vitamin D deficiency     Osteopenia     Essential hypertension     COPD (chronic obstructive pulmonary disease) ([facility]     Macular degeneration, dry-left eye     Macular degeneration, right eye-wet      PMR (polymyalgia rheumatica) (HCC)     CLL (chronic lymphocytic leukemia) ([facility]     CCC (chronic calculous cholecystitis)     Monoclonal gammopathy     Chronic insomnia     GERD (gastroesophageal reflux disease)     Elevated erythrocyte sedimentation rate     Lymphocytosis (symptomatic)    HPI:  Ms. 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 She stated that she was initially referred to Dr. Nena Abbasi because of elevated ESR and arthritis. She has been following regularly with Dr. Nena Abbasi for that. Recent blood tests on 2017, showed significant elevation in her white blood cell count with elevated absolute lymphocyte count. Manual differential revealed atypical lymphocytes and Pathologist recommend sending peripheral blood flow cytometry for further analysis. Peripheral blood flow cytometry was sent on 2017, and it revealed monoclonal B-cell population, surface immunoglobulin M and D, Lambda, co-expressing 25, is detected. CD20 is strongly positive. The lack of CD5 co-expression by lesional lymphocytes effectively excludes circulating Mantel cell lymphoma and B cell chronic lymphocytic leukemia.  and CD11C are negative which exclude hairy cell leukemia. 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 She complains of some tiredness and about 13 pound weight loss over three months duration. She claims that she has tiredness and weight loss because she recently moved to [facility]. She denies fever and night sweats. She denies hyperviscosity symptoms (headache, lightheadedness, blurry vision, or double vision). Her family history is significant for leukocytosis in her paternal grandmother and she passed away in 80. She was not diagnosed with leukemia or lymphoma at that time; however, family has suspected that she might have leukemia or lymphoma. No other family history of lymphoproliferative disorder. Her father has prostate cancer. Laboratory workups done on February 23, 2017, showed persistent lymphocytosis (WBC 25.4 and absolute lymphocyte count was 18.5), mild anemia (hemoglobin 11.3), and she has a normal complete metabolic panel. Her LDH is normal and hepatitis panels are negative. Serum protein electrophoresis and immunofixation revealed biclonal gammopathy (800 mg/dL of IgM Lambda, IgG Kappa, and free Lambda). Peripheral blood flow cytometry was done on February 23, 2017, and it revealed peripheral blood with CD5 positive monoclonal B cells (53 percent of the nucleated cells), consistent with mature B cell neoplasm. B cells are predominantly small, but scattered properties, positive for CD5 (partial), CD19, CD20, CD23 (partial), FMC-7 (partial), and CD38. B cells are negative for  and CD10. These findings are consistent with mature B cell neoplasm. Differential diagnosis includes, but is not limited to, atypical chronic lymphocytic leukemia/small lymphocytic lymphoma, mantel cell lymphoma, and marginal zone B-cell lymphoma.   Pathologist recommends Osteoarthritis. 2.      Hypothyroidism. 3.      Hyperlipidemia. 4.      Gastroesophageal reflux disease. 5.      Hypertension. PAST SURGICAL HISTORY:  No significant surgical history. FAMILY HISTORY:  Significant for prostate cancer in her father. Her maternal grandmother might have had lymphoproliferative disorder. She passed away in 1940. SOCIAL HISTORY:  She was a former smoker and she quit smoking in 1997. She used to smoke two packs a day for 20 years. She socially drinks alcohol and denies illicit drug abuse. She is  and she is currently living in [facility]. She is a retired . She does not have any children. ALLERGIES:  No known drug allergies. Oncology History    No history exists. Review of Systems: \"Per interval history; otherwise 10 point ROS is negative. \"  Her energy level is good, appetite, and sleep are fine. She denies fever, chills, night sweats, cough, shortness of breath, chest pain, hemoptysis, or palpitations. Her bowel and bladder functions are normal. She denies nausea, vomiting, abdominal pain, diarrhea, constipation, dysuria, loss of appetite, or weight loss. She doesn't have neuropathy and she denies bleeding or clotting issues. She denies any pain in her body. No anxiety or depression. The rest of the systems are unremarkable.      Vital Signs:  BP (!) 132/93 (Site: Right Upper Arm, Position: Sitting, Cuff Size: Medium Adult)   Pulse 64   Temp 98.3 °F (36.8 °C) (Infrared)   Resp 16   Ht 5' 1\" (1.549 m)   Wt 115 lb 12.8 oz (52.5 kg)   SpO2 95%   BMI 21.88 kg/m²     Physical Exam:  CONSTITUTIONAL: awake, alert, cooperative, no apparent distress,      EYES: pupils equal, round and reactive to light, sclera clear and conjunctiva normal  ENT: Normocephalic, without obvious abnormality, atraumatic  NECK: supple, symmetrical, no jugular venous distension and no carotid bruits   HEMATOLOGIC/LYMPHATIC: no cervical, supraclavicular or axillary 05/18/2021    AGRATIO 1.3 05/02/2016    GLOB 3.0 05/02/2016     Lab Results   Component Value Date     (L) 05/18/2021     No components found for: LD  Lab Results   Component Value Date    TSHHS 0.978 11/06/2017    T4FREE 1.61 05/26/2017     Immunology:  Lab Results   Component Value Date    PROT 6.7 05/18/2021    PROT 6.7 05/18/2021    SPEP  05/18/2021     INTERPRETATION - Monoclonal gammopathy, IgM lambda. RS    SPEP  05/18/2021     INTERPRETATION - Monoclonal gammopathy, 900 mg/dl IgM lambda, which has continued the decrease from 2130 mg/dl on 1/12/21. RS    ALBUMINELP 3.7 05/18/2021    LABALPH 0.2 05/18/2021    LABALPH 0.7 05/18/2021    LABBETA 0.8 05/18/2021    GAMGLOB 1.2 05/18/2021     Lab Results   Component Value Date    KAPPAUVOL 11.79 03/16/2021    LAMBDAUVOL 465.38 (H) 07/16/2020    KLFLCR 0.06 (L) 03/16/2021     Lab Results   Component Value Date    B2M 6.9 (H) 01/12/2021     Coagulation Panel:  No results found for: PROTIME, INR, APTT, DDIMER  Anemia Panel:  Lab Results   Component Value Date    HYBTBLPH64 371.1 01/12/2021    FOLATE >20.0 (H) 01/12/2021     Tumor Markers:  Lab Results   Component Value Date     6.2 06/11/2013     Observations:  No data recorded      Assessment & Plan:   1. Chronic lymphocytic leukemia  2. Monoclonal gammopathy (IgM Lambda). PLAN:  Ms. Giovani El has been followed for Waldenstrom's macroglobulinemia (MYD88 gene was detected). Since she becomes more anemia with mildly elevated serum creatinine level (most likely due to  hemoconcentration/hyperviscosity from macroglobulinemia), I recommend her to start first line chemotherapy with Ibrutinib. Ibrutinib was started on 1/30/21. On May 25, 2021, she presented to me for follow up. I have been following Ms. Giovani El for IgM lambda monoclonal gammopathy and monoclonal lymphocytosis.  Further work ups with bone marrow biopsy revealed that they all are due to Waldenstrom's macroglobulinemia (MYD88 gene was detected). Repeat work ups on 1/12/2021 because of worsening anemia and elevated serum creatinine showed increasing IgM level (3262 mg/dl from 2513mg in 11/24/20 and 2430 mg/dl in 7/16/20). Her M protein is also went up (2130 mg/dl from 1400 mg/dl in 11/24/20 and 1300 mg/dl in 7/16/20). Other anemia work ups were normal. ESR was significantly elevated (ESR >140) due to Waldenstrom's macroglobulinemia. Since she becomes more anemia with mildly elevated serum creatinine level (most likely due to  hemoconcentration/hyperviscosity from macroglobulinemia), I recommend her to start first line chemotherapy with Ibrutinib. It ws started on 1/30/21. I recognize that her hemoglobin is getting better after starting ibrutinib [9.9 g from 8 g before]. Her IgM level (3262 mg/dl on 1/12/21, 2011 mg/dl on 3/16/21 and 1439 mg/dl on 5/18/21) and monoclonal protein (2130 mg/dl on 1/12/21, 1100 mg/dl on 3/16/21 and 900 mg/dl on 5/18/21) are also going down nicely with chemotherapy. Since her parameters are getting better and she is tolerating well to ibrutinib, I recommend that he continue with ibrutinib on a regular basis. I will repeat all the blood test again in 2 months and I will see her again after that. Elevated serum creatinine - it is getting better. Recommend to follow up with her nephrologist regularly. Chemo induced nausea - recommend to continue with zofran prn. Hypertension - her BP is stable. Recommend to continue with triamterene/HCTZ 37.5-25 mg daily. Hypothyroidism - also recommend to continue with synthroid 88 mcg daily. Health maintenance -I recommend for age-appropriate cancer screening, exercise, low-salt and low-fat diet. I answered all her questions and concerns for today. I asked her to follow up with primary care physician on regular basis. Recent imaging and labs were reviewed and discussed with the patient.

## 2021-06-29 DIAGNOSIS — C88.0 WALDENSTROM MACROGLOBULINEMIA (HCC): Primary | ICD-10-CM

## 2021-06-29 RX ORDER — IBRUTINIB 140 MG/1
CAPSULE ORAL
Qty: 90 CAPSULE | Refills: 3 | Status: SHIPPED | OUTPATIENT
Start: 2021-06-29 | End: 2021-11-29 | Stop reason: SDUPTHER

## 2021-07-08 ENCOUNTER — HOSPITAL ENCOUNTER (OUTPATIENT)
Age: 76
Discharge: HOME | End: 2021-07-08
Payer: MEDICARE

## 2021-07-08 DIAGNOSIS — N18.32 STAGE 3B CHRONIC KIDNEY DISEASE (HCC): ICD-10-CM

## 2021-07-08 DIAGNOSIS — D47.2 MONOCLONAL GAMMOPATHY OF UNKNOWN SIGNIFICANCE (MGUS): ICD-10-CM

## 2021-07-08 LAB
ALBUMIN SERPL-MCNC: 3.8 GM/DL (ref 3.4–5)
ANION GAP SERPL CALCULATED.3IONS-SCNC: 14 MMOL/L (ref 4–16)
BUN BLDV-MCNC: 25 MG/DL (ref 6–23)
CALCIUM SERPL-MCNC: 8.5 MG/DL (ref 8.3–10.6)
CHLORIDE BLD-SCNC: 102 MMOL/L (ref 99–110)
CO2: 27 MMOL/L (ref 21–32)
CREAT SERPL-MCNC: 1.4 MG/DL (ref 0.6–1.1)
CREATININE URINE: 419.3 MG/DL (ref 28–217)
GFR AFRICAN AMERICAN: 44 ML/MIN/1.73M2
GFR NON-AFRICAN AMERICAN: 36 ML/MIN/1.73M2
GLUCOSE BLD-MCNC: 114 MG/DL (ref 70–99)
PHOSPHORUS: 4.3 MG/DL (ref 2.5–4.9)
POTASSIUM SERPL-SCNC: 3.3 MMOL/L (ref 3.5–5.1)
PROT/CREAT RATIO, UR: 0.3
SODIUM BLD-SCNC: 143 MMOL/L (ref 135–145)
URINE TOTAL PROTEIN: 141.2 MG/DL

## 2021-07-08 PROCEDURE — 80069 RENAL FUNCTION PANEL: CPT

## 2021-07-08 PROCEDURE — 84156 ASSAY OF PROTEIN URINE: CPT

## 2021-07-08 PROCEDURE — 82570 ASSAY OF URINE CREATININE: CPT

## 2021-07-08 PROCEDURE — 36415 COLL VENOUS BLD VENIPUNCTURE: CPT

## 2021-08-17 ENCOUNTER — HOSPITAL ENCOUNTER (OUTPATIENT)
Dept: INFUSION THERAPY | Age: 76
Discharge: HOME | End: 2021-08-17
Payer: MEDICARE

## 2021-08-17 DIAGNOSIS — C88.0 WALDENSTROM MACROGLOBULINEMIA (HCC): ICD-10-CM

## 2021-08-17 LAB
ALBUMIN SERPL-MCNC: 4.3 GM/DL (ref 3.4–5)
ALP BLD-CCNC: 88 IU/L (ref 40–129)
ALT SERPL-CCNC: 7 U/L (ref 10–40)
ANION GAP SERPL CALCULATED.3IONS-SCNC: 8 MMOL/L (ref 4–16)
AST SERPL-CCNC: 10 IU/L (ref 15–37)
BASOPHILS ABSOLUTE: 0 K/CU MM
BASOPHILS RELATIVE PERCENT: 0.2 % (ref 0–1)
BILIRUB SERPL-MCNC: 0.7 MG/DL (ref 0–1)
BUN BLDV-MCNC: 29 MG/DL (ref 6–23)
CALCIUM SERPL-MCNC: 8.7 MG/DL (ref 8.3–10.6)
CHLORIDE BLD-SCNC: 98 MMOL/L (ref 99–110)
CO2: 27 MMOL/L (ref 21–32)
CREAT SERPL-MCNC: 1.3 MG/DL (ref 0.6–1.1)
DIFFERENTIAL TYPE: ABNORMAL
EOSINOPHILS ABSOLUTE: 0.1 K/CU MM
EOSINOPHILS RELATIVE PERCENT: 0.5 % (ref 0–3)
ERYTHROCYTE SEDIMENTATION RATE: 46 MM/HR (ref 0–30)
GFR AFRICAN AMERICAN: 48 ML/MIN/1.73M2
GFR NON-AFRICAN AMERICAN: 40 ML/MIN/1.73M2
GLUCOSE BLD-MCNC: 92 MG/DL (ref 70–99)
HCT VFR BLD CALC: 36 % (ref 37–47)
HEMOGLOBIN: 11.9 GM/DL (ref 12.5–16)
IGA: <50 MG/DL (ref 69–382)
IGG,SERUM: <300 MG/DL (ref 723–1685)
IGM,SERUM: 2116 MG/DL (ref 62–277)
LACTATE DEHYDROGENASE: 121 IU/L (ref 120–246)
LYMPHOCYTES ABSOLUTE: 6.1 K/CU MM
LYMPHOCYTES RELATIVE PERCENT: 47 % (ref 24–44)
MCH RBC QN AUTO: 30.4 PG (ref 27–31)
MCHC RBC AUTO-ENTMCNC: 33.1 % (ref 32–36)
MCV RBC AUTO: 91.8 FL (ref 78–100)
MONOCYTES ABSOLUTE: 0.7 K/CU MM
MONOCYTES RELATIVE PERCENT: 5.5 % (ref 0–4)
PDW BLD-RTO: 12.9 % (ref 11.7–14.9)
PLATELET # BLD: 214 K/CU MM (ref 140–440)
PMV BLD AUTO: 10.6 FL (ref 7.5–11.1)
POTASSIUM SERPL-SCNC: 3.8 MMOL/L (ref 3.5–5.1)
RBC # BLD: 3.92 M/CU MM (ref 4.2–5.4)
SEGMENTED NEUTROPHILS ABSOLUTE COUNT: 6 K/CU MM
SEGMENTED NEUTROPHILS RELATIVE PERCENT: 46.8 % (ref 36–66)
SODIUM BLD-SCNC: 133 MMOL/L (ref 135–145)
TOTAL PROTEIN: 6.6 GM/DL (ref 6.4–8.2)
WBC # BLD: 12.9 K/CU MM (ref 4–10.5)

## 2021-08-17 PROCEDURE — 36415 COLL VENOUS BLD VENIPUNCTURE: CPT

## 2021-08-17 PROCEDURE — 85652 RBC SED RATE AUTOMATED: CPT

## 2021-08-17 PROCEDURE — 80053 COMPREHEN METABOLIC PANEL: CPT

## 2021-08-17 PROCEDURE — 86320 SERUM IMMUNOELECTROPHORESIS: CPT

## 2021-08-17 PROCEDURE — 85025 COMPLETE CBC W/AUTO DIFF WBC: CPT

## 2021-08-17 PROCEDURE — 83883 ASSAY NEPHELOMETRY NOT SPEC: CPT

## 2021-08-17 PROCEDURE — 84165 PROTEIN E-PHORESIS SERUM: CPT

## 2021-08-17 PROCEDURE — 83615 LACTATE (LD) (LDH) ENZYME: CPT

## 2021-08-17 PROCEDURE — 82784 ASSAY IGA/IGD/IGG/IGM EACH: CPT

## 2021-08-18 LAB
ALBUMIN ELP: 3.5 GM/DL (ref 3.2–5.6)
ALPHA-1-GLOBULIN: 0.3 GM/DL (ref 0.1–0.4)
ALPHA-2-GLOBULIN: 0.8 GM/DL (ref 0.4–1.2)
BETA GLOBULIN: 0.8 GM/DL (ref 0.5–1.3)
GAMMA GLOBULIN: 1.2 GM/DL (ref 0.5–1.6)
SPEP INTERPRETATION: NORMAL
SPEP INTERPRETATION: NORMAL
TOTAL PROTEIN: 6.6 GM/DL (ref 6.4–8.2)

## 2021-08-21 LAB
KAPPA QUANT FREE LIGHT CHAINS: 11.58 MG/L (ref 3.3–19.4)
KAPPA/LAMBDA FREE LIGHT CHAIN RATIO: 0.1 (ref 0.26–1.65)
LAMBDA FREE LIGHT CHAINS QNT: 111.96 MG/L (ref 5.71–26.3)

## 2021-08-24 ENCOUNTER — HOSPITAL ENCOUNTER (OUTPATIENT)
Dept: INFUSION THERAPY | Age: 76
Discharge: HOME | End: 2021-08-24
Payer: MEDICARE

## 2021-08-24 ENCOUNTER — OFFICE VISIT (OUTPATIENT)
Dept: ONCOLOGY | Age: 76
End: 2021-08-24
Payer: MEDICARE

## 2021-08-24 VITALS
WEIGHT: 110.6 LBS | TEMPERATURE: 97.7 F | HEIGHT: 61 IN | SYSTOLIC BLOOD PRESSURE: 109 MMHG | HEART RATE: 75 BPM | BODY MASS INDEX: 20.88 KG/M2 | OXYGEN SATURATION: 96 % | DIASTOLIC BLOOD PRESSURE: 55 MMHG

## 2021-08-24 DIAGNOSIS — C88.0 WALDENSTROM MACROGLOBULINEMIA (HCC): Primary | ICD-10-CM

## 2021-08-24 PROCEDURE — 1123F ACP DISCUSS/DSCN MKR DOCD: CPT | Performed by: INTERNAL MEDICINE

## 2021-08-24 PROCEDURE — 1036F TOBACCO NON-USER: CPT | Performed by: INTERNAL MEDICINE

## 2021-08-24 PROCEDURE — 4040F PNEUMOC VAC/ADMIN/RCVD: CPT | Performed by: INTERNAL MEDICINE

## 2021-08-24 PROCEDURE — G8420 CALC BMI NORM PARAMETERS: HCPCS | Performed by: INTERNAL MEDICINE

## 2021-08-24 PROCEDURE — 99214 OFFICE O/P EST MOD 30 MIN: CPT | Performed by: INTERNAL MEDICINE

## 2021-08-24 PROCEDURE — 1090F PRES/ABSN URINE INCON ASSESS: CPT | Performed by: INTERNAL MEDICINE

## 2021-08-24 PROCEDURE — 99211 OFF/OP EST MAY X REQ PHY/QHP: CPT

## 2021-08-24 PROCEDURE — G8427 DOCREV CUR MEDS BY ELIG CLIN: HCPCS | Performed by: INTERNAL MEDICINE

## 2021-08-24 PROCEDURE — G8399 PT W/DXA RESULTS DOCUMENT: HCPCS | Performed by: INTERNAL MEDICINE

## 2021-08-24 RX ORDER — TRAMADOL HYDROCHLORIDE 50 MG/1
TABLET ORAL
COMMUNITY
Start: 2021-07-22

## 2021-08-24 ASSESSMENT — PATIENT HEALTH QUESTIONNAIRE - PHQ9
2. FEELING DOWN, DEPRESSED OR HOPELESS: 1
SUM OF ALL RESPONSES TO PHQ QUESTIONS 1-9: 1
SUM OF ALL RESPONSES TO PHQ QUESTIONS 1-9: 1
SUM OF ALL RESPONSES TO PHQ9 QUESTIONS 1 & 2: 1
1. LITTLE INTEREST OR PLEASURE IN DOING THINGS: 0
SUM OF ALL RESPONSES TO PHQ QUESTIONS 1-9: 1

## 2021-08-24 NOTE — PROGRESS NOTES
MA Rooming Questions  Patient: Rafael Temple  MRN: [id number]    Date: 8/24/2021        1. Do you have any new issues?   no         2. Do you need any refills on medications?    no    3. Have you had any imaging done since your last visit? yes - labs here     4. Have you been hospitalized or seen in the emergency room since your last visit here?   no    5. Did the patient have a depression screening completed today?  Yes    PHQ-9 Total Score: 1 (8/24/2021  2:07 PM)       PHQ-9 Given to (if applicable):               PHQ-9 Score (if applicable):                     [] Positive     []  Negative              Does question #9 need addressed (if applicable)                     [] Yes    []  No               Regino Bhatt CMA

## 2021-08-24 NOTE — PROGRESS NOTES
Patient Name: Jorge Bess  Patient : 1945  Patient MRN: [id number]     Primary Oncologist: Saritha Aldrich MD  Referring Provider: Pastora Whitt MD     Date of Service: 2021      Chief Complaint:   Chief Complaint   Patient presents with    Follow-up     Patient Active Problem List:     Hypothyroidism     Hyperlipidemia     Vitamin D deficiency     Osteopenia     Essential hypertension     COPD (chronic obstructive pulmonary disease) ([facility] [address]     Macular degeneration, dry-left eye     Macular degeneration, right eye-wet      PMR (polymyalgia rheumatica) (HCC)     CLL (chronic lymphocytic leukemia) ([facility] [address]     CCC (chronic calculous cholecystitis)     Monoclonal gammopathy     Chronic insomnia     GERD (gastroesophageal reflux disease)     Elevated erythrocyte sedimentation rate     Lymphocytosis (symptomatic)    HPI:  Ms. 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 She stated that she was initially referred to Dr. Ezekiel Dakin because of elevated ESR and arthritis. She has been following regularly with Dr. Ezekiel Dakin for that. Recent blood tests on 2017, showed significant elevation in her white blood cell count with elevated absolute lymphocyte count. Manual differential revealed atypical lymphocytes and Pathologist recommend sending peripheral blood flow cytometry for further analysis. Peripheral blood flow cytometry was sent on 2017, and it revealed monoclonal B-cell population, surface immunoglobulin M and D, Lambda, co-expressing 25, is detected. CD20 is strongly positive. The lack of CD5 co-expression by lesional lymphocytes effectively excludes circulating Mantel cell lymphoma and B cell chronic lymphocytic leukemia.  and CD11C are negative which exclude hairy cell leukemia. 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 She complains of some tiredness and about 13 pound weight loss over three months duration. She claims that she has tiredness and weight loss because she recently moved to [facility]. She denies fever and night sweats. She denies hyperviscosity symptoms (headache, lightheadedness, blurry vision, or double vision). Her family history is significant for leukocytosis in her paternal grandmother and she passed away in 80. She was not diagnosed with leukemia or lymphoma at that time; however, family has suspected that she might have leukemia or lymphoma. No other family history of lymphoproliferative disorder. Her father has prostate cancer. Laboratory workups done on February 23, 2017, showed persistent lymphocytosis (WBC 25.4 and absolute lymphocyte count was 18.5), mild anemia (hemoglobin 11.3), and she has a normal complete metabolic panel. Her LDH is normal and hepatitis panels are negative. Serum protein electrophoresis and immunofixation revealed biclonal gammopathy (800 mg/dL of IgM Lambda, IgG Kappa, and free Lambda). Peripheral blood flow cytometry was done on February 23, 2017, and it revealed peripheral blood with CD5 positive monoclonal B cells (53 percent of the nucleated cells), consistent with mature B cell neoplasm. B cells are predominantly small, but scattered properties, positive for CD5 (partial), CD19, CD20, CD23 (partial), FMC-7 (partial), and CD38. B cells are negative for  and CD10. These findings are consistent with mature B cell neoplasm. Differential diagnosis includes, but is not limited to, atypical chronic lymphocytic leukemia/small lymphocytic lymphoma, mantel cell lymphoma, and marginal zone B-cell lymphoma.   Pathologist recommends correlating with clinical data, morphology, and cytology studies. FISH analysis for t(11;14) was sent to rule out Mantle cell lymphoma on 8/9/17 and it was a normal study. Pathogenic alteration is detected in MYD88 gene. Genomic alterations of uncertain significance are detected in javon SETD2 and TET2 genes. She stated that she will have colonoscopy at the end of 2017. She had mammogram in April 2017 and it was a negative study. She also had a Pap smear and it also was a normal study. Bone marrow biopsy done on August 15, 2019 showed mature B-cell neoplasm with plasmacytic differentiation [overall 80 to 85% of the cells]. 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 FISH studies for B-cell neoplasm reveal 18q+, which is a recurrent finding in many types of non-Hodgkin lymphoma. CT scan of the chest, abdomen and pelvis done on 2/17/20 showed splenomegaly with multiple hypoenhancing splenic lesions the largest measuring up to 1.6 x 1.2 cm. Given history of Waldenstroms macroglobulinemia, findings suggest lymphomatous involvement. Prominent but otherwise not pathologically enlarged axillary lymph nodes bilaterally. No other adenopathy in the chest, abdomen or pelvis. These can be followed on subsequent imaging. Multiple 2-3 mm pulmonary nodules which can be followed per clinical protocol or in 3 months. Mild emphysematous changes. Since she becomes more anemia with mildly elevated serum creatinine level (most likely due to  hemoconcentration/hyperviscosity from macroglobulinemia), I recommend her to start first line chemotherapy with Ibrutinib. Ibrutinib was started on 1/30/21. On August 24, 2021, she presented to me for follow up. I have been following Ms. Sulema Christine for IgM lambda monoclonal gammopathy and monoclonal lymphocytosis.  Further work ups with bone marrow biopsy revealed that they all are due to Waldenstrom's macroglobulinemia (MYD88 gene was detected). Repeat work ups on 1/12/2021 because of worsening anemia and elevated serum creatinine showed increasing IgM level (3262 mg/dl from 2513mg in 11/24/20 and 2430 mg/dl in 7/16/20). Her M protein is also went up (2130 mg/dl from 1400 mg/dl in 11/24/20 and 1300 mg/dl in 7/16/20). Other anemia work ups were normal. ESR was significantly elevated (ESR >140) due to Waldenstrom's macroglobulinemia. Since she becomes more anemia with mildly elevated serum creatinine level (most likely due to  hemoconcentration/hyperviscosity from macroglobulinemia), I recommend her to start first line chemotherapy with Ibrutinib. It ws started on 1/30/21. I recognize that her hemoglobin is getting better after starting ibrutinib [9.9 g from 8 g before]. Her IgM level was   3262 mg/dl on 1/12/21  2011 mg/dl on 3/16/21   1439 mg/dl on 5/18/21  2116 mg/dl on 8/17/21    Monoclonal protein were  2130 mg/dl on 1/12/21  1100 mg/dl on 3/16/21   900 mg/dl on 5/18/21  900 mg/dl on 8/17/21    Since her parameters are stable and she is tolerating well to ibrutinib, I recommend that he continue with ibrutinib on a regular basis. I will repeat all the blood test again in 3 months and I will see her again after that. Elevated serum creatinine - it is getting better. Recommend to follow up with her nephrologist regularly. Chemo induced nausea - recommend to continue with zofran prn. Hypertension - her BP is stable. Recommend to continue with triamterene/HCTZ 37.5-25 mg daily. Hypothyroidism - also recommend to continue with synthroid 88 mcg daily. Health maintenance -I recommend for age-appropriate cancer screening, exercise, low-salt and low-fat diet. She does not have any other significant symptoms on today's visit. PAST MEDICAL HISTORY:  Past medical history significant for:  1. Osteoarthritis. 2.      Hypothyroidism. 3.      Hyperlipidemia. 4.      Gastroesophageal reflux disease. 5.      Hypertension. PAST SURGICAL HISTORY:  No significant surgical history. FAMILY HISTORY:  Significant for prostate cancer in her father. Her maternal grandmother might have had lymphoproliferative disorder. She passed away in 1940. SOCIAL HISTORY:  She was a former smoker and she quit smoking in 1997. She used to smoke two packs a day for 20 years. She socially drinks alcohol and denies illicit drug abuse. She is  and she is currently living in [facility]. She is a retired [employer]. She does not have any children. ALLERGIES:  No known drug allergies. Oncology History    No history exists. Review of Systems: \"Per interval history; otherwise 10 point ROS is negative. \"  Her energy level is fair, appetite, and sleep are pretty good. She doesn't have fever, chills, night sweats, cough, shortness of breath, chest pain, hemoptysis or palpitations. Her bowel and bladder functions are normal. She doesn't have nausea, vomiting, abdominal pain, diarrhea, constipation, dysuria, loss of appetite or weight loss. She denies neuropathy and she doesn't have bleeding or clotting issues. She doesn't have any pain in her body. Denies anxiety or depression. The rest of the systems are unremarkable.      Vital Signs:  BP (!) 109/55 (Site: Left Upper Arm, Position: Sitting, Cuff Size: Medium Adult)   Pulse 75   Temp 97.7 °F (36.5 °C) (Temporal)   Ht 5' 1\" (1.549 m)   Wt 110 lb 9.6 oz (50.2 kg)   SpO2 96%   BMI 20.90 kg/m²     Physical Exam:  CONSTITUTIONAL: awake, alert, cooperative, no apparent distress,      EYES: pupils equal, round and reactive to light, sclera clear and conjunctiva normal  ENT: Normocephalic, without obvious abnormality, atraumatic  NECK: supple, symmetrical, no jugular venous distension and no carotid bruits   HEMATOLOGIC/LYMPHATIC: no cervical, supraclavicular or axillary lymphadenopathy   LUNGS: VBS, no wheezes, clear to auscultation, no crackles, no rhonchi, no increased work of breathing,    CARDIOVASCULAR: regular rate and rhythm, normal S1 and S2, no murmur noted  ABDOMEN: soft, non-distended, normal bowel sounds x 4, non-tender, no masses palpated, no hepatosplenomegaly   MUSCULOSKELETAL: full range of motion noted, tone is normal  NEUROLOGIC: awake, alert, oriented to name, place and time. Motor skills grossly intact. SKIN: Normal skin color, texture, turgor and no jaundice.  appears intact   EXTREMITIES: no leg swelling, no clubbing, no cyanosis, no LE edema,      Labs:  Hematology:  Lab Results   Component Value Date    WBC 12.9 (H) 08/17/2021    RBC 3.92 (L) 08/17/2021    HGB 11.9 (L) 08/17/2021    HCT 36.0 (L) 08/17/2021    MCV 91.8 08/17/2021    MCH 30.4 08/17/2021    MCHC 33.1 08/17/2021    RDW 12.9 08/17/2021     08/17/2021    MPV 10.6 08/17/2021    BANDSPCT 1 09/30/2019    SEGSPCT 46.8 08/17/2021    EOSRELPCT 0.5 08/17/2021    BASOPCT 0.2 08/17/2021    LYMPHOPCT 47.0 (H) 08/17/2021    MONOPCT 5.5 (H) 08/17/2021    BANDABS 0.11 11/06/2017    SEGSABS 6.0 08/17/2021    EOSABS 0.1 08/17/2021    BASOSABS 0.0 08/17/2021    LYMPHSABS 6.1 08/17/2021    MONOSABS 0.7 08/17/2021    DIFFTYPE AUTOMATED DIFFERENTIAL 08/17/2021    ANISOCYTOSIS Occasional (A) 05/17/2019    POLYCHROM 1+ 02/23/2017    WBCMORP FEW 11/06/2017    PLTM PLATELETS APPEAR NORMAL 02/23/2017     Lab Results   Component Value Date    ESR 46 (H) 08/17/2021     Chemistry:  Lab Results   Component Value Date     (L) 08/17/2021    K 3.8 08/17/2021    CL 98 (L) 08/17/2021    CO2 27 08/17/2021    BUN 29 (H) 08/17/2021    CREATININE 1.3 (H) 08/17/2021    GLUCOSE 92 08/17/2021    CALCIUM 8.7 08/17/2021    PROT 6.6 08/17/2021    PROT 6.6 08/17/2021    LABALBU 4.3 08/17/2021    BILITOT 0.7 08/17/2021    ALKPHOS 88 08/17/2021    AST 10 (L) 08/17/2021    ALT 7 (L) 08/17/2021 LABGLOM 40 (L) 08/17/2021    GFRAA 48 (L) 08/17/2021    AGRATIO 1.3 05/02/2016    GLOB 3.0 05/02/2016    PHOS 4.3 07/08/2021     Lab Results   Component Value Date     08/17/2021     No components found for: LD  Lab Results   Component Value Date    TSHHS 0.978 11/06/2017    T4FREE 1.61 05/26/2017     Immunology:  Lab Results   Component Value Date    PROT 6.6 08/17/2021    PROT 6.6 08/17/2021    SPEP  08/17/2021     INTERPRETATION - Monoclonal gammopathy, 900 mg/dL, identified as IgM lambda on concurrent KAI, which has remained stable since 5/18/21. Shereen Dove MD    SPEP  08/17/2021     INTERPRETATION - Monoclonal gammopathy, IgM lambda. Shereen Dove MD    ALBUMINELP 3.5 08/17/2021    LABALPH 0.3 08/17/2021    LABALPH 0.8 08/17/2021    LABBETA 0.8 08/17/2021    GAMGLOB 1.2 08/17/2021     Lab Results   Component Value Date    KAPPAUVOL 11.58 08/17/2021    LAMBDAUVOL 465.38 (H) 07/16/2020    KLFLCR 0.10 (L) 08/17/2021     Lab Results   Component Value Date    B2M 6.9 (H) 01/12/2021     Coagulation Panel:  No results found for: PROTIME, INR, APTT, DDIMER  Anemia Panel:  Lab Results   Component Value Date    VEMKHGKU59 371.1 01/12/2021    FOLATE >20.0 (H) 01/12/2021     Tumor Markers:  Lab Results   Component Value Date     6.2 06/11/2013     Observations:  PHQ-9 Total Score: 1 (8/24/2021  2:07 PM)        Assessment & Plan:   1. Chronic lymphocytic leukemia  2. Monoclonal gammopathy (IgM Lambda). PLAN:  Ms. Janeth Anna has been followed for Waldenstrom's macroglobulinemia (MYD88 gene was detected). Since she becomes more anemia with mildly elevated serum creatinine level (most likely due to  hemoconcentration/hyperviscosity from macroglobulinemia), I recommend her to start first line chemotherapy with Ibrutinib. Ibrutinib was started on 1/30/21. On August 24, 2021, she presented to me for follow up.  I have been following Ms. Janeth Anna for IgM lambda monoclonal gammopathy and monoclonal lymphocytosis. Further work ups with bone marrow biopsy revealed that they all are due to Waldenstrom's macroglobulinemia (MYD88 gene was detected). Repeat work ups on 1/12/2021 because of worsening anemia and elevated serum creatinine showed increasing IgM level (3262 mg/dl from 2513mg in 11/24/20 and 2430 mg/dl in 7/16/20). Her M protein is also went up (2130 mg/dl from 1400 mg/dl in 11/24/20 and 1300 mg/dl in 7/16/20). Other anemia work ups were normal. ESR was significantly elevated (ESR >140) due to Waldenstrom's macroglobulinemia. Since she becomes more anemia with mildly elevated serum creatinine level (most likely due to  hemoconcentration/hyperviscosity from macroglobulinemia), I recommend her to start first line chemotherapy with Ibrutinib. It ws started on 1/30/21. I recognize that her hemoglobin is getting better after starting ibrutinib [9.9 g from 8 g before]. Her IgM level was   3262 mg/dl on 1/12/21  2011 mg/dl on 3/16/21   1439 mg/dl on 5/18/21  2116 mg/dl on 8/17/21    Monoclonal protein were  2130 mg/dl on 1/12/21  1100 mg/dl on 3/16/21   900 mg/dl on 5/18/21  900 mg/dl on 8/17/21    Since her parameters are stable and she is tolerating well to ibrutinib, I recommend that he continue with ibrutinib on a regular basis. I will repeat all the blood test again in 3 months and I will see her again after that. Elevated serum creatinine - it is getting better. Recommend to follow up with her nephrologist regularly. Chemo induced nausea - recommend to continue with zofran prn. Hypertension - her BP is stable. Recommend to continue with triamterene/HCTZ 37.5-25 mg daily. Hypothyroidism - also recommend to continue with synthroid 88 mcg daily. Health maintenance -I recommend for age-appropriate cancer screening, exercise, low-salt and low-fat diet. I answered all her questions and concerns for today.  I asked her to follow up with primary care physician on regular basis. Recent imaging and labs were reviewed and discussed with the patient.

## 2021-10-18 ENCOUNTER — HOSPITAL ENCOUNTER (OUTPATIENT)
Age: 76
Discharge: HOME | End: 2021-10-18
Payer: MEDICARE

## 2021-10-18 LAB
ANION GAP SERPL CALCULATED.3IONS-SCNC: 9 MMOL/L (ref 4–16)
BUN BLDV-MCNC: 27 MG/DL (ref 6–23)
CALCIUM SERPL-MCNC: 8.5 MG/DL (ref 8.3–10.6)
CHLORIDE BLD-SCNC: 101 MMOL/L (ref 99–110)
CO2: 28 MMOL/L (ref 21–32)
CREAT SERPL-MCNC: 1.2 MG/DL (ref 0.6–1.1)
GFR AFRICAN AMERICAN: 53 ML/MIN/1.73M2
GFR NON-AFRICAN AMERICAN: 44 ML/MIN/1.73M2
GLUCOSE BLD-MCNC: 89 MG/DL (ref 70–99)
MAGNESIUM: 2 MG/DL (ref 1.8–2.4)
POTASSIUM SERPL-SCNC: 3.9 MMOL/L (ref 3.5–5.1)
SODIUM BLD-SCNC: 138 MMOL/L (ref 135–145)

## 2021-10-18 PROCEDURE — 80048 BASIC METABOLIC PNL TOTAL CA: CPT

## 2021-10-18 PROCEDURE — 36415 COLL VENOUS BLD VENIPUNCTURE: CPT

## 2021-10-18 PROCEDURE — 83735 ASSAY OF MAGNESIUM: CPT

## 2021-11-16 ENCOUNTER — HOSPITAL ENCOUNTER (OUTPATIENT)
Dept: INFUSION THERAPY | Age: 76
Discharge: HOME | End: 2021-11-16
Payer: MEDICARE

## 2021-11-16 DIAGNOSIS — C88.0 WALDENSTROM MACROGLOBULINEMIA (HCC): ICD-10-CM

## 2021-11-16 LAB
ALBUMIN SERPL-MCNC: 4.2 GM/DL (ref 3.4–5)
ALP BLD-CCNC: 72 IU/L (ref 40–129)
ALT SERPL-CCNC: 8 U/L (ref 10–40)
ANION GAP SERPL CALCULATED.3IONS-SCNC: 12 MMOL/L (ref 4–16)
AST SERPL-CCNC: 12 IU/L (ref 15–37)
BILIRUB SERPL-MCNC: 1 MG/DL (ref 0–1)
BUN BLDV-MCNC: 20 MG/DL (ref 6–23)
CALCIUM SERPL-MCNC: 8.7 MG/DL (ref 8.3–10.6)
CHLORIDE BLD-SCNC: 102 MMOL/L (ref 99–110)
CO2: 27 MMOL/L (ref 21–32)
CREAT SERPL-MCNC: 1 MG/DL (ref 0.6–1.1)
DIFFERENTIAL TYPE: ABNORMAL
EOSINOPHILS ABSOLUTE: 0.1 K/CU MM
EOSINOPHILS RELATIVE PERCENT: 1 % (ref 0–3)
ERYTHROCYTE SEDIMENTATION RATE: 6 MM/HR (ref 0–30)
GFR AFRICAN AMERICAN: >60 ML/MIN/1.73M2
GFR NON-AFRICAN AMERICAN: 54 ML/MIN/1.73M2
GLUCOSE BLD-MCNC: 102 MG/DL (ref 70–99)
HCT VFR BLD CALC: 40 % (ref 37–47)
HEMOGLOBIN: 12.3 GM/DL (ref 12.5–16)
IGA: <50 MG/DL (ref 69–382)
IGG,SERUM: <300 MG/DL (ref 723–1685)
IGM,SERUM: 1059 MG/DL (ref 62–277)
LACTATE DEHYDROGENASE: 129 IU/L (ref 120–246)
LYMPHOCYTES ABSOLUTE: 6.2 K/CU MM
LYMPHOCYTES RELATIVE PERCENT: 53 % (ref 24–44)
MCH RBC QN AUTO: 29.4 PG (ref 27–31)
MCHC RBC AUTO-ENTMCNC: 30.8 % (ref 32–36)
MCV RBC AUTO: 95.5 FL (ref 78–100)
MONOCYTES ABSOLUTE: 0.4 K/CU MM
MONOCYTES RELATIVE PERCENT: 3 % (ref 0–4)
PDW BLD-RTO: 12.8 % (ref 11.7–14.9)
PLATELET # BLD: 194 K/CU MM (ref 140–440)
PMV BLD AUTO: 12.3 FL (ref 7.5–11.1)
POTASSIUM SERPL-SCNC: 4.2 MMOL/L (ref 3.5–5.1)
RBC # BLD: 4.19 M/CU MM (ref 4.2–5.4)
SEGMENTED NEUTROPHILS ABSOLUTE COUNT: 5 K/CU MM
SEGMENTED NEUTROPHILS RELATIVE PERCENT: 43 % (ref 36–66)
SODIUM BLD-SCNC: 141 MMOL/L (ref 135–145)
TOTAL PROTEIN: 6.2 GM/DL (ref 6.4–8.2)
WBC # BLD: 11.7 K/CU MM (ref 4–10.5)

## 2021-11-16 PROCEDURE — 82232 ASSAY OF BETA-2 PROTEIN: CPT

## 2021-11-16 PROCEDURE — 83883 ASSAY NEPHELOMETRY NOT SPEC: CPT

## 2021-11-16 PROCEDURE — 86320 SERUM IMMUNOELECTROPHORESIS: CPT

## 2021-11-16 PROCEDURE — 84165 PROTEIN E-PHORESIS SERUM: CPT

## 2021-11-16 PROCEDURE — 83615 LACTATE (LD) (LDH) ENZYME: CPT

## 2021-11-16 PROCEDURE — 80053 COMPREHEN METABOLIC PANEL: CPT

## 2021-11-16 PROCEDURE — 85027 COMPLETE CBC AUTOMATED: CPT

## 2021-11-16 PROCEDURE — 82784 ASSAY IGA/IGD/IGG/IGM EACH: CPT

## 2021-11-16 PROCEDURE — 85652 RBC SED RATE AUTOMATED: CPT

## 2021-11-16 PROCEDURE — 85007 BL SMEAR W/DIFF WBC COUNT: CPT

## 2021-11-16 PROCEDURE — 36415 COLL VENOUS BLD VENIPUNCTURE: CPT

## 2021-11-17 LAB
ALBUMIN ELP: 3.6 GM/DL (ref 3.2–5.6)
ALPHA-1-GLOBULIN: 0.3 GM/DL (ref 0.1–0.4)
ALPHA-2-GLOBULIN: 0.8 GM/DL (ref 0.4–1.2)
BETA GLOBULIN: 0.8 GM/DL (ref 0.5–1.3)
GAMMA GLOBULIN: 0.8 GM/DL (ref 0.5–1.6)
SPEP INTERPRETATION: ABNORMAL
SPEP INTERPRETATION: NORMAL
TOTAL PROTEIN: 6.2 GM/DL (ref 6.4–8.2)

## 2021-11-18 LAB — BETA-2 MICROGLOBULIN: 3.8 MG/L (ref 1.1–2.4)

## 2021-11-19 LAB
KAPPA QUANT FREE LIGHT CHAINS: 10.2 MG/L (ref 3.3–19.4)
KAPPA/LAMBDA FREE LIGHT CHAIN RATIO: 0.19 (ref 0.26–1.65)
LAMBDA FREE LIGHT CHAINS QNT: 54.75 MG/L (ref 5.71–26.3)

## 2021-11-21 NOTE — PROGRESS NOTES
Patient Name: Darryle Stade  Patient : 1945  Patient MRN: [id number]     Primary Oncologist: Imani Navarrete MD  Referring Provider: Beto Sy MD     Date of Service: 2021      Chief Complaint:   Chief Complaint   Patient presents with    Follow-up     Patient Active Problem List:     Hypothyroidism     Hyperlipidemia     Vitamin D deficiency     Osteopenia     Essential hypertension     COPD (chronic obstructive pulmonary disease) ([facility]     Macular degeneration, dry-left eye     Macular degeneration, right eye-wet      PMR (polymyalgia rheumatica) (HCC)     CLL (chronic lymphocytic leukemia) ([facility]     CCC (chronic calculous cholecystitis)     Monoclonal gammopathy     Chronic insomnia     GERD (gastroesophageal reflux disease)     Elevated erythrocyte sedimentation rate     Lymphocytosis (symptomatic)    HPI:  Ms. 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 She stated that she was initially referred to Dr. Milan Cole because of elevated ESR and arthritis. She has been following regularly with Dr. Milan Cole for that. Recent blood tests on 2017, showed significant elevation in her white blood cell count with elevated absolute lymphocyte count. Manual differential revealed atypical lymphocytes and Pathologist recommend sending peripheral blood flow cytometry for further analysis. Peripheral blood flow cytometry was sent on 2017, and it revealed monoclonal B-cell population, surface immunoglobulin M and D, Lambda, co-expressing 25, is detected. CD20 is strongly positive. The lack of CD5 co-expression by lesional lymphocytes effectively excludes circulating Mantel cell lymphoma and B cell chronic lymphocytic leukemia. correlating with clinical data, morphology, and cytology studies. FISH analysis for t(11;14) was sent to rule out Mantle cell lymphoma on 8/9/17 and it was a normal study. Pathogenic alteration is detected in MYD88 gene. Genomic alterations of uncertain significance are detected in jaovn SETD2 and TET2 genes. She stated that she will have colonoscopy at the end of 2017. She had mammogram in April 2017 and it was a negative study. She also had a Pap smear and it also was a normal study. Bone marrow biopsy done on August 15, 2019 showed mature B-cell neoplasm with plasmacytic differentiation [overall 80 to 85% of the cells]. 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 FISH studies for B-cell neoplasm reveal 18q+, which is a recurrent finding in many types of non-Hodgkin lymphoma. CT scan of the chest, abdomen and pelvis done on 2/17/20 showed splenomegaly with multiple hypoenhancing splenic lesions the largest measuring up to 1.6 x 1.2 cm. Given history of Waldenstroms macroglobulinemia, findings suggest lymphomatous involvement. Prominent but otherwise not pathologically enlarged axillary lymph nodes bilaterally. No other adenopathy in the chest, abdomen or pelvis. These can be followed on subsequent imaging. Multiple 2-3 mm pulmonary nodules which can be followed per clinical protocol or in 3 months. Mild emphysematous changes. Since she becomes more anemia with mildly elevated serum creatinine level (most likely due to  hemoconcentration/hyperviscosity from macroglobulinemia), I recommend her to start first line chemotherapy with Ibrutinib. Ibrutinib was started on 1/30/21. On November 23, 2021, she presented to me for follow up. I have been following Ms. Ama Shaw for IgM lambda monoclonal gammopathy and monoclonal lymphocytosis.  Further work ups with bone marrow biopsy revealed that they all are due to Waldenstrom's macroglobulinemia (MYD88 gene was detected). Repeat work ups on 1/12/2021 because of worsening anemia and elevated serum creatinine showed increasing IgM level (3262 mg/dl from 2513mg in 11/24/20 and 2430 mg/dl in 7/16/20). Her M protein is also went up (2130 mg/dl from 1400 mg/dl in 11/24/20 and 1300 mg/dl in 7/16/20). Other anemia work ups were normal. ESR was significantly elevated (ESR >140) due to Waldenstrom's macroglobulinemia. Since she becomes more anemia with mildly elevated serum creatinine level (most likely due to  hemoconcentration/hyperviscosity from macroglobulinemia), I recommend her to start first line chemotherapy with Ibrutinib. It ws started on 1/30/21. I recognize that her hemoglobin is getting better after starting ibrutinib [9.9 g from 8 g before]. Her IgM level was   3262 mg/dl on 1/12/21  2011 mg/dl on 3/16/21   1439 mg/dl on 5/18/21  2116 mg/dl on 8/17/21  1059 mg/dl on 11/16/21    Monoclonal protein were  2130 mg/dl on 1/12/21  1100 mg/dl on 3/16/21   900 mg/dl on 5/18/21  900 mg/dl on 8/17/21  600 mg/dl on 11/16/21    Since her parameters are getting better and she is tolerating well to ibrutinib, I recommend that he continue with ibrutinib on a regular basis. I will repeat all the blood test again in 3 months and I will see her again after that. Elevated serum creatinine - it is getting better. Recommend to follow up with her nephrologist regularly. Chemo induced nausea - recommend to continue with zofran prn. Hypertension - her BP is stable. Recommend to continue with triamterene/HCTZ 37.5-25 mg daily. Hypothyroidism - also recommend to continue with synthroid 88 mcg daily. Health maintenance -I recommend for age-appropriate cancer screening, exercise, low-salt and low-fat diet. She does not have any other significant symptoms on today's visit.      PAST MEDICAL HISTORY:  Past medical history significant for:  1. Osteoarthritis. 2.      Hypothyroidism. 3.      Hyperlipidemia. 4.      Gastroesophageal reflux disease. 5.      Hypertension. PAST SURGICAL HISTORY:  No significant surgical history. FAMILY HISTORY:  Significant for prostate cancer in her father. Her maternal grandmother might have had lymphoproliferative disorder. She passed away in 1940. SOCIAL HISTORY:  She was a former smoker and she quit smoking in 1997. She used to smoke two packs a day for 20 years. She socially drinks alcohol and denies illicit drug abuse. She is  and she is currently living in [facility]. She is a retired [place]. She does not have any children. ALLERGIES:  No known drug allergies. Oncology History    No history exists. Review of Systems: \"Per interval history; otherwise 10 point ROS is negative. \"  Her energy level is stable, appetite and sleep are fine. She denies fever, chills, night sweats, cough, shortness of breath, chest pain, hemoptysis or palpitations. Her bowel and bladder functions are normal. She denies nausea, vomiting, abdominal pain, diarrhea, constipation, dysuria, loss of appetite or weight loss. She doesn't have neuropathy and she denies bleeding or clotting issues. She denies any pain in her body. No anxiety or depression. The rest of the systems are unremarkable.      Vital Signs:  BP (!) 126/58 (Site: Right Upper Arm, Position: Sitting, Cuff Size: Medium Adult)   Pulse 75   Temp 99.6 °F (37.6 °C) (Infrared)   Ht 5' 1\" (1.549 m)   Wt 108 lb 9.6 oz (49.3 kg)   SpO2 93%   BMI 20.52 kg/m²     Physical Exam:  CONSTITUTIONAL: awake, alert, cooperative, no apparent distress,      EYES: pupils equal, round and reactive to light, sclera clear and conjunctiva normal  ENT: Normocephalic, without obvious abnormality, atraumatic  NECK: supple, symmetrical, no jugular venous distension and no carotid bruits HEMATOLOGIC/LYMPHATIC: no cervical, supraclavicular or axillary lymphadenopathy   LUNGS: VBS, no wheezes, no increased work of breathing, clear to auscultation, no crackles, no rhonchi,   CARDIOVASCULAR: regular rate and rhythm, normal S1 and S2, no murmur noted  ABDOMEN: soft, non-distended, normal bowel sounds x 4, non-tender, no masses palpated, no hepatosplenomegaly   MUSCULOSKELETAL: full range of motion noted, tone is normal  NEUROLOGIC: awake, alert, oriented to name, place and time. Motor skills grossly intact. SKIN: Normal skin color, texture, turgor and no jaundice.  appears intact   EXTREMITIES: no clubbing, no cyanosis, no leg swelling, no LE edema,      Labs:  Hematology:  Lab Results   Component Value Date    WBC 11.7 (H) 11/16/2021    RBC 4.19 (L) 11/16/2021    HGB 12.3 (L) 11/16/2021    HCT 40.0 11/16/2021    MCV 95.5 11/16/2021    MCH 29.4 11/16/2021    MCHC 30.8 (L) 11/16/2021    RDW 12.8 11/16/2021     11/16/2021    MPV 12.3 (H) 11/16/2021    BANDSPCT 1 09/30/2019    SEGSPCT 43.0 11/16/2021    EOSRELPCT 1.0 11/16/2021    BASOPCT 0.2 08/17/2021    LYMPHOPCT 53.0 (H) 11/16/2021    MONOPCT 3.0 11/16/2021    BANDABS 0.11 11/06/2017    SEGSABS 5.0 11/16/2021    EOSABS 0.1 11/16/2021    BASOSABS 0.0 08/17/2021    LYMPHSABS 6.2 11/16/2021    MONOSABS 0.4 11/16/2021    DIFFTYPE MANUAL DIFFERENTIAL 11/16/2021    ANISOCYTOSIS Occasional (A) 05/17/2019    POLYCHROM 1+ 02/23/2017    WBCMORP FEW 11/06/2017    PLTM PLATELETS APPEAR NORMAL 02/23/2017     Lab Results   Component Value Date    ESR 6 11/16/2021     Chemistry:  Lab Results   Component Value Date     11/16/2021    K 4.2 11/16/2021     11/16/2021    CO2 27 11/16/2021    BUN 20 11/16/2021    CREATININE 1.0 11/16/2021    GLUCOSE 102 (H) 11/16/2021    CALCIUM 8.7 11/16/2021    PROT 6.2 (L) 11/16/2021    PROT 6.2 (L) 11/16/2021    LABALBU 4.2 11/16/2021    BILITOT 1.0 11/16/2021    ALKPHOS 72 11/16/2021    AST 12 (L) 11/16/2021 ALT 8 (L) 11/16/2021    LABGLOM 54 (L) 11/16/2021    GFRAA >60 11/16/2021    AGRATIO 1.3 05/02/2016    GLOB 3.0 05/02/2016    PHOS 4.3 07/08/2021    MG 2.0 10/18/2021     Lab Results   Component Value Date     11/16/2021     No components found for: LD  Lab Results   Component Value Date    TSHHS 0.978 11/06/2017    T4FREE 1.61 05/26/2017     Immunology:  Lab Results   Component Value Date    PROT 6.2 (L) 11/16/2021    PROT 6.2 (L) 11/16/2021    SPEP  11/16/2021     INTERPRETATION - Monoclonal gammopathy, 600 mg/dL, IgM lambda, decreased from 900 mg/dL on 8/17/2021. SAF    SPEP  11/16/2021     INTERPRETATION - An IgM lambda monoclonal band is detected. SAF    ALBUMINELP 3.6 11/16/2021    LABALPH 0.3 11/16/2021    LABALPH 0.8 11/16/2021    LABBETA 0.8 11/16/2021    GAMGLOB 0.8 11/16/2021     Lab Results   Component Value Date    KAPPAUVOL 10.20 11/16/2021    LAMBDAUVOL 465.38 (H) 07/16/2020    KLFLCR 0.19 (L) 11/16/2021     Lab Results   Component Value Date    B2M 3.8 (H) 11/16/2021     Coagulation Panel:  No results found for: PROTIME, INR, APTT, DDIMER  Anemia Panel:  Lab Results   Component Value Date    AOMSUSIG37 371.1 01/12/2021    FOLATE >20.0 (H) 01/12/2021     Tumor Markers:  Lab Results   Component Value Date     6.2 06/11/2013     Observations:  No data recorded      Assessment & Plan:   1. Chronic lymphocytic leukemia  2. Monoclonal gammopathy (IgM Lambda). PLAN:  Ms. Marni Franklin has been followed for Waldenstrom's macroglobulinemia (MYD88 gene was detected). Since she becomes more anemia with mildly elevated serum creatinine level (most likely due to  hemoconcentration/hyperviscosity from macroglobulinemia), I recommend her to start first line chemotherapy with Ibrutinib. Ibrutinib was started on 1/30/21. On November 23, 2021, she presented to me for follow up. I have been following Ms. Marni Franklin for IgM lambda monoclonal gammopathy and monoclonal lymphocytosis.  Further work ups with bone marrow biopsy revealed that they all are due to Waldenstrom's macroglobulinemia (MYD88 gene was detected). Repeat work ups on 1/12/2021 because of worsening anemia and elevated serum creatinine showed increasing IgM level (3262 mg/dl from 2513mg in 11/24/20 and 2430 mg/dl in 7/16/20). Her M protein is also went up (2130 mg/dl from 1400 mg/dl in 11/24/20 and 1300 mg/dl in 7/16/20). Other anemia work ups were normal. ESR was significantly elevated (ESR >140) due to Waldenstrom's macroglobulinemia. Since she becomes more anemia with mildly elevated serum creatinine level (most likely due to  hemoconcentration/hyperviscosity from macroglobulinemia), I recommend her to start first line chemotherapy with Ibrutinib. It ws started on 1/30/21. I recognize that her hemoglobin is getting better after starting ibrutinib [9.9 g from 8 g before]. Her IgM level was   3262 mg/dl on 1/12/21  2011 mg/dl on 3/16/21   1439 mg/dl on 5/18/21  2116 mg/dl on 8/17/21  1059 mg/dl on 11/16/21    Monoclonal protein were  2130 mg/dl on 1/12/21  1100 mg/dl on 3/16/21   900 mg/dl on 5/18/21  900 mg/dl on 8/17/21  600 mg/dl on 11/16/21    Since her parameters are getting better and she is tolerating well to ibrutinib, I recommend that he continue with ibrutinib on a regular basis. I will repeat all the blood test again in 3 months and I will see her again after that. Elevated serum creatinine - it is getting better. Recommend to follow up with her nephrologist regularly. Chemo induced nausea - recommend to continue with zofran prn. Hypertension - her BP is stable. Recommend to continue with triamterene/HCTZ 37.5-25 mg daily. Hypothyroidism - also recommend to continue with synthroid 88 mcg daily. Health maintenance -I recommend for age-appropriate cancer screening, exercise, low-salt and low-fat diet. I answered all her questions and concerns for today.  I asked her to follow up with primary care

## 2021-11-23 ENCOUNTER — HOSPITAL ENCOUNTER (OUTPATIENT)
Dept: INFUSION THERAPY | Age: 76
Discharge: HOME | End: 2021-11-23
Payer: MEDICARE

## 2021-11-23 ENCOUNTER — OFFICE VISIT (OUTPATIENT)
Dept: ONCOLOGY | Age: 76
End: 2021-11-23
Payer: MEDICARE

## 2021-11-23 VITALS
OXYGEN SATURATION: 93 % | SYSTOLIC BLOOD PRESSURE: 126 MMHG | DIASTOLIC BLOOD PRESSURE: 58 MMHG | TEMPERATURE: 99.6 F | HEART RATE: 75 BPM | HEIGHT: 61 IN | WEIGHT: 108.6 LBS | BODY MASS INDEX: 20.5 KG/M2

## 2021-11-23 DIAGNOSIS — C88.0 WALDENSTROM MACROGLOBULINEMIA (HCC): Primary | ICD-10-CM

## 2021-11-23 PROCEDURE — 1123F ACP DISCUSS/DSCN MKR DOCD: CPT | Performed by: INTERNAL MEDICINE

## 2021-11-23 PROCEDURE — G8427 DOCREV CUR MEDS BY ELIG CLIN: HCPCS | Performed by: INTERNAL MEDICINE

## 2021-11-23 PROCEDURE — 4040F PNEUMOC VAC/ADMIN/RCVD: CPT | Performed by: INTERNAL MEDICINE

## 2021-11-23 PROCEDURE — G8399 PT W/DXA RESULTS DOCUMENT: HCPCS | Performed by: INTERNAL MEDICINE

## 2021-11-23 PROCEDURE — 1036F TOBACCO NON-USER: CPT | Performed by: INTERNAL MEDICINE

## 2021-11-23 PROCEDURE — 1090F PRES/ABSN URINE INCON ASSESS: CPT | Performed by: INTERNAL MEDICINE

## 2021-11-23 PROCEDURE — 99211 OFF/OP EST MAY X REQ PHY/QHP: CPT

## 2021-11-23 PROCEDURE — G8484 FLU IMMUNIZE NO ADMIN: HCPCS | Performed by: INTERNAL MEDICINE

## 2021-11-23 PROCEDURE — G8420 CALC BMI NORM PARAMETERS: HCPCS | Performed by: INTERNAL MEDICINE

## 2021-11-23 PROCEDURE — 99214 OFFICE O/P EST MOD 30 MIN: CPT | Performed by: INTERNAL MEDICINE

## 2021-11-23 NOTE — PROGRESS NOTES
MA Rooming Questions  Patient: Cedric Acevedo  MRN: [id number]    Date: 11/23/2021        1. Do you have any new issues?   no         2. Do you need any refills on medications?    no    3. Have you had any imaging done since your last visit?   no    4. Have you been hospitalized or seen in the emergency room since your last visit here?   no    5. Did the patient have a depression screening completed today?  No    No data recorded     PHQ-9 Given to (if applicable):               PHQ-9 Score (if applicable):                     [] Positive     []  Negative              Does question #9 need addressed (if applicable)                     [] Yes    []  No               Emely Peralta CMA

## 2021-11-29 DIAGNOSIS — C88.0 WALDENSTROM MACROGLOBULINEMIA (HCC): ICD-10-CM

## 2021-11-29 RX ORDER — IBRUTINIB 140 MG/1
CAPSULE ORAL
Qty: 90 CAPSULE | Refills: 3 | Status: SHIPPED | OUTPATIENT
Start: 2021-11-29 | End: 2022-01-14 | Stop reason: SDUPTHER

## 2022-01-14 DIAGNOSIS — C88.0 WALDENSTROM MACROGLOBULINEMIA (HCC): ICD-10-CM

## 2022-01-18 ENCOUNTER — APPOINTMENT (OUTPATIENT)
Dept: CT IMAGING | Age: 77
End: 2022-01-18
Payer: MEDICARE

## 2022-01-18 ENCOUNTER — APPOINTMENT (OUTPATIENT)
Dept: GENERAL RADIOLOGY | Age: 77
End: 2022-01-18
Payer: MEDICARE

## 2022-01-18 ENCOUNTER — HOSPITAL ENCOUNTER (OUTPATIENT)
Age: 77
Setting detail: OBSERVATION
Discharge: ANOTHER HOSPITAL | End: 2022-01-20
Attending: EMERGENCY MEDICINE | Admitting: INTERNAL MEDICINE
Payer: MEDICARE

## 2022-01-18 DIAGNOSIS — S82.092A OTHER CLOSED FRACTURE OF LEFT PATELLA, INITIAL ENCOUNTER: ICD-10-CM

## 2022-01-18 DIAGNOSIS — S22.41XA CLOSED FRACTURE OF MULTIPLE RIBS OF RIGHT SIDE, INITIAL ENCOUNTER: Primary | ICD-10-CM
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
## 2022-01-18 LAB
ALBUMIN SERPL-MCNC: 3.4 GM/DL (ref 3.4–5)
ALP BLD-CCNC: 86 IU/L (ref 40–129)
ALT SERPL-CCNC: 16 U/L (ref 10–40)
ANION GAP SERPL CALCULATED.3IONS-SCNC: 11 MMOL/L (ref 4–16)
AST SERPL-CCNC: 26 IU/L (ref 15–37)
BASOPHILS ABSOLUTE: 0.1 K/CU MM
BASOPHILS RELATIVE PERCENT: 0.3 % (ref 0–1)
BILIRUB SERPL-MCNC: 0.4 MG/DL (ref 0–1)
BUN BLDV-MCNC: 22 MG/DL (ref 6–23)
CALCIUM SERPL-MCNC: 7.3 MG/DL (ref 8.3–10.6)
CHLORIDE BLD-SCNC: 104 MMOL/L (ref 99–110)
CO2: 25 MMOL/L (ref 21–32)
CREAT SERPL-MCNC: 0.9 MG/DL (ref 0.6–1.1)
DIFFERENTIAL TYPE: ABNORMAL
EOSINOPHILS ABSOLUTE: 0 K/CU MM
EOSINOPHILS RELATIVE PERCENT: 0.1 % (ref 0–3)
GFR AFRICAN AMERICAN: >60 ML/MIN/1.73M2
GFR NON-AFRICAN AMERICAN: >60 ML/MIN/1.73M2
GLUCOSE BLD-MCNC: 108 MG/DL (ref 70–99)
HCT VFR BLD CALC: 38.3 % (ref 37–47)
HEMOGLOBIN: 11.8 GM/DL (ref 12.5–16)
IMMATURE NEUTROPHIL %: 1.2 % (ref 0–0.43)
LYMPHOCYTES ABSOLUTE: 2.4 K/CU MM
LYMPHOCYTES RELATIVE PERCENT: 12.3 % (ref 24–44)
MAGNESIUM: 1.7 MG/DL (ref 1.8–2.4)
MCH RBC QN AUTO: 29.4 PG (ref 27–31)
MCHC RBC AUTO-ENTMCNC: 30.8 % (ref 32–36)
MCV RBC AUTO: 95.5 FL (ref 78–100)
MONOCYTES ABSOLUTE: 0.9 K/CU MM
MONOCYTES RELATIVE PERCENT: 4.4 % (ref 0–4)
NUCLEATED RBC %: 0 %
PDW BLD-RTO: 12.2 % (ref 11.7–14.9)
PLATELET # BLD: 213 K/CU MM (ref 140–440)
PMV BLD AUTO: 11.7 FL (ref 7.5–11.1)
POTASSIUM SERPL-SCNC: 3.3 MMOL/L (ref 3.5–5.1)
RBC # BLD: 4.01 M/CU MM (ref 4.2–5.4)
SEGMENTED NEUTROPHILS ABSOLUTE COUNT: 16.1 K/CU MM
SEGMENTED NEUTROPHILS RELATIVE PERCENT: 81.7 % (ref 36–66)
SODIUM BLD-SCNC: 140 MMOL/L (ref 135–145)
TOTAL IMMATURE NEUTOROPHIL: 0.24 K/CU MM
TOTAL NUCLEATED RBC: 0 K/CU MM
TOTAL PROTEIN: 5.6 GM/DL (ref 6.4–8.2)
TROPONIN T: <0.01 NG/ML
WBC # BLD: 19.7 K/CU MM (ref 4–10.5)

## 2022-01-18 PROCEDURE — 71260 CT THORAX DX C+: CPT

## 2022-01-18 PROCEDURE — 76376 3D RENDER W/INTRP POSTPROCES: CPT

## 2022-01-18 PROCEDURE — 93005 ELECTROCARDIOGRAM TRACING: CPT | Performed by: EMERGENCY MEDICINE

## 2022-01-18 PROCEDURE — 6360000004 HC RX CONTRAST MEDICATION: Performed by: EMERGENCY MEDICINE

## 2022-01-18 PROCEDURE — 2580000003 HC RX 258: Performed by: EMERGENCY MEDICINE

## 2022-01-18 PROCEDURE — 73564 X-RAY EXAM KNEE 4 OR MORE: CPT

## 2022-01-18 PROCEDURE — 71250 CT THORAX DX C-: CPT

## 2022-01-18 PROCEDURE — 73090 X-RAY EXAM OF FOREARM: CPT

## 2022-01-18 PROCEDURE — 83735 ASSAY OF MAGNESIUM: CPT

## 2022-01-18 PROCEDURE — 96374 THER/PROPH/DIAG INJ IV PUSH: CPT

## 2022-01-18 PROCEDURE — 84484 ASSAY OF TROPONIN QUANT: CPT

## 2022-01-18 PROCEDURE — 80053 COMPREHEN METABOLIC PANEL: CPT

## 2022-01-18 PROCEDURE — 70450 CT HEAD/BRAIN W/O DYE: CPT

## 2022-01-18 PROCEDURE — 99283 EMERGENCY DEPT VISIT LOW MDM: CPT

## 2022-01-18 PROCEDURE — 72125 CT NECK SPINE W/O DYE: CPT

## 2022-01-18 PROCEDURE — 85025 COMPLETE CBC W/AUTO DIFF WBC: CPT

## 2022-01-18 PROCEDURE — 6360000002 HC RX W HCPCS: Performed by: EMERGENCY MEDICINE

## 2022-01-18 PROCEDURE — 74177 CT ABD & PELVIS W/CONTRAST: CPT

## 2022-01-18 RX ORDER — FENTANYL CITRATE 50 UG/ML
50 INJECTION, SOLUTION INTRAMUSCULAR; INTRAVENOUS ONCE
Status: COMPLETED | OUTPATIENT
Start: 2022-01-18 | End: 2022-01-18

## 2022-01-18 RX ORDER — IBRUTINIB 140 MG/1
CAPSULE ORAL
Qty: 90 CAPSULE | Refills: 3 | Status: SHIPPED | OUTPATIENT
Start: 2022-01-18 | End: 2022-05-02 | Stop reason: SDUPTHER

## 2022-01-18 RX ORDER — SODIUM CHLORIDE 0.9 % (FLUSH) 0.9 %
5-40 SYRINGE (ML) INJECTION 2 TIMES DAILY
Status: DISCONTINUED | OUTPATIENT
Start: 2022-01-18 | End: 2022-01-19

## 2022-01-18 RX ADMIN — FENTANYL CITRATE 50 MCG: 50 INJECTION, SOLUTION INTRAMUSCULAR; INTRAVENOUS at 22:45

## 2022-01-18 RX ADMIN — SODIUM CHLORIDE, PRESERVATIVE FREE 10 ML: 5 INJECTION INTRAVENOUS at 22:45

## 2022-01-18 RX ADMIN — IOPAMIDOL 70 ML: 755 INJECTION, SOLUTION INTRAVENOUS at 21:52

## 2022-01-18 ASSESSMENT — PAIN DESCRIPTION - PAIN TYPE: TYPE: ACUTE PAIN

## 2022-01-18 ASSESSMENT — PAIN SCALES - GENERAL: PAINLEVEL_OUTOF10: 4
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
## 2022-01-19 ENCOUNTER — APPOINTMENT (OUTPATIENT)
Dept: GENERAL RADIOLOGY | Age: 77
End: 2022-01-19
Payer: MEDICARE

## 2022-01-19 LAB
ANION GAP SERPL CALCULATED.3IONS-SCNC: 10 MMOL/L (ref 4–16)
BASOPHILS ABSOLUTE: 0 K/CU MM
BASOPHILS RELATIVE PERCENT: 0.3 % (ref 0–1)
BUN BLDV-MCNC: 20 MG/DL (ref 6–23)
CALCIUM SERPL-MCNC: 8.2 MG/DL (ref 8.3–10.6)
CHLORIDE BLD-SCNC: 99 MMOL/L (ref 99–110)
CO2: 25 MMOL/L (ref 21–32)
CREAT SERPL-MCNC: 1 MG/DL (ref 0.6–1.1)
DIFFERENTIAL TYPE: ABNORMAL
EKG ATRIAL RATE: 76 BPM
EKG DIAGNOSIS: NORMAL
EKG P AXIS: 68 DEGREES
EKG P-R INTERVAL: 178 MS
EKG Q-T INTERVAL: 396 MS
EKG QRS DURATION: 64 MS
EKG QTC CALCULATION (BAZETT): 445 MS
EKG R AXIS: 68 DEGREES
EKG T AXIS: 54 DEGREES
EKG VENTRICULAR RATE: 76 BPM
EOSINOPHILS ABSOLUTE: 0 K/CU MM
EOSINOPHILS RELATIVE PERCENT: 0 % (ref 0–3)
GFR AFRICAN AMERICAN: >60 ML/MIN/1.73M2
GFR NON-AFRICAN AMERICAN: 54 ML/MIN/1.73M2
GLUCOSE BLD-MCNC: 129 MG/DL (ref 70–99)
HCT VFR BLD CALC: 34.9 % (ref 37–47)
HEMOGLOBIN: 11.1 GM/DL (ref 12.5–16)
IMMATURE NEUTROPHIL %: 1 % (ref 0–0.43)
LYMPHOCYTES ABSOLUTE: 1.7 K/CU MM
LYMPHOCYTES RELATIVE PERCENT: 13.1 % (ref 24–44)
MAGNESIUM: 1.8 MG/DL (ref 1.8–2.4)
MCH RBC QN AUTO: 29.9 PG (ref 27–31)
MCHC RBC AUTO-ENTMCNC: 31.8 % (ref 32–36)
MCV RBC AUTO: 94.1 FL (ref 78–100)
MONOCYTES ABSOLUTE: 0.6 K/CU MM
MONOCYTES RELATIVE PERCENT: 4.4 % (ref 0–4)
NUCLEATED RBC %: 0 %
PDW BLD-RTO: 12.3 % (ref 11.7–14.9)
PLATELET # BLD: 149 K/CU MM (ref 140–440)
PMV BLD AUTO: 11.1 FL (ref 7.5–11.1)
POTASSIUM SERPL-SCNC: 3.7 MMOL/L (ref 3.5–5.1)
RBC # BLD: 3.71 M/CU MM (ref 4.2–5.4)
SEGMENTED NEUTROPHILS ABSOLUTE COUNT: 10.6 K/CU MM
SEGMENTED NEUTROPHILS RELATIVE PERCENT: 81.2 % (ref 36–66)
SODIUM BLD-SCNC: 134 MMOL/L (ref 135–145)
TOTAL IMMATURE NEUTOROPHIL: 0.13 K/CU MM
TOTAL NUCLEATED RBC: 0 K/CU MM
WBC # BLD: 13 K/CU MM (ref 4–10.5)

## 2022-01-19 PROCEDURE — G0378 HOSPITAL OBSERVATION PER HR: HCPCS

## 2022-01-19 PROCEDURE — 96375 TX/PRO/DX INJ NEW DRUG ADDON: CPT

## 2022-01-19 PROCEDURE — 83735 ASSAY OF MAGNESIUM: CPT

## 2022-01-19 PROCEDURE — 80048 BASIC METABOLIC PNL TOTAL CA: CPT

## 2022-01-19 PROCEDURE — 6360000002 HC RX W HCPCS: Performed by: FAMILY MEDICINE

## 2022-01-19 PROCEDURE — 6360000002 HC RX W HCPCS: Performed by: NURSE PRACTITIONER

## 2022-01-19 PROCEDURE — 96376 TX/PRO/DX INJ SAME DRUG ADON: CPT

## 2022-01-19 PROCEDURE — 93010 ELECTROCARDIOGRAM REPORT: CPT | Performed by: INTERNAL MEDICINE

## 2022-01-19 PROCEDURE — 6370000000 HC RX 637 (ALT 250 FOR IP): Performed by: INTERNAL MEDICINE

## 2022-01-19 PROCEDURE — 6370000000 HC RX 637 (ALT 250 FOR IP): Performed by: NURSE PRACTITIONER

## 2022-01-19 PROCEDURE — 6360000002 HC RX W HCPCS: Performed by: INTERNAL MEDICINE

## 2022-01-19 PROCEDURE — 85025 COMPLETE CBC W/AUTO DIFF WBC: CPT

## 2022-01-19 PROCEDURE — 71045 X-RAY EXAM CHEST 1 VIEW: CPT

## 2022-01-19 PROCEDURE — 96372 THER/PROPH/DIAG INJ SC/IM: CPT

## 2022-01-19 PROCEDURE — 2580000003 HC RX 258: Performed by: INTERNAL MEDICINE

## 2022-01-19 PROCEDURE — 6370000000 HC RX 637 (ALT 250 FOR IP): Performed by: FAMILY MEDICINE

## 2022-01-19 PROCEDURE — 97530 THERAPEUTIC ACTIVITIES: CPT

## 2022-01-19 PROCEDURE — 99221 1ST HOSP IP/OBS SF/LOW 40: CPT | Performed by: ORTHOPAEDIC SURGERY

## 2022-01-19 PROCEDURE — 97166 OT EVAL MOD COMPLEX 45 MIN: CPT

## 2022-01-19 RX ORDER — SODIUM CHLORIDE 0.9 % (FLUSH) 0.9 %
5-40 SYRINGE (ML) INJECTION EVERY 12 HOURS SCHEDULED
Status: DISCONTINUED | OUTPATIENT
Start: 2022-01-19 | End: 2022-01-20 | Stop reason: HOSPADM

## 2022-01-19 RX ORDER — POTASSIUM CHLORIDE 750 MG/1
10 TABLET, FILM COATED, EXTENDED RELEASE ORAL 2 TIMES DAILY
Status: DISCONTINUED | OUTPATIENT
Start: 2022-01-19 | End: 2022-01-19

## 2022-01-19 RX ORDER — ALBUTEROL SULFATE 90 UG/1
2 AEROSOL, METERED RESPIRATORY (INHALATION) EVERY 6 HOURS PRN
Status: DISCONTINUED | OUTPATIENT
Start: 2022-01-19 | End: 2022-01-20 | Stop reason: HOSPADM

## 2022-01-19 RX ORDER — ZOLPIDEM TARTRATE 5 MG/1
5 TABLET ORAL NIGHTLY PRN
Status: DISCONTINUED | OUTPATIENT
Start: 2022-01-19 | End: 2022-01-20 | Stop reason: HOSPADM

## 2022-01-19 RX ORDER — POLYETHYLENE GLYCOL 3350 17 G/17G
17 POWDER, FOR SOLUTION ORAL DAILY PRN
Status: DISCONTINUED | OUTPATIENT
Start: 2022-01-19 | End: 2022-01-20 | Stop reason: HOSPADM

## 2022-01-19 RX ORDER — PANTOPRAZOLE SODIUM 40 MG/1
40 TABLET, DELAYED RELEASE ORAL
Status: DISCONTINUED | OUTPATIENT
Start: 2022-01-19 | End: 2022-01-20 | Stop reason: HOSPADM

## 2022-01-19 RX ORDER — ONDANSETRON 4 MG/1
4 TABLET, ORALLY DISINTEGRATING ORAL EVERY 8 HOURS PRN
Status: DISCONTINUED | OUTPATIENT
Start: 2022-01-19 | End: 2022-01-20 | Stop reason: HOSPADM

## 2022-01-19 RX ORDER — TRAMADOL HYDROCHLORIDE 50 MG/1
50 TABLET ORAL EVERY 8 HOURS PRN
Status: DISCONTINUED | OUTPATIENT
Start: 2022-01-19 | End: 2022-01-20 | Stop reason: HOSPADM

## 2022-01-19 RX ORDER — SODIUM CHLORIDE 0.9 % (FLUSH) 0.9 %
5-40 SYRINGE (ML) INJECTION PRN
Status: DISCONTINUED | OUTPATIENT
Start: 2022-01-19 | End: 2022-01-20 | Stop reason: HOSPADM

## 2022-01-19 RX ORDER — TRIAMTERENE AND HYDROCHLOROTHIAZIDE 37.5; 25 MG/1; MG/1
1 TABLET ORAL DAILY
Status: DISCONTINUED | OUTPATIENT
Start: 2022-01-19 | End: 2022-01-20 | Stop reason: HOSPADM

## 2022-01-19 RX ORDER — ACETAMINOPHEN 650 MG/1
650 SUPPOSITORY RECTAL EVERY 6 HOURS PRN
Status: DISCONTINUED | OUTPATIENT
Start: 2022-01-19 | End: 2022-01-20 | Stop reason: HOSPADM

## 2022-01-19 RX ORDER — ONDANSETRON 2 MG/ML
4 INJECTION INTRAMUSCULAR; INTRAVENOUS EVERY 6 HOURS PRN
Status: DISCONTINUED | OUTPATIENT
Start: 2022-01-19 | End: 2022-01-20 | Stop reason: HOSPADM

## 2022-01-19 RX ORDER — ACETAMINOPHEN 325 MG/1
650 TABLET ORAL EVERY 6 HOURS PRN
Status: DISCONTINUED | OUTPATIENT
Start: 2022-01-19 | End: 2022-01-20 | Stop reason: HOSPADM

## 2022-01-19 RX ORDER — MORPHINE SULFATE/0.9% NACL/PF 1 MG/ML
1 SYRINGE (ML) INJECTION EVERY 4 HOURS PRN
Status: DISCONTINUED | OUTPATIENT
Start: 2022-01-19 | End: 2022-01-20 | Stop reason: HOSPADM

## 2022-01-19 RX ORDER — SODIUM CHLORIDE 9 MG/ML
25 INJECTION, SOLUTION INTRAVENOUS PRN
Status: DISCONTINUED | OUTPATIENT
Start: 2022-01-19 | End: 2022-01-20 | Stop reason: HOSPADM

## 2022-01-19 RX ORDER — POTASSIUM CHLORIDE 20 MEQ/1
20 TABLET, EXTENDED RELEASE ORAL DAILY
Status: DISCONTINUED | OUTPATIENT
Start: 2022-01-19 | End: 2022-01-20 | Stop reason: HOSPADM

## 2022-01-19 RX ORDER — LEVOTHYROXINE SODIUM 0.1 MG/1
100 TABLET ORAL DAILY
Status: DISCONTINUED | OUTPATIENT
Start: 2022-01-19 | End: 2022-01-20 | Stop reason: HOSPADM

## 2022-01-19 RX ORDER — FENTANYL CITRATE 50 UG/ML
25 INJECTION, SOLUTION INTRAMUSCULAR; INTRAVENOUS ONCE
Status: COMPLETED | OUTPATIENT
Start: 2022-01-19 | End: 2022-01-19

## 2022-01-19 RX ADMIN — ENOXAPARIN SODIUM 40 MG: 100 INJECTION SUBCUTANEOUS at 08:43

## 2022-01-19 RX ADMIN — PANTOPRAZOLE SODIUM 40 MG: 40 TABLET, DELAYED RELEASE ORAL at 08:43

## 2022-01-19 RX ADMIN — POTASSIUM CHLORIDE 20 MEQ: 1500 TABLET, EXTENDED RELEASE ORAL at 10:16

## 2022-01-19 RX ADMIN — TRAMADOL HYDROCHLORIDE 50 MG: 50 TABLET, COATED ORAL at 08:36

## 2022-01-19 RX ADMIN — POTASSIUM CHLORIDE 10 MEQ: 750 TABLET, FILM COATED, EXTENDED RELEASE ORAL at 08:43

## 2022-01-19 RX ADMIN — SODIUM CHLORIDE, PRESERVATIVE FREE 10 ML: 5 INJECTION INTRAVENOUS at 08:46

## 2022-01-19 RX ADMIN — SODIUM CHLORIDE, PRESERVATIVE FREE 10 ML: 5 INJECTION INTRAVENOUS at 20:06

## 2022-01-19 RX ADMIN — TRAMADOL HYDROCHLORIDE 50 MG: 50 TABLET, COATED ORAL at 20:25

## 2022-01-19 RX ADMIN — LEVOTHYROXINE SODIUM 100 MCG: 0.1 TABLET ORAL at 08:43

## 2022-01-19 RX ADMIN — TRIAMTERENE AND HYDROCHLOROTHIAZIDE 1 TABLET: 37.5; 25 TABLET ORAL at 08:48

## 2022-01-19 RX ADMIN — ZOLPIDEM TARTRATE 5 MG: 5 TABLET ORAL at 02:30

## 2022-01-19 RX ADMIN — ZOLPIDEM TARTRATE 5 MG: 5 TABLET ORAL at 21:28

## 2022-01-19 RX ADMIN — TRAMADOL HYDROCHLORIDE 50 MG: 50 TABLET, COATED ORAL at 02:30

## 2022-01-19 RX ADMIN — FENTANYL CITRATE 25 MCG: 50 INJECTION, SOLUTION INTRAMUSCULAR; INTRAVENOUS at 04:30

## 2022-01-19 RX ADMIN — POTASSIUM CHLORIDE 10 MEQ: 750 TABLET, FILM COATED, EXTENDED RELEASE ORAL at 02:30

## 2022-01-19 RX ADMIN — Medication 5000 UNITS: at 08:48

## 2022-01-19 RX ADMIN — Medication 1 MG: at 10:16

## 2022-01-19 ASSESSMENT — ENCOUNTER SYMPTOMS
COLOR CHANGE: 0
CHEST TIGHTNESS: 0
BACK PAIN: 0

## 2022-01-19 ASSESSMENT — PAIN DESCRIPTION - PAIN TYPE
TYPE: ACUTE PAIN
TYPE: ACUTE PAIN

## 2022-01-19 ASSESSMENT — PAIN DESCRIPTION - PROGRESSION: CLINICAL_PROGRESSION: NOT CHANGED

## 2022-01-19 ASSESSMENT — PAIN DESCRIPTION - ORIENTATION
ORIENTATION: LEFT
ORIENTATION: LEFT

## 2022-01-19 ASSESSMENT — PAIN SCALES - GENERAL
PAINLEVEL_OUTOF10: 7
PAINLEVEL_OUTOF10: 8
PAINLEVEL_OUTOF10: 10
PAINLEVEL_OUTOF10: 10
PAINLEVEL_OUTOF10: 8
PAINLEVEL_OUTOF10: 5

## 2022-01-19 ASSESSMENT — PAIN DESCRIPTION - ONSET: ONSET: ON-GOING

## 2022-01-19 ASSESSMENT — PAIN DESCRIPTION - LOCATION
LOCATION: KNEE
LOCATION: KNEE

## 2022-01-19 ASSESSMENT — PAIN - FUNCTIONAL ASSESSMENT: PAIN_FUNCTIONAL_ASSESSMENT: PREVENTS OR INTERFERES WITH MANY ACTIVE NOT PASSIVE ACTIVITIES

## 2022-01-19 ASSESSMENT — PAIN DESCRIPTION - FREQUENCY: FREQUENCY: CONTINUOUS

## 2022-01-19 ASSESSMENT — PAIN DESCRIPTION - DESCRIPTORS: DESCRIPTORS: POUNDING
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
## 2022-01-19 NOTE — PROGRESS NOTES
Paged Dr Martha Joiner:    Can we order pt/ot to assess this patient. Ortho was into  see her said she should be able to stand with the brace, but we can hardly move her in the bed without her screaming in pain. Case management states she wants to go home.
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
## 2022-01-20 ENCOUNTER — HOSPITAL ENCOUNTER (OUTPATIENT)
Age: 77
Setting detail: OBSERVATION
Discharge: NURSING, REHAB OR LONG-TERM CARE | End: 2022-01-22
Attending: INTERNAL MEDICINE | Admitting: INTERNAL MEDICINE
Payer: MEDICARE

## 2022-01-20 VITALS
HEART RATE: 87 BPM | BODY MASS INDEX: 20.61 KG/M2 | TEMPERATURE: 98.2 F | WEIGHT: 112 LBS | OXYGEN SATURATION: 97 % | SYSTOLIC BLOOD PRESSURE: 132 MMHG | RESPIRATION RATE: 19 BRPM | HEIGHT: 62 IN | DIASTOLIC BLOOD PRESSURE: 52 MMHG

## 2022-01-20 LAB
SARS-COV-2, NAAT: NOT DETECTED
SOURCE: NORMAL

## 2022-01-20 PROCEDURE — 87635 SARS-COV-2 COVID-19 AMP PRB: CPT

## 2022-01-20 PROCEDURE — 6370000000 HC RX 637 (ALT 250 FOR IP): Performed by: INTERNAL MEDICINE

## 2022-01-20 PROCEDURE — G0378 HOSPITAL OBSERVATION PER HR: HCPCS

## 2022-01-20 PROCEDURE — 97116 GAIT TRAINING THERAPY: CPT

## 2022-01-20 PROCEDURE — 97530 THERAPEUTIC ACTIVITIES: CPT

## 2022-01-20 PROCEDURE — 2580000003 HC RX 258: Performed by: INTERNAL MEDICINE

## 2022-01-20 PROCEDURE — 6370000000 HC RX 637 (ALT 250 FOR IP): Performed by: FAMILY MEDICINE

## 2022-01-20 PROCEDURE — 97162 PT EVAL MOD COMPLEX 30 MIN: CPT

## 2022-01-20 PROCEDURE — 96372 THER/PROPH/DIAG INJ SC/IM: CPT

## 2022-01-20 PROCEDURE — 6360000002 HC RX W HCPCS: Performed by: INTERNAL MEDICINE

## 2022-01-20 PROCEDURE — 94761 N-INVAS EAR/PLS OXIMETRY MLT: CPT

## 2022-01-20 PROCEDURE — 97535 SELF CARE MNGMENT TRAINING: CPT

## 2022-01-20 PROCEDURE — G0379 DIRECT REFER HOSPITAL OBSERV: HCPCS

## 2022-01-20 RX ORDER — TRAMADOL HYDROCHLORIDE 50 MG/1
50 TABLET ORAL EVERY 8 HOURS PRN
Status: DISCONTINUED | OUTPATIENT
Start: 2022-01-20 | End: 2022-01-22 | Stop reason: HOSPADM

## 2022-01-20 RX ORDER — TRAMADOL HYDROCHLORIDE 50 MG/1
50 TABLET ORAL EVERY 8 HOURS PRN
Status: CANCELLED | OUTPATIENT
Start: 2022-01-20

## 2022-01-20 RX ORDER — ZOLPIDEM TARTRATE 5 MG/1
5 TABLET ORAL NIGHTLY PRN
Status: DISCONTINUED | OUTPATIENT
Start: 2022-01-20 | End: 2022-01-22 | Stop reason: HOSPADM

## 2022-01-20 RX ORDER — LEVOTHYROXINE SODIUM 0.1 MG/1
100 TABLET ORAL DAILY
Status: CANCELLED | OUTPATIENT
Start: 2022-01-21

## 2022-01-20 RX ORDER — ALBUTEROL SULFATE 90 UG/1
2 AEROSOL, METERED RESPIRATORY (INHALATION) EVERY 6 HOURS PRN
Status: CANCELLED | OUTPATIENT
Start: 2022-01-20

## 2022-01-20 RX ORDER — POTASSIUM CHLORIDE 20 MEQ/1
20 TABLET, EXTENDED RELEASE ORAL DAILY
Status: DISCONTINUED | OUTPATIENT
Start: 2022-01-21 | End: 2022-01-22 | Stop reason: HOSPADM

## 2022-01-20 RX ORDER — ALBUTEROL SULFATE 90 UG/1
2 AEROSOL, METERED RESPIRATORY (INHALATION) EVERY 6 HOURS PRN
Status: DISCONTINUED | OUTPATIENT
Start: 2022-01-20 | End: 2022-01-22 | Stop reason: HOSPADM

## 2022-01-20 RX ORDER — ACETAMINOPHEN 325 MG/1
650 TABLET ORAL EVERY 6 HOURS PRN
Status: CANCELLED | OUTPATIENT
Start: 2022-01-20

## 2022-01-20 RX ORDER — MORPHINE SULFATE 2 MG/ML
1 INJECTION, SOLUTION INTRAMUSCULAR; INTRAVENOUS EVERY 4 HOURS PRN
Status: DISCONTINUED | OUTPATIENT
Start: 2022-01-20 | End: 2022-01-22 | Stop reason: HOSPADM

## 2022-01-20 RX ORDER — SODIUM CHLORIDE 0.9 % (FLUSH) 0.9 %
5-40 SYRINGE (ML) INJECTION PRN
Status: DISCONTINUED | OUTPATIENT
Start: 2022-01-20 | End: 2022-01-22 | Stop reason: HOSPADM

## 2022-01-20 RX ORDER — SODIUM CHLORIDE 0.9 % (FLUSH) 0.9 %
5-40 SYRINGE (ML) INJECTION EVERY 12 HOURS SCHEDULED
Status: DISCONTINUED | OUTPATIENT
Start: 2022-01-20 | End: 2022-01-22 | Stop reason: HOSPADM

## 2022-01-20 RX ORDER — ONDANSETRON 2 MG/ML
4 INJECTION INTRAMUSCULAR; INTRAVENOUS EVERY 6 HOURS PRN
Status: CANCELLED | OUTPATIENT
Start: 2022-01-20

## 2022-01-20 RX ORDER — LANOLIN ALCOHOL/MO/W.PET/CERES
400 CREAM (GRAM) TOPICAL EVERY EVENING
Status: ON HOLD | COMMUNITY
End: 2022-02-10 | Stop reason: HOSPADM

## 2022-01-20 RX ORDER — SODIUM CHLORIDE 0.9 % (FLUSH) 0.9 %
5-40 SYRINGE (ML) INJECTION PRN
Status: CANCELLED | OUTPATIENT
Start: 2022-01-20

## 2022-01-20 RX ORDER — ONDANSETRON 2 MG/ML
4 INJECTION INTRAMUSCULAR; INTRAVENOUS EVERY 6 HOURS PRN
Status: DISCONTINUED | OUTPATIENT
Start: 2022-01-20 | End: 2022-01-22 | Stop reason: HOSPADM

## 2022-01-20 RX ORDER — ZOLPIDEM TARTRATE 5 MG/1
5 TABLET ORAL NIGHTLY PRN
Status: CANCELLED | OUTPATIENT
Start: 2022-01-20

## 2022-01-20 RX ORDER — ONDANSETRON 4 MG/1
4 TABLET, ORALLY DISINTEGRATING ORAL EVERY 8 HOURS PRN
Status: CANCELLED | OUTPATIENT
Start: 2022-01-20

## 2022-01-20 RX ORDER — SODIUM CHLORIDE 9 MG/ML
25 INJECTION, SOLUTION INTRAVENOUS PRN
Status: DISCONTINUED | OUTPATIENT
Start: 2022-01-20 | End: 2022-01-22 | Stop reason: HOSPADM

## 2022-01-20 RX ORDER — POLYETHYLENE GLYCOL 3350 17 G/17G
17 POWDER, FOR SOLUTION ORAL DAILY PRN
Status: DISCONTINUED | OUTPATIENT
Start: 2022-01-20 | End: 2022-01-22 | Stop reason: HOSPADM

## 2022-01-20 RX ORDER — PANTOPRAZOLE SODIUM 40 MG/1
40 TABLET, DELAYED RELEASE ORAL
Status: CANCELLED | OUTPATIENT
Start: 2022-01-21

## 2022-01-20 RX ORDER — TRIAMTERENE AND HYDROCHLOROTHIAZIDE 37.5; 25 MG/1; MG/1
1 TABLET ORAL DAILY
Status: DISCONTINUED | OUTPATIENT
Start: 2022-01-21 | End: 2022-01-22 | Stop reason: HOSPADM

## 2022-01-20 RX ORDER — POTASSIUM CHLORIDE 750 MG/1
10 TABLET, EXTENDED RELEASE ORAL 2 TIMES DAILY
COMMUNITY
End: 2022-06-17 | Stop reason: SDUPTHER

## 2022-01-20 RX ORDER — ACETAMINOPHEN 650 MG/1
650 SUPPOSITORY RECTAL EVERY 6 HOURS PRN
Status: CANCELLED | OUTPATIENT
Start: 2022-01-20

## 2022-01-20 RX ORDER — ACETAMINOPHEN 650 MG/1
650 SUPPOSITORY RECTAL EVERY 6 HOURS PRN
Status: DISCONTINUED | OUTPATIENT
Start: 2022-01-20 | End: 2022-01-22 | Stop reason: HOSPADM

## 2022-01-20 RX ORDER — ACETAMINOPHEN 325 MG/1
650 TABLET ORAL EVERY 6 HOURS PRN
Status: DISCONTINUED | OUTPATIENT
Start: 2022-01-20 | End: 2022-01-22 | Stop reason: HOSPADM

## 2022-01-20 RX ORDER — TRIAMTERENE AND HYDROCHLOROTHIAZIDE 37.5; 25 MG/1; MG/1
1 TABLET ORAL DAILY
Status: CANCELLED | OUTPATIENT
Start: 2022-01-20

## 2022-01-20 RX ORDER — PANTOPRAZOLE SODIUM 40 MG/1
40 TABLET, DELAYED RELEASE ORAL
Status: DISCONTINUED | OUTPATIENT
Start: 2022-01-21 | End: 2022-01-22 | Stop reason: HOSPADM

## 2022-01-20 RX ORDER — MORPHINE SULFATE/0.9% NACL/PF 1 MG/ML
1 SYRINGE (ML) INJECTION EVERY 4 HOURS PRN
Status: CANCELLED | OUTPATIENT
Start: 2022-01-20

## 2022-01-20 RX ORDER — SODIUM CHLORIDE 9 MG/ML
25 INJECTION, SOLUTION INTRAVENOUS PRN
Status: CANCELLED | OUTPATIENT
Start: 2022-01-20

## 2022-01-20 RX ORDER — POLYETHYLENE GLYCOL 3350 17 G/17G
17 POWDER, FOR SOLUTION ORAL DAILY PRN
Status: CANCELLED | OUTPATIENT
Start: 2022-01-20

## 2022-01-20 RX ORDER — POTASSIUM CHLORIDE 20 MEQ/1
20 TABLET, EXTENDED RELEASE ORAL DAILY
Status: CANCELLED | OUTPATIENT
Start: 2022-01-20

## 2022-01-20 RX ORDER — LEVOTHYROXINE SODIUM 0.1 MG/1
100 TABLET ORAL DAILY
Status: DISCONTINUED | OUTPATIENT
Start: 2022-01-21 | End: 2022-01-22 | Stop reason: HOSPADM

## 2022-01-20 RX ORDER — ONDANSETRON 4 MG/1
4 TABLET, ORALLY DISINTEGRATING ORAL EVERY 8 HOURS PRN
Status: DISCONTINUED | OUTPATIENT
Start: 2022-01-20 | End: 2022-01-22 | Stop reason: HOSPADM

## 2022-01-20 RX ORDER — SODIUM CHLORIDE 0.9 % (FLUSH) 0.9 %
5-40 SYRINGE (ML) INJECTION EVERY 12 HOURS SCHEDULED
Status: CANCELLED | OUTPATIENT
Start: 2022-01-20

## 2022-01-20 RX ADMIN — PANTOPRAZOLE SODIUM 40 MG: 40 TABLET, DELAYED RELEASE ORAL at 11:53

## 2022-01-20 RX ADMIN — SODIUM CHLORIDE, PRESERVATIVE FREE 10 ML: 5 INJECTION INTRAVENOUS at 23:06

## 2022-01-20 RX ADMIN — ZOLPIDEM TARTRATE 5 MG: 5 TABLET ORAL at 22:03

## 2022-01-20 RX ADMIN — ACETAMINOPHEN 650 MG: 325 TABLET ORAL at 08:41

## 2022-01-20 RX ADMIN — ENOXAPARIN SODIUM 40 MG: 100 INJECTION SUBCUTANEOUS at 11:54

## 2022-01-20 RX ADMIN — POTASSIUM CHLORIDE 20 MEQ: 1500 TABLET, EXTENDED RELEASE ORAL at 11:54

## 2022-01-20 RX ADMIN — SODIUM CHLORIDE, PRESERVATIVE FREE 10 ML: 5 INJECTION INTRAVENOUS at 11:56

## 2022-01-20 RX ADMIN — LEVOTHYROXINE SODIUM 100 MCG: 0.1 TABLET ORAL at 11:54

## 2022-01-20 RX ADMIN — Medication 5000 UNITS: at 11:58

## 2022-01-20 RX ADMIN — TRAMADOL HYDROCHLORIDE 50 MG: 50 TABLET, COATED ORAL at 19:25

## 2022-01-20 RX ADMIN — TRAMADOL HYDROCHLORIDE 50 MG: 50 TABLET, COATED ORAL at 08:41

## 2022-01-20 RX ADMIN — TRIAMTERENE AND HYDROCHLOROTHIAZIDE 1 TABLET: 37.5; 25 TABLET ORAL at 11:54

## 2022-01-20 ASSESSMENT — PAIN DESCRIPTION - FREQUENCY: FREQUENCY: CONTINUOUS

## 2022-01-20 ASSESSMENT — PAIN DESCRIPTION - DESCRIPTORS: DESCRIPTORS: ACHING

## 2022-01-20 ASSESSMENT — PAIN DESCRIPTION - ORIENTATION: ORIENTATION: LEFT

## 2022-01-20 ASSESSMENT — PAIN DESCRIPTION - LOCATION: LOCATION: KNEE

## 2022-01-20 ASSESSMENT — PAIN DESCRIPTION - ONSET: ONSET: ON-GOING

## 2022-01-20 ASSESSMENT — PAIN SCALES - GENERAL
PAINLEVEL_OUTOF10: 5
PAINLEVEL_OUTOF10: 6
PAINLEVEL_OUTOF10: 0

## 2022-01-20 ASSESSMENT — PAIN DESCRIPTION - PAIN TYPE: TYPE: ACUTE PAIN

## 2022-01-20 NOTE — PROGRESS NOTES
Occupational Therapy  Occupational Therapy Treatment Note      Name: Maxi Verma MRN: [id number] :   1945   Date:  2022   Admission Date: 2022 Room:  ED38/ED-38     Primary Problem: [place]:  The primary encounter diagnosis was Closed fracture of multiple ribs of right side, initial encounter. A diagnosis of Other closed fracture of left patella, initial encounter was also pertinent to this visit. Restrictions/Precautions:    General Precautions, Fall Risk, weightbearing as tolerated on the left leg with the knee immobilizer in place, She can remove the knee immobilizer for bathing and while at rest. She is to avoid weightbearing in a flexed position on the left knee. Communication with other providers:  PT, RN    Subjective:  Patient states:  Agreeable to therapy. Pain: Pt denied pain this date (0/10). Objective:    Observation:  Pt was received supine in bed upon arrival.   Objective Measures:  Vitals stable throughout session. Treatment, including education:  Therapeutic Activity Training:   Therapeutic activity training was instructed today. Cues were given for safety, sequence, UE/LE placement, awareness, and balance. Activities performed today included bed mobility training, sup-sit, sit-stand, ambulation. Self Care Training:   Cues were given for safety, sequence, UE/LE placement, visual cues, and balance. Activities performed today included dressing. Pt performed supine to seated bed mobility with HOB elevated and ModA for BLE positioning (increased time with use of leg  and VC throughout for sequencing of AE), trunk support, VC throughout for sequencing, and increased time. Pt sat EOB and attempted to michael shoes, however was unable and shoes were donned for pt DEP. Pt performed STS from EOB with Ino and RW. Pt ambulated approx 30ft x 30ft with CGA - Ino, RW, VC throughout for sequencing, increased time, and seated rest break between each bout.  Pt required Ino for stand - sit to chair and EOB. Sit - sup was performed with Ino for LLE positioning and trunk support. Pt was left supine in bed, all needs met, alarm in place. Assessment / Impression:    Patient's tolerance of treatment: good  Adverse Reaction: None  Significant change in status and impact:  None  Barriers to improvement: strength, activity tolerance, pain      Plan for Next Session:    Continue with POC    Time in:  835  Time out:  905  Timed treatment minutes: 30  Total treatment time: 30      Electronically signed by:     Cristofer Sloan OT,   1/20/2022, 11:17 AM
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
## 2022-01-20 NOTE — CARE COORDINATION
Chart reviewed. CM met with patient at bedside in the emergency department. She was updated on plan of care and transferring to [place] room 4. She is in agreement with that. She is very thankful to be going with her spouse. She was updated on therapy recommendations and that [facility] denied her insurance. She was updated on swing bed as a POSSIBLE option pending the , her insurance, bed availability and doctor's and therapy recommendations. She voices understanding. Juan  updated. The patient states that she is on a cancer medication imbrubica three times a day that her friend has brought to the hospital for her. Her friends name is Lora.
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
## 2022-01-21 PROCEDURE — G0378 HOSPITAL OBSERVATION PER HR: HCPCS

## 2022-01-21 PROCEDURE — 2580000003 HC RX 258: Performed by: INTERNAL MEDICINE

## 2022-01-21 PROCEDURE — 6360000002 HC RX W HCPCS: Performed by: INTERNAL MEDICINE

## 2022-01-21 PROCEDURE — 97116 GAIT TRAINING THERAPY: CPT

## 2022-01-21 PROCEDURE — 97161 PT EVAL LOW COMPLEX 20 MIN: CPT

## 2022-01-21 PROCEDURE — 6370000000 HC RX 637 (ALT 250 FOR IP): Performed by: INTERNAL MEDICINE

## 2022-01-21 PROCEDURE — 97166 OT EVAL MOD COMPLEX 45 MIN: CPT

## 2022-01-21 PROCEDURE — 97530 THERAPEUTIC ACTIVITIES: CPT

## 2022-01-21 PROCEDURE — 96372 THER/PROPH/DIAG INJ SC/IM: CPT

## 2022-01-21 RX ADMIN — ENOXAPARIN SODIUM 40 MG: 100 INJECTION SUBCUTANEOUS at 10:28

## 2022-01-21 RX ADMIN — TRIAMTERENE AND HYDROCHLOROTHIAZIDE 1 TABLET: 37.5; 25 TABLET ORAL at 10:28

## 2022-01-21 RX ADMIN — POTASSIUM CHLORIDE 20 MEQ: 1500 TABLET, EXTENDED RELEASE ORAL at 10:28

## 2022-01-21 RX ADMIN — TRAMADOL HYDROCHLORIDE 50 MG: 50 TABLET ORAL at 10:27

## 2022-01-21 RX ADMIN — ZOLPIDEM TARTRATE 5 MG: 5 TABLET ORAL at 21:51

## 2022-01-21 RX ADMIN — PANTOPRAZOLE SODIUM 40 MG: 40 TABLET, DELAYED RELEASE ORAL at 05:45

## 2022-01-21 RX ADMIN — Medication 5000 UNITS: at 10:28

## 2022-01-21 RX ADMIN — LEVOTHYROXINE SODIUM 100 MCG: 0.1 TABLET ORAL at 05:45

## 2022-01-21 RX ADMIN — TRAMADOL HYDROCHLORIDE 50 MG: 50 TABLET ORAL at 20:42

## 2022-01-21 RX ADMIN — SODIUM CHLORIDE, PRESERVATIVE FREE 10 ML: 5 INJECTION INTRAVENOUS at 20:42

## 2022-01-21 ASSESSMENT — PAIN - FUNCTIONAL ASSESSMENT: PAIN_FUNCTIONAL_ASSESSMENT: ACTIVITIES ARE NOT PREVENTED

## 2022-01-21 ASSESSMENT — PAIN DESCRIPTION - LOCATION
LOCATION: KNEE
LOCATION: KNEE

## 2022-01-21 ASSESSMENT — PAIN DESCRIPTION - FREQUENCY
FREQUENCY: CONTINUOUS
FREQUENCY: CONTINUOUS

## 2022-01-21 ASSESSMENT — PAIN DESCRIPTION - DESCRIPTORS
DESCRIPTORS: ACHING
DESCRIPTORS: ACHING

## 2022-01-21 ASSESSMENT — PAIN SCALES - GENERAL
PAINLEVEL_OUTOF10: 0
PAINLEVEL_OUTOF10: 7
PAINLEVEL_OUTOF10: 4
PAINLEVEL_OUTOF10: 0

## 2022-01-21 ASSESSMENT — PAIN DESCRIPTION - ONSET
ONSET: ON-GOING
ONSET: PROGRESSIVE

## 2022-01-21 ASSESSMENT — PAIN DESCRIPTION - PAIN TYPE
TYPE: ACUTE PAIN

## 2022-01-21 ASSESSMENT — PAIN DESCRIPTION - ORIENTATION
ORIENTATION: LEFT
ORIENTATION: LEFT

## 2022-01-21 ASSESSMENT — PAIN DESCRIPTION - PROGRESSION: CLINICAL_PROGRESSION: NOT CHANGED
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
## 2022-01-22 ENCOUNTER — HOSPITAL ENCOUNTER (INPATIENT)
Age: 77
LOS: 19 days | Discharge: OTHER INSTITUTION | DRG: 641 | End: 2022-02-10
Attending: INTERNAL MEDICINE | Admitting: INTERNAL MEDICINE
Payer: MEDICARE

## 2022-01-22 VITALS
HEIGHT: 62 IN | HEART RATE: 85 BPM | TEMPERATURE: 96.8 F | SYSTOLIC BLOOD PRESSURE: 123 MMHG | OXYGEN SATURATION: 93 % | WEIGHT: 106.5 LBS | RESPIRATION RATE: 22 BRPM | DIASTOLIC BLOOD PRESSURE: 51 MMHG | BODY MASS INDEX: 19.6 KG/M2

## 2022-01-22 LAB
ANION GAP SERPL CALCULATED.3IONS-SCNC: 14 MMOL/L (ref 4–16)
BUN BLDV-MCNC: 35 MG/DL (ref 6–23)
CALCIUM SERPL-MCNC: 8.2 MG/DL (ref 8.3–10.6)
CHLORIDE BLD-SCNC: 99 MMOL/L (ref 99–110)
CO2: 24 MMOL/L (ref 21–32)
CREAT SERPL-MCNC: 1.3 MG/DL (ref 0.6–1.1)
GFR AFRICAN AMERICAN: 48 ML/MIN/1.73M2
GFR NON-AFRICAN AMERICAN: 40 ML/MIN/1.73M2
GLUCOSE BLD-MCNC: 109 MG/DL (ref 70–99)
HCT VFR BLD CALC: 30.2 % (ref 37–47)
HEMOGLOBIN: 9.7 GM/DL (ref 12.5–16)
MCH RBC QN AUTO: 30.3 PG (ref 27–31)
MCHC RBC AUTO-ENTMCNC: 32.1 % (ref 32–36)
MCV RBC AUTO: 94.4 FL (ref 78–100)
PDW BLD-RTO: 12.4 % (ref 11.7–14.9)
PLATELET # BLD: 161 K/CU MM (ref 140–440)
PMV BLD AUTO: 12 FL (ref 7.5–11.1)
POTASSIUM SERPL-SCNC: 4.5 MMOL/L (ref 3.5–5.1)
RBC # BLD: 3.2 M/CU MM (ref 4.2–5.4)
SODIUM BLD-SCNC: 137 MMOL/L (ref 135–145)
WBC # BLD: 17.9 K/CU MM (ref 4–10.5)

## 2022-01-22 PROCEDURE — 80048 BASIC METABOLIC PNL TOTAL CA: CPT

## 2022-01-22 PROCEDURE — 96372 THER/PROPH/DIAG INJ SC/IM: CPT

## 2022-01-22 PROCEDURE — G0378 HOSPITAL OBSERVATION PER HR: HCPCS

## 2022-01-22 PROCEDURE — 85027 COMPLETE CBC AUTOMATED: CPT

## 2022-01-22 PROCEDURE — 94761 N-INVAS EAR/PLS OXIMETRY MLT: CPT

## 2022-01-22 PROCEDURE — 1200000002 HC SEMI PRIVATE SWING BED

## 2022-01-22 PROCEDURE — 6370000000 HC RX 637 (ALT 250 FOR IP): Performed by: NURSE PRACTITIONER

## 2022-01-22 PROCEDURE — 6360000002 HC RX W HCPCS: Performed by: INTERNAL MEDICINE

## 2022-01-22 PROCEDURE — 94150 VITAL CAPACITY TEST: CPT

## 2022-01-22 PROCEDURE — 6370000000 HC RX 637 (ALT 250 FOR IP): Performed by: INTERNAL MEDICINE

## 2022-01-22 PROCEDURE — 97110 THERAPEUTIC EXERCISES: CPT

## 2022-01-22 PROCEDURE — 97530 THERAPEUTIC ACTIVITIES: CPT

## 2022-01-22 RX ORDER — TRIAMTERENE AND HYDROCHLOROTHIAZIDE 37.5; 25 MG/1; MG/1
1 TABLET ORAL DAILY
Status: CANCELLED | OUTPATIENT
Start: 2022-01-22

## 2022-01-22 RX ORDER — TRIAMTERENE AND HYDROCHLOROTHIAZIDE 37.5; 25 MG/1; MG/1
1 TABLET ORAL DAILY
Status: DISCONTINUED | OUTPATIENT
Start: 2022-01-23 | End: 2022-02-10 | Stop reason: HOSPADM

## 2022-01-22 RX ORDER — LEVOTHYROXINE SODIUM 0.1 MG/1
100 TABLET ORAL DAILY
Status: DISCONTINUED | OUTPATIENT
Start: 2022-01-23 | End: 2022-02-10 | Stop reason: HOSPADM

## 2022-01-22 RX ORDER — PANTOPRAZOLE SODIUM 40 MG/1
40 TABLET, DELAYED RELEASE ORAL
Status: DISCONTINUED | OUTPATIENT
Start: 2022-01-23 | End: 2022-02-10 | Stop reason: HOSPADM

## 2022-01-22 RX ORDER — TRAMADOL HYDROCHLORIDE 50 MG/1
50 TABLET ORAL EVERY 8 HOURS PRN
Status: DISCONTINUED | OUTPATIENT
Start: 2022-01-22 | End: 2022-02-01

## 2022-01-22 RX ORDER — ALBUTEROL SULFATE 90 UG/1
2 AEROSOL, METERED RESPIRATORY (INHALATION) EVERY 6 HOURS PRN
Status: DISCONTINUED | OUTPATIENT
Start: 2022-01-22 | End: 2022-02-10 | Stop reason: HOSPADM

## 2022-01-22 RX ORDER — ACETAMINOPHEN 325 MG/1
650 TABLET ORAL EVERY 6 HOURS PRN
Status: CANCELLED | OUTPATIENT
Start: 2022-01-22

## 2022-01-22 RX ORDER — ACETAMINOPHEN 650 MG/1
650 SUPPOSITORY RECTAL EVERY 6 HOURS PRN
Status: CANCELLED | OUTPATIENT
Start: 2022-01-22

## 2022-01-22 RX ORDER — POLYETHYLENE GLYCOL 3350 17 G/17G
17 POWDER, FOR SOLUTION ORAL DAILY PRN
Status: CANCELLED | OUTPATIENT
Start: 2022-01-22

## 2022-01-22 RX ORDER — LEVOTHYROXINE SODIUM 0.1 MG/1
100 TABLET ORAL DAILY
Status: CANCELLED | OUTPATIENT
Start: 2022-01-23

## 2022-01-22 RX ORDER — POTASSIUM CHLORIDE 20 MEQ/1
20 TABLET, EXTENDED RELEASE ORAL DAILY
Status: DISCONTINUED | OUTPATIENT
Start: 2022-01-23 | End: 2022-02-10 | Stop reason: HOSPADM

## 2022-01-22 RX ORDER — ACETAMINOPHEN 650 MG/1
650 SUPPOSITORY RECTAL EVERY 6 HOURS PRN
Status: DISCONTINUED | OUTPATIENT
Start: 2022-01-22 | End: 2022-02-10 | Stop reason: HOSPADM

## 2022-01-22 RX ORDER — POTASSIUM CHLORIDE 20 MEQ/1
20 TABLET, EXTENDED RELEASE ORAL DAILY
Status: CANCELLED | OUTPATIENT
Start: 2022-01-22

## 2022-01-22 RX ORDER — POLYETHYLENE GLYCOL 3350 17 G/17G
17 POWDER, FOR SOLUTION ORAL DAILY PRN
Status: DISCONTINUED | OUTPATIENT
Start: 2022-01-22 | End: 2022-02-10 | Stop reason: HOSPADM

## 2022-01-22 RX ORDER — ACETAMINOPHEN 325 MG/1
650 TABLET ORAL EVERY 6 HOURS PRN
Status: DISCONTINUED | OUTPATIENT
Start: 2022-01-22 | End: 2022-02-10 | Stop reason: HOSPADM

## 2022-01-22 RX ORDER — ZOLPIDEM TARTRATE 5 MG/1
5 TABLET ORAL NIGHTLY PRN
Status: CANCELLED | OUTPATIENT
Start: 2022-01-22

## 2022-01-22 RX ORDER — ZOLPIDEM TARTRATE 5 MG/1
5 TABLET ORAL NIGHTLY PRN
Status: DISCONTINUED | OUTPATIENT
Start: 2022-01-22 | End: 2022-02-10 | Stop reason: HOSPADM

## 2022-01-22 RX ORDER — ALBUTEROL SULFATE 90 UG/1
2 AEROSOL, METERED RESPIRATORY (INHALATION) EVERY 6 HOURS PRN
Status: CANCELLED | OUTPATIENT
Start: 2022-01-22

## 2022-01-22 RX ORDER — PANTOPRAZOLE SODIUM 40 MG/1
40 TABLET, DELAYED RELEASE ORAL
Status: CANCELLED | OUTPATIENT
Start: 2022-01-23

## 2022-01-22 RX ORDER — TRAMADOL HYDROCHLORIDE 50 MG/1
50 TABLET ORAL EVERY 8 HOURS PRN
Status: CANCELLED | OUTPATIENT
Start: 2022-01-22

## 2022-01-22 RX ADMIN — TRAMADOL HYDROCHLORIDE 50 MG: 50 TABLET ORAL at 06:05

## 2022-01-22 RX ADMIN — TRIAMTERENE AND HYDROCHLOROTHIAZIDE 1 TABLET: 37.5; 25 TABLET ORAL at 09:07

## 2022-01-22 RX ADMIN — Medication 5000 UNITS: at 09:07

## 2022-01-22 RX ADMIN — TRAMADOL HYDROCHLORIDE 50 MG: 50 TABLET ORAL at 15:38

## 2022-01-22 RX ADMIN — POTASSIUM CHLORIDE 20 MEQ: 1500 TABLET, EXTENDED RELEASE ORAL at 09:07

## 2022-01-22 RX ADMIN — ENOXAPARIN SODIUM 40 MG: 100 INJECTION SUBCUTANEOUS at 09:07

## 2022-01-22 RX ADMIN — PANTOPRAZOLE SODIUM 40 MG: 40 TABLET, DELAYED RELEASE ORAL at 05:52

## 2022-01-22 RX ADMIN — LEVOTHYROXINE SODIUM 100 MCG: 0.1 TABLET ORAL at 05:52

## 2022-01-22 RX ADMIN — ZOLPIDEM TARTRATE 5 MG: 5 TABLET ORAL at 22:17

## 2022-01-22 ASSESSMENT — PAIN DESCRIPTION - FREQUENCY: FREQUENCY: CONTINUOUS

## 2022-01-22 ASSESSMENT — PAIN SCALES - GENERAL
PAINLEVEL_OUTOF10: 0
PAINLEVEL_OUTOF10: 0
PAINLEVEL_OUTOF10: 5
PAINLEVEL_OUTOF10: 7

## 2022-01-22 ASSESSMENT — PAIN - FUNCTIONAL ASSESSMENT: PAIN_FUNCTIONAL_ASSESSMENT: ACTIVITIES ARE NOT PREVENTED

## 2022-01-22 ASSESSMENT — PAIN DESCRIPTION - PAIN TYPE: TYPE: ACUTE PAIN

## 2022-01-22 ASSESSMENT — PAIN DESCRIPTION - ONSET: ONSET: ON-GOING

## 2022-01-22 ASSESSMENT — PAIN DESCRIPTION - DESCRIPTORS: DESCRIPTORS: ACHING

## 2022-01-22 ASSESSMENT — PAIN DESCRIPTION - ORIENTATION: ORIENTATION: LEFT

## 2022-01-22 ASSESSMENT — PAIN DESCRIPTION - PROGRESSION: CLINICAL_PROGRESSION: NOT CHANGED

## 2022-01-22 ASSESSMENT — PAIN DESCRIPTION - LOCATION: LOCATION: KNEE
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
## 2022-01-22 NOTE — FLOWSHEET NOTE
Physical Therapy Treatment Note   Date: 2022 Room: [unfilled]   Name: Fang Rodriguez : 1945   MRN: [id number] Admission Date:2022    Primary Problem:   Past Medical History:   Diagnosis Date    Arm fracture, left 2019    Casted - no surgery - Dr. Munoz [facility] Arthritis     Right arm    CLL (chronic lymphocytic leukemia) ([facility] [address] 2017    Monoclonal b-cell lymphcytosis-Dr Delfina Perdomo    COPD (chronic obstructive pulmonary disease) ([facility])     ex-smoker, bronchitis yearly, 3/2019 last exac    Great vein anomaly 3/2011    great saphenous vein incompetence bilateral-US bilateral venous US-Dr Gabe Tate    H. pylori infection 1996    S/P Biaxin and Prilosec    Hiatal hernia 1994    with reflux by UGI    Hyperlipidemia     Hypertension     Hypothyroidism 1's    MDRO (multiple drug resistant organisms) resistance     Nasal passages    Osteoporosis     Smoking hx      Past Surgical History:   Procedure Laterality Date    CHOLECYSTECTOMY, LAPAROSCOPIC  2018    Dr Jake Sung    COLONOSCOPY  10/26/2007    Normal exam - Dr. Ryann Lima COLONOSCOPY  2019    COLONOSCOPY N/A 2019    COLORECTAL CANCER SCREENING, NOT HIGH RISK performed by Rashaad Haley MD at [address] Left 10/21/2013    Lesion excision-squamous papillomaDr Francesco    SKIN BIOPSY  1960's    Moles removed - face, neck     Rehabilitation Diagnosis: R rib fx 4-7 and L patellar fx   Restrictions/Precautions: WBAT with knee immobilizer L LE, limit knee flexion    Subjective: Pt reports she has not been out of bed. She reports most of her pain is in her ribs.    Initial Pain level: 4/10    Goals:                   Short term goals  Time Frame for Short term goals: 1 week  Short term goal 1: Pt will perform bed mobility SBAand flat HOB  Short term goal 2: Pt will perform STS transfer to/from bed, chair, toilet SBA and LRAD  Short term goal 3: Pt will ambulate 50' CGA and LRAD               Plan of Care: Times per week: Mon- Sat 5+/wk            Current Treatment Recommendations: Strengthening,Balance Training,Functional Mobility Training,Transfer Training,Gait Training      Objective Findings:    Date:1/22/22 Date:  Date:  Date:  Date:    Bed mobility Supine to sit: min-mod A x1       Sit to stand transfer CGA       Stand to sit transfer Min A to get L LE out in front       Commode transfer Not performed       Standing tolerance Not performed. Ambulation Pt ambulated x2 feet pivot transfer to chair with CGA and RW       Stairs Not performed. Exercises:   Exercise/Equipment/Modalities  Date: 1/22/22 Date:  Date:  Date:    Ankle pumps 1x10 B LE      Quad set 1x10 5\" B LE      glute set 1x10 5\"                                                            Modality/intervention used:   [x] Therapeutic Exercise   [ ] Modalities:   [x] Therapeutic Activity     [ ] Ultrasound   [ ] Elec Stim   [ ] Gait Training     [ ] Cervical Traction  [ ] Lumbar Traction   [ ] Neuromuscular Re-education   [ ] Cold/hotpack  [ ] Iontophoresis   [ ] Instruction in HEP     [ ] Vasopneumatic   [ ] Manual Therapy   [ ] Aquatic Therapy     Other Therapeutic Activities:Pt educated on transfers and using the leg . Pt educated on WB status and HEP. Pt L knee swollen and PT placed ice on it prior to leaving. Pt reports she has not had any ice on it at all. Home Exercise Program/Education provided to patient: Pt educated to perform ankle pumps, quad set, and glute set daily about 4x/day. Manual Treatments: none    Communication with other providers:  PT spoke with aide regarding transfer. Adverse reactions to treatment:  None noted    Treatment/Activity Tolerance:   [ ] Patient limited by fatigue [ ] Patient limited by pain [ ] Patient limited by other medical complications [x] Patient tolerated therapy well  [ ] Other:     Post Tx Pain Rating:  3/10 pt reported her ribs felt better.      Safety Precautions: [ ] left in bed  [x] left in chair [x] call light within reach  [ ] bed alarm on  [ x personal alarm on  [ ] other staff present:    Plan:   [x] Continue per plan of care [ ] Dimitry Calvo current plan   [ ] Plan of care initiated [ ] Amelia Gomez pending MD visit [ ] Discharge     Plan for Next Session: ambulate farther, and begin standing balance training.      Time In:  2:35 pm  Time Out: 3:00 pm  Total Treatment Minutes: 25'  Billed Units: 1 therapeutic exercise, 1 therapeutic activity     Signed: Cyril Romberg, PT,DPT [id number]  1/22/2022, 3:03 PM

## 2022-01-23 PROCEDURE — 6360000002 HC RX W HCPCS: Performed by: NURSE PRACTITIONER

## 2022-01-23 PROCEDURE — 1200000002 HC SEMI PRIVATE SWING BED

## 2022-01-23 PROCEDURE — 6370000000 HC RX 637 (ALT 250 FOR IP): Performed by: NURSE PRACTITIONER

## 2022-01-23 RX ADMIN — CHOLECALCIFEROL TAB 125 MCG (5000 UNIT) 5000 UNITS: 125 TAB at 09:54

## 2022-01-23 RX ADMIN — PANTOPRAZOLE SODIUM 40 MG: 40 TABLET, DELAYED RELEASE ORAL at 06:14

## 2022-01-23 RX ADMIN — ZOLPIDEM TARTRATE 5 MG: 5 TABLET ORAL at 19:41

## 2022-01-23 RX ADMIN — ENOXAPARIN SODIUM 30 MG: 100 INJECTION SUBCUTANEOUS at 09:54

## 2022-01-23 RX ADMIN — POTASSIUM CHLORIDE 20 MEQ: 20 TABLET, EXTENDED RELEASE ORAL at 09:54

## 2022-01-23 RX ADMIN — LEVOTHYROXINE SODIUM 100 MCG: 100 TABLET ORAL at 06:14

## 2022-01-23 RX ADMIN — TRIAMTERENE AND HYDROCHLOROTHIAZIDE 1 TABLET: 37.5; 25 TABLET ORAL at 09:54

## 2022-01-23 RX ADMIN — TRAMADOL HYDROCHLORIDE 50 MG: 50 TABLET, COATED ORAL at 16:56

## 2022-01-23 RX ADMIN — TRAMADOL HYDROCHLORIDE 50 MG: 50 TABLET, COATED ORAL at 04:10

## 2022-01-23 ASSESSMENT — PAIN SCALES - GENERAL
PAINLEVEL_OUTOF10: 8
PAINLEVEL_OUTOF10: 3
PAINLEVEL_OUTOF10: 4
PAINLEVEL_OUTOF10: 0

## 2022-01-23 ASSESSMENT — PAIN DESCRIPTION - ONSET: ONSET: ON-GOING

## 2022-01-23 ASSESSMENT — PAIN DESCRIPTION - LOCATION: LOCATION: KNEE

## 2022-01-23 ASSESSMENT — PAIN DESCRIPTION - PROGRESSION
CLINICAL_PROGRESSION: GRADUALLY WORSENING

## 2022-01-23 ASSESSMENT — PAIN - FUNCTIONAL ASSESSMENT: PAIN_FUNCTIONAL_ASSESSMENT: PREVENTS OR INTERFERES SOME ACTIVE ACTIVITIES AND ADLS

## 2022-01-23 ASSESSMENT — PAIN DESCRIPTION - FREQUENCY: FREQUENCY: CONTINUOUS

## 2022-01-23 ASSESSMENT — PAIN DESCRIPTION - ORIENTATION: ORIENTATION: LEFT

## 2022-01-23 ASSESSMENT — PAIN DESCRIPTION - PAIN TYPE: TYPE: ACUTE PAIN

## 2022-01-23 ASSESSMENT — PAIN DESCRIPTION - DESCRIPTORS: DESCRIPTORS: ACHING
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
## 2022-01-23 NOTE — PLAN OF CARE
Bed alarm on, bed rails up times two, patient reeducated to request assistance before getting out of bed, and bed in lowest position to prevent falls.   Problem: Falls - Risk of:  Goal: Will remain free from falls  Description: Will remain free from falls  Outcome: Ongoing     Problem: Falls - Risk of:  Goal: Absence of physical injury  Description: Absence of physical injury  Outcome: Ongoing

## 2022-01-24 PROBLEM — R53.81 PHYSICAL DEBILITY: Status: ACTIVE | Noted: 2022-01-24

## 2022-01-24 PROCEDURE — 97166 OT EVAL MOD COMPLEX 45 MIN: CPT

## 2022-01-24 PROCEDURE — 6370000000 HC RX 637 (ALT 250 FOR IP): Performed by: NURSE PRACTITIONER

## 2022-01-24 PROCEDURE — 6360000002 HC RX W HCPCS: Performed by: NURSE PRACTITIONER

## 2022-01-24 PROCEDURE — 97530 THERAPEUTIC ACTIVITIES: CPT

## 2022-01-24 PROCEDURE — 97161 PT EVAL LOW COMPLEX 20 MIN: CPT

## 2022-01-24 PROCEDURE — 51798 US URINE CAPACITY MEASURE: CPT

## 2022-01-24 PROCEDURE — 1200000002 HC SEMI PRIVATE SWING BED

## 2022-01-24 PROCEDURE — 97110 THERAPEUTIC EXERCISES: CPT

## 2022-01-24 RX ADMIN — ZOLPIDEM TARTRATE 5 MG: 5 TABLET ORAL at 20:55

## 2022-01-24 RX ADMIN — POTASSIUM CHLORIDE 20 MEQ: 20 TABLET, EXTENDED RELEASE ORAL at 08:07

## 2022-01-24 RX ADMIN — LEVOTHYROXINE SODIUM 100 MCG: 100 TABLET ORAL at 05:57

## 2022-01-24 RX ADMIN — CHOLECALCIFEROL TAB 125 MCG (5000 UNIT) 5000 UNITS: 125 TAB at 08:07

## 2022-01-24 RX ADMIN — ENOXAPARIN SODIUM 30 MG: 100 INJECTION SUBCUTANEOUS at 08:06

## 2022-01-24 RX ADMIN — TRIAMTERENE AND HYDROCHLOROTHIAZIDE 1 TABLET: 37.5; 25 TABLET ORAL at 08:07

## 2022-01-24 RX ADMIN — TRAMADOL HYDROCHLORIDE 50 MG: 50 TABLET, COATED ORAL at 20:55

## 2022-01-24 RX ADMIN — PANTOPRAZOLE SODIUM 40 MG: 40 TABLET, DELAYED RELEASE ORAL at 05:57

## 2022-01-24 ASSESSMENT — PAIN DESCRIPTION - PROGRESSION
CLINICAL_PROGRESSION: GRADUALLY WORSENING
CLINICAL_PROGRESSION: RAPIDLY WORSENING
CLINICAL_PROGRESSION: GRADUALLY WORSENING

## 2022-01-24 ASSESSMENT — PAIN DESCRIPTION - DESCRIPTORS: DESCRIPTORS: ACHING;THROBBING

## 2022-01-24 ASSESSMENT — PAIN SCALES - GENERAL
PAINLEVEL_OUTOF10: 0
PAINLEVEL_OUTOF10: 7

## 2022-01-24 ASSESSMENT — PAIN DESCRIPTION - ORIENTATION: ORIENTATION: LEFT

## 2022-01-24 ASSESSMENT — PAIN DESCRIPTION - LOCATION: LOCATION: LEG

## 2022-01-24 ASSESSMENT — PAIN DESCRIPTION - PAIN TYPE: TYPE: ACUTE PAIN

## 2022-01-24 ASSESSMENT — PAIN DESCRIPTION - FREQUENCY: FREQUENCY: INTERMITTENT

## 2022-01-24 NOTE — PROGRESS NOTES
Pt seen by therapist to complete pt's leisure assessment for swing bed program.  Pt noted to not make eye contact with therapist throughout the assessment.  Pt expressed enjoyment in:  [employer]  Current Events  Car Rides  Television  Watching Sports  Movies  Card Games-Euchre  Music-Swing  Reading  People Watching  Quiet Time  Walking  Photography  Singing    Electronically signed by Luz Pike MA on 1/24/2022 at 2:12 PM
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
## 2022-01-24 NOTE — PLAN OF CARE
Problem: Falls - Risk of:  Goal: Will remain free from falls  Description: Will remain free from falls  1/23/2022 2101 by Warren Sandoval LPN  Outcome: Ongoing  1/23/2022 1658 by Ankita Carrizales RN  Outcome: Ongoing  Goal: Absence of physical injury  Description: Absence of physical injury  1/23/2022 2101 by Warren Sandoval LPN  Outcome: Ongoing  1/23/2022 1658 by Ankita Carrizales RN  Outcome: Ongoing     Problem: Pain:  Goal: Pain level will decrease  Description: Pain level will decrease  Outcome: Ongoing  Goal: Control of acute pain  Description: Control of acute pain  Outcome: Ongoing  Goal: Control of chronic pain  Description: Control of chronic pain  Outcome: Ongoing     Problem:  Activity:  Goal: Ability to tolerate increased activity will improve  Description: Ability to tolerate increased activity will improve  Outcome: Ongoing
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
## 2022-01-24 NOTE — PLAN OF CARE
Problem: Falls - Risk of:  Goal: Will remain free from falls  Description: Will remain free from falls  1/24/2022 0906 by Juan A Murphy RN  Outcome: Ongoing  1/23/2022 2101 by Santhosh Solis LPN  Outcome: Ongoing  Goal: Absence of physical injury  Description: Absence of physical injury  1/24/2022 0906 by Juan A Murphy RN  Outcome: Ongoing  1/23/2022 2101 by Santhosh Solis LPN  Outcome: Ongoing     Problem: Pain:  Goal: Pain level will decrease  Description: Pain level will decrease  1/24/2022 0906 by Juan A Murphy RN  Outcome: Ongoing  1/23/2022 2101 by Santhosh Solis LPN  Outcome: Ongoing  Goal: Control of acute pain  Description: Control of acute pain  1/24/2022 0906 by Juan A Murphy RN  Outcome: Ongoing  1/23/2022 2101 by Santhosh Solis LPN  Outcome: Ongoing  Goal: Control of chronic pain  Description: Control of chronic pain  1/24/2022 0906 by Juan A Murphy RN  Outcome: Ongoing  1/23/2022 2101 by Santhosh Solis LPN  Outcome: Ongoing     Problem:  Activity:  Goal: Ability to tolerate increased activity will improve  Description: Ability to tolerate increased activity will improve  1/24/2022 0906 by Juan A Murphy RN  Outcome: Ongoing  1/23/2022 2101 by Santhosh Solis LPN  Outcome: Ongoing

## 2022-01-25 LAB
ANION GAP SERPL CALCULATED.3IONS-SCNC: 13 MMOL/L (ref 4–16)
BUN BLDV-MCNC: 36 MG/DL (ref 6–23)
CALCIUM SERPL-MCNC: 8.5 MG/DL (ref 8.3–10.6)
CHLORIDE BLD-SCNC: 94 MMOL/L (ref 99–110)
CO2: 27 MMOL/L (ref 21–32)
CREAT SERPL-MCNC: 1.2 MG/DL (ref 0.6–1.1)
GFR AFRICAN AMERICAN: 53 ML/MIN/1.73M2
GFR NON-AFRICAN AMERICAN: 44 ML/MIN/1.73M2
GLUCOSE BLD-MCNC: 101 MG/DL (ref 70–99)
HCT VFR BLD CALC: 29.2 % (ref 37–47)
HEMOGLOBIN: 9.2 GM/DL (ref 12.5–16)
MCH RBC QN AUTO: 29.6 PG (ref 27–31)
MCHC RBC AUTO-ENTMCNC: 31.5 % (ref 32–36)
MCV RBC AUTO: 93.9 FL (ref 78–100)
PDW BLD-RTO: 12.4 % (ref 11.7–14.9)
PLATELET # BLD: 192 K/CU MM (ref 140–440)
PMV BLD AUTO: 11.4 FL (ref 7.5–11.1)
POTASSIUM SERPL-SCNC: 4.1 MMOL/L (ref 3.5–5.1)
RBC # BLD: 3.11 M/CU MM (ref 4.2–5.4)
SODIUM BLD-SCNC: 134 MMOL/L (ref 135–145)
WBC # BLD: 14.9 K/CU MM (ref 4–10.5)

## 2022-01-25 PROCEDURE — 6370000000 HC RX 637 (ALT 250 FOR IP): Performed by: NURSE PRACTITIONER

## 2022-01-25 PROCEDURE — 6360000002 HC RX W HCPCS: Performed by: NURSE PRACTITIONER

## 2022-01-25 PROCEDURE — 97110 THERAPEUTIC EXERCISES: CPT

## 2022-01-25 PROCEDURE — 1200000002 HC SEMI PRIVATE SWING BED

## 2022-01-25 PROCEDURE — 97535 SELF CARE MNGMENT TRAINING: CPT

## 2022-01-25 PROCEDURE — 80048 BASIC METABOLIC PNL TOTAL CA: CPT

## 2022-01-25 PROCEDURE — 97116 GAIT TRAINING THERAPY: CPT

## 2022-01-25 PROCEDURE — 97530 THERAPEUTIC ACTIVITIES: CPT

## 2022-01-25 PROCEDURE — 85027 COMPLETE CBC AUTOMATED: CPT

## 2022-01-25 RX ADMIN — PANTOPRAZOLE SODIUM 40 MG: 40 TABLET, DELAYED RELEASE ORAL at 06:39

## 2022-01-25 RX ADMIN — POTASSIUM CHLORIDE 20 MEQ: 20 TABLET, EXTENDED RELEASE ORAL at 08:45

## 2022-01-25 RX ADMIN — CHOLECALCIFEROL TAB 125 MCG (5000 UNIT) 5000 UNITS: 125 TAB at 08:46

## 2022-01-25 RX ADMIN — TRIAMTERENE AND HYDROCHLOROTHIAZIDE 1 TABLET: 37.5; 25 TABLET ORAL at 08:45

## 2022-01-25 RX ADMIN — ZOLPIDEM TARTRATE 5 MG: 5 TABLET ORAL at 21:44

## 2022-01-25 RX ADMIN — LEVOTHYROXINE SODIUM 100 MCG: 100 TABLET ORAL at 06:38

## 2022-01-25 RX ADMIN — ENOXAPARIN SODIUM 30 MG: 100 INJECTION SUBCUTANEOUS at 08:45

## 2022-01-25 RX ADMIN — TRAMADOL HYDROCHLORIDE 50 MG: 50 TABLET, COATED ORAL at 14:43

## 2022-01-25 RX ADMIN — TRAMADOL HYDROCHLORIDE 50 MG: 50 TABLET, COATED ORAL at 22:43

## 2022-01-25 ASSESSMENT — PAIN - FUNCTIONAL ASSESSMENT: PAIN_FUNCTIONAL_ASSESSMENT: ACTIVITIES ARE NOT PREVENTED

## 2022-01-25 ASSESSMENT — PAIN DESCRIPTION - FREQUENCY: FREQUENCY: INTERMITTENT

## 2022-01-25 ASSESSMENT — PAIN DESCRIPTION - PAIN TYPE: TYPE: ACUTE PAIN

## 2022-01-25 ASSESSMENT — PAIN DESCRIPTION - ONSET: ONSET: ON-GOING

## 2022-01-25 ASSESSMENT — PAIN DESCRIPTION - PROGRESSION: CLINICAL_PROGRESSION: NOT CHANGED

## 2022-01-25 ASSESSMENT — PAIN SCALES - GENERAL
PAINLEVEL_OUTOF10: 0
PAINLEVEL_OUTOF10: 7
PAINLEVEL_OUTOF10: 3

## 2022-01-25 ASSESSMENT — PAIN DESCRIPTION - ORIENTATION: ORIENTATION: LEFT

## 2022-01-25 ASSESSMENT — PAIN DESCRIPTION - LOCATION: LOCATION: LEG

## 2022-01-25 ASSESSMENT — PAIN DESCRIPTION - DESCRIPTORS: DESCRIPTORS: ACHING
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
## 2022-01-25 NOTE — PLAN OF CARE
Problem: Falls - Risk of:  Goal: Will remain free from falls  Description: Will remain free from falls  1/24/2022 2133 by Lg Castro RN  Outcome: Ongoing  1/24/2022 0906 by Munir Callaway RN  Outcome: Ongoing  Goal: Absence of physical injury  Description: Absence of physical injury  1/24/2022 2133 by Lg Castro RN  Outcome: Ongoing  1/24/2022 0906 by Munir Callaway RN  Outcome: Ongoing     Problem: Pain:  Goal: Pain level will decrease  Description: Pain level will decrease  1/24/2022 2133 by Lg Castro RN  Outcome: Ongoing  1/24/2022 0906 by Munir Callaway RN  Outcome: Ongoing  Goal: Control of acute pain  Description: Control of acute pain  1/24/2022 2133 by Lg Castro RN  Outcome: Ongoing  1/24/2022 0906 by Munir Callaway RN  Outcome: Ongoing  Goal: Control of chronic pain  Description: Control of chronic pain  1/24/2022 2133 by Lg Castro RN  Outcome: Ongoing  1/24/2022 0906 by Munir Callaway RN  Outcome: Ongoing     Problem:  Activity:  Goal: Ability to tolerate increased activity will improve  Description: Ability to tolerate increased activity will improve  1/24/2022 2133 by Lg Castro RN  Outcome: Ongoing  1/24/2022 0906 by Munir Callaway RN  Outcome: Ongoing

## 2022-01-25 NOTE — PROGRESS NOTES
Occupational Therapy    Occupational Therapy Treatment Note  Name: Etienne Ledesma MRN: [id number] :   1945   Date:  2022   Admission Date: 2022 Room:  [address] [place] [address]   Restrictions/Precautions:  Restrictions/Precautions  Restrictions/Precautions: Weight Bearing  Required Braces or Orthoses?: Yes     Communication with other providers:   Cleared for treatment by  RN. Subjective:  Patient states:  \"I have a lot of pain in my right ribs\"  Pain:   Location, Type, Intensity (0/10 to 10/10): No pain    Objective:    Observation:  Pt alert and oriented      Treatment, including education:  Transfers  Supine to sit :Mod A for trunk  Sit to supine Mod A for B legs  Scooting :SUp  Sit to stand :Min A  Stand to sit :[facility] Training:   Activities performed today included the following: Toileting  CGA with toilet hygiene and clothing management. Grooming  SBA to use mouthwash. Therapeutic Activity Training: Pt completed functional mobility with RW x 10' and 40' x 2 with CGA. Pt stood x 6 min with CGA with RW to facilitate increased endurance/strength for ADL tasks and transfers. Safety Measures: Gait belt used, up in chair,  Pull/Bed Alarm activated and call light left in reach        Assessment / Impression:        Patient's tolerance of treatment: Good   Adverse Reaction: None  Significant change in status and impact:  None  Barriers to improvement: Dec strength and endurance. Plan for Next Session:    Continue with POC.     Time in:  1045  1500  Time out:  1115 1525  Total treatment time:  55  Billed Units: 2 ADL, 2 TA  Electronically signed by:    Johny Sr [address]    2022, 4:03 PM    Previously filed values:  Patient Goals   Patient goals : to be able to go home and do things for herself  Short term goals  Time Frame for Short term goals: until discharge  Short term goal 1: Pt will be MI for functional adl transfers to chair, toilet/BSC, and bed following her WB precautions for LLE( wt bearing only when brace is on), using good walker safety, w/o LOB or falls  Short term goal 2: Pt will be MI for UB/LB ADLs using necessary a.e.   Short term goal 3: Pt will be I/MI toileting  Short term goal 4: Pt will be min vc for BUE strengthening to improve strength and endurance for transfers

## 2022-01-26 PROCEDURE — 97110 THERAPEUTIC EXERCISES: CPT

## 2022-01-26 PROCEDURE — 97530 THERAPEUTIC ACTIVITIES: CPT

## 2022-01-26 PROCEDURE — 94761 N-INVAS EAR/PLS OXIMETRY MLT: CPT

## 2022-01-26 PROCEDURE — 6360000002 HC RX W HCPCS: Performed by: NURSE PRACTITIONER

## 2022-01-26 PROCEDURE — 6370000000 HC RX 637 (ALT 250 FOR IP): Performed by: NURSE PRACTITIONER

## 2022-01-26 PROCEDURE — 1200000002 HC SEMI PRIVATE SWING BED

## 2022-01-26 PROCEDURE — 97535 SELF CARE MNGMENT TRAINING: CPT

## 2022-01-26 RX ADMIN — ZOLPIDEM TARTRATE 5 MG: 5 TABLET ORAL at 21:15

## 2022-01-26 RX ADMIN — TRIAMTERENE AND HYDROCHLOROTHIAZIDE 1 TABLET: 37.5; 25 TABLET ORAL at 08:30

## 2022-01-26 RX ADMIN — PANTOPRAZOLE SODIUM 40 MG: 40 TABLET, DELAYED RELEASE ORAL at 06:42

## 2022-01-26 RX ADMIN — CHOLECALCIFEROL TAB 125 MCG (5000 UNIT) 5000 UNITS: 125 TAB at 08:31

## 2022-01-26 RX ADMIN — LEVOTHYROXINE SODIUM 100 MCG: 100 TABLET ORAL at 06:42

## 2022-01-26 RX ADMIN — TRAMADOL HYDROCHLORIDE 50 MG: 50 TABLET, COATED ORAL at 19:34

## 2022-01-26 RX ADMIN — ENOXAPARIN SODIUM 30 MG: 100 INJECTION SUBCUTANEOUS at 08:31

## 2022-01-26 RX ADMIN — POTASSIUM CHLORIDE 20 MEQ: 20 TABLET, EXTENDED RELEASE ORAL at 08:30

## 2022-01-26 RX ADMIN — POLYETHYLENE GLYCOL 3350 17 G: 17 POWDER, FOR SOLUTION ORAL at 08:40

## 2022-01-26 ASSESSMENT — PAIN DESCRIPTION - PAIN TYPE
TYPE: ACUTE PAIN
TYPE: ACUTE PAIN

## 2022-01-26 ASSESSMENT — PAIN - FUNCTIONAL ASSESSMENT
PAIN_FUNCTIONAL_ASSESSMENT: ACTIVITIES ARE NOT PREVENTED
PAIN_FUNCTIONAL_ASSESSMENT: ACTIVITIES ARE NOT PREVENTED

## 2022-01-26 ASSESSMENT — PAIN SCALES - GENERAL
PAINLEVEL_OUTOF10: 5
PAINLEVEL_OUTOF10: 2

## 2022-01-26 ASSESSMENT — PAIN DESCRIPTION - FREQUENCY
FREQUENCY: INTERMITTENT
FREQUENCY: INTERMITTENT

## 2022-01-26 ASSESSMENT — PAIN DESCRIPTION - PROGRESSION
CLINICAL_PROGRESSION: NOT CHANGED
CLINICAL_PROGRESSION: NOT CHANGED

## 2022-01-26 ASSESSMENT — PAIN DESCRIPTION - LOCATION
LOCATION: LEG
LOCATION: LEG

## 2022-01-26 ASSESSMENT — PAIN DESCRIPTION - ONSET
ONSET: GRADUAL
ONSET: ON-GOING

## 2022-01-26 ASSESSMENT — PAIN DESCRIPTION - DESCRIPTORS
DESCRIPTORS: ACHING
DESCRIPTORS: ACHING

## 2022-01-26 ASSESSMENT — PAIN DESCRIPTION - ORIENTATION
ORIENTATION: LEFT
ORIENTATION: LEFT

## 2022-01-26 NOTE — CARE COORDINATION
CM submitted updated clinical documentation to [facility] to request an extension to the patient's stay in the swing bed program.  CM will follow.

## 2022-01-26 NOTE — FLOWSHEET NOTE
demonstrate Good standing balance. Plan of Care:             Times per week: Mon-Sun 5x/wk            Current Treatment Recommendations: Strengthening,IADL Training,Home Exercise Program,ROM,Safety Education & Monna Fennel Training,Patient/Caregiver Education & Training,Functional Mobility Training,Equipment Evaluation, Education, & procurement,Transfer Training,Gait Training,ADL/Self-care Training,Positioning      Objective Findings:    Date: 1/25/2022 Date:   1-26-22 Date:  Date:  Date:    Bed mobility CGA-Min A for RLE Declined getting up right now      Sit to stand transfer CGA-Min A of 1  VC's for hand placement Just walked with that other girl, maybe later.       Stand to sit transfer CGA-Min A of 1  VC's for hand placement x      Commode transfer Not performed x      Standing tolerance During amb x      Ambulation Amb w/RW and CGA of 1 25ftx2 x      Stairs Not performed x        Exercises:   Exercise/Equipment/Modalities  Date: 1/25/2022 Date:   1-26-22  Knee brace on Date:  Date:    AP 20x B x20 henry     Glut sets 10x x20     Quad sets 10x x20 tactile cues to facilitate     Heel slides R 20x AROM RLE     Hip AB/AD  L 10x w/A  R 10 AROM RLE, AAROM LLE x10 ea     SLR 10x B AROM RLE, AAROM LLE x10 ea                                     Modality/intervention used:   [x ] Therapeutic Exercise   [ ] Modalities:   [ ] Therapeutic Activity     [ ] Ultrasound   [ ] Ronan Bunde   [ ] Gait Training     [ ] Cervical Traction  [ ] Lumbar Traction   [ ] Neuromuscular Re-education   [ ] Cold/hotpack  [ ] Iontophoresis   [ ] Instruction in HEP     [ ] Starlette Bear   [ ] Manual Therapy   [ ] Aquatic Therapy     Other Therapeutic Activities:x    Home Exercise Program/Education provided to patient:     Manual Treatments: x    Communication with other providers:   Okay to see per nursing    Adverse reactions to treatment: pain    Treatment/Activity Tolerance:   [ ] Patient limited by fatigue [x ] Patient limited by pain [ ] Patient limited by other medical complications [ ] Patient tolerated therapy well  [ ] Other:     Post Tx Pain Rating:does not rate /10     Safety Precautions:   [ ] left in bed  [x ] left in chair [x ] call light within reach  [ ] bed alarm on  [x ] personal alarm on  [ ] other staff present:    Plan:   [x ] Continue per plan of care [ ] Devaughn Williamson current plan   [ ] Plan of care initiated [ ] Hold pending MD visit [ ] Discharge     Plan for Next Session:     Time In: 7032  Time Out: 1230  Total Treatment Minutes: 30  Billed Units:2/30= 2 ana Oglesby, RANDAL,[id number] 1/26/2022, 12:06 PM

## 2022-01-26 NOTE — PROGRESS NOTES
Occupational Therapy    Occupational Therapy Treatment Note  Name: Huber Brady MRN: [id number] :   1945   Date:  2022   Admission Date: 2022 Room:     Restrictions/Precautions:  Restrictions/Precautions  Restrictions/Precautions: Weight Bearing  Required Braces or Orthoses?: Yes     Communication with other providers:   Cleared for treatment by  RN. Subjective:  Patient states:  \"I have some pain in my left ribs\" Patient attempting to use bed pan, stating \"I just want to pee\"  Pain:   Location, Type, Intensity (0/10 to 10/10):  1/10 L ribs    Objective:    Observation: Patient supine in bed, HOB elevated upon arrival. Pt alert and oriented. Treatment, including education:  Transfers  Supine to sit :Mod A for trunk and LLE  Scooting : sup  Sit to stand : CGA  Stand to sit : SBA  Toilet: min A. Patient attempted to push up on FWW. Educated patient to push up from grab bars/sink    Self Care Training:   Activities performed today included the following: Toileting: Sup with toilet hygiene and clothing management    Grooming: SBA for tooth brushing and hand washing while standing at sink using FWW. Patient required mod vc's to complete tasks. Patient leaning on sink intermittently due to fatigue, min vc's to maintain upright posture. Therapeutic Activity Training: Pt completed functional mobility with RW x 23. 6'  CGA. Patient stood x 15 min SBA/CGA with FWW to facilitate increased endurance/strength for ADL tasks and transfers    Safety Measures: LLE knee immobilizer worn throughout treatment session, gait belt used, up in recliner,  Pull/chair Alarm activated and call light left in reach    Assessment / Impression:        Patient's tolerance of treatment: Good   Adverse Reaction: None  Significant change in status and impact:  None  Barriers to improvement: Dec strength and endurance. Plan for Next Session:    Continue with POC.     Time in:  1120  Time out: 1150   Total treatment time:  30  Billed Units: 1 ADL, 1 TA  Electronically signed by:    Zach Harry, XIAO, OTR/L [id number]  1/26/2022, 11:27 AM    Previously filed values:  Patient Goals   Patient goals : to be able to go home and do things for herself  Short term goals  Time Frame for Short term goals: until discharge  Short term goal 1: Pt will be MI for functional adl transfers to chair, toilet/BSC, and bed following her WB precautions for LLE( wt bearing only when brace is on), using good walker safety, w/o LOB or falls  Short term goal 2: Pt will be MI for UB/LB ADLs using necessary a.e.   Short term goal 3: Pt will be I/MI toileting  Short term goal 4: Pt will be min vc for BUE strengthening to improve strength and endurance for transfers

## 2022-01-26 NOTE — PROGRESS NOTES
Attempted 1:1 session. Pt utilizing the restroom. Therapist will attempt again at a later time.      Electronically signed by MARTITA Ortiz MA on 1/26/2022 at 3:09 PM

## 2022-01-26 NOTE — PLAN OF CARE
Discussed pain level and when to ask for PRN medications. Problem: Falls - Risk of:  Goal: Will remain free from falls  Description: Will remain free from falls  1/26/2022 0837 by Lisa Senior RN  Outcome: Ongoing  1/25/2022 2102 by Judith Leon RN  Outcome: Ongoing  Goal: Absence of physical injury  Description: Absence of physical injury  1/26/2022 0837 by Lisa Senior RN  Outcome: Ongoing  1/25/2022 2102 by Judith Leon RN  Outcome: Ongoing     Problem: Pain:  Goal: Pain level will decrease  Description: Pain level will decrease  1/26/2022 0837 by Lisa Senior RN  Outcome: Ongoing  1/25/2022 2102 by Judith Leon RN  Outcome: Ongoing  Goal: Control of acute pain  Description: Control of acute pain  1/26/2022 0837 by Lisa Senior RN  Outcome: Ongoing  1/25/2022 2102 by Jduith Leon RN  Outcome: Ongoing  Goal: Control of chronic pain  Description: Control of chronic pain  1/26/2022 0837 by Lisa Senior RN  Outcome: Ongoing  1/25/2022 2102 by Judith Leon RN  Outcome: Ongoing     Problem:  Activity:  Goal: Ability to tolerate increased activity will improve  Description: Ability to tolerate increased activity will improve  1/26/2022 0837 by Lisa Senior RN  Outcome: Ongoing  1/25/2022 2102 by Judith Leon RN  Outcome: Ongoing

## 2022-01-27 PROCEDURE — 94761 N-INVAS EAR/PLS OXIMETRY MLT: CPT

## 2022-01-27 PROCEDURE — 97116 GAIT TRAINING THERAPY: CPT

## 2022-01-27 PROCEDURE — 6360000002 HC RX W HCPCS: Performed by: NURSE PRACTITIONER

## 2022-01-27 PROCEDURE — 97535 SELF CARE MNGMENT TRAINING: CPT

## 2022-01-27 PROCEDURE — 6370000000 HC RX 637 (ALT 250 FOR IP): Performed by: NURSE PRACTITIONER

## 2022-01-27 PROCEDURE — 97110 THERAPEUTIC EXERCISES: CPT

## 2022-01-27 PROCEDURE — 97530 THERAPEUTIC ACTIVITIES: CPT

## 2022-01-27 PROCEDURE — 1200000002 HC SEMI PRIVATE SWING BED

## 2022-01-27 RX ADMIN — LEVOTHYROXINE SODIUM 100 MCG: 100 TABLET ORAL at 06:36

## 2022-01-27 RX ADMIN — TRAMADOL HYDROCHLORIDE 50 MG: 50 TABLET, COATED ORAL at 07:19

## 2022-01-27 RX ADMIN — TRAMADOL HYDROCHLORIDE 50 MG: 50 TABLET, COATED ORAL at 18:54

## 2022-01-27 RX ADMIN — ENOXAPARIN SODIUM 30 MG: 100 INJECTION SUBCUTANEOUS at 08:16

## 2022-01-27 RX ADMIN — TRIAMTERENE AND HYDROCHLOROTHIAZIDE 1 TABLET: 37.5; 25 TABLET ORAL at 08:15

## 2022-01-27 RX ADMIN — POTASSIUM CHLORIDE 20 MEQ: 20 TABLET, EXTENDED RELEASE ORAL at 08:16

## 2022-01-27 RX ADMIN — ZOLPIDEM TARTRATE 5 MG: 5 TABLET ORAL at 20:30

## 2022-01-27 RX ADMIN — CHOLECALCIFEROL TAB 125 MCG (5000 UNIT) 5000 UNITS: 125 TAB at 08:16

## 2022-01-27 RX ADMIN — PANTOPRAZOLE SODIUM 40 MG: 40 TABLET, DELAYED RELEASE ORAL at 06:36

## 2022-01-27 RX ADMIN — ACETAMINOPHEN 650 MG: 325 TABLET ORAL at 20:30

## 2022-01-27 ASSESSMENT — PAIN DESCRIPTION - DESCRIPTORS
DESCRIPTORS: ACHING

## 2022-01-27 ASSESSMENT — PAIN SCALES - GENERAL
PAINLEVEL_OUTOF10: 10
PAINLEVEL_OUTOF10: 3
PAINLEVEL_OUTOF10: 5
PAINLEVEL_OUTOF10: 10
PAINLEVEL_OUTOF10: 3

## 2022-01-27 ASSESSMENT — PAIN DESCRIPTION - FREQUENCY
FREQUENCY: CONTINUOUS

## 2022-01-27 ASSESSMENT — PAIN DESCRIPTION - LOCATION
LOCATION: LEG

## 2022-01-27 ASSESSMENT — PAIN DESCRIPTION - PROGRESSION
CLINICAL_PROGRESSION: NOT CHANGED
CLINICAL_PROGRESSION: GRADUALLY WORSENING
CLINICAL_PROGRESSION: NOT CHANGED
CLINICAL_PROGRESSION: GRADUALLY WORSENING

## 2022-01-27 ASSESSMENT — PAIN DESCRIPTION - ONSET
ONSET: ON-GOING

## 2022-01-27 ASSESSMENT — PAIN DESCRIPTION - ORIENTATION
ORIENTATION: LEFT

## 2022-01-27 ASSESSMENT — PAIN DESCRIPTION - PAIN TYPE
TYPE: ACUTE PAIN

## 2022-01-27 ASSESSMENT — PAIN - FUNCTIONAL ASSESSMENT
PAIN_FUNCTIONAL_ASSESSMENT: PREVENTS OR INTERFERES SOME ACTIVE ACTIVITIES AND ADLS
PAIN_FUNCTIONAL_ASSESSMENT: PREVENTS OR INTERFERES SOME ACTIVE ACTIVITIES AND ADLS
PAIN_FUNCTIONAL_ASSESSMENT: ACTIVITIES ARE NOT PREVENTED
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
## 2022-01-27 NOTE — FLOWSHEET NOTE
Physical Therapy Treatment Note   Date: 2022 Room: [unfilled]   Name: Hannah Bermudez : 1945   MRN: [id number] Admission Date:2022    Primary Problem:   Past Medical History:   Diagnosis Date    Arm fracture, left 2019    Casted - no surgery - Dr. 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 pylori infection 1996    S/P Biaxin and Prilosec    Hiatal hernia 1994    with reflux by UGI    Hyperlipidemia     Hypertension     Hypothyroidism 1's    MDRO (multiple drug resistant organisms) resistance     Nasal passages    Osteoporosis     Smoking hx      Past Surgical History:   Procedure Laterality Date    CHOLECYSTECTOMY, LAPAROSCOPIC  2018    Dr Rona Aguirre    COLONOSCOPY  10/26/2007    Normal exam - Dr. Sherry Espinosa COLONOSCOPY  2019    COLONOSCOPY N/A 2019    COLORECTAL CANCER SCREENING, NOT HIGH RISK performed by Tammy Griggs MD at [address] Left 10/21/2013    Lesion excision-squamous papillomaDr Francesco    SKIN BIOPSY  1960's    Moles removed - face, neck     Rehabilitation Diagnosis: R rib fx 4-7 and L patellar fx   Restrictions/Precautions: WBAT with knee immobilizer L LE, limit knee flexion    Subjective: I need to pee but I can't go. My leg hurts worse today than the entire time I've nee here. Initial Pain level:  0 at rest , 6-7/10 with gait,     Goals:                   Short term goals  Time Frame for Short term goals: 1 week or until discharge  Short term goal 1: Pt will demonstrate bed mobility Mod I  Short term goal 2: Pt will demonstrate ambulation 100ft supervision with RW.   Short term goal 3: Pt will demonstrate transfers Mod I.  Short term goal 4: Pt will demonstrate Good standing balance. Plan of Care:             Times per week: Mon-Sun 5x/wk            Current Treatment Recommendations: Strengthening,IADL Training,Home Exercise Program,ROM,Safety Education & Eileen Snuffer Training,Patient/Caregiver Education & Training,Functional Mobility Training,Equipment Evaluation, Education, & procurement,Transfer Training,Gait Training,ADL/Self-care Training,Positioning      Objective Findings:    Date: 1/25/2022 Date:   1-26-22 Date:   1-27-22 Date:  Date:    Bed mobility CGA-Min A for RLE Declined getting up right now Min assist LLE     Sit to stand transfer CGA-Min A of 1  VC's for hand placement Just walked with that other girl, maybe later. Cg      Stand to sit transfer CGA-Min A of 1  VC's for hand placement x cg     Commode transfer Not performed x Cg, procured BSC for next attempt      Standing tolerance During amb x 2-3 mins with gait and transfers     Ambulation Amb w/RW and CGA of 1 25ftx2 x Gait training with RW 4+12 ft with cg. Step to pattern. Brace on.      Stairs Not performed x x       Exercises:   Exercise/Equipment/Modalities  Date: 1/25/2022 Date:   1-26-22  Knee brace on Date:   1-27-22 Date:    AP 20x B x20 henry x10    Glut sets 10x x20 x10    Quad sets 10x x20 tactile cues to facilitate X 10 with cues    Heel slides R 20x AROM RLE     Hip AB/AD  L 10x w/A  R 10 AROM RLE, AAROM LLE x10 ea     SLR 10x B AROM RLE, AAROM LLE x10 ea AAROM LLE x10                                    Modality/intervention used:   [x ] Therapeutic Exercise   [ ] Modalities:   [x ] Therapeutic Activity     [ ] Ultrasound   [ ] Elec Stim   [x ] Gait Training     [ ] Cervical Traction  [ ] Lumbar Traction   [ ] Neuromuscular Re-education   [ ] Cold/hotpack  [ ] Iontophoresis   [ ] Instruction in HEP     [ ] Meryl Bender   [ ] Manual Therapy   [ ] Aquatic Therapy     Other Therapeutic Activities:x    Home Exercise Program/Education provided to patient: isometrics and ankle pumps    Manual Treatments: x    Communication with other providers:   Okay to see per nursing, notified of inability to urinate, nursing aware. Notified not able to swallow potassium pill.     Adverse reactions to treatment: pain    Treatment/Activity Tolerance:   [ ] Patient limited by fatigue [x ] Patient limited by pain [ ] Patient limited by other medical complications [ ] Patient tolerated therapy well  [ ] Other:     Post Tx Pain Rating:does not rate /10     Safety Precautions:   [ ] left in bed  [x ] left in chair [x ] call light within reach  [ ] bed alarm on  [x ] personal alarm on  [ ] other staff present:    Plan:   [x ] Continue per plan of care [ ] Darrell Acosta current plan   [ ] Plan of care initiated [ ] Hold pending MD visit [ ] Discharge     Plan for Next Session:     Time In: 65  Time Out: 075  Total Treatment Minutes: 48  Billed Units:4/53=1gt, 1te, 2ta    [facility] Melinda, [id number] 1/27/2022, 9:02 AM

## 2022-01-27 NOTE — PROGRESS NOTES
Occupational Therapy    Occupational Therapy Treatment Note  Name: Thai Yoder MRN: [id number] :   1945   Date:  2022   Admission Date: 2022 Room:  [address] [place] [address]   Restrictions/Precautions:  Restrictions/Precautions  Restrictions/Precautions: Weight Bearing  Required Braces or Orthoses?: Yes     Communication with other providers:   Cleared for treatment by RN. Subjective:  Patient states:  \"I don't want to stand up\"  Pain:   Location, Type, Intensity (0/10 to 10/10): \"I don't have any pain at the moment she just gave me pain meds\"    Objective:    Observation:  Pt alert and oriented up in chair. Self Care Training:   Activities performed today included the following:      Grooming  Mod to wash face and brush teeth. Bathing   Min A for B LEs, pt washed miguel area sitting in chair and declined to stand to wash buttocks    UB Dress  Donned gown    LB Dress  Max A to don B socks. Therapeutic Exercise:        Therapeutic Activity Training:         Safety Measures: Gait belt used, Left in bed, Pull/Bed Alarm activated and call light left in reach        Assessment / Impression:        Patient's tolerance of treatment: Good   Adverse Reaction: None  Significant change in status and impact:  None  Barriers to improvement: dec strength and endurance    Plan for Next Session:    Continue with POC.     Time in:  1015  Time out:  1055  Total treatment time:  40  Billed Units: 3 ADL  Electronically signed by:    Fernanda Marquez, [address]    2022, 10:59 AM    Previously filed values:  Patient Goals   Patient goals : to be able to go home and do things for herself  Short term goals  Time Frame for Short term goals: until discharge  Short term goal 1: Pt will be MI for functional adl transfers to chair, toilet/BSC, and bed following her WB precautions for LLE( wt bearing only when brace is on), using good walker safety, w/o LOB or falls  Short term goal 2: Pt will be MI for UB/LB ADLs using necessary a.e.   Short term goal 3: Pt will be I/MI toileting  Short term goal 4: Pt will be min vc for BUE strengthening to improve strength and endurance for transfers

## 2022-01-27 NOTE — PLAN OF CARE
Patient continues to work with therapy, get up to bathroom, and up to chair during shift. Problem:  Activity:  Goal: Ability to tolerate increased activity will improve  Description: Ability to tolerate increased activity will improve  Outcome: Ongoing

## 2022-01-27 NOTE — PATIENT CARE CONFERENCE
SWING BED WEEKLY TEAM SHEET      Roro Coelho   1/27/2022             WEEK# 1    PHYSICAL THERAPY       Ambulation: Distance:  25 ft    Device:RW     Assist: CGA     Wt bearing: WBAT L     W/C skills:  Propulsion:na       W/C parts management:     Stairs:  Amount/size: na      Assist:        Device:      Pain:     Transfers:   Sit to stand: CGA   Stand to sit:     CGA          Bed Mobility:  Rolls right:     Rolls left:     Positioning:     Supine to sit: Min A      Sit to supine: Min A         Strength/ROM:      Balance:     Static sitting    [] P  [] F-   [] F    [] F+    [x] G               Dynamic Sitting           [] P  [] F-   [] F    [] F+    [x] G                     Static standing            [] P  [] F-   [x] F    [] F+    [] G   [x]  With  [] Without device Dynamic standing       [] P  [] F-   [x] F    [] F+    [] G   []  With  [] Without device  (P= poor   F= fair   G= good)    Issues to be resolved before discharge weakness    Goals of previous week  [x] 1st team   [] met   [] partially met    [] not met                                          Why the goals were not met   Goals:   Time Frame for Short term goals: 1 week  Short term goal 1: Pt will perform bed mobility SBAand flat HOB  Short term goal 2: Pt will perform STS transfer to/from bed, chair, toilet SBA and LRAD  Short term goal 3: Pt will ambulate 48' CGA and LRAD  Updated Goals:        Physical Therapy Signature:  Jackie Castellanos, PT

## 2022-01-28 LAB
ANION GAP SERPL CALCULATED.3IONS-SCNC: 12 MMOL/L (ref 4–16)
BASOPHILS ABSOLUTE: 0.1 K/CU MM
BASOPHILS RELATIVE PERCENT: 0.8 % (ref 0–1)
BUN BLDV-MCNC: 47 MG/DL (ref 6–23)
CALCIUM SERPL-MCNC: 8.7 MG/DL (ref 8.3–10.6)
CHLORIDE BLD-SCNC: 96 MMOL/L (ref 99–110)
CO2: 27 MMOL/L (ref 21–32)
CREAT SERPL-MCNC: 1.4 MG/DL (ref 0.6–1.1)
DIFFERENTIAL TYPE: ABNORMAL
EOSINOPHILS ABSOLUTE: 0.3 K/CU MM
EOSINOPHILS RELATIVE PERCENT: 1.8 % (ref 0–3)
GFR AFRICAN AMERICAN: 44 ML/MIN/1.73M2
GFR NON-AFRICAN AMERICAN: 36 ML/MIN/1.73M2
GLUCOSE BLD-MCNC: 97 MG/DL (ref 70–99)
HCT VFR BLD CALC: 31.3 % (ref 37–47)
HEMOGLOBIN: 9.6 GM/DL (ref 12.5–16)
IMMATURE NEUTROPHIL %: 4.4 % (ref 0–0.43)
LYMPHOCYTES ABSOLUTE: 5.4 K/CU MM
LYMPHOCYTES RELATIVE PERCENT: 32.5 % (ref 24–44)
MAGNESIUM: 2.4 MG/DL (ref 1.8–2.4)
MCH RBC QN AUTO: 28.9 PG (ref 27–31)
MCHC RBC AUTO-ENTMCNC: 30.7 % (ref 32–36)
MCV RBC AUTO: 94.3 FL (ref 78–100)
MONOCYTES ABSOLUTE: 1.1 K/CU MM
MONOCYTES RELATIVE PERCENT: 6.5 % (ref 0–4)
PDW BLD-RTO: 12.5 % (ref 11.7–14.9)
PLATELET # BLD: 282 K/CU MM (ref 140–440)
PMV BLD AUTO: 10.9 FL (ref 7.5–11.1)
POTASSIUM SERPL-SCNC: 3.5 MMOL/L (ref 3.5–5.1)
RBC # BLD: 3.32 M/CU MM (ref 4.2–5.4)
SEGMENTED NEUTROPHILS ABSOLUTE COUNT: 8.9 K/CU MM
SEGMENTED NEUTROPHILS RELATIVE PERCENT: 54 % (ref 36–66)
SODIUM BLD-SCNC: 135 MMOL/L (ref 135–145)
TOTAL IMMATURE NEUTOROPHIL: 0.72 K/CU MM
WBC # BLD: 16.5 K/CU MM (ref 4–10.5)

## 2022-01-28 PROCEDURE — 85025 COMPLETE CBC W/AUTO DIFF WBC: CPT

## 2022-01-28 PROCEDURE — 1200000002 HC SEMI PRIVATE SWING BED

## 2022-01-28 PROCEDURE — 94761 N-INVAS EAR/PLS OXIMETRY MLT: CPT

## 2022-01-28 PROCEDURE — 83735 ASSAY OF MAGNESIUM: CPT

## 2022-01-28 PROCEDURE — 6370000000 HC RX 637 (ALT 250 FOR IP): Performed by: NURSE PRACTITIONER

## 2022-01-28 PROCEDURE — 97535 SELF CARE MNGMENT TRAINING: CPT

## 2022-01-28 PROCEDURE — 97530 THERAPEUTIC ACTIVITIES: CPT

## 2022-01-28 PROCEDURE — 6360000002 HC RX W HCPCS: Performed by: NURSE PRACTITIONER

## 2022-01-28 PROCEDURE — 6370000000 HC RX 637 (ALT 250 FOR IP): Performed by: INTERNAL MEDICINE

## 2022-01-28 PROCEDURE — 80048 BASIC METABOLIC PNL TOTAL CA: CPT

## 2022-01-28 PROCEDURE — 36415 COLL VENOUS BLD VENIPUNCTURE: CPT

## 2022-01-28 PROCEDURE — 97116 GAIT TRAINING THERAPY: CPT

## 2022-01-28 RX ORDER — OXYCODONE HYDROCHLORIDE AND ACETAMINOPHEN 5; 325 MG/1; MG/1
1 TABLET ORAL EVERY 4 HOURS PRN
Status: DISCONTINUED | OUTPATIENT
Start: 2022-01-28 | End: 2022-02-10 | Stop reason: HOSPADM

## 2022-01-28 RX ADMIN — TRIAMTERENE AND HYDROCHLOROTHIAZIDE 1 TABLET: 37.5; 25 TABLET ORAL at 08:20

## 2022-01-28 RX ADMIN — PANTOPRAZOLE SODIUM 40 MG: 40 TABLET, DELAYED RELEASE ORAL at 06:03

## 2022-01-28 RX ADMIN — TRAMADOL HYDROCHLORIDE 50 MG: 50 TABLET, COATED ORAL at 08:20

## 2022-01-28 RX ADMIN — LEVOTHYROXINE SODIUM 100 MCG: 100 TABLET ORAL at 06:03

## 2022-01-28 RX ADMIN — POTASSIUM CHLORIDE 20 MEQ: 20 TABLET, EXTENDED RELEASE ORAL at 08:20

## 2022-01-28 RX ADMIN — ENOXAPARIN SODIUM 30 MG: 100 INJECTION SUBCUTANEOUS at 08:20

## 2022-01-28 RX ADMIN — CHOLECALCIFEROL TAB 125 MCG (5000 UNIT) 5000 UNITS: 125 TAB at 08:20

## 2022-01-28 RX ADMIN — OXYCODONE AND ACETAMINOPHEN 1 TABLET: 5; 325 TABLET ORAL at 11:36

## 2022-01-28 ASSESSMENT — PAIN DESCRIPTION - ONSET
ONSET: ON-GOING

## 2022-01-28 ASSESSMENT — PAIN DESCRIPTION - PAIN TYPE
TYPE: ACUTE PAIN

## 2022-01-28 ASSESSMENT — PAIN DESCRIPTION - PROGRESSION
CLINICAL_PROGRESSION: GRADUALLY WORSENING
CLINICAL_PROGRESSION: GRADUALLY IMPROVING
CLINICAL_PROGRESSION: GRADUALLY WORSENING
CLINICAL_PROGRESSION: RAPIDLY WORSENING
CLINICAL_PROGRESSION: GRADUALLY WORSENING
CLINICAL_PROGRESSION: GRADUALLY IMPROVING

## 2022-01-28 ASSESSMENT — PAIN DESCRIPTION - FREQUENCY
FREQUENCY: CONTINUOUS

## 2022-01-28 ASSESSMENT — PAIN DESCRIPTION - LOCATION
LOCATION: LEG

## 2022-01-28 ASSESSMENT — PAIN SCALES - GENERAL
PAINLEVEL_OUTOF10: 7
PAINLEVEL_OUTOF10: 0
PAINLEVEL_OUTOF10: 8
PAINLEVEL_OUTOF10: 0
PAINLEVEL_OUTOF10: 7
PAINLEVEL_OUTOF10: 0
PAINLEVEL_OUTOF10: 0
PAINLEVEL_OUTOF10: 10

## 2022-01-28 ASSESSMENT — PAIN - FUNCTIONAL ASSESSMENT
PAIN_FUNCTIONAL_ASSESSMENT: PREVENTS OR INTERFERES WITH MANY ACTIVE NOT PASSIVE ACTIVITIES
PAIN_FUNCTIONAL_ASSESSMENT: PREVENTS OR INTERFERES SOME ACTIVE ACTIVITIES AND ADLS
PAIN_FUNCTIONAL_ASSESSMENT: PREVENTS OR INTERFERES SOME ACTIVE ACTIVITIES AND ADLS

## 2022-01-28 ASSESSMENT — PAIN DESCRIPTION - ORIENTATION
ORIENTATION: LEFT

## 2022-01-28 ASSESSMENT — PAIN DESCRIPTION - DESCRIPTORS
DESCRIPTORS: ACHING;THROBBING
DESCRIPTORS: ACHING;THROBBING
DESCRIPTORS: ACHING;DISCOMFORT

## 2022-01-28 ASSESSMENT — PAIN DESCRIPTION - DIRECTION: RADIATING_TOWARDS: TO KNEE

## 2022-01-28 NOTE — PLAN OF CARE
Patient has had increased pain in the left knee during the beginning of this shift, however after 1400 patient states pain has decreased and she is comfortable.    Problem: Pain:  Goal: Control of acute pain  Description: Control of acute pain  Outcome: Ongoing

## 2022-01-28 NOTE — PROGRESS NOTES
Occupational Therapy    Occupational Therapy Treatment Note  Name: Rosalinda Mercer MRN: [id number] :   1945   Date:  2022   Admission Date: 2022 Room:  [address] [place] [address]   Restrictions/Precautions:  Restrictions/Precautions  Restrictions/Precautions: Weight Bearing  Required Braces or Orthoses?: Yes     Communication with other providers:   Cleared for treatment by RN. Subjective:  Patient states:  \"I have so much pain in my belly, I just want to sit on the toilet and explode\"  Pain:   Location, Type, Intensity (0/10 to 10/10):  9/10 bowels    Objective:    Observation:  Pt alert and oriented      Treatment, including education:  Transfers  Supine to sit :SBA  Sit to supine :SBA  Scooting :SBA  Sit to stand :CGA  Stand to sit :[facility] Training:   Activities performed today included the following: Toileting Max A for toilet hygiene. Therapeutic Activity Training: Pt completed functional mobility with RW x 75' with frequent standing rest breaks. Pt stood x 5-7 min with CGA with RW to facilitate increased endurance/strength for ADL tasks and transfers. Safety Measures: Gait belt used, Left in bed, Pull/Bed Alarm activated and call light left in reach        Assessment / Impression:        Patient's tolerance of treatment: Good   Adverse Reaction: None  Significant change in status and impact:  None  Barriers to improvement:  Dec strength and endurance. Plan for Next Session:    Continue with POC.     Time in:  1000  Time out:  1035  Total treatment time:  35  Billed Units: 2 TA  Electronically signed by:    Arin Keenan, [address]    2022, 12:50 PM    Previously filed values:  Patient Goals   Patient goals : to be able to go home and do things for herself  Short term goals  Time Frame for Short term goals: until discharge  Short term goal 1: Pt will be MI for functional adl transfers to chair, toilet/BSC, and bed following her WB precautions for LLE( wt bearing only when brace is on), using good walker safety, w/o LOB or falls  Short term goal 2: Pt will be MI for UB/LB ADLs using necessary a.e.   Short term goal 3: Pt will be I/MI toileting  Short term goal 4: Pt will be min vc for BUE strengthening to improve strength and endurance for transfers

## 2022-01-28 NOTE — FLOWSHEET NOTE
Physical Therapy Treatment Note   Date: 2022 Room: [unfilled]   Name: Aliya Zimmer : 1945   MRN: [id number] Admission Date:2022    Primary Problem:   Past Medical History:   Diagnosis Date    Arm fracture, left 2019    Casted - no surgery - Dr. 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 pylori infection 1996    S/P Biaxin and Prilosec    Hiatal hernia 1994    with reflux by UGI    Hyperlipidemia     Hypertension     Hypothyroidism 1's    MDRO (multiple drug resistant organisms) resistance     Nasal passages    Osteoporosis     Smoking hx      Past Surgical History:   Procedure Laterality Date    CHOLECYSTECTOMY, LAPAROSCOPIC  2018    Dr Jayshree Frank    COLONOSCOPY  10/26/2007    Normal exam - Dr. Osorio Bath COLONOSCOPY  2019    COLONOSCOPY N/A 2019    COLORECTAL CANCER SCREENING, NOT HIGH RISK performed by Audrey Díaz MD at [address] Left 10/21/2013    Lesion excision-squamous papillomaDr Francesco    SKIN BIOPSY  1960's    Moles removed - face, neck     Rehabilitation Diagnosis: R rib fx 4-7 and L patellar fx   Restrictions/Precautions: WBAT with knee immobilizer L LE, limit knee flexion    Subjective: My stomach really hurts. I need to poop. If I could just poop I would feel better. Knee not really hurting today.   Patient noted rib soreness with ambulation    Initial Pain level:  0 at rest , 6-7/10 with gait,     Goals:                   Short term goals  Time Frame for Short term goals: 1 week or until discharge  Short term goal 1: Pt will demonstrate bed mobility Mod I  Short term goal 2: Pt will demonstrate ambulation 100ft supervision with RW. Short term goal 3: Pt will demonstrate transfers Mod I. Short term goal 4: Pt will demonstrate Good standing balance. Plan of Care:             Times per week: Mon-Sun 5x/wk            Current Treatment Recommendations: Strengthening,IADL Training,Home Exercise Program,ROM,Safety Education & Schuyler retirement Training,Patient/Caregiver Education & Training,Functional Mobility Training,Equipment Evaluation, Education, & procurement,Transfer Training,Gait Training,ADL/Self-care Training,Positioning      Objective Findings:    Date: 1/25/2022 Date:   1-26-22 Date:   1-27-22 Date:   1/28/2022 Date:    Bed mobility CGA-Min A for RLE Declined getting up right now Min assist LLE Min A LLE    Sit to stand transfer CGA-Min A of 1  VC's for hand placement Just walked with that other girl, maybe later. Cg  CGA    Stand to sit transfer CGA-Min A of 1  VC's for hand placement x cg CGA    Commode transfer Not performed x Cg, procured BSC for next attempt  CGA to [address]    Standing tolerance During amb x 2-3 mins with gait and transfers For ambulation     Ambulation Amb w/RW and CGA of 1 25ftx2 x Gait training with RW 4+12 ft with cg. Step to pattern. Brace on. Patient ambulated 76' with RW and CGA.   Step to gait with brace donned    Stairs Not performed x x       Exercises:   Exercise/Equipment/Modalities  Date: 1/25/2022 Date:   1-26-22  Knee brace on Date:   1-27-22 Date:    AP 20x B x20 henry x10    Glut sets 10x x20 x10    Quad sets 10x x20 tactile cues to facilitate X 10 with cues    Heel slides R 20x AROM RLE     Hip AB/AD  L 10x w/A  R 10 AROM RLE, AAROM LLE x10 ea     SLR 10x B AROM RLE, AAROM LLE x10 ea AAROM LLE x10                                    Modality/intervention used:   [x ] Therapeutic Exercise   [ ] Modalities:   [x ] Therapeutic Activity     [ ] Ultrasound   [ ] Elec Stim   [x ] Gait Training     [ ] Cervical Traction  [ ] Lumbar Traction   [ ] Neuromuscular Re-education   [ ] Cold/hotpack  [ ] Iontophoresis   [ ] Instruction in HEP     [ ] Cas Alu   [ ] Manual Therapy   [ ] Aquatic Therapy     Other Therapeutic Activities: Patient dependent for cleaning up after bowel movement.       Home Exercise Program/Education provided to patient: isometrics and ankle pumps    Manual Treatments: x    Communication with other providers:   Okay to see per nursing    Adverse reactions to treatment: pain    Treatment/Activity Tolerance:   [ ] Patient limited by fatigue [x ] Patient limited by pain [ ] Patient limited by other medical complications [ ] Patient tolerated therapy well  [ ] Other:     Post Tx Pain Rating:does not rate /10     Safety Precautions:   [x ] left in bed  [] left in chair [x ] call light within reach  [x ] bed alarm on  [] personal alarm on  [ ] other staff present:    Plan:   [x ] Continue per plan of care [ ] Anita Raymundo current plan   [ ] Plan of care initiated [ ] Juanita Duval pending MD visit [ ] Discharge     Plan for Next Session:     Time In:1000  Time Out: 1035  Total Treatment Minutes: 35  Billed Units: 1 TA, 1 gait    Signed: Rain Davidson PTA  1/28/2022, 11:20 AM

## 2022-01-28 NOTE — CARE COORDINATION
EMILY received a call from Silvestre Holguin with [facility] stating that she has spoken with both Tomasz Goldstein and her  Mariano Osullivan regarding their plans following discharge from the swing bed program and Tomasz Goldstein expressed interest in possible admission to [facility] assisted living setting. 2:10 PM  CM spoke with Tomasz Goldstein and her visitor who was at the bedside. Tomasz Goldstein confirmed that she received a phone call from Lokesh jimenez with [facility] and discussed looking into assisted living at [facility] as an open until both her and her  return to their baseline level of functioning. Tomasz Goldstein agreed with this CM contacting [facility] regarding their assisted living setting. CM will follow. 2:52 PM  CM faxed clinical documentation to [address] [place] [address] in admissions at [facility] to initiate referral for their assisted living setting. CM will follow.

## 2022-01-28 NOTE — PROGRESS NOTES
Pt seen by therapist briefly for 1:1 session. Pt given handout r/t interesting facts about Jennifer Jereles d/t today is Marion Griffes birthday. Pt requested to read independently. 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     Electronically signed by MARTITA Pineda MA on 1/28/2022 at 3:42 PM

## 2022-01-29 PROBLEM — S22.41XD CLOSED FRACTURE OF MULTIPLE RIBS OF RIGHT SIDE WITH ROUTINE HEALING: Status: ACTIVE | Noted: 2022-01-29

## 2022-01-29 PROBLEM — S82.092D CLOSED SLEEVE FRACTURE OF LEFT PATELLA WITH ROUTINE HEALING: Status: ACTIVE | Noted: 2022-01-29

## 2022-01-29 PROCEDURE — 6370000000 HC RX 637 (ALT 250 FOR IP): Performed by: INTERNAL MEDICINE

## 2022-01-29 PROCEDURE — 97116 GAIT TRAINING THERAPY: CPT

## 2022-01-29 PROCEDURE — 97110 THERAPEUTIC EXERCISES: CPT

## 2022-01-29 PROCEDURE — 6360000002 HC RX W HCPCS: Performed by: NURSE PRACTITIONER

## 2022-01-29 PROCEDURE — 97530 THERAPEUTIC ACTIVITIES: CPT

## 2022-01-29 PROCEDURE — 94761 N-INVAS EAR/PLS OXIMETRY MLT: CPT

## 2022-01-29 PROCEDURE — 1200000002 HC SEMI PRIVATE SWING BED

## 2022-01-29 PROCEDURE — 6370000000 HC RX 637 (ALT 250 FOR IP): Performed by: NURSE PRACTITIONER

## 2022-01-29 RX ADMIN — LEVOTHYROXINE SODIUM 100 MCG: 100 TABLET ORAL at 06:10

## 2022-01-29 RX ADMIN — POTASSIUM CHLORIDE 20 MEQ: 20 TABLET, EXTENDED RELEASE ORAL at 10:08

## 2022-01-29 RX ADMIN — ZOLPIDEM TARTRATE 5 MG: 5 TABLET ORAL at 22:14

## 2022-01-29 RX ADMIN — OXYCODONE AND ACETAMINOPHEN 1 TABLET: 5; 325 TABLET ORAL at 22:14

## 2022-01-29 RX ADMIN — OXYCODONE AND ACETAMINOPHEN 1 TABLET: 5; 325 TABLET ORAL at 07:52

## 2022-01-29 RX ADMIN — CHOLECALCIFEROL TAB 125 MCG (5000 UNIT) 5000 UNITS: 125 TAB at 10:09

## 2022-01-29 RX ADMIN — PANTOPRAZOLE SODIUM 40 MG: 40 TABLET, DELAYED RELEASE ORAL at 06:10

## 2022-01-29 RX ADMIN — OXYCODONE AND ACETAMINOPHEN 1 TABLET: 5; 325 TABLET ORAL at 16:58

## 2022-01-29 RX ADMIN — ENOXAPARIN SODIUM 30 MG: 100 INJECTION SUBCUTANEOUS at 10:09

## 2022-01-29 ASSESSMENT — PAIN DESCRIPTION - PROGRESSION
CLINICAL_PROGRESSION: GRADUALLY WORSENING

## 2022-01-29 ASSESSMENT — PAIN SCALES - GENERAL
PAINLEVEL_OUTOF10: 0
PAINLEVEL_OUTOF10: 7
PAINLEVEL_OUTOF10: 7
PAINLEVEL_OUTOF10: 0
PAINLEVEL_OUTOF10: 3
PAINLEVEL_OUTOF10: 7

## 2022-01-29 ASSESSMENT — PAIN DESCRIPTION - DESCRIPTORS
DESCRIPTORS: ACHING
DESCRIPTORS: ACHING;THROBBING
DESCRIPTORS: ACHING;THROBBING

## 2022-01-29 ASSESSMENT — PAIN DESCRIPTION - DIRECTION: RADIATING_TOWARDS: TO KNEE

## 2022-01-29 ASSESSMENT — PAIN DESCRIPTION - FREQUENCY
FREQUENCY: CONTINUOUS

## 2022-01-29 ASSESSMENT — PAIN DESCRIPTION - LOCATION
LOCATION: LEG

## 2022-01-29 ASSESSMENT — PAIN DESCRIPTION - PAIN TYPE
TYPE: ACUTE PAIN

## 2022-01-29 ASSESSMENT — PAIN DESCRIPTION - ORIENTATION
ORIENTATION: LEFT

## 2022-01-29 ASSESSMENT — PAIN DESCRIPTION - ONSET
ONSET: GRADUAL
ONSET: ON-GOING
ONSET: ON-GOING

## 2022-01-29 ASSESSMENT — PAIN - FUNCTIONAL ASSESSMENT: PAIN_FUNCTIONAL_ASSESSMENT: PREVENTS OR INTERFERES SOME ACTIVE ACTIVITIES AND ADLS

## 2022-01-29 NOTE — FLOWSHEET NOTE
Physical Therapy Treatment Note   Date: 2022 Room: [unfilled]   Name: Alla Ruiz : 1945   MRN: [id number] Admission Date:2022    Primary Problem:   Past Medical History:   Diagnosis Date    Arm fracture, left 2019    Casted - no surgery - Dr. 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 pylori infection 1996    S/P Biaxin and Prilosec    Hiatal hernia 1994    with reflux by UGI    Hyperlipidemia     Hypertension     Hypothyroidism 1's    MDRO (multiple drug resistant organisms) resistance     Nasal passages    Osteoporosis     Smoking hx      Past Surgical History:   Procedure Laterality Date    CHOLECYSTECTOMY, LAPAROSCOPIC  2018    Dr Belle Rodriguez    COLONOSCOPY  10/26/2007    Normal exam - Dr. Ragini Christie COLONOSCOPY  2019    COLONOSCOPY N/A 2019    COLORECTAL CANCER SCREENING, NOT HIGH RISK performed by Sean Caballero MD at [address] Left 10/21/2013    Lesion excision-squamous papillomaDr Francesco    SKIN BIOPSY  1960's    Moles removed - face, neck     Rehabilitation Diagnosis: R rib fx 4-7 and L patellar fx   Restrictions/Precautions: WBAT with knee immobilizer L LE, limit knee flexion    Subjective:  Patient semi reclined in bed upon entering and agreeable to working with therapy    Initial Pain level:  Does not rate    Goals:                   Short term goals  Time Frame for Short term goals: 1 week or until discharge  Short term goal 1: Pt will demonstrate bed mobility Mod I  Short term goal 2: Pt will demonstrate ambulation 100ft supervision with RW. Short term goal 3: Pt will demonstrate transfers Mod I.   Short term goal 4: Pt will demonstrate Good standing balance. Plan of Care:             Times per week: Mon-Sun 5x/wk            Current Treatment Recommendations: Ehsan Quinonez Exercise Program,ROM,Safety Education & Consuelo Haro Training,Patient/Caregiver Education & Training,Functional Mobility Training,Equipment Evaluation, Education, & procurement,Transfer Training,Gait Training,ADL/Self-care Training,Positioning      Objective Findings:    Date: 1/25/2022 Date:   1-26-22 Date:   1-27-22 Date:   1/28/2022 Date: 1/29/2022   Bed mobility CGA-Min A for RLE Declined getting up right now Min assist LLE Min A LLE SBA   Sit to stand transfer CGA-Min A of 1  VC's for hand placement Just walked with that other girl, maybe later. Cg  CGA SBA   Stand to sit transfer CGA-Min A of 1  VC's for hand placement x cg CGA SBA   Commode transfer Not performed x Cg, procured BSC for next attempt  CGA to [facility] SBA-CGA   Standing tolerance During amb x 2-3 mins with gait and transfers For ambulation  For amb and self miguel care   Ambulation Amb w/RW and CGA of 1 25ftx2 x Gait training with RW 4+12 ft with cg. Step to pattern. Brace on. Patient ambulated 76' with RW and CGA.   Step to gait with brace donned Amb w/RW and CGA of 1 200ft x2 w/brace donned   Stairs Not performed x x  x     Exercises:   Exercise/Equipment/Modalities  Date: 1/25/2022 Date:   1-26-22  Knee brace on Date:   1-27-22 Date: 1/29/2022   AP 20x B x20 henry x10 20x   Glut sets 10x x20 x10 20x   Quad sets 10x x20 tactile cues to facilitate X 10 with cues 20x   Heel slides R 20x AROM RLE  20x R   Hip AB/AD  L 10x w/A  R 10 AROM RLE, AAROM LLE x10 ea  2x10 B   SLR 10x B AROM RLE, AAROM LLE x10 ea AAROM LLE x10 2x10 B w/slight assist on the left                                   Modality/intervention used:   [x ] Therapeutic Exercise   [ ] Modalities:   [x ] Therapeutic Activity     [ ] Ultrasound   [ ] Elec Stim   [x ] Gait Training Special Stains Stage 12 - Results: Base On Clearance Noted Above
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
## 2022-01-30 LAB
ANION GAP SERPL CALCULATED.3IONS-SCNC: 10 MMOL/L (ref 4–16)
ANISOCYTOSIS: ABNORMAL
ATYPICAL LYMPHOCYTE ABSOLUTE COUNT: ABNORMAL
BACTERIA: NEGATIVE /HPF
BANDED NEUTROPHILS ABSOLUTE COUNT: 0.71 K/CU MM
BANDED NEUTROPHILS RELATIVE PERCENT: 5 % (ref 5–11)
BILIRUBIN URINE: NEGATIVE MG/DL
BLOOD, URINE: NEGATIVE
BUN BLDV-MCNC: 42 MG/DL (ref 6–23)
CALCIUM SERPL-MCNC: 8.4 MG/DL (ref 8.3–10.6)
CAST TYPE: NORMAL /HPF
CHLORIDE BLD-SCNC: 99 MMOL/L (ref 99–110)
CLARITY: CLEAR
CO2: 26 MMOL/L (ref 21–32)
COLOR: YELLOW
CREAT SERPL-MCNC: 1.3 MG/DL (ref 0.6–1.1)
CRYSTAL TYPE: NEGATIVE /HPF
DIFFERENTIAL TYPE: ABNORMAL
EOSINOPHILS ABSOLUTE: 0.1 K/CU MM
EOSINOPHILS RELATIVE PERCENT: 1 % (ref 0–3)
EPITHELIAL CELLS, UA: NORMAL /HPF
GFR AFRICAN AMERICAN: 48 ML/MIN/1.73M2
GFR NON-AFRICAN AMERICAN: 40 ML/MIN/1.73M2
GLUCOSE BLD-MCNC: 96 MG/DL (ref 70–99)
GLUCOSE, URINE: NEGATIVE MG/DL
HCT VFR BLD CALC: 27.8 % (ref 37–47)
HEMOGLOBIN: 8.7 GM/DL (ref 12.5–16)
KETONES, URINE: NEGATIVE MG/DL
LEUKOCYTE ESTERASE, URINE: NEGATIVE
LYMPHOCYTES ABSOLUTE: 5.1 K/CU MM
LYMPHOCYTES RELATIVE PERCENT: 36 % (ref 24–44)
MCH RBC QN AUTO: 29.2 PG (ref 27–31)
MCHC RBC AUTO-ENTMCNC: 31.3 % (ref 32–36)
MCV RBC AUTO: 93.3 FL (ref 78–100)
METAMYELOCYTES ABSOLUTE COUNT: 0.14 K/CU MM
METAMYELOCYTES PERCENT: 1 %
MONOCYTES ABSOLUTE: 0.4 K/CU MM
MONOCYTES RELATIVE PERCENT: 3 % (ref 0–4)
NITRITE URINE, QUANTITATIVE: NEGATIVE
PDW BLD-RTO: 12.6 % (ref 11.7–14.9)
PH, URINE: 6 (ref 5–8)
PLATELET # BLD: 285 K/CU MM (ref 140–440)
PLT MORPHOLOGY: ADEQUATE
PMV BLD AUTO: 10.6 FL (ref 7.5–11.1)
POTASSIUM SERPL-SCNC: 3.6 MMOL/L (ref 3.5–5.1)
PROTEIN UA: NEGATIVE MG/DL
RBC # BLD: 2.98 M/CU MM (ref 4.2–5.4)
RBC URINE: NEGATIVE /HPF (ref 0–6)
SEGMENTED NEUTROPHILS ABSOLUTE COUNT: 7.8 K/CU MM
SEGMENTED NEUTROPHILS RELATIVE PERCENT: 54 % (ref 36–66)
SODIUM BLD-SCNC: 135 MMOL/L (ref 135–145)
SPECIFIC GRAVITY UA: 1.01 (ref 1–1.03)
UROBILINOGEN, URINE: 0.2 MG/DL (ref 0.2–1)
WBC # BLD: 14.2 K/CU MM (ref 4–10.5)
WBC UA: NORMAL /HPF (ref 0–5)

## 2022-01-30 PROCEDURE — 6370000000 HC RX 637 (ALT 250 FOR IP): Performed by: INTERNAL MEDICINE

## 2022-01-30 PROCEDURE — 85027 COMPLETE CBC AUTOMATED: CPT

## 2022-01-30 PROCEDURE — 51701 INSERT BLADDER CATHETER: CPT

## 2022-01-30 PROCEDURE — 1200000002 HC SEMI PRIVATE SWING BED

## 2022-01-30 PROCEDURE — 81001 URINALYSIS AUTO W/SCOPE: CPT

## 2022-01-30 PROCEDURE — 80048 BASIC METABOLIC PNL TOTAL CA: CPT

## 2022-01-30 PROCEDURE — 94761 N-INVAS EAR/PLS OXIMETRY MLT: CPT

## 2022-01-30 PROCEDURE — 85007 BL SMEAR W/DIFF WBC COUNT: CPT

## 2022-01-30 PROCEDURE — 6360000002 HC RX W HCPCS: Performed by: NURSE PRACTITIONER

## 2022-01-30 PROCEDURE — 6370000000 HC RX 637 (ALT 250 FOR IP): Performed by: NURSE PRACTITIONER

## 2022-01-30 RX ADMIN — OXYCODONE AND ACETAMINOPHEN 1 TABLET: 5; 325 TABLET ORAL at 22:14

## 2022-01-30 RX ADMIN — PANTOPRAZOLE SODIUM 40 MG: 40 TABLET, DELAYED RELEASE ORAL at 06:29

## 2022-01-30 RX ADMIN — ENOXAPARIN SODIUM 30 MG: 100 INJECTION SUBCUTANEOUS at 08:52

## 2022-01-30 RX ADMIN — POTASSIUM CHLORIDE 20 MEQ: 20 TABLET, EXTENDED RELEASE ORAL at 08:52

## 2022-01-30 RX ADMIN — ZOLPIDEM TARTRATE 5 MG: 5 TABLET ORAL at 22:14

## 2022-01-30 RX ADMIN — LEVOTHYROXINE SODIUM 100 MCG: 100 TABLET ORAL at 06:29

## 2022-01-30 RX ADMIN — OXYCODONE AND ACETAMINOPHEN 1 TABLET: 5; 325 TABLET ORAL at 08:52

## 2022-01-30 RX ADMIN — OXYCODONE AND ACETAMINOPHEN 1 TABLET: 5; 325 TABLET ORAL at 15:09

## 2022-01-30 RX ADMIN — CHOLECALCIFEROL TAB 125 MCG (5000 UNIT) 5000 UNITS: 125 TAB at 08:53

## 2022-01-30 ASSESSMENT — PAIN DESCRIPTION - DESCRIPTORS
DESCRIPTORS: ACHING

## 2022-01-30 ASSESSMENT — PAIN DESCRIPTION - FREQUENCY
FREQUENCY: CONTINUOUS

## 2022-01-30 ASSESSMENT — PAIN DESCRIPTION - ORIENTATION
ORIENTATION: LEFT

## 2022-01-30 ASSESSMENT — PAIN SCALES - GENERAL
PAINLEVEL_OUTOF10: 7
PAINLEVEL_OUTOF10: 2
PAINLEVEL_OUTOF10: 4
PAINLEVEL_OUTOF10: 2

## 2022-01-30 ASSESSMENT — PAIN DESCRIPTION - PAIN TYPE
TYPE: ACUTE PAIN

## 2022-01-30 ASSESSMENT — PAIN DESCRIPTION - PROGRESSION
CLINICAL_PROGRESSION: GRADUALLY IMPROVING
CLINICAL_PROGRESSION: GRADUALLY WORSENING

## 2022-01-30 ASSESSMENT — PAIN DESCRIPTION - ONSET
ONSET: ON-GOING

## 2022-01-30 ASSESSMENT — PAIN - FUNCTIONAL ASSESSMENT
PAIN_FUNCTIONAL_ASSESSMENT: ACTIVITIES ARE NOT PREVENTED
PAIN_FUNCTIONAL_ASSESSMENT: PREVENTS OR INTERFERES SOME ACTIVE ACTIVITIES AND ADLS

## 2022-01-30 ASSESSMENT — PAIN DESCRIPTION - DIRECTION
RADIATING_TOWARDS: KNEE
RADIATING_TOWARDS: KNEE

## 2022-01-30 ASSESSMENT — PAIN DESCRIPTION - LOCATION
LOCATION: LEG
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
## 2022-01-30 NOTE — PLAN OF CARE
Problem: Falls - Risk of:  Goal: Will remain free from falls  Description: Will remain free from falls  Outcome: Ongoing  Goal: Absence of physical injury  Description: Absence of physical injury  Outcome: Ongoing     Problem: Pain:  Description: Pain management should include both nonpharmacologic and pharmacologic interventions. Goal: Pain level will decrease  Description: Pain level will decrease  Outcome: Ongoing  Goal: Control of acute pain  Description: Control of acute pain  Outcome: Ongoing  Goal: Control of chronic pain  Description: Control of chronic pain  Outcome: Ongoing     Problem:  Activity:  Goal: Ability to tolerate increased activity will improve  Description: Ability to tolerate increased activity will improve  Outcome: Ongoing

## 2022-01-31 ENCOUNTER — APPOINTMENT (OUTPATIENT)
Dept: CT IMAGING | Age: 77
DRG: 641 | End: 2022-01-31
Payer: MEDICARE

## 2022-01-31 LAB
ANION GAP SERPL CALCULATED.3IONS-SCNC: 10 MMOL/L (ref 4–16)
BASOPHILS ABSOLUTE: 0.1 K/CU MM
BASOPHILS RELATIVE PERCENT: 0.7 % (ref 0–1)
BUN BLDV-MCNC: 41 MG/DL (ref 6–23)
CALCIUM SERPL-MCNC: 8.2 MG/DL (ref 8.3–10.6)
CHLORIDE BLD-SCNC: 99 MMOL/L (ref 99–110)
CO2: 26 MMOL/L (ref 21–32)
CREAT SERPL-MCNC: 1.3 MG/DL (ref 0.6–1.1)
DIFFERENTIAL TYPE: ABNORMAL
EOSINOPHILS ABSOLUTE: 0.1 K/CU MM
EOSINOPHILS RELATIVE PERCENT: 0.5 % (ref 0–3)
GFR AFRICAN AMERICAN: 48 ML/MIN/1.73M2
GFR NON-AFRICAN AMERICAN: 40 ML/MIN/1.73M2
GLUCOSE BLD-MCNC: 116 MG/DL (ref 70–99)
HCT VFR BLD CALC: 30.3 % (ref 37–47)
HEMOGLOBIN: 9.3 GM/DL (ref 12.5–16)
IMMATURE NEUTROPHIL %: 5 % (ref 0–0.43)
LYMPHOCYTES ABSOLUTE: 4.1 K/CU MM
LYMPHOCYTES RELATIVE PERCENT: 21.8 % (ref 24–44)
MCH RBC QN AUTO: 28.8 PG (ref 27–31)
MCHC RBC AUTO-ENTMCNC: 30.7 % (ref 32–36)
MCV RBC AUTO: 93.8 FL (ref 78–100)
MONOCYTES ABSOLUTE: 0.9 K/CU MM
MONOCYTES RELATIVE PERCENT: 4.6 % (ref 0–4)
PDW BLD-RTO: 12.6 % (ref 11.7–14.9)
PLATELET # BLD: 332 K/CU MM (ref 140–440)
PMV BLD AUTO: 10.1 FL (ref 7.5–11.1)
POTASSIUM SERPL-SCNC: 4.1 MMOL/L (ref 3.5–5.1)
RBC # BLD: 3.23 M/CU MM (ref 4.2–5.4)
SEGMENTED NEUTROPHILS ABSOLUTE COUNT: 12.6 K/CU MM
SEGMENTED NEUTROPHILS RELATIVE PERCENT: 67.4 % (ref 36–66)
SODIUM BLD-SCNC: 135 MMOL/L (ref 135–145)
TOTAL IMMATURE NEUTOROPHIL: 0.94 K/CU MM
WBC # BLD: 18.7 K/CU MM (ref 4–10.5)

## 2022-01-31 PROCEDURE — 6370000000 HC RX 637 (ALT 250 FOR IP): Performed by: INTERNAL MEDICINE

## 2022-01-31 PROCEDURE — 97535 SELF CARE MNGMENT TRAINING: CPT

## 2022-01-31 PROCEDURE — 36415 COLL VENOUS BLD VENIPUNCTURE: CPT

## 2022-01-31 PROCEDURE — 97530 THERAPEUTIC ACTIVITIES: CPT

## 2022-01-31 PROCEDURE — 6360000002 HC RX W HCPCS: Performed by: NURSE PRACTITIONER

## 2022-01-31 PROCEDURE — 1200000002 HC SEMI PRIVATE SWING BED

## 2022-01-31 PROCEDURE — 85025 COMPLETE CBC W/AUTO DIFF WBC: CPT

## 2022-01-31 PROCEDURE — 6370000000 HC RX 637 (ALT 250 FOR IP): Performed by: NURSE PRACTITIONER

## 2022-01-31 PROCEDURE — 80048 BASIC METABOLIC PNL TOTAL CA: CPT

## 2022-01-31 PROCEDURE — 70450 CT HEAD/BRAIN W/O DYE: CPT

## 2022-01-31 PROCEDURE — 97116 GAIT TRAINING THERAPY: CPT

## 2022-01-31 RX ADMIN — OXYCODONE AND ACETAMINOPHEN 1 TABLET: 5; 325 TABLET ORAL at 21:40

## 2022-01-31 RX ADMIN — OXYCODONE AND ACETAMINOPHEN 1 TABLET: 5; 325 TABLET ORAL at 12:15

## 2022-01-31 RX ADMIN — ZOLPIDEM TARTRATE 5 MG: 5 TABLET ORAL at 21:39

## 2022-01-31 RX ADMIN — LEVOTHYROXINE SODIUM 100 MCG: 100 TABLET ORAL at 05:05

## 2022-01-31 RX ADMIN — ENOXAPARIN SODIUM 30 MG: 100 INJECTION SUBCUTANEOUS at 09:55

## 2022-01-31 RX ADMIN — TRAMADOL HYDROCHLORIDE 50 MG: 50 TABLET, COATED ORAL at 17:08

## 2022-01-31 RX ADMIN — CHOLECALCIFEROL TAB 125 MCG (5000 UNIT) 5000 UNITS: 125 TAB at 09:55

## 2022-01-31 RX ADMIN — PANTOPRAZOLE SODIUM 40 MG: 40 TABLET, DELAYED RELEASE ORAL at 05:05

## 2022-01-31 RX ADMIN — POTASSIUM CHLORIDE 20 MEQ: 20 TABLET, EXTENDED RELEASE ORAL at 09:55

## 2022-01-31 ASSESSMENT — PAIN DESCRIPTION - ORIENTATION
ORIENTATION: LEFT

## 2022-01-31 ASSESSMENT — PAIN DESCRIPTION - FREQUENCY
FREQUENCY: CONTINUOUS

## 2022-01-31 ASSESSMENT — PAIN DESCRIPTION - PAIN TYPE
TYPE: ACUTE PAIN

## 2022-01-31 ASSESSMENT — PAIN DESCRIPTION - PROGRESSION
CLINICAL_PROGRESSION: GRADUALLY IMPROVING
CLINICAL_PROGRESSION: RAPIDLY WORSENING
CLINICAL_PROGRESSION: GRADUALLY WORSENING
CLINICAL_PROGRESSION: GRADUALLY WORSENING
CLINICAL_PROGRESSION: RAPIDLY IMPROVING

## 2022-01-31 ASSESSMENT — PAIN DESCRIPTION - ONSET
ONSET: ON-GOING

## 2022-01-31 ASSESSMENT — PAIN SCALES - GENERAL
PAINLEVEL_OUTOF10: 0
PAINLEVEL_OUTOF10: 0
PAINLEVEL_OUTOF10: 7
PAINLEVEL_OUTOF10: 0
PAINLEVEL_OUTOF10: 7
PAINLEVEL_OUTOF10: 5
PAINLEVEL_OUTOF10: 5
PAINLEVEL_OUTOF10: 0
PAINLEVEL_OUTOF10: 3
PAINLEVEL_OUTOF10: 0

## 2022-01-31 ASSESSMENT — PAIN - FUNCTIONAL ASSESSMENT
PAIN_FUNCTIONAL_ASSESSMENT: ACTIVITIES ARE NOT PREVENTED
PAIN_FUNCTIONAL_ASSESSMENT: PREVENTS OR INTERFERES SOME ACTIVE ACTIVITIES AND ADLS
PAIN_FUNCTIONAL_ASSESSMENT: PREVENTS OR INTERFERES SOME ACTIVE ACTIVITIES AND ADLS

## 2022-01-31 ASSESSMENT — PAIN DESCRIPTION - DESCRIPTORS
DESCRIPTORS: ACHING

## 2022-01-31 ASSESSMENT — PAIN DESCRIPTION - DIRECTION: RADIATING_TOWARDS: KNEE

## 2022-01-31 ASSESSMENT — PAIN DESCRIPTION - LOCATION
LOCATION: LEG

## 2022-01-31 NOTE — PROGRESS NOTES
Nursing staff heard patient make a noise then the sound of a walker falling. This nurse as well as other nursing staff noted patient laying at the foot of bed, walker laying on floor, urine in bedside commode. Patient denies pain or c/o. VS stable, afebrile. Patient stated twice to nursing staff that she \"scooted\" down the bed to turn the alarm off. Patient stated she heard [employer] playing and needed the music to stop so she had to get up and thought she would urinate also. Patient denied hitting her head. Patient's bed alarm was on and call light was within reach when this nurse and Marlen Toledo LPN last rounded on patient at 2300. Michael NP notified and arrived at bedside. N.O. CT head without contrast, CBC with diff, BMP. X-ray and lab notified.

## 2022-01-31 NOTE — PROGRESS NOTES
Pt seen by therapist from approximately [id number] for 1:1 session. Pt engaged in All About The 60s activity. Pt listened to music from the 62s and discussed various facts about the 60s as well. Pt shared that she loves listening to [facility], but is not a fan of The Beatles. Pt also expressed amazement that the [id number] was created in the 62s. Pt stated \"I would have expected it to be around longer than that! \"    Electronically signed by Ga Calvert MA on 1/31/2022 at 2:00 PM

## 2022-01-31 NOTE — PLAN OF CARE
Ongoing goal, patient continues to work with therapy and staff but pain and activity tolerance are limiting activities and independence with ADL's. Problem:  Activity:  Goal: Ability to tolerate increased activity will improve  Description: Ability to tolerate increased activity will improve  Outcome: Ongoing
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
## 2022-01-31 NOTE — PROGRESS NOTES
Last dose of Imbruvica, patient supplied home medication, given during am medication pass. Patient states she does not have anymore Imbruvica at home but called this weekend to order refill for delivery. Patient gave permission for this RN to verify with pharmacy that refill had been ordered. [facility] in [place], [place] called at [phone]. Pharmacist stated that last refilled called in on 1/30/2021. This RN requested that pharmacy send a refill to patient's house. Pharmacist verified patient birthdate, name, and address then stated that refill would be at patients house within 3-5 business days. Patient verbalized understanding and called family member with this RN in room to request that they watch mail and bring in prescription when it arrives.

## 2022-01-31 NOTE — PROGRESS NOTES
Occupational Therapy    Occupational Therapy Treatment Note  Name: Zulma Ayon MRN: [id number] :   1945   Date:  2022   Admission Date: 2022 Room:  [address] [place] [address]   Restrictions/Precautions:  Restrictions/Precautions  Restrictions/Precautions: Weight Bearing  Required Braces or Orthoses?: Yes     Communication with other providers:   Cleared for treatment by RN. Subjective:  Patient states:  \"I need to get my act cleaned up\"  Pain:   Location, Type, Intensity (0/10 to 10/10): No pain    Objective:    Observation:  Pt alert and oriented    Treatment, including education:  Transfers  Supine to sit :Sup  Sit to supine : Mod A for B LEs  Scooting :Sup  Sit to stand :CGA  Stand to sit :CAG  SPT:CGA  Shower:CGA  Toilet:CGA    Self Care Training:   Activities performed today included the following: Toileting  CGA with toilet hygiene and clothing management, pt took extra time to use bathroom. Grooming  Sup to wash face. Bathing   CGA in stance to wash buttocks and miguel area. UB Dress donned gown    LB Dress  Max A to don socks      Therapeutic Activity Training:  Pt completed functional mobility with CGA with RW x 50' x 2. Pt stood x 7 min with CGA with RW to facilitate increased endurance/strength for ADL tasks and transfers. Safety Measures: Gait belt used, Left in bed, Pull/Bed Alarm activated and call light left in reach        Assessment / Impression:        Patient's tolerance of treatment: Good   Adverse Reaction: None  Significant change in status and impact:  None  Barriers to improvement:  Dec strength and endurance. Plan for Next Session:    Continue with POC.     Time in:  1351  Time out:  1451  Total treatment time:  60  Billed Units: 3 ADL, 1 TA  Electronically signed by:    Alyson Sanchez, [address]    2022, 3:26 PM    Previously filed values:  Patient Goals   Patient goals : to be able to go home and do things for herself  Short term goals  Time Frame for Short term goals: until discharge  Short term goal 1: Pt will be MI for functional adl transfers to chair, toilet/BSC, and bed following her WB precautions for LLE( wt bearing only when brace is on), using good walker safety, w/o LOB or falls  Short term goal 2: Pt will be MI for UB/LB ADLs using necessary a.e.   Short term goal 3: Pt will be I/MI toileting  Short term goal 4: Pt will be min vc for BUE strengthening to improve strength and endurance for transfers

## 2022-01-31 NOTE — PLAN OF CARE
Problem: Falls - Risk of:  Goal: Will remain free from falls  Description: Will remain free from falls  1/31/2022 0043 by Irvin Nguyen RN  Outcome: Ongoing  1/31/2022 0043 by Irvin Nguyen RN  Outcome: Ongoing  Goal: Absence of physical injury  Description: Absence of physical injury  1/31/2022 0043 by Irvin Nguyen RN  Outcome: Ongoing  1/31/2022 0043 by Irvin Nguyen RN  Outcome: Ongoing     Problem: Pain:  Description: Pain management should include both nonpharmacologic and pharmacologic interventions. Goal: Pain level will decrease  Description: Pain level will decrease  1/31/2022 0043 by Irvin Nguyen RN  Outcome: Ongoing  1/31/2022 0043 by Irvin Nguyen RN  Outcome: Ongoing  Goal: Control of acute pain  Description: Control of acute pain  1/31/2022 0043 by Irvin Nguyen RN  Outcome: Ongoing  1/31/2022 0043 by Irvin Nguyen RN  Outcome: Ongoing  Goal: Control of chronic pain  Description: Control of chronic pain  1/31/2022 0043 by Irvin Nguyen RN  Outcome: Ongoing  1/31/2022 0043 by Irvin Nguyen RN  Outcome: Ongoing     Problem:  Activity:  Goal: Ability to tolerate increased activity will improve  Description: Ability to tolerate increased activity will improve  1/31/2022 0043 by Irvin Nguyen RN  Outcome: Ongoing  1/31/2022 0043 by Irvin Nguyen RN  Outcome: Ongoing

## 2022-02-01 LAB
ANION GAP SERPL CALCULATED.3IONS-SCNC: 11 MMOL/L (ref 4–16)
BASOPHILS ABSOLUTE: 0.1 K/CU MM
BASOPHILS RELATIVE PERCENT: 0.9 % (ref 0–1)
BUN BLDV-MCNC: 32 MG/DL (ref 6–23)
CALCIUM SERPL-MCNC: 8.3 MG/DL (ref 8.3–10.6)
CHLORIDE BLD-SCNC: 103 MMOL/L (ref 99–110)
CO2: 25 MMOL/L (ref 21–32)
CREAT SERPL-MCNC: 1.1 MG/DL (ref 0.6–1.1)
DIFFERENTIAL TYPE: ABNORMAL
EOSINOPHILS ABSOLUTE: 0.2 K/CU MM
EOSINOPHILS RELATIVE PERCENT: 1.5 % (ref 0–3)
GFR AFRICAN AMERICAN: 58 ML/MIN/1.73M2
GFR NON-AFRICAN AMERICAN: 48 ML/MIN/1.73M2
GLUCOSE BLD-MCNC: 98 MG/DL (ref 70–99)
HCT VFR BLD CALC: 28.1 % (ref 37–47)
HEMOGLOBIN: 8.5 GM/DL (ref 12.5–16)
IMMATURE NEUTROPHIL %: 5.9 % (ref 0–0.43)
LYMPHOCYTES ABSOLUTE: 4.4 K/CU MM
LYMPHOCYTES RELATIVE PERCENT: 34 % (ref 24–44)
MCH RBC QN AUTO: 29.1 PG (ref 27–31)
MCHC RBC AUTO-ENTMCNC: 30.2 % (ref 32–36)
MCV RBC AUTO: 96.2 FL (ref 78–100)
MONOCYTES ABSOLUTE: 0.5 K/CU MM
MONOCYTES RELATIVE PERCENT: 4.2 % (ref 0–4)
PDW BLD-RTO: 12.7 % (ref 11.7–14.9)
PLATELET # BLD: 275 K/CU MM (ref 140–440)
PMV BLD AUTO: 10.3 FL (ref 7.5–11.1)
POTASSIUM SERPL-SCNC: 4 MMOL/L (ref 3.5–5.1)
RBC # BLD: 2.92 M/CU MM (ref 4.2–5.4)
SEGMENTED NEUTROPHILS ABSOLUTE COUNT: 6.9 K/CU MM
SEGMENTED NEUTROPHILS RELATIVE PERCENT: 53.5 % (ref 36–66)
SODIUM BLD-SCNC: 139 MMOL/L (ref 135–145)
TOTAL IMMATURE NEUTOROPHIL: 0.76 K/CU MM
WBC # BLD: 13 K/CU MM (ref 4–10.5)

## 2022-02-01 PROCEDURE — 97116 GAIT TRAINING THERAPY: CPT

## 2022-02-01 PROCEDURE — 36415 COLL VENOUS BLD VENIPUNCTURE: CPT

## 2022-02-01 PROCEDURE — 97530 THERAPEUTIC ACTIVITIES: CPT

## 2022-02-01 PROCEDURE — 97535 SELF CARE MNGMENT TRAINING: CPT

## 2022-02-01 PROCEDURE — 6370000000 HC RX 637 (ALT 250 FOR IP): Performed by: INTERNAL MEDICINE

## 2022-02-01 PROCEDURE — 6370000000 HC RX 637 (ALT 250 FOR IP): Performed by: NURSE PRACTITIONER

## 2022-02-01 PROCEDURE — 6360000002 HC RX W HCPCS: Performed by: NURSE PRACTITIONER

## 2022-02-01 PROCEDURE — 97110 THERAPEUTIC EXERCISES: CPT

## 2022-02-01 PROCEDURE — 85025 COMPLETE CBC W/AUTO DIFF WBC: CPT

## 2022-02-01 PROCEDURE — 1200000002 HC SEMI PRIVATE SWING BED

## 2022-02-01 PROCEDURE — 80048 BASIC METABOLIC PNL TOTAL CA: CPT

## 2022-02-01 RX ORDER — TRAMADOL HYDROCHLORIDE 50 MG/1
50 TABLET ORAL EVERY 8 HOURS PRN
Status: DISCONTINUED | OUTPATIENT
Start: 2022-02-01 | End: 2022-02-10 | Stop reason: HOSPADM

## 2022-02-01 RX ADMIN — POTASSIUM CHLORIDE 20 MEQ: 20 TABLET, EXTENDED RELEASE ORAL at 09:05

## 2022-02-01 RX ADMIN — PANTOPRAZOLE SODIUM 40 MG: 40 TABLET, DELAYED RELEASE ORAL at 05:50

## 2022-02-01 RX ADMIN — ZOLPIDEM TARTRATE 5 MG: 5 TABLET ORAL at 23:19

## 2022-02-01 RX ADMIN — OXYCODONE AND ACETAMINOPHEN 1 TABLET: 5; 325 TABLET ORAL at 21:15

## 2022-02-01 RX ADMIN — ENOXAPARIN SODIUM 30 MG: 100 INJECTION SUBCUTANEOUS at 09:05

## 2022-02-01 RX ADMIN — LEVOTHYROXINE SODIUM 100 MCG: 100 TABLET ORAL at 05:50

## 2022-02-01 RX ADMIN — CHOLECALCIFEROL TAB 125 MCG (5000 UNIT) 5000 UNITS: 125 TAB at 09:05

## 2022-02-01 ASSESSMENT — PAIN SCALES - GENERAL
PAINLEVEL_OUTOF10: 0
PAINLEVEL_OUTOF10: 2
PAINLEVEL_OUTOF10: 10
PAINLEVEL_OUTOF10: 0

## 2022-02-01 ASSESSMENT — PAIN DESCRIPTION - PAIN TYPE: TYPE: ACUTE PAIN

## 2022-02-01 NOTE — PLAN OF CARE
Problem: Falls - Risk of:  Goal: Will remain free from falls  Description: Will remain free from falls  1/31/2022 2305 by Junior Enrique RN  Outcome: Ongoing  1/31/2022 1509 by Betito James RN  Outcome: Ongoing  Goal: Absence of physical injury  Description: Absence of physical injury  1/31/2022 2305 by Junior Enrique RN  Outcome: Ongoing  1/31/2022 1509 by Betito James RN  Outcome: Ongoing     Problem: Pain:  Description: Pain management should include both nonpharmacologic and pharmacologic interventions. Goal: Pain level will decrease  Description: Pain level will decrease  1/31/2022 2305 by Junior Enrique RN  Outcome: Ongoing  1/31/2022 1509 by Betito James RN  Outcome: Ongoing  Goal: Control of acute pain  Description: Control of acute pain  1/31/2022 2305 by Junior Enrique RN  Outcome: Ongoing  1/31/2022 1509 by Betito James RN  Outcome: Ongoing  Goal: Control of chronic pain  Description: Control of chronic pain  1/31/2022 2305 by Junior Enrique RN  Outcome: Ongoing  1/31/2022 1509 by Betito James RN  Outcome: Ongoing     Problem:  Activity:  Goal: Ability to tolerate increased activity will improve  Description: Ability to tolerate increased activity will improve  1/31/2022 2305 by Junior Enrique RN  Outcome: Ongoing  1/31/2022 1509 by Betito James RN  Outcome: Ongoing

## 2022-02-01 NOTE — FLOWSHEET NOTE
Physical Therapy Treatment Note   Date: 2022 Room: [unfilled]   Name: Ever Espana : 1945   MRN: [id number] Admission Date:2022    Primary Problem:   Past Medical History:   Diagnosis Date    Arm fracture, left 2019    Casted - no surgery - Dr. 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 pylori infection 1996    S/P Biaxin and Prilosec    Hiatal hernia 1994    with reflux by UGI    Hyperlipidemia     Hypertension     Hypothyroidism 1's    MDRO (multiple drug resistant organisms) resistance     Nasal passages    Osteoporosis     Smoking hx      Past Surgical History:   Procedure Laterality Date    CHOLECYSTECTOMY, LAPAROSCOPIC  2018    Dr Connie Larkin    COLONOSCOPY  10/26/2007    Normal exam - Dr. Rafita Mcnair COLONOSCOPY  2019    COLONOSCOPY N/A 2019    COLORECTAL CANCER SCREENING, NOT HIGH RISK performed by Dahlia Marin MD at [address] Left 10/21/2013    Lesion excision-squamous papillomaDr Francesco    SKIN BIOPSY  1960's    Moles removed - face, neck     Rehabilitation Diagnosis: R rib fx 4-7 and L patellar fx   Restrictions/Precautions: WBAT with knee immobilizer L LE, limit knee flexion    Subjective:  Patient agreeable to working with therapy    Initial Pain level:  0/10    Goals:                   Short term goals  Time Frame for Short term goals: 1 week or until discharge  Short term goal 1: Pt will demonstrate bed mobility Mod I  Short term goal 2: Pt will demonstrate ambulation 100ft supervision with RW. Short term goal 3: Pt will demonstrate transfers Mod I. Short term goal 4: Pt will demonstrate Good standing balance. Plan of Care:             Times per week: Mon-Sun 5x/wk            Current Treatment Recommendations: Raquel Christy Exercise Program,ROM,Safety Education & Garnette Iha Training,Patient/Caregiver Education & Training,Functional Mobility Training,Equipment Evaluation, Education, & procurement,Transfer Training,Gait Training,ADL/Self-care Training,Positioning      Objective Findings:    Date: 1/25/2022 Date:   1-26-22 Date:   1-27-22 Date:   1/28/2022 Date: 1/29/2022 1/31/22 2/1/2022   Bed mobility CGA-Min A for RLE Declined getting up right now Min assist LLE Min A LLE SBA Min A LLE to get in/out of bed Min/CGA LLE to get in/out of bed   Sit to stand transfer CGA-Min A of 1  VC's for hand placement Just walked with that other girl, maybe later. Cg  CGA SBA SBA  SBA   Stand to sit transfer CGA-Min A of 1  VC's for hand placement x cg CGA SBA SBA SBA   Commode transfer Not performed x Cg, procured BSC for next attempt  CGA to [facility] SBA-CGA SBA-CGA SBA-CGA   Standing tolerance During amb x 2-3 mins with gait and transfers For ambulation  For amb and self miguel care For ambulation  For ambulation   Ambulation Amb w/RW and CGA of 1 25ftx2 x Gait training with RW 4+12 ft with cg. Step to pattern. Brace on. Patient ambulated 76' with RW and CGA.   Step to gait with brace donned Amb w/RW and CGA of 1 200ft x2 w/brace donned Amb w/RW and CGA of 1 50ft x2 w/brace donned Amb w/RW and CGA of 1 250ft   w/brace donned   Stairs Not performed x x  x x x     Exercises:   Exercise/Equipment/Modalities  Date: 1/25/2022 Date:   1-26-22  Knee brace on Date:   1-27-22 Date: 1/29/2022 1/31/22 2/1/2022   AP 20x B x20 henry x10 20x  20x   Glut sets 10x x20 x10 20x  10x   Quad sets 10x x20 tactile cues to facilitate X 10 with cues 20x  10x   Heel slides R 20x AROM RLE  20x R  10x R   Hip AB/AD  L 10x w/A  R 10 AROM RLE, AAROM LLE x10 ea  2x10 B 1 x10 10x B   SLR 10x B AROM RLE, AAROM LLE x10 ea CARLOOM LLE x10 2x10 B w/slight assist on the left 1 x10 10x B                                           Modality/intervention used:   [x ] Therapeutic Exercise   [ ] Modalities:   [x ] Therapeutic Activity     [ ] Ultrasound   [ ] Elec Stim   [x ] Gait Training     [ ] Cervical Traction  [ ] Lumbar Traction   [ ] Neuromuscular Re-education   [ ] Cold/hotpack  [ ] Iontophoresis   [ ] Instruction in HEP     [ ] Vasopneumatic   [ ] Manual Therapy   [ ] Aquatic Therapy     Other Therapeutic Activities: Patient independent with wiping after urination     Home Exercise Program/Education provided to patient:      Manual Treatments: x    Communication with other providers:   Okay to see per nursing    Adverse reactions to treatment: pain    Treatment/Activity Tolerance:   [ ] Patient limited by fatigue [x ] Patient limited by pain [ ] Patient limited by other medical complications [ ] Patient tolerated therapy well  [ ] Other:     Post Tx Pain Rating:does not rate 4/10 R ribs    Safety Precautions:   [ x ] left in bed  [ ] left in chair [x ] call light within reach  [  ] bed alarm on  [] personal alarm on  [ ] other staff present:    Plan:   [x ] Continue per plan of care [ ] Moni Brennan current plan   [ ] Plan of care initiated [ ] Hold pending MD visit [ ] Discharge     Plan for Next Session:     Time In: 1122  Time Out:  1222  Total Treatment Minutes: 60   Billed Units: , 1 gait, 1te 2ta    Signed: Terrence Simmonds 2/1/2022, 2:32 PM

## 2022-02-01 NOTE — PROGRESS NOTES
Occupational Therapy    Occupational Therapy Treatment Note  Name: Zulma Ayon MRN: [id number] :   1945   Date:  2022   Admission Date: 2022 Room:  [address] [place] [address]   Restrictions/Precautions:  Restrictions/Precautions  Restrictions/Precautions: Weight Bearing  Required Braces or Orthoses?: Yes     Communication with other providers:   Cleared for treatment by RN. Subjective:  Patient states:  \"I need to pee\"  Pain:   Location, Type, Intensity (0/10 to 10/10): not rated knee pain    Objective:    Observation:  Pt alert and oriented. Treatment, including education:  Transfers  Supine to sit :SBA  Sit to supine :Min A with L LE  Scooting :SBA  Sit to stand CGA  Stand to sit :[address] [facility] Training:   Activities performed today included the following: Toileting  CGA with clothing management and toilet hygiene. Therapeutic Exercise:  Pt completed UE exercises to increase strength and ROM to facilitate increase for ADLs and functional mobility. Pt completed B UEs with 2# dumbbell exercises with curls, shoulder presses, punch, stirring and wrist curls x 20 reps with mod rest breaks due to fatigue. Therapeutic Activity Training: Pt completed functional mobility with RW x 75' x 2 with CGA        Safety Measures: Gait belt used, Left in bed, Pull/Bed Alarm activated and call light left in reach        Assessment / Impression:        Patient's tolerance of treatment: Good   Adverse Reaction: None  Significant change in status and impact:  None  Barriers to improvement:  Dec strength and endurance. Plan for Next Session:    Continue with POC.     Time in:  1130  Time out:  1210  Total treatment time:  40  Billed Units: 1 ADL, 1 TA, 1 TE  Electronically signed by:    Alyson Sanchez, [address]    2022, 2:25 PM    Previously filed values:  Patient Goals   Patient goals : to be able to go home and do things for herself  Short term goals  Time Frame for Short term goals: until discharge  Short term goal 1: Pt will be MI for functional adl transfers to chair, toilet/BSC, and bed following her WB precautions for LLE( wt bearing only when brace is on), using good walker safety, w/o LOB or falls  Short term goal 2: Pt will be MI for UB/LB ADLs using necessary a.e.   Short term goal 3: Pt will be I/MI toileting  Short term goal 4: Pt will be min vc for BUE strengthening to improve strength and endurance for transfers

## 2022-02-01 NOTE — PROGRESS NOTES
Pt participated actively in 1:1 session with pet therapy dog.   Pt noted to smile and share stories about pets she has owned in the past.     Electronically signed by Sunday Baires MA on 2/1/2022 at 2:51 PM

## 2022-02-02 PROCEDURE — 6360000002 HC RX W HCPCS: Performed by: NURSE PRACTITIONER

## 2022-02-02 PROCEDURE — 97110 THERAPEUTIC EXERCISES: CPT

## 2022-02-02 PROCEDURE — 97116 GAIT TRAINING THERAPY: CPT

## 2022-02-02 PROCEDURE — 1200000002 HC SEMI PRIVATE SWING BED

## 2022-02-02 PROCEDURE — 6370000000 HC RX 637 (ALT 250 FOR IP): Performed by: NURSE PRACTITIONER

## 2022-02-02 PROCEDURE — 97530 THERAPEUTIC ACTIVITIES: CPT

## 2022-02-02 PROCEDURE — 6370000000 HC RX 637 (ALT 250 FOR IP): Performed by: INTERNAL MEDICINE

## 2022-02-02 RX ADMIN — POTASSIUM CHLORIDE 20 MEQ: 20 TABLET, EXTENDED RELEASE ORAL at 08:20

## 2022-02-02 RX ADMIN — OXYCODONE AND ACETAMINOPHEN 1 TABLET: 5; 325 TABLET ORAL at 20:50

## 2022-02-02 RX ADMIN — OXYCODONE AND ACETAMINOPHEN 1 TABLET: 5; 325 TABLET ORAL at 08:20

## 2022-02-02 RX ADMIN — PANTOPRAZOLE SODIUM 40 MG: 40 TABLET, DELAYED RELEASE ORAL at 05:15

## 2022-02-02 RX ADMIN — ENOXAPARIN SODIUM 30 MG: 100 INJECTION SUBCUTANEOUS at 08:19

## 2022-02-02 RX ADMIN — LEVOTHYROXINE SODIUM 100 MCG: 100 TABLET ORAL at 05:15

## 2022-02-02 RX ADMIN — CHOLECALCIFEROL TAB 125 MCG (5000 UNIT) 5000 UNITS: 125 TAB at 08:20

## 2022-02-02 ASSESSMENT — PAIN DESCRIPTION - PAIN TYPE
TYPE: ACUTE PAIN
TYPE: ACUTE PAIN

## 2022-02-02 ASSESSMENT — PAIN DESCRIPTION - PROGRESSION
CLINICAL_PROGRESSION: NOT CHANGED
CLINICAL_PROGRESSION: GRADUALLY WORSENING
CLINICAL_PROGRESSION: NOT CHANGED

## 2022-02-02 ASSESSMENT — PAIN SCALES - GENERAL
PAINLEVEL_OUTOF10: 0
PAINLEVEL_OUTOF10: 7
PAINLEVEL_OUTOF10: 4
PAINLEVEL_OUTOF10: 0
PAINLEVEL_OUTOF10: 7
PAINLEVEL_OUTOF10: 2

## 2022-02-02 ASSESSMENT — PAIN DESCRIPTION - FREQUENCY
FREQUENCY: INTERMITTENT
FREQUENCY: INTERMITTENT

## 2022-02-02 ASSESSMENT — PAIN DESCRIPTION - LOCATION
LOCATION: LEG
LOCATION: KNEE

## 2022-02-02 ASSESSMENT — PAIN DESCRIPTION - ORIENTATION
ORIENTATION: LEFT
ORIENTATION: LEFT

## 2022-02-02 ASSESSMENT — PAIN DESCRIPTION - ONSET
ONSET: SUDDEN
ONSET: GRADUAL

## 2022-02-02 ASSESSMENT — PAIN - FUNCTIONAL ASSESSMENT
PAIN_FUNCTIONAL_ASSESSMENT: PREVENTS OR INTERFERES SOME ACTIVE ACTIVITIES AND ADLS
PAIN_FUNCTIONAL_ASSESSMENT: PREVENTS OR INTERFERES SOME ACTIVE ACTIVITIES AND ADLS

## 2022-02-02 ASSESSMENT — PAIN DESCRIPTION - DESCRIPTORS
DESCRIPTORS: ACHING;DISCOMFORT
DESCRIPTORS: ACHING

## 2022-02-02 NOTE — PATIENT CARE CONFERENCE
SWING BED WEEKLY TEAM SHEET      Alayna Ny   2/2/2022             WEEK# 2    PHYSICAL THERAPY       Ambulation: Distance:  250 ft    Device:RW     Assist: CGA     Wt bearing: WBAT L     W/C skills:  Propulsion:na       W/C parts management:     Stairs:  Amount/size: na      Assist:        Device:      Pain:     Transfers:   Sit to stand: SBA   Stand to sit:     SBA          Bed Mobility:  Rolls right:     Rolls left:     Positioning:     Supine to sit: SBA    Sit to supineSBA        Strength/ROM:      Balance:     Static sitting    [] P  [] F-   [] F    [] F+    [x] G               Dynamic Sitting           [] P  [] F-   [] F    [] F+    [x] G                     Static standing            [] P  [] F-   [x] F    [] F+    [] G   [x]  With  [] Without device Dynamic standing       [] P  [] F-   [x] F    [] F+    [] G   []  With  [] Without device  (P= poor   F= fair   G= good)    Issues to be resolved before discharge weakness    Goals of previous week  [] 1st team   [x] met   [] partially met    [] not met                                          Why the goals were not met   Goals:   Time Frame for Short term goals: 1 week  Short term goal 1: Pt will perform bed mobility SBAand flat HOB  Short term goal 2: Pt will perform STS transfer to/from bed, chair, toilet SBA and LRAD  Short term goal 3: Pt will ambulate 48' CGA and LRAD  Updated Goals:   Short term goal 1: Pt will perform bed mobility Mod I and flat HOB  Short term goal 2: Pt will perform STS transfer to/from bed, chair, toilet Mod I  and LRAD  Short term goal 3: Pt will ambulate 150' Mod I and LRAD     Physical Therapy Signature:  Judy Zuniga, PT

## 2022-02-02 NOTE — PROGRESS NOTES
goals: until discharge  Short term goal 1: Pt will be MI for functional adl transfers to chair, toilet/BSC, and bed following her WB precautions for LLE( wt bearing only when brace is on), using good walker safety, w/o LOB or falls  Short term goal 2: Pt will be MI for UB/LB ADLs using necessary a.e.   Short term goal 3: Pt will be I/MI toileting  Short term goal 4: Pt will be min vc for BUE strengthening to improve strength and endurance for transfers

## 2022-02-02 NOTE — PLAN OF CARE
Problem: Falls - Risk of:  Goal: Will remain free from falls  Description: Will remain free from falls  Outcome: Ongoing  Goal: Absence of physical injury  Description: Absence of physical injury  Outcome: Ongoing     Problem: Pain:  Goal: Pain level will decrease  Description: Pain level will decrease  Outcome: Ongoing  Goal: Control of acute pain  Description: Control of acute pain  Outcome: Ongoing  Goal: Control of chronic pain  Description: Control of chronic pain  Outcome: Ongoing     Problem:  Activity:  Goal: Ability to tolerate increased activity will improve  Description: Ability to tolerate increased activity will improve  Outcome: Ongoing

## 2022-02-02 NOTE — FLOWSHEET NOTE
SWING BED WEEKLY TEAM SHEET     Maikol Beltrán   2/2/2022   WEEK # 2    Occupational Therapy    Feeding  [x] Independ [] SBA [] MIN assist  [] mod assist  [] max assist [] tot assist  Grooming [x] Independ [] SBA [] MIN assist  [] mod assist  [] max assist [] tot assist   Bathing upper body [x] Independ [] SBA [] MIN assist  [] mod assist  [] max assist              [] tot assist    Dressing upper body [] Independ [x] SBA [] MIN assist  [] mod assist  [] max assist              [] tot assist    Dressing lower body [] Independ [] SBA [] MIN assist  [x] mod assist  [] max assist              [] tot assist   Toileting [] Independ [x] SBA [] MIN assist  [] mod assist  [] max assist [] tot assist    Home Management: has no help at home    Cognitive/Perception: WFL    Upper extremity motor control: WFL    Other   Goals of previous week:   [] 1st team   [] met   [x] partially met    [] not met             Why goals were not met pain     Issues to be resolved before discharge:      Occupational Therapy Signature: Anna CHONG/SERAFIN DAISY [id number]
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
## 2022-02-02 NOTE — PROGRESS NOTES
Pt engaged in 1:1 session with client from approximately [id number]. Pt agreeable to participate in [employer] card activity for session. Pt able to recall rules to game and socialized appropriately throughout the activity. At the end of the activity, pt shared that participating in activity brought back positive memories of participating in the same activity with her mother.      Electronically signed by MARTITA Dover MA on 2/2/2022 at 4:02 PM
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
## 2022-02-03 PROCEDURE — 97110 THERAPEUTIC EXERCISES: CPT

## 2022-02-03 PROCEDURE — 97530 THERAPEUTIC ACTIVITIES: CPT

## 2022-02-03 PROCEDURE — 6360000002 HC RX W HCPCS: Performed by: NURSE PRACTITIONER

## 2022-02-03 PROCEDURE — 6370000000 HC RX 637 (ALT 250 FOR IP): Performed by: NURSE PRACTITIONER

## 2022-02-03 PROCEDURE — 97535 SELF CARE MNGMENT TRAINING: CPT

## 2022-02-03 PROCEDURE — 1200000002 HC SEMI PRIVATE SWING BED

## 2022-02-03 PROCEDURE — 6370000000 HC RX 637 (ALT 250 FOR IP): Performed by: INTERNAL MEDICINE

## 2022-02-03 PROCEDURE — 97116 GAIT TRAINING THERAPY: CPT

## 2022-02-03 RX ADMIN — OXYCODONE AND ACETAMINOPHEN 1 TABLET: 5; 325 TABLET ORAL at 20:38

## 2022-02-03 RX ADMIN — ZOLPIDEM TARTRATE 5 MG: 5 TABLET ORAL at 00:29

## 2022-02-03 RX ADMIN — POTASSIUM CHLORIDE 20 MEQ: 20 TABLET, EXTENDED RELEASE ORAL at 08:46

## 2022-02-03 RX ADMIN — PANTOPRAZOLE SODIUM 40 MG: 40 TABLET, DELAYED RELEASE ORAL at 04:46

## 2022-02-03 RX ADMIN — ENOXAPARIN SODIUM 30 MG: 100 INJECTION SUBCUTANEOUS at 08:46

## 2022-02-03 RX ADMIN — LEVOTHYROXINE SODIUM 100 MCG: 100 TABLET ORAL at 04:46

## 2022-02-03 RX ADMIN — TRAMADOL HYDROCHLORIDE 50 MG: 50 TABLET, COATED ORAL at 22:01

## 2022-02-03 RX ADMIN — CHOLECALCIFEROL TAB 125 MCG (5000 UNIT) 5000 UNITS: 125 TAB at 08:46

## 2022-02-03 RX ADMIN — ZOLPIDEM TARTRATE 5 MG: 5 TABLET ORAL at 22:58

## 2022-02-03 ASSESSMENT — PAIN DESCRIPTION - ONSET
ONSET: GRADUAL
ONSET: ON-GOING

## 2022-02-03 ASSESSMENT — PAIN DESCRIPTION - DESCRIPTORS
DESCRIPTORS: ACHING;DISCOMFORT
DESCRIPTORS: ACHING;DISCOMFORT

## 2022-02-03 ASSESSMENT — PAIN DESCRIPTION - LOCATION
LOCATION: KNEE
LOCATION: LEG

## 2022-02-03 ASSESSMENT — PAIN DESCRIPTION - PROGRESSION
CLINICAL_PROGRESSION: GRADUALLY WORSENING
CLINICAL_PROGRESSION: GRADUALLY WORSENING

## 2022-02-03 ASSESSMENT — PAIN DESCRIPTION - FREQUENCY
FREQUENCY: CONTINUOUS
FREQUENCY: INTERMITTENT

## 2022-02-03 ASSESSMENT — PAIN SCALES - GENERAL
PAINLEVEL_OUTOF10: 0
PAINLEVEL_OUTOF10: 3
PAINLEVEL_OUTOF10: 0
PAINLEVEL_OUTOF10: 6
PAINLEVEL_OUTOF10: 0
PAINLEVEL_OUTOF10: 5

## 2022-02-03 ASSESSMENT — PAIN DESCRIPTION - ORIENTATION
ORIENTATION: LEFT
ORIENTATION: LEFT

## 2022-02-03 ASSESSMENT — PAIN DESCRIPTION - PAIN TYPE
TYPE: ACUTE PAIN
TYPE: ACUTE PAIN

## 2022-02-03 NOTE — PROGRESS NOTES
Occupational Therapy    Occupational Therapy Treatment Note  Name: Martin Read MRN: [id number] :   1945   Date:  2/3/2022   Admission Date: 2022 Room:  [address] [place] [address]   Restrictions/Precautions:  Restrictions/Precautions  Restrictions/Precautions: Weight Bearing  Required Braces or Orthoses?: Yes     Communication with other providers:   Cleared for treatment by  RN. Subjective:  Patient states:  \"I need to use the bathroom\"  Pain:   Location, Type, Intensity (0/10 to 10/10): No pain    Objective:    Observation:  Pt alert and oriented    Treatment, including education:  Transfers  Supine to sit :Sup  Sit to supine :Min A for L LE  Scooting :SBA  Sit to stand :CGA  Stand to sit :[facility] Training:   Activities performed today included the following: Toileting  SBA for toilet hygiene. In PM pt was taken to bathroom and performed task with SBA for clothing management and hygiene. Therapeutic Exercise: Pt completed UE exercises to increase strength and ROM to facilitate increase for ADLs and functional mobility. Pt completed B UEs with 2# dumbbell exercises with curls, shoulder presses, punch, stirring and wrist curls x 20 reps with mod rest breaks due to fatigue. Therapeutic Activity Training: Pt completed functional mobility with RW x 300ft   w/brace donned with mod standing rest breaks. Safety Measures: Gait belt used, Left in bed, Pull/Bed Alarm activated and call light left in reach        Assessment / Impression:        Patient's tolerance of treatment: Good   Adverse Reaction: None  Significant change in status and impact:  None  Barriers to improvement:  Dec strength and endurance. Plan for Next Session:    Continue with POC.     Time in:  6956 1350  Time out:  1552 1405  Total treatment time:  55+ 15= 70  Billed Units: 2 TA, 2 ADL, 1 TE  Electronically signed by:    Bea Grant, [address]    2/3/2022, 11:57 AM    Previously filed values:  Patient Goals   Patient goals : to be able to go home and do things for herself  Short term goals  Time Frame for Short term goals: until discharge  Short term goal 1: Pt will be MI for functional adl transfers to chair, toilet/BSC, and bed following her WB precautions for LLE( wt bearing only when brace is on), using good walker safety, w/o LOB or falls  Short term goal 2: Pt will be MI for UB/LB ADLs using necessary a.e.   Short term goal 3: Pt will be I/MI toileting  Short term goal 4: Pt will be min vc for BUE strengthening to improve strength and endurance for transfers

## 2022-02-03 NOTE — FLOWSHEET NOTE
Physical Therapy Treatment Note   Date: 2/3/2022 Room: [unfilled]   Name: Aliya Zimmer : 1945   MRN: [id number] Admission Date:2022    Primary Problem:   Past Medical History:   Diagnosis Date    Arm fracture, left 2019    Casted - no surgery - Dr. 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 pylori infection 1996    S/P Biaxin and Prilosec    Hiatal hernia 1994    with reflux by UGI    Hyperlipidemia     Hypertension     Hypothyroidism 1's    MDRO (multiple drug resistant organisms) resistance     Nasal passages    Osteoporosis     Smoking hx      Past Surgical History:   Procedure Laterality Date    CHOLECYSTECTOMY, LAPAROSCOPIC  2018    Dr Jayshree Frank    COLONOSCOPY  10/26/2007    Normal exam - Dr. Osorio [place] COLONOSCOPY  2019    COLONOSCOPY N/A 2019    COLORECTAL CANCER SCREENING, NOT HIGH RISK performed by Audrey Díaz MD at [address] Left 10/21/2013    Lesion excision-squamous papillomaDr Francesco    SKIN BIOPSY  1960's    Moles removed - face, neck     Rehabilitation Diagnosis: R rib fx 4-7 and L patellar fx   Restrictions/Precautions: WBAT with knee immobilizer L LE, limit knee flexion    Subjective:   Patient semi reclined upon entering and agreeable to working with therapy  Initial Pain level:  0/10    Goals:                   Short term goals  Time Frame for Short term goals: 1 week or until discharge  Short term goal 1: Pt will demonstrate bed mobility Mod I  Short term goal 2: Pt will demonstrate ambulation 100ft supervision with RW. Short term goal 3: Pt will demonstrate transfers Mod I.   Short term goal 4: Pt will demonstrate Good standing balance. Plan of Care:             Times per week: Mon-Sun 5x/wk            Current Treatment Recommendations: Strengthening,IADL Training,Home Exercise Program,ROM,Safety Education & Cliffton Antony Training,Patient/Caregiver Education & Training,Functional Mobility Training,Equipment Evaluation, Education, & procurement,Transfer Training,Gait Training,ADL/Self-care Training,Positioning      Objective Findings:    Date: 1/29/2022 1/31/22 2/1/2022 2-2-22 2/3/2022   Bed mobility SBA Min A LLE to get in/out of bed Min/CGA LLE to get in/out of bed sba with leg  SBA   Sit to stand transfer SBA SBA  SBA sba SBA   Stand to sit transfer SBA SBA SBA sba SBA   Commode transfer SBA-CGA SBA-CGA SBA-CGA Sba, 2 episodes. SBA   Standing tolerance For amb and self miguel care For ambulation  For ambulation 14-15 mins with dyn standing ball catch/throw with cg-sba. Intermittent UE on walker during rest breaks. For ambulation   Ambulation Amb w/RW and CGA of 1 200ft x2 w/brace donned Amb w/RW and CGA of 1 50ft x2 w/brace donned Amb w/RW and CGA of 1 250ft   w/brace donned Gait training with RW 36+56+57+21+02  Brace donned.  Standing rest breaks, step to pattern  Amb w/RW and CGA of 1 300ft   w/brace donned   Stairs x x x x x     Exercises:   Exercise/Equipment/Modalities  Date: 1/29/2022 1/31/22 2/1/2022 2/3/2022   AP 20x  20x 20x   Glut sets 20x  10x 20x   Quad sets 20x  10x    Heel slides 20x R  10x R 20x R   Hip AB/AD 2x10 B 1 x10 10x B 20x B   SLR 2x10 B w/slight assist on the left 1 x10 10x B 20x B w/slight assist on left                                 Modality/intervention used:   [x ] Therapeutic Exercise   [ ] Modalities:   [x ] Therapeutic Activity     [ ] Ultrasound   [ ] Elec Stim   [x ] Gait Training     [ ] Cervical Traction  [ ] Lumbar Traction   [ ] Neuromuscular Re-education   [ ] Cold/hotpack  [ ] Iontophoresis   [ ] Instruction in HEP     [ ] Vasopneumatic   [ ] Manual Therapy   [ ] Aquatic Therapy     Other Therapeutic Activities:      Home Exercise Program/Education provided to patient:  x    Manual Treatments: x    Communication with other providers:   Okay to see per nursing,      Adverse reactions to treatment: pain    Treatment/Activity Tolerance:   [x ] Patient limited by fatigue [x ] Patient limited by pain [ ] Patient limited by other medical complications [ ] Patient tolerated therapy well  [ ] Other:     Post Tx Pain Rating:does not rate     Safety Precautions:   [x ] left in bed  [  ] left in chair [x ] call light within reach  [x  ] bed alarm on  [ ] personal alarm on  [ ] other staff present:    Plan:   [x ] Continue per plan of care [ ] Zack Cardozo current plan   [ ] Plan of care initiated [ ] Debby Ya pending MD visit [ ] Discharge     Plan for Next Session:     Time In: 0945  Time Out:  1040  Total Treatment Minutes: 55   Billed Units:  1gt 1ta 2te    Signed: Karen Mejias 2/3/2022, 11:38 AM

## 2022-02-03 NOTE — PLAN OF CARE
Problem: Falls - Risk of:  Goal: Will remain free from falls  Description: Will remain free from falls  2/3/2022 1013 by Klaudia Cooper RN  Outcome: Ongoing  2/3/2022 0303 by Odessa Murphy RN  Outcome: Ongoing  Goal: Absence of physical injury  Description: Absence of physical injury  2/3/2022 1013 by Klaudia Cooper RN  Outcome: Ongoing  2/3/2022 0303 by Odessa Murphy RN  Outcome: Ongoing     Problem: Pain:  Goal: Pain level will decrease  Description: Pain level will decrease  2/3/2022 1013 by Klaudia Cooper RN  Outcome: Ongoing  2/3/2022 0303 by Odessa Murphy RN  Outcome: Ongoing  Goal: Control of acute pain  Description: Control of acute pain  2/3/2022 1013 by Klaudia Cooper RN  Outcome: Ongoing  2/3/2022 0303 by Odessa Murphy RN  Outcome: Ongoing  Goal: Control of chronic pain  Description: Control of chronic pain  2/3/2022 1013 by Klaudia Cooper RN  Outcome: Ongoing  2/3/2022 0303 by Odessa Murphy RN  Outcome: Ongoing     Problem:  Activity:  Goal: Ability to tolerate increased activity will improve  Description: Ability to tolerate increased activity will improve  2/3/2022 1013 by Klaudia Cooper RN  Outcome: Ongoing  2/3/2022 0303 by Odessa Murphy RN  Outcome: Ongoing

## 2022-02-04 LAB
ANION GAP SERPL CALCULATED.3IONS-SCNC: 10 MMOL/L (ref 4–16)
BUN BLDV-MCNC: 23 MG/DL (ref 6–23)
CALCIUM SERPL-MCNC: 8.2 MG/DL (ref 8.3–10.6)
CHLORIDE BLD-SCNC: 108 MMOL/L (ref 99–110)
CO2: 25 MMOL/L (ref 21–32)
CREAT SERPL-MCNC: 0.9 MG/DL (ref 0.6–1.1)
GFR AFRICAN AMERICAN: >60 ML/MIN/1.73M2
GFR NON-AFRICAN AMERICAN: >60 ML/MIN/1.73M2
GLUCOSE BLD-MCNC: 93 MG/DL (ref 70–99)
HCT VFR BLD CALC: 29.7 % (ref 37–47)
HEMOGLOBIN: 8.8 GM/DL (ref 12.5–16)
MCH RBC QN AUTO: 28.8 PG (ref 27–31)
MCHC RBC AUTO-ENTMCNC: 29.6 % (ref 32–36)
MCV RBC AUTO: 97.1 FL (ref 78–100)
PDW BLD-RTO: 13.4 % (ref 11.7–14.9)
PLATELET # BLD: 268 K/CU MM (ref 140–440)
PMV BLD AUTO: 9.8 FL (ref 7.5–11.1)
POTASSIUM SERPL-SCNC: 4.5 MMOL/L (ref 3.5–5.1)
RBC # BLD: 3.06 M/CU MM (ref 4.2–5.4)
SODIUM BLD-SCNC: 143 MMOL/L (ref 135–145)
WBC # BLD: 12.2 K/CU MM (ref 4–10.5)

## 2022-02-04 PROCEDURE — 1200000002 HC SEMI PRIVATE SWING BED

## 2022-02-04 PROCEDURE — 97116 GAIT TRAINING THERAPY: CPT

## 2022-02-04 PROCEDURE — 6360000002 HC RX W HCPCS: Performed by: NURSE PRACTITIONER

## 2022-02-04 PROCEDURE — 6370000000 HC RX 637 (ALT 250 FOR IP): Performed by: NURSE PRACTITIONER

## 2022-02-04 PROCEDURE — 97110 THERAPEUTIC EXERCISES: CPT

## 2022-02-04 PROCEDURE — 6370000000 HC RX 637 (ALT 250 FOR IP): Performed by: INTERNAL MEDICINE

## 2022-02-04 PROCEDURE — 85027 COMPLETE CBC AUTOMATED: CPT

## 2022-02-04 PROCEDURE — 80048 BASIC METABOLIC PNL TOTAL CA: CPT

## 2022-02-04 PROCEDURE — 97530 THERAPEUTIC ACTIVITIES: CPT

## 2022-02-04 PROCEDURE — 36415 COLL VENOUS BLD VENIPUNCTURE: CPT

## 2022-02-04 RX ADMIN — OXYCODONE AND ACETAMINOPHEN 1 TABLET: 5; 325 TABLET ORAL at 08:36

## 2022-02-04 RX ADMIN — PANTOPRAZOLE SODIUM 40 MG: 40 TABLET, DELAYED RELEASE ORAL at 06:09

## 2022-02-04 RX ADMIN — POTASSIUM CHLORIDE 20 MEQ: 20 TABLET, EXTENDED RELEASE ORAL at 10:20

## 2022-02-04 RX ADMIN — LEVOTHYROXINE SODIUM 100 MCG: 100 TABLET ORAL at 06:09

## 2022-02-04 RX ADMIN — ENOXAPARIN SODIUM 30 MG: 100 INJECTION SUBCUTANEOUS at 10:25

## 2022-02-04 RX ADMIN — CHOLECALCIFEROL TAB 125 MCG (5000 UNIT) 5000 UNITS: 125 TAB at 10:20

## 2022-02-04 RX ADMIN — ZOLPIDEM TARTRATE 5 MG: 5 TABLET ORAL at 21:30

## 2022-02-04 RX ADMIN — OXYCODONE AND ACETAMINOPHEN 1 TABLET: 5; 325 TABLET ORAL at 21:30

## 2022-02-04 ASSESSMENT — PAIN SCALES - GENERAL
PAINLEVEL_OUTOF10: 0
PAINLEVEL_OUTOF10: 8
PAINLEVEL_OUTOF10: 4
PAINLEVEL_OUTOF10: 7
PAINLEVEL_OUTOF10: 0

## 2022-02-04 ASSESSMENT — PAIN DESCRIPTION - FREQUENCY
FREQUENCY: CONTINUOUS
FREQUENCY: CONTINUOUS

## 2022-02-04 ASSESSMENT — PAIN DESCRIPTION - ONSET
ONSET: ON-GOING
ONSET: ON-GOING

## 2022-02-04 ASSESSMENT — PAIN DESCRIPTION - DESCRIPTORS: DESCRIPTORS: DISCOMFORT;STABBING

## 2022-02-04 ASSESSMENT — PAIN DESCRIPTION - PROGRESSION
CLINICAL_PROGRESSION: GRADUALLY WORSENING
CLINICAL_PROGRESSION: GRADUALLY IMPROVING
CLINICAL_PROGRESSION: GRADUALLY WORSENING

## 2022-02-04 ASSESSMENT — PAIN DESCRIPTION - ORIENTATION
ORIENTATION: LEFT
ORIENTATION: LEFT

## 2022-02-04 ASSESSMENT — PAIN DESCRIPTION - PAIN TYPE
TYPE: ACUTE PAIN
TYPE: ACUTE PAIN

## 2022-02-04 ASSESSMENT — PAIN DESCRIPTION - LOCATION
LOCATION: KNEE
LOCATION: KNEE;LEG

## 2022-02-04 ASSESSMENT — PAIN DESCRIPTION - DIRECTION: RADIATING_TOWARDS: KNEE

## 2022-02-04 NOTE — CARE COORDINATION
SWING BED WEEKLY TEAM SHEET     Nabor Castillo   2/4/2022 WEEK # 1    Care Management    Issues to be resolved before discharge: Increased strength, balance, and mobility.      Family Education: Patient/staff to update     [facility]  Patient/Family Adjustment: N/A     Goals of previous week:   [] 1st team   [] met   [] partially met    [x] not met             Why goals were not met: Continued weakness     Skilled Level of Care Remains   [x] yes   [] no    Estimated length of stay: Next insurance review 2/6/22  Patient/Family Concerns/input: None at this time    Patient/Family  Signature _________________  2/4/2022       Care Management Signature:  Chantal De Oliveira RN

## 2022-02-04 NOTE — FLOWSHEET NOTE
Physical Therapy Treatment Note   Date: 2022 Room: [unfilled]   Name: Rob Riddle : 1945   MRN: [id number] Admission Date:2022    Primary Problem:   Past Medical History:   Diagnosis Date    Arm fracture, left 2019    Casted - no surgery - Dr. 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 pylori infection 1996    S/P Biaxin and Prilosec    Hiatal hernia 1994    with reflux by UGI    Hyperlipidemia     Hypertension     Hypothyroidism 1's    MDRO (multiple drug resistant organisms) resistance     Nasal passages    Osteoporosis     Smoking hx      Past Surgical History:   Procedure Laterality Date    CHOLECYSTECTOMY, LAPAROSCOPIC  2018    Dr Marilee Gutierrez    COLONOSCOPY  10/26/2007    Normal exam - Dr. Patel [facility] COLONOSCOPY  2019    COLONOSCOPY N/A 2019    COLORECTAL CANCER SCREENING, NOT HIGH RISK performed by Mukesh Mancilla MD at [address] Left 10/21/2013    Lesion excision-squamous papillomaDr Francesco    SKIN BIOPSY  1960's    Moles removed - face, neck     Rehabilitation Diagnosis: R rib fx 4-7 and L patellar fx   Restrictions/Precautions: WBAT with knee immobilizer L LE, limit knee flexion    Subjective:   Patient semi reclined upon entering and agreeable to working with therapy  Initial Pain level:  0/10    Goals:                   Short term goals  Time Frame for Short term goals: 1 week or until discharge  Short term goal 1: Pt will demonstrate bed mobility Mod I  Short term goal 2: Pt will demonstrate ambulation 100ft supervision with RW. Short term goal 3: Pt will demonstrate transfers Mod I.   Short term goal 4: Pt will demonstrate Good standing balance. Plan of Care:             Times per week: Mon-Sun 5x/wk            Current Treatment Recommendations: Strengthening,IADL Training,Home Exercise Program,ROM,Safety Education & Hamida Bride Training,Patient/Caregiver Education & Training,Functional Mobility Training,Equipment Evaluation, Education, & procurement,Transfer Training,Gait Training,ADL/Self-care Training,Positioning      Objective Findings:    Date: 1/29/2022 1/31/22 2/1/2022 2-2-22 2/3/2022 2/4/2022   Bed mobility SBA Min A LLE to get in/out of bed Min/CGA LLE to get in/out of bed sba with leg  SBA SBA   Sit to stand transfer SBA SBA  SBA sba SBA SBA   Stand to sit transfer SBA SBA SBA sba SBA SBA   Commode transfer SBA-CGA SBA-CGA SBA-CGA Sba, 2 episodes. SBA x   Standing tolerance For amb and self miguel care For ambulation  For ambulation 14-15 mins with dyn standing ball catch/throw with cg-sba. Intermittent UE on walker during rest breaks. For ambulation For ambulation    Ambulation Amb w/RW and CGA of 1 200ft x2 w/brace donned Amb w/RW and CGA of 1 50ft x2 w/brace donned Amb w/RW and CGA of 1 250ft   w/brace donned Gait training with RW 35+72+62+91+54  Brace donned.  Standing rest breaks, step to pattern  Amb w/RW and CGA of 1 300ft   w/brace donned Amb with  ftx2 with CGA with knee brace donned   Stairs x x x x x      Exercises:   Exercise/Equipment/Modalities  Date: 1/29/2022 1/31/22 2/1/2022 2/3/2022   AP 20x  20x 20x   Glut sets 20x  10x 20x   Quad sets 20x  10x    Heel slides 20x R  10x R 20x R   Hip AB/AD 2x10 B 1 x10 10x B 20x B   SLR 2x10 B w/slight assist on the left 1 x10 10x B 20x B w/slight assist on left                                 Modality/intervention used:   [x ] Therapeutic Exercise   [ ] Modalities:   [x ] Therapeutic Activity     [ ] Ultrasound   [ ] Elec Stim   [x ] Gait Training     [ ] Cervical Traction  [ ] Lumbar Traction   [ ] Neuromuscular Re-education   [ ] Cold/hotpack  [ ] Iontophoresis   [ ] Instruction in HEP     [ ] Vasopneumatic   [ ] Manual Therapy   [ ] Aquatic Therapy     Other Therapeutic Activities:      Home Exercise Program/Education provided to patient:  x    Manual Treatments: x    Communication with other providers:   Okay to see per nursing,      Adverse reactions to treatment: pain    Treatment/Activity Tolerance:   [x ] Patient limited by fatigue [x ] Patient limited by pain [ ] Patient limited by other medical complications [ ] Patient tolerated therapy well  [ ] Other:     Post Tx Pain Rating:does not rate     Safety Precautions:   [x ] left in bed  [  ] left in chair [x ] call light within reach  [x  ] bed alarm on  [ ] personal alarm on  [ ] other staff present:    Plan:   [x ] Continue per plan of care [ ] Ame Barrios current plan   [ ] Plan of care initiated [ ] Hold pending MD visit [ ] Discharge     Plan for Next Session:     Time In: 7137  Time Out:  1138  Total Treatment Minutes: 33  Billed Units:  1gt 1ta     Signed: [address] 2/4/2022, 12:59 PM

## 2022-02-04 NOTE — PROGRESS NOTES
Attempted x2 to work with pt for 1:1 session at [facility]. First time client was working with PT/OT. Second time, client on phone and requesting to continue to talk on the phone. Pt provided with Word Search activity to complete independently.      Electronically signed by MARTITA Lerma MA on 2/4/2022 at 12:40 PM

## 2022-02-04 NOTE — PROGRESS NOTES
Occupational Therapy    Occupational Therapy Treatment Note  Name: Brnadan Molina MRN: [id number] :   1945   Date:  2022   Admission Date: 2022 Room:  [address] [place], [place] [address]   Restrictions/Precautions:  Restrictions/Precautions  Restrictions/Precautions: Weight Bearing  Required Braces or Orthoses?: Yes     Communication with other providers:   Cleared for treatment by RN. Subjective:  Patient states:  \"I need to get straightened up in bed\"  Pain:   Location, Type, Intensity (0/10 to 10/10): No pain    Objective:    Observation:  Pt alert and orietned      Treatment, including education:  Transfers  Supine to sit :Sup  Sit to supine :Min A for leg  Scooting :Sup  Sit to stand :CGA  Stand to sit :CGA  SPT:CGA      Therapeutic Exercise:  Pt completed UE exercises to increase strength and ROM to facilitate increase for ADLs and functional mobility. Pt completed B UEs with 3# dumbbell exercises with curls, shoulder presses, punch, stirring and wrist curls x 20 reps with mod rest breaks due to fatigue. Therapeutic Activity Training: Pt stood x 7 min with CGA with RW to facilitate increased endurance/strength for ADL tasks and transfers. Pt completed functional mobility x 100' x 2 with CGA. Safety Measures: Gait belt used, Left in bed, Pull/Bed Alarm activated and call light left in reach        Assessment / Impression:        Patient's tolerance of treatment: Good   Adverse Reaction: None  Significant change in status and impact:  None  Barriers to improvement:  Dec strength and endurance. Plan for Next Session:    Continue with POC.     Time in:  8173 2663  Time out:  9430 8  Total treatment time:  10 + 33=43  Billed Units: 1 EX, 2 ACT  Electronically signed by:    Fritz Petty, [address]    2022, 12:55 PM    Previously filed values:  Patient Goals   Patient goals : to be able to go home and do things for herself  Short term goals  Time Frame for Short term goals: until discharge  Short term goal 1: Pt will be MI for functional adl transfers to chair, toilet/BSC, and bed following her WB precautions for LLE( wt bearing only when brace is on), using good walker safety, w/o LOB or falls  Short term goal 2: Pt will be MI for UB/LB ADLs using necessary a.e.   Short term goal 3: Pt will be I/MI toileting  Short term goal 4: Pt will be min vc for BUE strengthening to improve strength and endurance for transfers

## 2022-02-04 NOTE — PROGRESS NOTES
Attempted x2 to complete pt's 1:1 session at 1423 at 1440. Both times pt on the phone and requesting to continue phone call.      Electronically signed by MARTITA Herrera MA on 2/4/2022 at 12:39 PM

## 2022-02-04 NOTE — FLOWSHEET NOTE
Physical Therapy Treatment Note   Date: 2022 Room: [unfilled]   Name: Mitra Up : 1945   MRN: [id number] Admission Date:2022    Primary Problem:   Past Medical History:   Diagnosis Date    Arm fracture, left 2019    Casted - no surgery - Dr. 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 pylori infection 1996    S/P Biaxin and Prilosec    Hiatal hernia 1994    with reflux by UGI    Hyperlipidemia     Hypertension     Hypothyroidism 1's    MDRO (multiple drug resistant organisms) resistance     Nasal passages    Osteoporosis     Smoking hx      Past Surgical History:   Procedure Laterality Date    CHOLECYSTECTOMY, LAPAROSCOPIC  2018    Dr Jazmín Jones    COLONOSCOPY  10/26/2007    Normal exam - Dr. Sukumar Maria COLONOSCOPY  2019    COLONOSCOPY N/A 2019    COLORECTAL CANCER SCREENING, NOT HIGH RISK performed by Valente Lino MD at [address] Left 10/21/2013    Lesion excision-squamous papillomaDr Francesco    SKIN BIOPSY  1960's    Moles removed - face, neck     Rehabilitation Diagnosis: R rib fx 4-7 and L patellar fx   Restrictions/Precautions: WBAT with knee immobilizer L LE, limit knee flexion    Subjective:   Patient  agreeable to working with therapy  Initial Pain level:  0/10    Goals:                   Short term goals  Time Frame for Short term goals: 1 week or until discharge  Short term goal 1: Pt will demonstrate bed mobility Mod I  Short term goal 2: Pt will demonstrate ambulation 100ft supervision with RW. Short term goal 3: Pt will demonstrate transfers Mod I. Short term goal 4: Pt will demonstrate Good standing balance. Plan of Care:             Times per week: Mon-Sun 5x/wk            Current Treatment Recommendations: Strengthening,IADL Training,Home Exercise Program,ROM,Safety Education & Uma Sis Training,Patient/Caregiver Education & Training,Functional Mobility Training,Equipment Evaluation, Education, & procurement,Transfer Training,Gait Training,ADL/Self-care Training,Positioning      Objective Findings:    Date: 1/29/2022 1/31/22 2/1/2022 2-2-22 2/3/2022 2/4/22   Bed mobility SBA Min A LLE to get in/out of bed Min/CGA LLE to get in/out of bed sba with leg  SBA SBA to get out of bed; min A to get into bed - used leg    Sit to stand transfer SBA SBA  SBA sba SBA SBA   Stand to sit transfer SBA SBA SBA sba SBA SBA   Commode transfer SBA-CGA SBA-CGA SBA-CGA Sba, 2 episodes. SBA Not performed   Standing tolerance For amb and self miguel care For ambulation  For ambulation 14-15 mins with dyn standing ball catch/throw with cg-sba. Intermittent UE on walker during rest breaks. For ambulation For ambulation   Ambulation Amb w/RW and CGA of 1 200ft x2 w/brace donned Amb w/RW and CGA of 1 50ft x2 w/brace donned Amb w/RW and CGA of 1 250ft   w/brace donned Gait training with RW 07+82+38+89+78  Brace donned.  Standing rest breaks, step to pattern  Amb w/RW and CGA of 1 300ft   w/brace donned Amb w/RW and S BA of 1 250ft   w/brace donned   Stairs x x x x x      Exercises:   Exercise/Equipment/Modalities  Date: 1/29/2022 1/31/22 2/1/2022 2/3/2022 2/4/22   AP 20x  20x 20x    Glut sets 20x  10x 20x    Quad sets 20x  10x     Heel slides 20x R  10x R 20x R    Hip AB/AD 2x10 B 1 x10 10x B 20x B    SLR 2x10 B w/slight assist on the left 1 x10 10x B 20x B w/slight assist on left 10 x R;   L ABD/ ADD x10                                     Modality/intervention used:   [x ] Therapeutic Exercise   [ ] Modalities:   [x ] Therapeutic Activity     [ ] Ultrasound   [ ] Elec Stim   [x ] Gait Training [ ] Cervical Traction  [ ] Lumbar Traction   [ ] Neuromuscular Re-education   [ ] Cold/hotpack  [ ] Iontophoresis   [ ] Instruction in HEP     [ ] Vasopneumatic   [ ] Manual Therapy   [ ] Aquatic Therapy     Other Therapeutic Activities:      Home Exercise Program/Education provided to patient:  x    Manual Treatments: x    Communication with other providers:   Okay to see per nursing,      Adverse reactions to treatment: pain    Treatment/Activity Tolerance:   [x ] Patient limited by fatigue [x ] Patient limited by pain [ ] Patient limited by other medical complications [ ] Patient tolerated therapy well  [ ] Other:     Post Tx Pain Rating:does not rate     Safety Precautions:   [x ] left in bed  [  ] left in chair [x ] call light within reach  [x  ] bed alarm on  [ ] personal alarm on  [ ] other staff present:    Plan:   [x ] Continue per plan of care [ ] Bess Obrien current plan   [ ] Plan of care initiated [ ] Hold pending MD visit [ ] Discharge     Plan for Next Session:     Time In: 940Time Out:  1000  Total Treatment Minutes:20   Billed Units:  1gt     Signed: Kaden Neumann, PT 2/4/2022, 12:59 PM
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
## 2022-02-05 PROCEDURE — 97116 GAIT TRAINING THERAPY: CPT

## 2022-02-05 PROCEDURE — 6370000000 HC RX 637 (ALT 250 FOR IP): Performed by: INTERNAL MEDICINE

## 2022-02-05 PROCEDURE — 1200000002 HC SEMI PRIVATE SWING BED

## 2022-02-05 PROCEDURE — 6360000002 HC RX W HCPCS: Performed by: NURSE PRACTITIONER

## 2022-02-05 PROCEDURE — 6370000000 HC RX 637 (ALT 250 FOR IP): Performed by: NURSE PRACTITIONER

## 2022-02-05 RX ADMIN — TRAMADOL HYDROCHLORIDE 50 MG: 50 TABLET, COATED ORAL at 20:58

## 2022-02-05 RX ADMIN — OXYCODONE AND ACETAMINOPHEN 1 TABLET: 5; 325 TABLET ORAL at 04:00

## 2022-02-05 RX ADMIN — LEVOTHYROXINE SODIUM 100 MCG: 100 TABLET ORAL at 06:00

## 2022-02-05 RX ADMIN — TRAMADOL HYDROCHLORIDE 50 MG: 50 TABLET, COATED ORAL at 14:16

## 2022-02-05 RX ADMIN — OXYCODONE AND ACETAMINOPHEN 1 TABLET: 5; 325 TABLET ORAL at 18:56

## 2022-02-05 RX ADMIN — PANTOPRAZOLE SODIUM 40 MG: 40 TABLET, DELAYED RELEASE ORAL at 06:00

## 2022-02-05 RX ADMIN — ACETAMINOPHEN 650 MG: 325 TABLET ORAL at 15:34

## 2022-02-05 RX ADMIN — TRAMADOL HYDROCHLORIDE 50 MG: 50 TABLET, COATED ORAL at 02:37

## 2022-02-05 RX ADMIN — OXYCODONE AND ACETAMINOPHEN 1 TABLET: 5; 325 TABLET ORAL at 09:20

## 2022-02-05 RX ADMIN — POTASSIUM CHLORIDE 20 MEQ: 20 TABLET, EXTENDED RELEASE ORAL at 09:32

## 2022-02-05 RX ADMIN — CHOLECALCIFEROL TAB 125 MCG (5000 UNIT) 5000 UNITS: 125 TAB at 09:32

## 2022-02-05 RX ADMIN — ENOXAPARIN SODIUM 30 MG: 100 INJECTION SUBCUTANEOUS at 09:32

## 2022-02-05 ASSESSMENT — PAIN SCALES - GENERAL
PAINLEVEL_OUTOF10: 4
PAINLEVEL_OUTOF10: 0
PAINLEVEL_OUTOF10: 7
PAINLEVEL_OUTOF10: 0
PAINLEVEL_OUTOF10: 10
PAINLEVEL_OUTOF10: 6
PAINLEVEL_OUTOF10: 9
PAINLEVEL_OUTOF10: 1
PAINLEVEL_OUTOF10: 0
PAINLEVEL_OUTOF10: 3
PAINLEVEL_OUTOF10: 5
PAINLEVEL_OUTOF10: 7
PAINLEVEL_OUTOF10: 10
PAINLEVEL_OUTOF10: 1

## 2022-02-05 ASSESSMENT — PAIN DESCRIPTION - ORIENTATION
ORIENTATION: LEFT

## 2022-02-05 ASSESSMENT — PAIN - FUNCTIONAL ASSESSMENT
PAIN_FUNCTIONAL_ASSESSMENT: PREVENTS OR INTERFERES SOME ACTIVE ACTIVITIES AND ADLS
PAIN_FUNCTIONAL_ASSESSMENT: ACTIVITIES ARE NOT PREVENTED
PAIN_FUNCTIONAL_ASSESSMENT: ACTIVITIES ARE NOT PREVENTED

## 2022-02-05 ASSESSMENT — PAIN DESCRIPTION - LOCATION
LOCATION: HEAD
LOCATION: LEG
LOCATION: LEG
LOCATION: KNEE;LEG
LOCATION: KNEE;LEG
LOCATION: LEG

## 2022-02-05 ASSESSMENT — PAIN DESCRIPTION - PROGRESSION
CLINICAL_PROGRESSION: GRADUALLY IMPROVING
CLINICAL_PROGRESSION: RAPIDLY WORSENING
CLINICAL_PROGRESSION: GRADUALLY IMPROVING
CLINICAL_PROGRESSION: GRADUALLY WORSENING
CLINICAL_PROGRESSION: GRADUALLY WORSENING

## 2022-02-05 ASSESSMENT — PAIN DESCRIPTION - FREQUENCY
FREQUENCY: CONTINUOUS
FREQUENCY: INTERMITTENT
FREQUENCY: CONTINUOUS

## 2022-02-05 ASSESSMENT — PAIN DESCRIPTION - PAIN TYPE
TYPE: ACUTE PAIN

## 2022-02-05 ASSESSMENT — PAIN DESCRIPTION - ONSET
ONSET: GRADUAL
ONSET: ON-GOING
ONSET: UNABLE TO TELL
ONSET: ON-GOING
ONSET: ON-GOING

## 2022-02-05 ASSESSMENT — PAIN DESCRIPTION - DIRECTION
RADIATING_TOWARDS: KNEE
RADIATING_TOWARDS: KNEE

## 2022-02-05 ASSESSMENT — PAIN DESCRIPTION - DESCRIPTORS
DESCRIPTORS: HEADACHE
DESCRIPTORS: ACHING
DESCRIPTORS: CONSTANT;DISCOMFORT
DESCRIPTORS: ACHING
DESCRIPTORS: CONSTANT;DISCOMFORT

## 2022-02-05 NOTE — FLOWSHEET NOTE
Physical Therapy Treatment Note   Date: 2022 Room: [unfilled]   Name: Alayna Ny : 1945   MRN: [id number] Admission Date:2022    Primary Problem:   Past Medical History:   Diagnosis Date    Arm fracture, left 2019    Casted - no surgery - Dr. 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 pylori infection 1996    S/P Biaxin and Prilosec    Hiatal hernia 1994    with reflux by UGI    Hyperlipidemia     Hypertension     Hypothyroidism 1's    MDRO (multiple drug resistant organisms) resistance     Nasal passages    Osteoporosis     Smoking hx      Past Surgical History:   Procedure Laterality Date    CHOLECYSTECTOMY, LAPAROSCOPIC  2018    Dr Chung Muller    COLONOSCOPY  10/26/2007    Normal exam - Dr. Daryl Wood COLONOSCOPY  2019    COLONOSCOPY N/A 2019    COLORECTAL CANCER SCREENING, NOT HIGH RISK performed by Kennedy Carter MD at [address] Left 10/21/2013    Lesion excision-squamous papillomaDr Francesco    SKIN BIOPSY  1960's    Moles removed - face, neck     Rehabilitation Diagnosis: R rib fx 4-7 and L patellar fx   Restrictions/Precautions: WBAT with knee immobilizer L LE, limit knee flexion    Subjective:   Patient  agreeable to working with therapy  Initial Pain level:  0/10    Goals:                   Short term goals  Time Frame for Short term goals: 1 week or until discharge  Short term goal 1: Pt will demonstrate bed mobility Mod I  Short term goal 2: Pt will demonstrate ambulation 100ft supervision with RW. Short term goal 3: Pt will demonstrate transfers Mod I. Short term goal 4: Pt will demonstrate Good standing balance. Plan of Care:             Times per week: Mon-Sat 5x/wk            Current Treatment Recommendations: Strengthening,IADL Training,Home Exercise Program,ROM,Safety Education & Lori Felicita Training,Patient/Caregiver Education & Training,Functional Mobility Training,Equipment Evaluation, Education, & procurement,Transfer Training,Gait Training,ADL/Self-care Training,Positioning      Objective Findings:    Date: 1/29/2022 1/31/22 2/1/2022 2-2-22 2/3/2022 2/4/22 2/5/22   Bed mobility SBA Min A LLE to get in/out of bed Min/CGA LLE to get in/out of bed sba with leg  SBA SBA to get out of bed; min A to get into bed - used leg  SBA to get out of bed; min A to get into bed - used leg    Sit to stand transfer SBA SBA  SBA sba SBA SBA SBA   Stand to sit transfer SBA SBA SBA sba SBA SBA SBA   Commode transfer SBA-CGA SBA-CGA SBA-CGA Sba, 2 episodes. SBA Not performed np   Standing tolerance For amb and self miguel care For ambulation  For ambulation 14-15 mins with dyn standing ball catch/throw with cg-sba. Intermittent UE on walker during rest breaks. For ambulation For ambulation For ambulation   Ambulation Amb w/RW and CGA of 1 200ft x2 w/brace donned Amb w/RW and CGA of 1 50ft x2 w/brace donned Amb w/RW and CGA of 1 250ft   w/brace donned Gait training with RW 19+07+91+61+63  Brace donned. Standing rest breaks, step to pattern  Amb w/RW and CGA of 1 300ft   w/brace donned Amb w/RW and S BA of 1 250ft   w/brace donned Amb w/RW and S BA of 1350ft   w/brace donned   Stairs x x x x x       Exercises:   Exercise/Equipment/Modalities  Date: 1/29/2022 1/31/22 2/1/2022 2/3/2022 2/4/22 2/5/22   AP 20x  20x 20x  20x   Glut sets 20x  10x 20x     Quad sets 20x  10x      Heel slides 20x R  10x R 20x R     Hip AB/AD 2x10 B 1 x10 10x B 20x B     SLR 2x10 B w/slight assist on the left 1 x10 10x B 20x B w/slight assist on left 10 x R;   L ABD/ ADD x10 10 x R;   L ABD/ ADD x10;  I SLR x 1 Modality/intervention used:   [x ] Therapeutic Exercise   [ ] Modalities:   [x ] Therapeutic Activity     [ ] Ultrasound   [ ] Elec Stim   [x ] Gait Training     [ ] Cervical Traction  [ ] Lumbar Traction   [ ] Neuromuscular Re-education   [ ] Cold/hotpack  [ ] Iontophoresis   [ ] Instruction in HEP     [ ] Vasopneumatic   [ ] Manual Therapy   [ ] Aquatic Therapy     Other Therapeutic Activities:      Home Exercise Program/Education provided to patient:  x    Manual Treatments: x    Communication with other providers:   Okay to see per nursing,      Adverse reactions to treatment: pain    Treatment/Activity Tolerance:   [x ] Patient limited by fatigue [x ] Patient limited by pain [ ] Patient limited by other medical complications [ ] Patient tolerated therapy well  [ ] Other:     Post Tx Pain Rating:does 0/10    Safety Precautions:   [x ] left in bed  [  ] left in chair [x ] call light within reach  [x  ] bed alarm on  [ ] personal alarm on  [ ] other staff present:    Plan:   [x ] Continue per plan of care [ ] Sanjiv Lombardi current plan   [ ] Plan of care initiated [ ] Hold pending MD visit [ ] Discharge     Plan for Next Session:     Time In: 945Time Out:  1015  Total Treatment Minutes:30  Billed Units:  2gt     Signed: Milton Argueta PT 2/5/2022, 12:13 PM

## 2022-02-06 PROCEDURE — 6370000000 HC RX 637 (ALT 250 FOR IP): Performed by: INTERNAL MEDICINE

## 2022-02-06 PROCEDURE — 6360000002 HC RX W HCPCS: Performed by: NURSE PRACTITIONER

## 2022-02-06 PROCEDURE — 1200000002 HC SEMI PRIVATE SWING BED

## 2022-02-06 PROCEDURE — 6370000000 HC RX 637 (ALT 250 FOR IP): Performed by: NURSE PRACTITIONER

## 2022-02-06 RX ADMIN — OXYCODONE AND ACETAMINOPHEN 1 TABLET: 5; 325 TABLET ORAL at 21:04

## 2022-02-06 RX ADMIN — OXYCODONE AND ACETAMINOPHEN 1 TABLET: 5; 325 TABLET ORAL at 16:04

## 2022-02-06 RX ADMIN — OXYCODONE AND ACETAMINOPHEN 1 TABLET: 5; 325 TABLET ORAL at 11:03

## 2022-02-06 RX ADMIN — LEVOTHYROXINE SODIUM 100 MCG: 100 TABLET ORAL at 05:42

## 2022-02-06 RX ADMIN — POTASSIUM CHLORIDE 20 MEQ: 20 TABLET, EXTENDED RELEASE ORAL at 09:17

## 2022-02-06 RX ADMIN — ZOLPIDEM TARTRATE 5 MG: 5 TABLET ORAL at 22:47

## 2022-02-06 RX ADMIN — ENOXAPARIN SODIUM 30 MG: 100 INJECTION SUBCUTANEOUS at 09:17

## 2022-02-06 RX ADMIN — OXYCODONE AND ACETAMINOPHEN 1 TABLET: 5; 325 TABLET ORAL at 05:45

## 2022-02-06 RX ADMIN — TRAMADOL HYDROCHLORIDE 50 MG: 50 TABLET, COATED ORAL at 14:56

## 2022-02-06 RX ADMIN — CHOLECALCIFEROL TAB 125 MCG (5000 UNIT) 5000 UNITS: 125 TAB at 09:17

## 2022-02-06 RX ADMIN — PANTOPRAZOLE SODIUM 40 MG: 40 TABLET, DELAYED RELEASE ORAL at 05:42

## 2022-02-06 ASSESSMENT — PAIN DESCRIPTION - FREQUENCY
FREQUENCY: INTERMITTENT
FREQUENCY: CONTINUOUS
FREQUENCY: INTERMITTENT
FREQUENCY: INTERMITTENT
FREQUENCY: CONTINUOUS

## 2022-02-06 ASSESSMENT — PAIN DESCRIPTION - PAIN TYPE
TYPE: ACUTE PAIN
TYPE: CHRONIC PAIN
TYPE: ACUTE PAIN

## 2022-02-06 ASSESSMENT — PAIN DESCRIPTION - LOCATION
LOCATION: LEG
LOCATION: KNEE;LEG

## 2022-02-06 ASSESSMENT — PAIN DESCRIPTION - PROGRESSION
CLINICAL_PROGRESSION: GRADUALLY WORSENING
CLINICAL_PROGRESSION: GRADUALLY IMPROVING
CLINICAL_PROGRESSION: GRADUALLY IMPROVING
CLINICAL_PROGRESSION: GRADUALLY WORSENING
CLINICAL_PROGRESSION: RAPIDLY WORSENING
CLINICAL_PROGRESSION: GRADUALLY WORSENING
CLINICAL_PROGRESSION: NOT CHANGED
CLINICAL_PROGRESSION: GRADUALLY WORSENING

## 2022-02-06 ASSESSMENT — PAIN DESCRIPTION - DESCRIPTORS
DESCRIPTORS: ACHING
DESCRIPTORS: ACHING;SHARP
DESCRIPTORS: ACHING
DESCRIPTORS: ACHING
DESCRIPTORS: HEADACHE

## 2022-02-06 ASSESSMENT — PAIN - FUNCTIONAL ASSESSMENT
PAIN_FUNCTIONAL_ASSESSMENT: PREVENTS OR INTERFERES SOME ACTIVE ACTIVITIES AND ADLS

## 2022-02-06 ASSESSMENT — PAIN DESCRIPTION - ORIENTATION
ORIENTATION: LEFT

## 2022-02-06 ASSESSMENT — PAIN SCALES - GENERAL
PAINLEVEL_OUTOF10: 2
PAINLEVEL_OUTOF10: 3
PAINLEVEL_OUTOF10: 0
PAINLEVEL_OUTOF10: 4
PAINLEVEL_OUTOF10: 0
PAINLEVEL_OUTOF10: 0
PAINLEVEL_OUTOF10: 6
PAINLEVEL_OUTOF10: 7
PAINLEVEL_OUTOF10: 10
PAINLEVEL_OUTOF10: 5
PAINLEVEL_OUTOF10: 5
PAINLEVEL_OUTOF10: 0
PAINLEVEL_OUTOF10: 7
PAINLEVEL_OUTOF10: 7

## 2022-02-06 ASSESSMENT — PAIN DESCRIPTION - ONSET
ONSET: PROGRESSIVE
ONSET: ON-GOING
ONSET: GRADUAL
ONSET: ON-GOING
ONSET: SUDDEN
ONSET: ON-GOING

## 2022-02-06 NOTE — PLAN OF CARE
Patient has complained of intermittent pain during this shift. Patient states that pain medication \"helps but does not take all the pain away\".   Problem: Pain:  Goal: Control of acute pain  Description: Control of acute pain  Outcome: Ongoing
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
## 2022-02-07 LAB
ANION GAP SERPL CALCULATED.3IONS-SCNC: 12 MMOL/L (ref 4–16)
BUN BLDV-MCNC: 25 MG/DL (ref 6–23)
CALCIUM SERPL-MCNC: 8.4 MG/DL (ref 8.3–10.6)
CHLORIDE BLD-SCNC: 104 MMOL/L (ref 99–110)
CO2: 25 MMOL/L (ref 21–32)
CREAT SERPL-MCNC: 1 MG/DL (ref 0.6–1.1)
GFR AFRICAN AMERICAN: >60 ML/MIN/1.73M2
GFR NON-AFRICAN AMERICAN: 54 ML/MIN/1.73M2
GLUCOSE BLD-MCNC: 88 MG/DL (ref 70–99)
POTASSIUM SERPL-SCNC: 4.4 MMOL/L (ref 3.5–5.1)
SODIUM BLD-SCNC: 141 MMOL/L (ref 135–145)

## 2022-02-07 PROCEDURE — 6370000000 HC RX 637 (ALT 250 FOR IP): Performed by: INTERNAL MEDICINE

## 2022-02-07 PROCEDURE — 1200000002 HC SEMI PRIVATE SWING BED

## 2022-02-07 PROCEDURE — 97110 THERAPEUTIC EXERCISES: CPT

## 2022-02-07 PROCEDURE — 80048 BASIC METABOLIC PNL TOTAL CA: CPT

## 2022-02-07 PROCEDURE — 97116 GAIT TRAINING THERAPY: CPT

## 2022-02-07 PROCEDURE — 6360000002 HC RX W HCPCS: Performed by: NURSE PRACTITIONER

## 2022-02-07 PROCEDURE — 97530 THERAPEUTIC ACTIVITIES: CPT

## 2022-02-07 PROCEDURE — 97535 SELF CARE MNGMENT TRAINING: CPT

## 2022-02-07 PROCEDURE — 6370000000 HC RX 637 (ALT 250 FOR IP): Performed by: NURSE PRACTITIONER

## 2022-02-07 PROCEDURE — 36415 COLL VENOUS BLD VENIPUNCTURE: CPT

## 2022-02-07 RX ADMIN — PANTOPRAZOLE SODIUM 40 MG: 40 TABLET, DELAYED RELEASE ORAL at 05:43

## 2022-02-07 RX ADMIN — LEVOTHYROXINE SODIUM 100 MCG: 100 TABLET ORAL at 05:43

## 2022-02-07 RX ADMIN — POTASSIUM CHLORIDE 20 MEQ: 20 TABLET, EXTENDED RELEASE ORAL at 09:31

## 2022-02-07 RX ADMIN — OXYCODONE AND ACETAMINOPHEN 1 TABLET: 5; 325 TABLET ORAL at 14:36

## 2022-02-07 RX ADMIN — CHOLECALCIFEROL TAB 125 MCG (5000 UNIT) 5000 UNITS: 125 TAB at 09:31

## 2022-02-07 RX ADMIN — ENOXAPARIN SODIUM 30 MG: 100 INJECTION SUBCUTANEOUS at 09:31

## 2022-02-07 RX ADMIN — TRAMADOL HYDROCHLORIDE 50 MG: 50 TABLET, COATED ORAL at 12:00

## 2022-02-07 ASSESSMENT — PAIN SCALES - GENERAL
PAINLEVEL_OUTOF10: 0
PAINLEVEL_OUTOF10: 8
PAINLEVEL_OUTOF10: 6
PAINLEVEL_OUTOF10: 0
PAINLEVEL_OUTOF10: 0
PAINLEVEL_OUTOF10: 3
PAINLEVEL_OUTOF10: 0
PAINLEVEL_OUTOF10: 0

## 2022-02-07 NOTE — CARE COORDINATION
CM received [employer] [facility] letter from the patient's insurance provider. CM met with the patient and her two visitors at the bedside. CM explained to the patient the [facility] letter and its purpose. Patient and visitors discussed and voiced understanding. Patient would prefer to be discharged Thursday 2/10/2022 to [facility] assisted living with her . Patient signed [facility]ART letter, copy left with the patient at the bedside, and a copy was faxed back to her insurance provider. CM will follow. 3:41 PM  CM left a voicemail for [facility] ([phone]) at [facility] to notify her of the patient's anticipated discharge to [address] [place] [address] Thursday 2/10. CM will follow.

## 2022-02-07 NOTE — PLAN OF CARE
Problem: Falls - Risk of:  Goal: Will remain free from falls  Description: Will remain free from falls  2/7/2022 0020 by Eric Saldivar RN  Outcome: Ongoing  2/6/2022 1628 by Pilar Francois RN  Outcome: Ongoing  Goal: Absence of physical injury  Description: Absence of physical injury  2/7/2022 0020 by Eric Saldivar RN  Outcome: Ongoing  2/6/2022 1628 by Pilar Francois RN  Outcome: Ongoing     Problem: Pain:  Goal: Pain level will decrease  Description: Pain level will decrease  2/7/2022 0020 by Eric Saldivar RN  Outcome: Ongoing  2/6/2022 1628 by Pilar Francois RN  Outcome: Ongoing  Goal: Control of acute pain  Description: Control of acute pain  2/7/2022 0020 by Eric Saldivar RN  Outcome: Ongoing  2/6/2022 1628 by Pilar Francois RN  Outcome: Ongoing  Goal: Control of chronic pain  Description: Control of chronic pain  2/7/2022 0020 by Eric Saldivar RN  Outcome: Ongoing  2/6/2022 1628 by Pilar Francois RN  Outcome: Ongoing     Problem:  Activity:  Goal: Ability to tolerate increased activity will improve  Description: Ability to tolerate increased activity will improve  2/7/2022 0020 by Eric Saldivar RN  Outcome: Ongoing  2/6/2022 1628 by Pilar Francois RN  Outcome: Ongoing

## 2022-02-07 NOTE — FLOWSHEET NOTE
Physical Therapy Treatment Note   Date: 2022 Room: [unfilled]   Name: Hadley Oconnor : 1945   MRN: [id number] Admission Date:2022    Primary Problem:   Past Medical History:   Diagnosis Date    Arm fracture, left 2019    Casted - no surgery - Dr. 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 pylori infection 1996    S/P Biaxin and Prilosec    Hiatal hernia 1994    with reflux by UGI    Hyperlipidemia     Hypertension     Hypothyroidism 1's    MDRO (multiple drug resistant organisms) resistance     Nasal passages    Osteoporosis     Smoking hx      Past Surgical History:   Procedure Laterality Date    CHOLECYSTECTOMY, LAPAROSCOPIC  2018    Dr Ben Mar    COLONOSCOPY  10/26/2007    Normal exam - Dr. Bibi Fallon COLONOSCOPY  2019    COLONOSCOPY N/A 2019    COLORECTAL CANCER SCREENING, NOT HIGH RISK performed by Polo Baker MD at [address] Left 10/21/2013    Lesion excision-squamous papillomaDr Francesco    SKIN BIOPSY  1960's    Moles removed - face, neck     Rehabilitation Diagnosis: R rib fx 4-7 and L patellar fx   Restrictions/Precautions: WBAT with knee immobilizer L LE, limit knee flexion    Subjective:   Patient  agreeable to working with therapy  Initial Pain level:  0/10    Goals:                   Short term goals  Time Frame for Short term goals: 1 week or until discharge  Short term goal 1: Pt will demonstrate bed mobility Mod I  Short term goal 2: Pt will demonstrate ambulation 100ft supervision with RW. Short term goal 3: Pt will demonstrate transfers Mod I. Short term goal 4: Pt will demonstrate Good standing balance. Plan of Care:             Times per week: Mon-Sat 5x/wk            Current Treatment Recommendations: Strengthening,IADL Training,Home Exercise Program,ROM,Safety Education & Amy Dakota Training,Patient/Caregiver Education & Training,Functional Mobility Training,Equipment Evaluation, Education, & procurement,Transfer Training,Gait Training,ADL/Self-care Training,Positioning      Objective Findings:    Date: 1/29/2022 1/31/22 2/1/2022 2-2-22 2/3/2022 2/4/22 2/5/22 2/7/2022   Bed mobility SBA Min A LLE to get in/out of bed Min/CGA LLE to get in/out of bed sba with leg  SBA SBA to get out of bed; min A to get into bed - used leg  SBA to get out of bed; min A to get into bed - used leg  SBA out of bed   Sit to stand transfer SBA SBA  SBA sba SBA SBA SBA SBA   Stand to sit transfer SBA SBA SBA sba SBA SBA SBA SBA   Commode transfer SBA-CGA SBA-CGA SBA-CGA Sba, 2 episodes. SBA Not performed np NP   Standing tolerance For amb and self miguel care For ambulation  For ambulation 14-15 mins with dyn standing ball catch/throw with cg-sba. Intermittent UE on walker during rest breaks. For ambulation For ambulation For ambulation For amb   Ambulation Amb w/RW and CGA of 1 200ft x2 w/brace donned Amb w/RW and CGA of 1 50ft x2 w/brace donned Amb w/RW and CGA of 1 250ft   w/brace donned Gait training with RW 79+22+50+12+80  Brace donned.  Standing rest breaks, step to pattern  Amb w/RW and CGA of 1 300ft   w/brace donned Amb w/RW and S BA of 1 250ft   w/brace donned Amb w/RW and S BA of 1350ft   w/brace donned Amb w/RW and SBA of 1 350ft w/brace donned   Stairs x x x x x   x     Exercises:   Exercise/Equipment/Modalities  Date: 1/29/2022 1/31/22 2/1/2022 2/3/2022 2/4/22 2/5/22 2/7/2022   AP 20x  20x 20x  20x 20x B   Glut sets 20x  10x 20x   20x   Quad sets 20x  10x    20x   Heel slides 20x R  10x R 20x R   2x10 R   Hip AB/AD 2x10 B 1 x10 10x B 20x B   2x10 B   SLR 2x10 B w/slight assist on the left 1 x10 10x B 20x B w/slight assist on left 10 x R;   L ABD/ ADD x10 10 x R;   L ABD/ ADD x10;  I SLR x 1 2x10 R indep   2x10 L w/assist                                              Modality/intervention used:   [x ] Therapeutic Exercise   [ ] Modalities:   [x ] Therapeutic Activity     [ ] Ultrasound   [ ] Elec Stim   [x ] Gait Training     [ ] Cervical Traction  [ ] Lumbar Traction   [ ] Neuromuscular Re-education   [ ] Cold/hotpack  [ ] Iontophoresis   [ ] Instruction in HEP     [ ] Vasopneumatic   [ ] Manual Therapy   [ ] Aquatic Therapy     Other Therapeutic Activities:      Home Exercise Program/Education provided to patient:  x    Manual Treatments: x    Communication with other providers:   Okay to see per nursing,      Adverse reactions to treatment: pain    Treatment/Activity Tolerance:   [  ] Patient limited by fatigue [ x] Patient limited by pain [ ] Patient limited by other medical complications [ ] Patient tolerated therapy well  [ ] Other:     Post Tx Pain Rating:does 0/10    Safety Precautions:   [  ] left in bed  [x  ] left in chair [x ] call light within reach  [x ] bed alarm on  [ ] personal alarm on  [ ] other staff present:    Plan:   [x ] Continue per plan of care [ ] Dimitry Calvo current plan   [ ] Plan of care initiated [ ] Amelia Gomez pending MD visit [ ] Discharge     Plan for Next Session:     Time In:  0810  Time Out:   0850  Total Treatment Minutes: 40  Billed Units:  1gt 2te    Signed: Meg Bell PTA 2/7/2022, 9:15 AM

## 2022-02-07 NOTE — CARE COORDINATION
CM submitted updated clinical documentation to [facility] to request an extension to her stay in the swing bed program.  CM will follow.

## 2022-02-07 NOTE — PROGRESS NOTES
Provided client with [employer] to complete for individual leisure activity.      Electronically signed by MARTITA Gaspar MA on 2/7/2022 at 1:48 PM
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
## 2022-02-08 PROCEDURE — 97530 THERAPEUTIC ACTIVITIES: CPT

## 2022-02-08 PROCEDURE — 1200000002 HC SEMI PRIVATE SWING BED

## 2022-02-08 PROCEDURE — 6370000000 HC RX 637 (ALT 250 FOR IP): Performed by: NURSE PRACTITIONER

## 2022-02-08 PROCEDURE — 97116 GAIT TRAINING THERAPY: CPT

## 2022-02-08 PROCEDURE — 97535 SELF CARE MNGMENT TRAINING: CPT

## 2022-02-08 PROCEDURE — 97110 THERAPEUTIC EXERCISES: CPT

## 2022-02-08 PROCEDURE — 6360000002 HC RX W HCPCS: Performed by: NURSE PRACTITIONER

## 2022-02-08 RX ADMIN — LEVOTHYROXINE SODIUM 100 MCG: 100 TABLET ORAL at 06:55

## 2022-02-08 RX ADMIN — TRAMADOL HYDROCHLORIDE 50 MG: 50 TABLET, COATED ORAL at 01:39

## 2022-02-08 RX ADMIN — ZOLPIDEM TARTRATE 5 MG: 5 TABLET ORAL at 01:36

## 2022-02-08 RX ADMIN — CHOLECALCIFEROL TAB 125 MCG (5000 UNIT) 5000 UNITS: 125 TAB at 09:27

## 2022-02-08 RX ADMIN — ZOLPIDEM TARTRATE 5 MG: 5 TABLET ORAL at 21:50

## 2022-02-08 RX ADMIN — PANTOPRAZOLE SODIUM 40 MG: 40 TABLET, DELAYED RELEASE ORAL at 06:55

## 2022-02-08 RX ADMIN — POTASSIUM CHLORIDE 20 MEQ: 20 TABLET, EXTENDED RELEASE ORAL at 09:27

## 2022-02-08 RX ADMIN — TRAMADOL HYDROCHLORIDE 50 MG: 50 TABLET, COATED ORAL at 09:32

## 2022-02-08 RX ADMIN — ENOXAPARIN SODIUM 30 MG: 100 INJECTION SUBCUTANEOUS at 09:28

## 2022-02-08 RX ADMIN — TRAMADOL HYDROCHLORIDE 50 MG: 50 TABLET, COATED ORAL at 21:50

## 2022-02-08 ASSESSMENT — PAIN SCALES - GENERAL
PAINLEVEL_OUTOF10: 0
PAINLEVEL_OUTOF10: 1
PAINLEVEL_OUTOF10: 0
PAINLEVEL_OUTOF10: 3
PAINLEVEL_OUTOF10: 0
PAINLEVEL_OUTOF10: 4
PAINLEVEL_OUTOF10: 5
PAINLEVEL_OUTOF10: 0
PAINLEVEL_OUTOF10: 5

## 2022-02-08 ASSESSMENT — PAIN DESCRIPTION - ONSET
ONSET: ON-GOING
ONSET: GRADUAL
ONSET: ON-GOING

## 2022-02-08 ASSESSMENT — PAIN DESCRIPTION - LOCATION
LOCATION: RIB CAGE

## 2022-02-08 ASSESSMENT — PAIN DESCRIPTION - PAIN TYPE
TYPE: ACUTE PAIN

## 2022-02-08 ASSESSMENT — PAIN DESCRIPTION - DESCRIPTORS
DESCRIPTORS: ACHING

## 2022-02-08 ASSESSMENT — PAIN DESCRIPTION - ORIENTATION
ORIENTATION: LEFT
ORIENTATION: RIGHT
ORIENTATION: LEFT

## 2022-02-08 ASSESSMENT — PAIN DESCRIPTION - FREQUENCY
FREQUENCY: INTERMITTENT
FREQUENCY: INTERMITTENT
FREQUENCY: CONTINUOUS

## 2022-02-08 ASSESSMENT — PAIN DESCRIPTION - PROGRESSION
CLINICAL_PROGRESSION: GRADUALLY WORSENING
CLINICAL_PROGRESSION: GRADUALLY WORSENING

## 2022-02-08 NOTE — PROGRESS NOTES
Occupational Therapy    Occupational Therapy Treatment Note  Name: Jose G Kulkarni MRN: [id number] :   1945   Date:  2022   Admission Date: 2022 Room:  [address] [place] [address]   Restrictions/Precautions:  Restrictions/Precautions  Restrictions/Precautions: Weight Bearing  Required Braces or Orthoses?: Yes     Communication with other providers:   Cleared for treatment by RN. Subjective:  Patient states:  \"I'm too tired for a shower today\"  Pain:   Location, Type, Intensity (0/10 to 10/10): No pain    Objective:    Observation:  Pt alert and orietned    Treatment, including education:  Transfers  Supine to sit :SUp  Sit to supine :Sup  Scooting :SUp  Rolling :SUp  Sit to stand :SBA  Stand to sit :SBA  SPT:SBA  Toilet:SBA    Self Care Training:   Activities performed today included the following:    Grooming: Pt completed oral care and washing face at sink standing with SBA. Therapeutic Exercise:  Pt completed UE exercises to increase strength and ROM to facilitate increase for ADLs and functional mobility. Pt completed B UEs with 2# dumbbell exercises with curls, shoulder presses, punch, stirring and wrist curls x 20 reps with mod rest breaks due to fatigue. Therapeutic Activity Training:Pt completed functional mobility with RW x 100' x 1 and 150' with SBA. Pt stood x 7 min with SBA with RW to facilitate increased endurance/strength for ADL tasks and transfers. Safety Measures: Gait belt used, Left in bed, Pull/Bed Alarm activated and call light left in reach        Assessment / Impression:        Patient's tolerance of treatment: Good   Adverse Reaction: None  Significant change in status and impact:  None  Barriers to improvement:  Dec strength and endurance    Plan for Next Session:    Continue with POC.     Time in:  1445  Time out:  1530  Total treatment time:  45  Billed Units: 1 TA, 1 TE, 1 ADL  Electronically signed by:    Varsha Correia [address]    2022, 3:53 PM    Previously filed values:  Patient Goals   Patient goals : to be able to go home and do things for herself  Short term goals  Time Frame for Short term goals: until discharge  Short term goal 1: Pt will be MI for functional adl transfers to chair, toilet/BSC, and bed following her WB precautions for LLE( wt bearing only when brace is on), using good walker safety, w/o LOB or falls  Short term goal 2: Pt will be MI for UB/LB ADLs using necessary a.e.   Short term goal 3: Pt will be I/MI toileting  Short term goal 4: Pt will be min vc for BUE strengthening to improve strength and endurance for transfers

## 2022-02-08 NOTE — PLAN OF CARE
Problem: Falls - Risk of:  Goal: Will remain free from falls  Description: Will remain free from falls  2/7/2022 2226 by Selvin Rutledge RN  Outcome: Ongoing  2/7/2022 1527 by Zoya Mcnair RN  Outcome: Ongoing  Goal: Absence of physical injury  Description: Absence of physical injury  2/7/2022 2226 by Selvin Rutledge RN  Outcome: Ongoing  2/7/2022 1527 by Zoya Mcnair RN  Outcome: Ongoing     Problem: Pain:  Goal: Pain level will decrease  Description: Pain level will decrease  2/7/2022 2226 by Selvin Rutledge RN  Outcome: Ongoing  2/7/2022 1527 by Zoya Mcnair RN  Outcome: Ongoing  Goal: Control of acute pain  Description: Control of acute pain  2/7/2022 2226 by Selvin Rutledge RN  Outcome: Ongoing  2/7/2022 1527 by Zoya Mcnair RN  Outcome: Ongoing  Goal: Control of chronic pain  Description: Control of chronic pain  2/7/2022 2226 by Selvin Rutledge RN  Outcome: Ongoing  2/7/2022 1527 by Zoya Mcnair RN  Outcome: Ongoing     Problem:  Activity:  Goal: Ability to tolerate increased activity will improve  Description: Ability to tolerate increased activity will improve  2/7/2022 2226 by Selvin Rutledge RN  Outcome: Ongoing  2/7/2022 1527 by Zoya Mcnair RN  Outcome: Ongoing
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
## 2022-02-08 NOTE — PROGRESS NOTES
Pt participated actively in the pet therapy activity. Pt's affect noted to brighten while engaging with pet therapy dog. Pt expressed enjoyment in the activity.      Electronically signed by MARTITA Millard MA on 2/8/2022 at 3:39 PM

## 2022-02-08 NOTE — CARE COORDINATION
SWING BED WEEKLY TEAM SHEET                 Jose G Kulkarni              2/8/2022 WEEK # 2     Care Management     Issues to be resolved before discharge: Increased strength, balance, and mobility.      Family Education: Patient/staff to update      Discharge Plan: [facility] assisted living  Patient/Family Adjustment: N/A      Goals of previous week:   []? 1st team   []? met   [x]? partially met    []? not met                                              Why goals were not met: Continued weakness      Skilled Level of Care Remains   [x]? yes   []? no     Estimated length of stay: [address] issued, LCD 2/9/22.   Discharge 2/10/22  Patient/Family Concerns/input: None at this time     Patient/Family  Signature _________________  2/8/2022

## 2022-02-09 ENCOUNTER — APPOINTMENT (OUTPATIENT)
Dept: GENERAL RADIOLOGY | Age: 77
DRG: 641 | End: 2022-02-09
Attending: INTERNAL MEDICINE
Payer: MEDICARE

## 2022-02-09 LAB
ANION GAP SERPL CALCULATED.3IONS-SCNC: 8 MMOL/L (ref 4–16)
BASOPHILS ABSOLUTE: 0.1 K/CU MM
BASOPHILS RELATIVE PERCENT: 0.6 % (ref 0–1)
BUN BLDV-MCNC: 20 MG/DL (ref 6–23)
CALCIUM SERPL-MCNC: 7.9 MG/DL (ref 8.3–10.6)
CHLORIDE BLD-SCNC: 106 MMOL/L (ref 99–110)
CO2: 27 MMOL/L (ref 21–32)
CREAT SERPL-MCNC: 1.1 MG/DL (ref 0.6–1.1)
DIFFERENTIAL TYPE: ABNORMAL
EOSINOPHILS ABSOLUTE: 0.1 K/CU MM
EOSINOPHILS RELATIVE PERCENT: 1.3 % (ref 0–3)
GFR AFRICAN AMERICAN: 58 ML/MIN/1.73M2
GFR NON-AFRICAN AMERICAN: 48 ML/MIN/1.73M2
GLUCOSE BLD-MCNC: 92 MG/DL (ref 70–99)
HCT VFR BLD CALC: 29 % (ref 37–47)
HEMOGLOBIN: 8.8 GM/DL (ref 12.5–16)
IMMATURE NEUTROPHIL %: 1.6 % (ref 0–0.43)
LYMPHOCYTES ABSOLUTE: 2.9 K/CU MM
LYMPHOCYTES RELATIVE PERCENT: 33.3 % (ref 24–44)
MCH RBC QN AUTO: 29.3 PG (ref 27–31)
MCHC RBC AUTO-ENTMCNC: 30.3 % (ref 32–36)
MCV RBC AUTO: 96.7 FL (ref 78–100)
MONOCYTES ABSOLUTE: 0.4 K/CU MM
MONOCYTES RELATIVE PERCENT: 5 % (ref 0–4)
PDW BLD-RTO: 14.1 % (ref 11.7–14.9)
PLATELET # BLD: 210 K/CU MM (ref 140–440)
PMV BLD AUTO: 10.5 FL (ref 7.5–11.1)
POTASSIUM SERPL-SCNC: 4.3 MMOL/L (ref 3.5–5.1)
RBC # BLD: 3 M/CU MM (ref 4.2–5.4)
SEGMENTED NEUTROPHILS ABSOLUTE COUNT: 5.1 K/CU MM
SEGMENTED NEUTROPHILS RELATIVE PERCENT: 58.2 % (ref 36–66)
SODIUM BLD-SCNC: 141 MMOL/L (ref 135–145)
TOTAL IMMATURE NEUTOROPHIL: 0.14 K/CU MM
WBC # BLD: 8.7 K/CU MM (ref 4–10.5)

## 2022-02-09 PROCEDURE — 97535 SELF CARE MNGMENT TRAINING: CPT

## 2022-02-09 PROCEDURE — 97116 GAIT TRAINING THERAPY: CPT

## 2022-02-09 PROCEDURE — 97110 THERAPEUTIC EXERCISES: CPT

## 2022-02-09 PROCEDURE — 97530 THERAPEUTIC ACTIVITIES: CPT

## 2022-02-09 PROCEDURE — 36415 COLL VENOUS BLD VENIPUNCTURE: CPT

## 2022-02-09 PROCEDURE — 1200000002 HC SEMI PRIVATE SWING BED

## 2022-02-09 PROCEDURE — 94761 N-INVAS EAR/PLS OXIMETRY MLT: CPT

## 2022-02-09 PROCEDURE — 80048 BASIC METABOLIC PNL TOTAL CA: CPT

## 2022-02-09 PROCEDURE — 6370000000 HC RX 637 (ALT 250 FOR IP): Performed by: NURSE PRACTITIONER

## 2022-02-09 PROCEDURE — 6360000002 HC RX W HCPCS: Performed by: NURSE PRACTITIONER

## 2022-02-09 PROCEDURE — 6370000000 HC RX 637 (ALT 250 FOR IP): Performed by: INTERNAL MEDICINE

## 2022-02-09 PROCEDURE — 85025 COMPLETE CBC W/AUTO DIFF WBC: CPT

## 2022-02-09 PROCEDURE — 73562 X-RAY EXAM OF KNEE 3: CPT

## 2022-02-09 RX ADMIN — CHOLECALCIFEROL TAB 125 MCG (5000 UNIT) 5000 UNITS: 125 TAB at 08:27

## 2022-02-09 RX ADMIN — OXYCODONE AND ACETAMINOPHEN 1 TABLET: 5; 325 TABLET ORAL at 20:04

## 2022-02-09 RX ADMIN — POTASSIUM CHLORIDE 20 MEQ: 20 TABLET, EXTENDED RELEASE ORAL at 08:27

## 2022-02-09 RX ADMIN — ZOLPIDEM TARTRATE 5 MG: 5 TABLET ORAL at 22:03

## 2022-02-09 RX ADMIN — ENOXAPARIN SODIUM 30 MG: 100 INJECTION SUBCUTANEOUS at 08:28

## 2022-02-09 RX ADMIN — LEVOTHYROXINE SODIUM 100 MCG: 100 TABLET ORAL at 06:15

## 2022-02-09 RX ADMIN — PANTOPRAZOLE SODIUM 40 MG: 40 TABLET, DELAYED RELEASE ORAL at 06:15

## 2022-02-09 ASSESSMENT — PAIN SCALES - GENERAL
PAINLEVEL_OUTOF10: 0
PAINLEVEL_OUTOF10: 0
PAINLEVEL_OUTOF10: 2
PAINLEVEL_OUTOF10: 0
PAINLEVEL_OUTOF10: 0
PAINLEVEL_OUTOF10: 2
PAINLEVEL_OUTOF10: 7
PAINLEVEL_OUTOF10: 0

## 2022-02-09 ASSESSMENT — PAIN DESCRIPTION - DESCRIPTORS: DESCRIPTORS: ACHING

## 2022-02-09 ASSESSMENT — PAIN DESCRIPTION - FREQUENCY: FREQUENCY: INTERMITTENT

## 2022-02-09 ASSESSMENT — PAIN - FUNCTIONAL ASSESSMENT: PAIN_FUNCTIONAL_ASSESSMENT: PREVENTS OR INTERFERES SOME ACTIVE ACTIVITIES AND ADLS

## 2022-02-09 ASSESSMENT — PAIN DESCRIPTION - PROGRESSION: CLINICAL_PROGRESSION: GRADUALLY WORSENING

## 2022-02-09 ASSESSMENT — PAIN DESCRIPTION - PAIN TYPE: TYPE: ACUTE PAIN

## 2022-02-09 ASSESSMENT — PAIN DESCRIPTION - ONSET: ONSET: GRADUAL

## 2022-02-09 ASSESSMENT — PAIN DESCRIPTION - ORIENTATION: ORIENTATION: RIGHT

## 2022-02-09 ASSESSMENT — PAIN DESCRIPTION - LOCATION: LOCATION: RIB CAGE
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
## 2022-02-09 NOTE — PROGRESS NOTES
Occupational Therapy    Occupational Therapy Treatment Note  Name: Richard Browne MRN: [id number] :   1945   Date:  2022   Admission Date: 2022 Room:  [address] [place] [address]   Restrictions/Precautions:  Restrictions/Precautions  Restrictions/Precautions: Weight Bearing  Required Braces or Orthoses?: Yes     Communication with other providers:   Cleared for treatment by RN. Subjective:  Patient states:  \"I'm clean I will skip the shower today\"  Pain:   Location, Type, Intensity (0/10 to 10/10): No pain    Objective:    Observation:  Pt alert and orietned    Treatment, including education:  Transfers  Supine to sit :SUp  Sit to supine :Sup  Scooting :SUp  Rolling :SUp  Sit to stand :SBA  Stand to sit :SBA  SPT:SBA  Toilet:SBA    Self Care Training:   Activities performed today included the following: Toileting: SBA to complete clothing management and toilet hygiene. Therapeutic Exercise:  Pt completed UE exercises to increase strength and ROM to facilitate increase for ADLs and functional mobility. Pt completed B UEs with 2# dumbbell exercises with curls, shoulder presses, punch, stirring and wrist curls x 20 reps with mod rest breaks due to fatigue. Therapeutic Activity Training:Pt completed functional mobility with RW x 250' with SBA. Pt stood x 9 min with SBA with RW to facilitate increased endurance/strength for ADL tasks and transfers. Safety Measures: Gait belt used, Left in bed, Pull/Bed Alarm activated and call light left in reach        Assessment / Impression:        Patient's tolerance of treatment: Good   Adverse Reaction: None  Significant change in status and impact:  None  Barriers to improvement:  Dec strength and endurance    Plan for Next Session:    Continue with POC.     Time in:  955  Time out:  1035  Total treatment time:  40  Billed Units: 1 TA, 1 TE, 1 ADL  Electronically signed by:    Vasiliy Mitchell, [address]    2022, 10:17 AM    Previously filed values:  Patient Goals   Patient goals : to be able to go home and do things for herself  Short term goals  Time Frame for Short term goals: until discharge  Short term goal 1: Pt will be MI for functional adl transfers to chair, toilet/BSC, and bed following her WB precautions for LLE( wt bearing only when brace is on), using good walker safety, w/o LOB or falls  Short term goal 2: Pt will be MI for UB/LB ADLs using necessary a.e.   Short term goal 3: Pt will be I/MI toileting  Short term goal 4: Pt will be min vc for BUE strengthening to improve strength and endurance for transfers

## 2022-02-09 NOTE — PATIENT CARE CONFERENCE
SWING BED WEEKLY TEAM SHEET      Abundio Shah   2/9/2022             WEEK# 3    PHYSICAL THERAPY       Ambulation: Distance:  200 ft    Device:RW     Assist:SBA     Wt bearing: WBAT L     W/C skills:  Propulsion:na       W/C parts management:     Stairs:  Amount/size: na      Assist:        Device:      Pain:     Transfers:   Sit to stand: SBA   Stand to sit:     SBA          Bed Mobility:  Rolls right:     Rolls left:     Positioning:     Supine to sit: SBA    Sit to supineSBA        Strength/ROM:      Balance:     Static sitting    [] P  [] F-   [] F    [] F+    [x] G               Dynamic Sitting           [] P  [] F-   [] F    [] F+    [x] G                     Static standing            [] P  [] F-   [x] F    [] F+    [] G   [x]  With  [] Without device Dynamic standing       [] P  [] F-   [x] F    [] F+    [] G   []  With  [] Without device  (P= poor   F= fair   G= good)    Issues to be resolved before discharge weakness    Goals of previous week  [] 1st team   [] met   [] partially met    [x] not met                                          Why the goals were not met   Goals:   Time Frame for Short term goals: 1 week  Short term goal 1: Pt will perform bed mobility SBAand flat HOB  Short term goal 2: Pt will perform STS transfer to/from bed, chair, toilet SBA and LRAD  Short term goal 3: Pt will ambulate 48' CGA and LRAD  Updated Goals:   Short term goal 1: Pt will perform bed mobility Mod I and flat HOB  Short term goal 2: Pt will perform STS transfer to/from bed, chair, toilet Mod I  and LRAD  Short term goal 3: Pt will ambulate 150' Mod I and LRAD     Physical Therapy Signature:  Kaden Neumann, PT

## 2022-02-09 NOTE — CARE COORDINATION
CM left a voicemail for [facility] with Uvaldo Brsawell ([phone]) requesting a return call regarding the patient's anticipated discharge tomorrow (2/10). CM will follow. 9:39 AM  CM received return call from [facility] asking if transportation can be arranged for the patient to [address] [place] [address] Thursday 2/10. CM will follow. 9:53 AM  EMILY contacted Dr. Brett Jacome regarding patient's anticipated discharge tomorrow (2/10) and the fact she has not scheduled the 2 week follow-up appointment he recommended 1/19 when he saw her at [address] [place] [address]. Dr. Brett Jacome will attempt to see the patient later today or possibly Thursday morning at the bedside. 9:56 AM  EMILY arranged wheelchair ambulette transportation to [facility] Thursday at 1:15 PM through [employer]. 10:15 AM  CM met with the patient to provide time of transportation and to notify her that Dr. Brett Jacome would see her at the bedside prior to her discharge, patient voiced understanding.

## 2022-02-09 NOTE — PLAN OF CARE
Problem: Pain:  Goal: Pain level will decrease  Description: Pain level will decrease  Outcome: Met This Shift  Goal: Control of acute pain  Description: Control of acute pain  Outcome: Met This Shift     Problem: Falls - Risk of:  Goal: Will remain free from falls  Description: Will remain free from falls  Outcome: Ongoing  Goal: Absence of physical injury  Description: Absence of physical injury  Outcome: Ongoing     Problem: Pain:  Goal: Control of chronic pain  Description: Control of chronic pain  Outcome: Ongoing     Problem:  Activity:  Goal: Ability to tolerate increased activity will improve  Description: Ability to tolerate increased activity will improve  Outcome: Ongoing

## 2022-02-10 VITALS
SYSTOLIC BLOOD PRESSURE: 125 MMHG | HEIGHT: 62 IN | OXYGEN SATURATION: 97 % | WEIGHT: 106.8 LBS | DIASTOLIC BLOOD PRESSURE: 46 MMHG | BODY MASS INDEX: 19.65 KG/M2 | RESPIRATION RATE: 16 BRPM | TEMPERATURE: 98.6 F | HEART RATE: 67 BPM

## 2022-02-10 LAB
ANION GAP SERPL CALCULATED.3IONS-SCNC: 0 MMOL/L (ref 4–16)
BASOPHILS ABSOLUTE: 0 K/CU MM
BASOPHILS RELATIVE PERCENT: 0.5 % (ref 0–1)
BUN BLDV-MCNC: 18 MG/DL (ref 6–23)
CALCIUM SERPL-MCNC: 7.7 MG/DL (ref 8.3–10.6)
CHLORIDE BLD-SCNC: 107 MMOL/L (ref 99–110)
CO2: 35 MMOL/L (ref 21–32)
CREAT SERPL-MCNC: 1 MG/DL (ref 0.6–1.1)
DIFFERENTIAL TYPE: ABNORMAL
EOSINOPHILS ABSOLUTE: 0.1 K/CU MM
EOSINOPHILS RELATIVE PERCENT: 1 % (ref 0–3)
GFR AFRICAN AMERICAN: >60 ML/MIN/1.73M2
GFR NON-AFRICAN AMERICAN: 54 ML/MIN/1.73M2
GLUCOSE BLD-MCNC: 119 MG/DL (ref 70–99)
HCT VFR BLD CALC: 30.1 % (ref 37–47)
HEMOGLOBIN: 8.9 GM/DL (ref 12.5–16)
IMMATURE NEUTROPHIL %: 1.3 % (ref 0–0.43)
LYMPHOCYTES ABSOLUTE: 2.1 K/CU MM
LYMPHOCYTES RELATIVE PERCENT: 24.8 % (ref 24–44)
MCH RBC QN AUTO: 29 PG (ref 27–31)
MCHC RBC AUTO-ENTMCNC: 29.6 % (ref 32–36)
MCV RBC AUTO: 98 FL (ref 78–100)
MONOCYTES ABSOLUTE: 0.4 K/CU MM
MONOCYTES RELATIVE PERCENT: 4.5 % (ref 0–4)
PDW BLD-RTO: 14.3 % (ref 11.7–14.9)
PLATELET # BLD: 187 K/CU MM (ref 140–440)
PMV BLD AUTO: 10.5 FL (ref 7.5–11.1)
POTASSIUM SERPL-SCNC: 3.9 MMOL/L (ref 3.5–5.1)
RBC # BLD: 3.07 M/CU MM (ref 4.2–5.4)
SEGMENTED NEUTROPHILS ABSOLUTE COUNT: 5.7 K/CU MM
SEGMENTED NEUTROPHILS RELATIVE PERCENT: 67.9 % (ref 36–66)
SODIUM BLD-SCNC: 142 MMOL/L (ref 135–145)
TOTAL IMMATURE NEUTOROPHIL: 0.11 K/CU MM
WBC # BLD: 8.4 K/CU MM (ref 4–10.5)

## 2022-02-10 PROCEDURE — 6370000000 HC RX 637 (ALT 250 FOR IP): Performed by: NURSE PRACTITIONER

## 2022-02-10 PROCEDURE — 36415 COLL VENOUS BLD VENIPUNCTURE: CPT

## 2022-02-10 PROCEDURE — 94761 N-INVAS EAR/PLS OXIMETRY MLT: CPT

## 2022-02-10 PROCEDURE — 80048 BASIC METABOLIC PNL TOTAL CA: CPT

## 2022-02-10 PROCEDURE — 99232 SBSQ HOSP IP/OBS MODERATE 35: CPT | Performed by: ORTHOPAEDIC SURGERY

## 2022-02-10 PROCEDURE — 85025 COMPLETE CBC W/AUTO DIFF WBC: CPT

## 2022-02-10 PROCEDURE — 6360000002 HC RX W HCPCS: Performed by: NURSE PRACTITIONER

## 2022-02-10 RX ORDER — LEVOTHYROXINE SODIUM 0.1 MG/1
100 TABLET ORAL DAILY
Qty: 30 TABLET | Refills: 0 | Status: SHIPPED | OUTPATIENT
Start: 2022-02-11

## 2022-02-10 RX ADMIN — LEVOTHYROXINE SODIUM 100 MCG: 100 TABLET ORAL at 05:31

## 2022-02-10 RX ADMIN — ENOXAPARIN SODIUM 30 MG: 100 INJECTION SUBCUTANEOUS at 09:26

## 2022-02-10 RX ADMIN — CHOLECALCIFEROL TAB 125 MCG (5000 UNIT) 5000 UNITS: 125 TAB at 09:25

## 2022-02-10 RX ADMIN — POTASSIUM CHLORIDE 20 MEQ: 20 TABLET, EXTENDED RELEASE ORAL at 09:26

## 2022-02-10 RX ADMIN — PANTOPRAZOLE SODIUM 40 MG: 40 TABLET, DELAYED RELEASE ORAL at 05:31

## 2022-02-10 ASSESSMENT — PAIN SCALES - GENERAL
PAINLEVEL_OUTOF10: 0
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
## 2022-02-10 NOTE — PROGRESS NOTES
Report called to Griselda Garcia RN, all questions answered, verbalized understanding, informed patient will leave at 1315.

## 2022-02-10 NOTE — PROGRESS NOTES
Patient taken to [facility] [place] in wheelchair with all belongings by [employer].

## 2022-02-10 NOTE — PROGRESS NOTES
Roro Coelho is a 67 y.o. female patient. No diagnosis found. Past Medical History:   Diagnosis Date    Arm fracture, left 05/16/2019    Casted - no surgery - Dr. Magi Vega     Right arm    CLL (chronic lymphocytic leukemia) ([facility] [address] 2017    Monoclonal b-cell lymphcytosis-Dr Rohini Portillo    COPD (chronic obstructive pulmonary disease) (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 pylori infection 8/1996    S/P Biaxin and Prilosec    Hiatal hernia 8/1994    with reflux by UGI    Hyperlipidemia     Hypertension     Hypothyroidism 1990's    MDRO (multiple drug resistant organisms) resistance 2005    Nasal passages    Osteoporosis     Smoking hx      No past surgical history pertinent negatives on file. Scheduled Meds:   ibrutinib  420 mg Oral Daily    levothyroxine  100 mcg Oral Daily    pantoprazole  40 mg Oral QAM AC    potassium chloride  20 mEq Oral Daily    [Held by provider] triamterene-hydroCHLOROthiazide  1 tablet Oral Daily    vitamin D3  5,000 Units Oral Daily    enoxaparin  30 mg SubCUTAneous Daily     Continuous Infusions:  PRN Meds:traMADol, oxyCODONE-acetaminophen, acetaminophen **OR** acetaminophen, polyethylene glycol, albuterol sulfate HFA, zolpidem    Allergies   Allergen Reactions    Amitiza [Lubiprostone]     Clindamycin/Lincomycin      GI intolerance    Evista [Raloxifene Hydrochloride]     Flexeril [Cyclobenzaprine Hcl]      CNS    Omega-3 Fatty Acids [Fish Oil] Diarrhea    Prilosec [Omeprazole]      Pt sts meds didn't work - states not an allergy 6/3/19    Skelaxin [Metaxalone]      Itching    Spiriva Handihaler [Tiotropium Bromide Monohydrate]      \"Made my throat dry\" - not allergic too.   6/3/19     Principal Problem:    Physical debility  Active Problems:    Essential hypertension    COPD (chronic obstructive pulmonary disease) ([facility] [address] Waldenstrom macroglobulinemia (HCC)    Stage 3b chronic kidney disease (HCC)    Hypopotassemia    Closed sleeve fracture of left patella with routine healing    Closed fracture of multiple ribs of right side with routine healing  Resolved Problems:    * No resolved hospital problems. *    Blood pressure (!) 125/46, pulse 67, temperature 98.6 °F (37 °C), temperature source Oral, resp. rate 16, height 5' 1.5\" (1.562 m), weight 106 lb 12.8 oz (48.4 kg), SpO2 97 %, not currently breastfeeding. Subjective   Patient seen and examined, resting in bed comfortably, pain controlled, no new complaints. Getting ready to be discharged to [facility] today for continued PT. She states that she has been able to bear weight on the left lower extremity in the knee immobilizer. I discussed with the patient today that their are symptoms are normal and should improve with time. Objective   LLE - Skin intact with no erythema, ecchymosis or lacerations present. Active extension present in the left knee  0-90 with mild pain. XR KNEE LEFT (3 VIEWS)    Result Date: 2/9/2022  EXAMINATION: THREE XRAY VIEWS OF THE LEFT KNEE 2/9/2022 12:58 pm COMPARISON: 01/18/2022 HISTORY: ORDERING SYSTEM PROVIDED HISTORY: Patella fx follow up TECHNOLOGIST PROVIDED HISTORY: Reason for exam:->Patella fx follow up Reason for Exam: Patella fx follow up Additional signs and symptoms: Patella fx follow up FINDINGS: There is a fracture again seen of the patella. There is a moderate-sized joint effusion identified. Mild narrowing of the lateral compartment with peaking of the intercondylar tibial spine. Associated soft tissue swelling.  The alignment of the fracture fragments are similar when compared to the prior study with some healing identified in the interval.     Mild healing identified with fracture lines again seen in satisfactory alignment of the comminuted patellar fracture with moderate-sized joint effusion again present as well as soft tissue swelling of the knee. No significant change in the alignment. Assessment & Plan  Left patella fracture, 2 weeks    I discussed with her today her x-ray findings. I reassured her that her patella fracture remains in good alignment and will continue to heal with conservative treatment. She can continue weightbearing as tolerated on the left leg with the knee immobilizer in place. She can remove the knee immobilizer while in bed and for bathing. Avoid deep flexion of the left knee. No running, jumping, heavy lifting. Orthopedically she is stable for discharge, follow-up in office in 4 weeks.   Emily 97, DO  2/10/2022

## 2022-02-21 ENCOUNTER — OFFICE VISIT (OUTPATIENT)
Dept: ORTHOPEDIC SURGERY | Age: 77
End: 2022-02-21
Payer: MEDICARE

## 2022-02-21 VITALS
OXYGEN SATURATION: 96 % | BODY MASS INDEX: 19.51 KG/M2 | HEART RATE: 77 BPM | WEIGHT: 106 LBS | HEIGHT: 62 IN | RESPIRATION RATE: 16 BRPM

## 2022-02-21 DIAGNOSIS — S82.035A CLOSED NONDISPLACED TRANSVERSE FRACTURE OF LEFT PATELLA, INITIAL ENCOUNTER: Primary | ICD-10-CM

## 2022-02-21 PROCEDURE — G8399 PT W/DXA RESULTS DOCUMENT: HCPCS | Performed by: PHYSICIAN ASSISTANT

## 2022-02-21 PROCEDURE — G8484 FLU IMMUNIZE NO ADMIN: HCPCS | Performed by: PHYSICIAN ASSISTANT

## 2022-02-21 PROCEDURE — G8420 CALC BMI NORM PARAMETERS: HCPCS | Performed by: PHYSICIAN ASSISTANT

## 2022-02-21 PROCEDURE — 99203 OFFICE O/P NEW LOW 30 MIN: CPT | Performed by: PHYSICIAN ASSISTANT

## 2022-02-21 PROCEDURE — 1090F PRES/ABSN URINE INCON ASSESS: CPT | Performed by: PHYSICIAN ASSISTANT

## 2022-02-21 PROCEDURE — 1123F ACP DISCUSS/DSCN MKR DOCD: CPT | Performed by: PHYSICIAN ASSISTANT

## 2022-02-21 PROCEDURE — 4040F PNEUMOC VAC/ADMIN/RCVD: CPT | Performed by: PHYSICIAN ASSISTANT

## 2022-02-21 PROCEDURE — 1036F TOBACCO NON-USER: CPT | Performed by: PHYSICIAN ASSISTANT

## 2022-02-21 PROCEDURE — 1111F DSCHRG MED/CURRENT MED MERGE: CPT | Performed by: PHYSICIAN ASSISTANT

## 2022-02-21 PROCEDURE — G8427 DOCREV CUR MEDS BY ELIG CLIN: HCPCS | Performed by: PHYSICIAN ASSISTANT

## 2022-02-21 ASSESSMENT — ENCOUNTER SYMPTOMS
EYES NEGATIVE: 1
GASTROINTESTINAL NEGATIVE: 1
RESPIRATORY NEGATIVE: 1

## 2022-02-21 NOTE — PATIENT INSTRUCTIONS
Continue wearing knee immobilizer while walking for the next 2-3 weeks. When at rest please work on slowly bending the left knee outside of the brace. Follow-up in 3 weeks for x-ray and transition to hinged knee brace.
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
## 2022-03-14 ENCOUNTER — OFFICE VISIT (OUTPATIENT)
Dept: ORTHOPEDIC SURGERY | Age: 77
End: 2022-03-14
Payer: MEDICARE

## 2022-03-14 VITALS
BODY MASS INDEX: 19.51 KG/M2 | HEIGHT: 62 IN | OXYGEN SATURATION: 95 % | HEART RATE: 96 BPM | RESPIRATION RATE: 16 BRPM | WEIGHT: 106 LBS

## 2022-03-14 DIAGNOSIS — S82.035A CLOSED NONDISPLACED TRANSVERSE FRACTURE OF LEFT PATELLA, INITIAL ENCOUNTER: Primary | ICD-10-CM

## 2022-03-14 PROCEDURE — 4040F PNEUMOC VAC/ADMIN/RCVD: CPT | Performed by: PHYSICIAN ASSISTANT

## 2022-03-14 PROCEDURE — G8427 DOCREV CUR MEDS BY ELIG CLIN: HCPCS | Performed by: PHYSICIAN ASSISTANT

## 2022-03-14 PROCEDURE — G8484 FLU IMMUNIZE NO ADMIN: HCPCS | Performed by: PHYSICIAN ASSISTANT

## 2022-03-14 PROCEDURE — 1036F TOBACCO NON-USER: CPT | Performed by: PHYSICIAN ASSISTANT

## 2022-03-14 PROCEDURE — G8420 CALC BMI NORM PARAMETERS: HCPCS | Performed by: PHYSICIAN ASSISTANT

## 2022-03-14 PROCEDURE — 1123F ACP DISCUSS/DSCN MKR DOCD: CPT | Performed by: PHYSICIAN ASSISTANT

## 2022-03-14 PROCEDURE — G8399 PT W/DXA RESULTS DOCUMENT: HCPCS | Performed by: PHYSICIAN ASSISTANT

## 2022-03-14 PROCEDURE — 99213 OFFICE O/P EST LOW 20 MIN: CPT | Performed by: PHYSICIAN ASSISTANT

## 2022-03-14 PROCEDURE — 1090F PRES/ABSN URINE INCON ASSESS: CPT | Performed by: PHYSICIAN ASSISTANT

## 2022-03-14 ASSESSMENT — ENCOUNTER SYMPTOMS
RESPIRATORY NEGATIVE: 1
GASTROINTESTINAL NEGATIVE: 1
EYES NEGATIVE: 1

## 2022-03-14 NOTE — PATIENT INSTRUCTIONS
Was fitted for a knee brace  Wear when ambulating  My remove at rest and for hygiene  Work on ROM activities  Follow up in 6 weeks

## 2022-03-14 NOTE — PROGRESS NOTES
Review of Systems   Constitutional: Negative. HENT: Negative. Eyes: Negative. Respiratory: Negative. Cardiovascular: Negative. Gastrointestinal: Negative. Genitourinary: Negative. Musculoskeletal: Positive for arthralgias and myalgias. Skin: Negative. Neurological: Negative. Psychiatric/Behavioral: Negative. Penny Del Cid is a 67 y.o. female that presents to the office today following up on her left patella fracture. She has been in the knee immobilizer for the last 6 weeks since her injury. She is able to bear weight and able to fully extend the left knee without the knee immobilizer. She has no pain in the knee at this time but she is stiff.   Past Medical History:   Diagnosis Date    Arm fracture, left 05/16/2019    Casted - no surgery - Dr. 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 pylori infection 8/1996    S/P Biaxin and Prilosec    Hiatal hernia 8/1994    with reflux by UGI    Hyperlipidemia     Hypertension     Hypothyroidism 1's    MDRO (multiple drug resistant organisms) resistance 2005    Nasal passages    Osteoporosis     Smoking hx        Past Surgical History:   Procedure Laterality Date    CHOLECYSTECTOMY, LAPAROSCOPIC  04/17/2018    Dr Sandra Pond    COLONOSCOPY  10/26/2007    Normal exam - Dr. Shonna Jones COLONOSCOPY  06/06/2019    COLONOSCOPY N/A 6/6/2019    COLORECTAL CANCER SCREENING, NOT HIGH RISK [address] [facility]o [facility] [address] Left 10/21/2013    Lesion excision-squamous papillomaDr Francesco    SKIN BIOPSY  1960's    Moles removed - face, neck       Family History   Problem Relation Age of Onset    High Blood Pressure Mother     High Blood Pressure Father        Social History     Socioeconomic History    Marital status:      Spouse name: None    Number of children: None    Years of education: None    Highest education level: None   Occupational History    None   Tobacco Use    Smoking status: Former Smoker     Packs/day: 2.00     Years: 30.00     Pack years: 60.00     Types: Cigarettes     Start date: 1965     Quit date: 1997     Years since quittin.2    Smokeless tobacco: Never Used   Vaping Use    Vaping Use: Never used   Substance and Sexual Activity    Alcohol use: No    Drug use: No    Sexual activity: None   Other Topics Concern    None   Social History Narrative    None     Social Determinants of Health     Financial Resource Strain:     Difficulty of Paying Living Expenses: Not on file   Food Insecurity:     Worried About Running Out of Food in the Last Year: Not on file    Giovanna of Food in the Last Year: Not on file   Transportation Needs:     Lack of Transportation (Medical): Not on file    Lack of Transportation (Non-Medical):  Not on file   Physical Activity:     Days of Exercise per Week: Not on file    Minutes of Exercise per Session: Not on file   Stress:     Feeling of Stress : Not on file   Social Connections:     Frequency of Communication with Friends and Family: Not on file    Frequency of Social Gatherings with Friends and Family: Not on file    Attends Latter-day Services: Not on file    Active Member of Clubs or Organizations: Not on file    Attends Club or Organization Meetings: Not on file    Marital Status: Not on file   Intimate Partner Violence:     Fear of Current or Ex-Partner: Not on file    Emotionally Abused: Not on file    Physically Abused: Not on file    Sexually Abused: Not on file   Housing Stability:     Unable to Pay for Housing in the Last Year: Not on file    Number of [place] in the Last Year: Not on file    Unstable Housing in the Last Year: Not on file       Current Outpatient Medications   Medication Sig Dispense Refill    levothyroxine (SYNTHROID) 100 MCG tablet Take 1 tablet by mouth Daily 30 tablet 0    potassium chloride (KLOR-CON M) 10 MEQ extended release tablet Take 10 mEq by mouth 2 times daily      ibrutinib (IMBRUVICA) 140 MG chemo capsule Take 3 capsules daily 90 capsule 3    traMADol (ULTRAM) 50 MG tablet TAKE 1 TABLET BY MOUTH TWICE A DAY AS NEEDED      vitamin D (D-3-5) 125 MCG (5000 UT) CAPS capsule Take 5,000 Units by mouth daily Wed and sat      zolpidem (AMBIEN) 5 MG tablet TAKE 1 TABLET BY MOUTH NIGHTLY AS NEEDED FOR SLEEP FOR UP TO 30 DAYS.  esomeprazole (NEXIUM) 40 MG delayed release capsule TAKE 1 CAPSULE DAILY 90 capsule 0    albuterol sulfate HFA (VENTOLIN HFA) 108 (90 Base) MCG/ACT inhaler Inhale 2 puffs into the lungs every 6 hours as needed for Wheezing 3 Inhaler 3     No current facility-administered medications for this visit. Allergies   Allergen Reactions    Amitiza [Lubiprostone]     Clindamycin/Lincomycin      GI intolerance    Evista [Raloxifene Hydrochloride]     Flexeril [Cyclobenzaprine Hcl]      CNS    Omega-3 Fatty Acids [Fish Oil] Diarrhea    Prilosec [Omeprazole]      Pt sts meds didn't work - states not an allergy 6/3/19    Skelaxin [Metaxalone]      Itching    Spiriva Handihaler [Tiotropium Bromide Monohydrate]      \"Made my throat dry\" - not allergic too. 6/3/19       Review of Systems:  See above      Physical Exam:   Pulse 96   Resp 16   Ht 5' 1.5\" (1.562 m)   Wt 106 lb (48.1 kg)   SpO2 95%   BMI 19.70 kg/m²        Gait is antalgic   Gen/Psych:Examination reveals a pleasant individual in no acute distress. The patient is oriented to time, place and person. The patient's mood and affect are appropriate.     Lymph:  The lymphatic examination bilaterally reveals all areas to be without enlargement or induration.      Skin intact without lymphadenopathy, discoloration, or abnormal temperature.      Vascular: There is intact, symmetric circulation in both lower extremities. Left knee: Inspection: No erythema, no ecchymosis, there is mild swelling  Palpation: No significant tenderness to palpation  Range of motion: 0-45 degrees      Outsiderecord review: Office note. Imaging studies:  2 views of the left knee taken and reviewed in the office today show healing fracture of the distal pole of the patella. The official read and interpretation of these x-rays will be done by the the [facility]         Impression:     Diagnosis Orders   1. Closed nondisplaced transverse fracture of left patella, initial encounter           Plan:    Knee brace set from 0 to 90 degrees. Remove brace throughout the day to work on range of motion of the knee.   Patient Instructions   Was fitted for a knee brace  Wear when ambulating  My remove at rest and for hygiene  Work on ROM activities  Follow up in 6 weeks

## 2022-04-25 ENCOUNTER — OFFICE VISIT (OUTPATIENT)
Dept: ORTHOPEDIC SURGERY | Age: 77
End: 2022-04-25
Payer: MEDICARE

## 2022-04-25 VITALS
WEIGHT: 106 LBS | HEART RATE: 75 BPM | BODY MASS INDEX: 19.51 KG/M2 | RESPIRATION RATE: 16 BRPM | OXYGEN SATURATION: 95 % | HEIGHT: 62 IN

## 2022-04-25 DIAGNOSIS — S82.035A CLOSED NONDISPLACED TRANSVERSE FRACTURE OF LEFT PATELLA, INITIAL ENCOUNTER: Primary | ICD-10-CM

## 2022-04-25 PROCEDURE — 99212 OFFICE O/P EST SF 10 MIN: CPT | Performed by: PHYSICIAN ASSISTANT

## 2022-04-25 PROCEDURE — 4040F PNEUMOC VAC/ADMIN/RCVD: CPT | Performed by: PHYSICIAN ASSISTANT

## 2022-04-25 PROCEDURE — G8427 DOCREV CUR MEDS BY ELIG CLIN: HCPCS | Performed by: PHYSICIAN ASSISTANT

## 2022-04-25 PROCEDURE — 1123F ACP DISCUSS/DSCN MKR DOCD: CPT | Performed by: PHYSICIAN ASSISTANT

## 2022-04-25 PROCEDURE — G8399 PT W/DXA RESULTS DOCUMENT: HCPCS | Performed by: PHYSICIAN ASSISTANT

## 2022-04-25 PROCEDURE — 1090F PRES/ABSN URINE INCON ASSESS: CPT | Performed by: PHYSICIAN ASSISTANT

## 2022-04-25 PROCEDURE — 1036F TOBACCO NON-USER: CPT | Performed by: PHYSICIAN ASSISTANT

## 2022-04-25 PROCEDURE — G8420 CALC BMI NORM PARAMETERS: HCPCS | Performed by: PHYSICIAN ASSISTANT

## 2022-04-25 NOTE — PROGRESS NOTES
Review of Systems   Constitutional: Negative. HENT: Negative. Eyes: Negative. Respiratory: Negative. Cardiovascular: Negative. Gastrointestinal: Negative. Genitourinary: Negative. Musculoskeletal: Positive for arthralgias and myalgias. Skin: Negative. Neurological: Negative. Psychiatric/Behavioral: Negative. Benjamin Ward is a 67 y.o. female that returns to the office today for reevaluation of her left knee. She does have a patella fracture in the left knee that was treated nonsurgically and she has been using the brace since I last saw her. She is not really having much pain in the knee but she is having stiffness. She does remove the brace to work on range of motion and was doing therapy that come into the house but she has not done any outpatient therapy.   Past Medical History:   Diagnosis Date    Arm fracture, left 05/16/2019    Casted - no surgery - Dr. 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 pylori infection 8/1996    S/P Biaxin and Prilosec    Hiatal hernia 8/1994    with reflux by UGI    Hyperlipidemia     Hypertension     Hypothyroidism 1's    MDRO (multiple drug resistant organisms) resistance 2005    Nasal passages    Osteoporosis     Smoking hx        Past Surgical History:   Procedure Laterality Date    CHOLECYSTECTOMY, LAPAROSCOPIC  04/17/2018    Dr Enmanuel Price    COLONOSCOPY  10/26/2007    Normal exam - Dr. Kyler Olivarez COLONOSCOPY  06/06/2019    COLONOSCOPY N/A 6/6/2019    COLORECTAL CANCER SCREENING, NOT HIGH RISK performed by Leidy Valera MD at [address] Left 10/21/2013    Lesion excision-squamous papillomaDr Francesco    SKIN BIOPSY  's    Moles removed - face, neck       Family History   Problem Relation Age of Onset    High Blood Pressure Mother     High Blood Pressure Father        Social History     Socioeconomic History    Marital status:      Spouse name: None    Number of children: None    Years of education: None    Highest education level: None   Occupational History    None   Tobacco Use    Smoking status: Former Smoker     Packs/day: 2.00     Years: 30.00     Pack years: 60.00     Types: Cigarettes     Start date: 1965     Quit date: 1997     Years since quittin.3    Smokeless tobacco: Never Used   Vaping Use    Vaping Use: Never used   Substance and Sexual Activity    Alcohol use: No    Drug use: No    Sexual activity: None   Other Topics Concern    None   Social History Narrative    None     Social Determinants of Health     Financial Resource Strain:     Difficulty of Paying Living Expenses: Not on file   Food Insecurity:     Worried About Running Out of Food in the Last Year: Not on file    Giovanna of Food in the Last Year: Not on file   Transportation Needs:     Lack of Transportation (Medical): Not on file    Lack of Transportation (Non-Medical):  Not on file   Physical Activity:     Days of Exercise per Week: Not on file    Minutes of Exercise per Session: Not on file   Stress:     Feeling of Stress : Not on file   Social Connections:     Frequency of Communication with Friends and Family: Not on file    Frequency of Social Gatherings with Friends and Family: Not on file    Attends Yarsanism Services: Not on file    Active Member of Clubs or Organizations: Not on file    Attends Club or Organization Meetings: Not on file    Marital Status: Not on file   Intimate Partner Violence:     Fear of Current or Ex-Partner: Not on file    Emotionally Abused: Not on file    Physically Abused: Not on file    Sexually Abused: Not on file   Housing Stability:  Unable to Pay for Housing in the Last Year: Not on file    Number of Places Lived in the Last Year: Not on file    Unstable Housing in the Last Year: Not on file       Current Outpatient Medications   Medication Sig Dispense Refill    levothyroxine (SYNTHROID) 100 MCG tablet Take 1 tablet by mouth Daily 30 tablet 0    potassium chloride (KLOR-CON M) 10 MEQ extended release tablet Take 10 mEq by mouth 2 times daily      ibrutinib (IMBRUVICA) 140 MG chemo capsule Take 3 capsules daily 90 capsule 3    traMADol (ULTRAM) 50 MG tablet TAKE 1 TABLET BY MOUTH TWICE A DAY AS NEEDED      vitamin D (D-3-5) 125 MCG (5000 UT) CAPS capsule Take 5,000 Units by mouth daily Wed and sat      zolpidem (AMBIEN) 5 MG tablet TAKE 1 TABLET BY MOUTH NIGHTLY AS NEEDED FOR SLEEP FOR UP TO 30 DAYS.  esomeprazole (NEXIUM) 40 MG delayed release capsule TAKE 1 CAPSULE DAILY 90 capsule 0    albuterol sulfate HFA (VENTOLIN HFA) 108 (90 Base) MCG/ACT inhaler Inhale 2 puffs into the lungs every 6 hours as needed for Wheezing 3 Inhaler 3     No current facility-administered medications for this visit. Allergies   Allergen Reactions    Amitiza [Lubiprostone]     Clindamycin/Lincomycin      GI intolerance    Evista [Raloxifene Hydrochloride]     Flexeril [Cyclobenzaprine Hcl]      CNS    Omega-3 Fatty Acids [Fish Oil] Diarrhea    Prilosec [Omeprazole]      Pt sts meds didn't work - states not an allergy 6/3/19    Skelaxin [Metaxalone]      Itching    Spiriva Handihaler [Tiotropium Bromide Monohydrate]      \"Made my throat dry\" - not allergic too. 6/3/19       Review of Systems:  See above      Physical Exam:   Pulse 75   Resp 16   Ht 5' 1.5\" (1.562 m)   Wt 106 lb (48.1 kg)   SpO2 95%   BMI 19.70 kg/m²        Gait is antalgic   Gen/Psych:Examination reveals a pleasant individual in no acute distress. The patient is oriented to time, place and person. The patient's mood and affect are appropriate.     Lymph:  The lymphatic examination bilaterally reveals all areas to be without enlargement or induration.      Skin intact without lymphadenopathy, discoloration, or abnormal temperature.      Vascular: There is intact, symmetric circulation in both lower extremities. Left knee: Inspection: There is no edema, there is no erythema or ecchymosis noted. Palpation: No significant tenderness to palpation  Range of motion: 0-90 degrees      Outsiderecord review: Office note. Imaging studies:  2 views of the left knee taken and reviewed in the office today show continued healing of the fracture of the distal pole of the patella. The official read and interpretation of these x-rays will be done by the the [facility]         Impression:     Diagnosis Orders   1.  Closed nondisplaced transverse fracture of left patella, initial encounter         Plan:      Patient Instructions   Discontinue use of knee brace  Continue to work on range of motion  Follow up as needed

## 2022-05-02 DIAGNOSIS — C88.0 WALDENSTROM MACROGLOBULINEMIA (HCC): ICD-10-CM

## 2022-05-02 RX ORDER — IBRUTINIB 140 MG/1
CAPSULE ORAL
Qty: 90 CAPSULE | Refills: 3 | Status: ACTIVE | OUTPATIENT
Start: 2022-05-02 | End: 2022-08-18 | Stop reason: SDUPTHER

## 2022-05-03 NOTE — PROGRESS NOTES
[facility]    Date: 05/03/22    Medication is currently being filled at [facility] and has been routed there. Please call us with any questions at [phone] opt.  2.

## 2022-05-08 NOTE — PROGRESS NOTES
Patient Name: Sravani Teague  Patient : 1945  Patient MRN: [id number]     Primary Oncologist: Juan Yeboah MD  Referring Provider: Katie Tong MD     Date of Service: 2022      Chief Complaint:   Chief Complaint   Patient presents with    Follow-up     Patient Active Problem List:     Hypothyroidism     Hyperlipidemia     Vitamin D deficiency     Osteopenia     Essential hypertension     COPD (chronic obstructive pulmonary disease) ([facility]     Macular degeneration, dry-left eye     Macular degeneration, right eye-wet      PMR (polymyalgia rheumatica) (HCC)     CLL (chronic lymphocytic leukemia) ([facility]     CCC (chronic calculous cholecystitis)     Monoclonal gammopathy     Chronic insomnia     GERD (gastroesophageal reflux disease)     Elevated erythrocyte sedimentation rate     Lymphocytosis (symptomatic)    HPI:  Ms. 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 She stated that she was initially referred to Dr. Jim Dodson because of elevated ESR and arthritis. She has been following regularly with Dr. Jim Dodson for that. Recent blood tests on 2017, showed significant elevation in her white blood cell count with elevated absolute lymphocyte count. Manual differential revealed atypical lymphocytes and Pathologist recommend sending peripheral blood flow cytometry for further analysis. Peripheral blood flow cytometry was sent on 2017, and it revealed monoclonal B-cell population, surface immunoglobulin M and D, Lambda, co-expressing 25, is detected. CD20 is strongly positive. The lack of CD5 co-expression by lesional lymphocytes effectively excludes circulating Mantel cell lymphoma and B cell chronic lymphocytic leukemia.  and CD11C are negative which exclude hairy cell leukemia. 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 She complains of some tiredness and about 13 pound weight loss over three months duration. She claims that she has tiredness and weight loss because she recently moved to [facility]. She denies fever and night sweats. She denies hyperviscosity symptoms (headache, lightheadedness, blurry vision, or double vision). Her family history is significant for leukocytosis in her paternal grandmother and she passed [place] [facility] [place] [address]. She was not diagnosed with leukemia or lymphoma at that time; however, family has suspected that she might have leukemia or lymphoma. No other family history of lymphoproliferative disorder. Her father has prostate cancer. Laboratory workups done on February 23, 2017, showed persistent lymphocytosis (WBC 25.4 and absolute lymphocyte count was 18.5), mild anemia (hemoglobin 11.3), and she has a normal complete metabolic panel. Her LDH is normal and hepatitis panels are negative. Serum protein electrophoresis and immunofixation revealed biclonal gammopathy (800 mg/dL of IgM Lambda, IgG Kappa, and free Lambda). Peripheral blood flow cytometry was done on February 23, 2017, and it revealed peripheral blood with CD5 positive monoclonal B cells (53 percent of the nucleated cells), consistent with mature B cell neoplasm. B cells are predominantly small, but scattered properties, positive for CD5 (partial), CD19, CD20, CD23 (partial), FMC-7 (partial), and CD38. B cells are negative for  and CD10. These findings are consistent with mature B cell neoplasm. Differential diagnosis includes, but is not limited to, atypical chronic lymphocytic leukemia/small lymphocytic lymphoma, mantel cell lymphoma, and marginal zone B-cell lymphoma.   Pathologist recommends correlating with clinical data, morphology, and cytology studies. FISH analysis for t(11;14) was sent to rule out Mantle cell lymphoma on 8/9/17 and it was a normal study. Pathogenic alteration is detected in MYD88 gene. Genomic alterations of uncertain significance are detected in javon SETD2 and TET2 genes. She stated that she will have colonoscopy at the end of 2017. She had mammogram in April 2017 and it was a negative study. She also had a Pap smear and it also was a normal study. Bone marrow biopsy done on August 15, 2019 showed mature B-cell neoplasm with plasmacytic differentiation [overall 80 to 85% of the cells]. 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 FISH studies for B-cell neoplasm reveal 18q+, which is a recurrent finding in many types of non-Hodgkin lymphoma. CT scan of the chest, abdomen and pelvis done on 2/17/20 showed splenomegaly with multiple hypoenhancing splenic lesions the largest measuring up to 1.6 x 1.2 cm. Given history of Waldenstroms macroglobulinemia, findings suggest lymphomatous involvement. Prominent but otherwise not pathologically enlarged axillary lymph nodes bilaterally. No other adenopathy in the chest, abdomen or pelvis. These can be followed on subsequent imaging. Multiple 2-3 mm pulmonary nodules which can be followed per clinical protocol or in 3 months. Mild emphysematous changes. Since she becomes more anemia with mildly elevated serum creatinine level (most likely due to  hemoconcentration/hyperviscosity from macroglobulinemia), I recommend her to start first line chemotherapy with Ibrutinib. Ibrutinib was started on 1/30/21. On May 12, 2022, she presented to me for follow up. I have been following Ms. Carolin Homans for IgM lambda monoclonal gammopathy and monoclonal lymphocytosis.  Further work ups with bone marrow biopsy revealed that they all are due to Waldenstrom's macroglobulinemia (MYD88 gene was detected). Repeat work ups on 1/12/2021 because of worsening anemia and elevated serum creatinine showed increasing IgM level (3262 mg/dl from 2513mg in 11/24/20 and 2430 mg/dl in 7/16/20). Her M protein is also went up (2130 mg/dl from 1400 mg/dl in 11/24/20 and 1300 mg/dl in 7/16/20). Other anemia work ups were normal. ESR was significantly elevated (ESR >140) due to Waldenstrom's macroglobulinemia. Since she becomes more anemia with mildly elevated serum creatinine level (most likely due to  hemoconcentration/hyperviscosity from macroglobulinemia), I recommend her to start first line chemotherapy with Ibrutinib. It ws started on 1/30/21. I recognize that her hemoglobin is getting better after starting ibrutinib. Her IgM level was   3262 mg/dl on 1/12/21  2011 mg/dl on 3/16/21   1439 mg/dl on 5/18/21  2116 mg/dl on 8/17/21  1059 mg/dl on 11/16/21    Monoclonal protein were  2130 mg/dl on 1/12/21  1100 mg/dl on 3/16/21   900 mg/dl on 5/18/21  900 mg/dl on 8/17/21  600 mg/dl on 11/16/21    Since her parameters are getting better and she is tolerating well to ibrutinib, I recommend that he continue with ibrutinib on a regular basis. I will repeat all the blood test today and I will follow up with the results. I will see her again in 3 months. Elevated serum creatinine - it is getting better. Recommend to follow up with her nephrologist regularly. Chemo induced nausea - recommend to continue with zofran prn. Hypertension - her BP is stable. Recommend to continue with triamterene/HCTZ 37.5-25 mg daily. Hypothyroidism - also recommend to continue with synthroid 88 mcg daily. Health maintenance -I recommend for age-appropriate cancer screening, exercise, low-salt and low-fat diet. She does not have any other significant symptoms on today's visit.      PAST MEDICAL HISTORY:  Past medical history significant for:  1. Osteoarthritis. 2.      Hypothyroidism. 3.      Hyperlipidemia. 4.      Gastroesophageal reflux disease. 5.      Hypertension. PAST SURGICAL HISTORY:  No significant surgical history. FAMILY HISTORY:  Significant for prostate cancer in her father. Her maternal grandmother might have had lymphoproliferative disorder. She passed away in 1940. SOCIAL HISTORY:  She was a former smoker and she quit smoking in 1997. She used to smoke two packs a day for 20 years. She socially drinks alcohol and denies illicit drug abuse. She is  and she is currently living in [facility]. She is a retired [employer]. She does not have any children. ALLERGIES:  No known drug allergies. Oncology History    No history exists. Review of Systems: \"Per interval history; otherwise 10 point ROS is negative. \"  Her energy level is fair and her sleep is good. She doesn't have fever, chills, night sweats, cough, shortness of breath, chest pain, hemoptysis or palpitations. Her bowel and bladder functions are normal. She denies nausea, vomiting, abdominal pain, diarrhea, constipation, dysuria, loss of appetite or weight loss. She doesn't have neuropathy and she denies bleeding or clotting issues. She doesn't have any pain in her body. Denies anxiety or depression. The rest of the systems are unremarkable.      Vital Signs:  BP (!) 123/54 (Site: Right Upper Arm, Position: Sitting, Cuff Size: Medium Adult)   Pulse 72   Temp 97.8 °F (36.6 °C) (Temporal)   Resp 16   Ht 5' 1.5\" (1.562 m)   Wt 106 lb (48.1 kg)   SpO2 97%   BMI 19.70 kg/m²     Physical Exam:  CONSTITUTIONAL: awake, alert, cooperative, no apparent distress,      EYES: pupils equal, round and reactive to light, sclera clear and conjunctiva normal  ENT: Normocephalic, without obvious abnormality, atraumatic  NECK: supple, symmetrical, no jugular venous distension and no carotid bruits HEMATOLOGIC/LYMPHATIC: no cervical, supraclavicular or axillary lymphadenopathy   LUNGS: VBS, no wheezes, clear to auscultation, no crackles, no increased work of breathing, no rhonchi,   CARDIOVASCULAR: regular rate and rhythm, normal S1 and S2, no murmur noted  ABDOMEN: soft, non-distended, normal bowel sounds x 4, non-tender, no masses palpated, no hepatosplenomegaly   MUSCULOSKELETAL: full range of motion noted, tone is normal  NEUROLOGIC: awake, alert, oriented to name, place and time. Motor skills grossly intact. SKIN: Normal skin color, texture, turgor and no jaundice.  appears intact   EXTREMITIES: no cyanosis, no leg swelling, no clubbing, no LE edema,      Labs:  Hematology:  Lab Results   Component Value Date    WBC 7.9 05/12/2022    RBC 3.77 (L) 05/12/2022    HGB 11.0 (L) 05/12/2022    HCT 35.6 (L) 05/12/2022    MCV 94.4 05/12/2022    MCH 29.2 05/12/2022    MCHC 30.9 (L) 05/12/2022    RDW 14.1 05/12/2022     05/12/2022    MPV 11.5 (H) 05/12/2022    BANDSPCT 5 01/30/2022    SEGSPCT 67.2 (H) 05/12/2022    EOSRELPCT 0.5 05/12/2022    BASOPCT 0.1 05/12/2022    LYMPHOPCT 25.0 05/12/2022    MONOPCT 7.2 (H) 05/12/2022    BANDABS 0.71 01/30/2022    SEGSABS 5.3 05/12/2022    EOSABS 0.0 05/12/2022    BASOSABS 0.0 05/12/2022    LYMPHSABS 2.0 05/12/2022    MONOSABS 0.6 05/12/2022    DIFFTYPE AUTOMATED DIFFERENTIAL 05/12/2022    ANISOCYTOSIS 1+ 01/30/2022    POLYCHROM 1+ 02/23/2017    WBCMORP FEW 11/06/2017    PLTM ADEQUATE 01/30/2022     Lab Results   Component Value Date    ESR 6 11/16/2021     Chemistry:  Lab Results   Component Value Date     02/10/2022    K 3.9 02/10/2022     02/10/2022    CO2 35 (H) 02/10/2022    BUN 18 02/10/2022    CREATININE 1.0 02/10/2022    GLUCOSE 119 (H) 02/10/2022    CALCIUM 7.7 (L) 02/10/2022    PROT 5.6 (L) 01/18/2022    LABALBU 3.4 01/18/2022    BILITOT 0.4 01/18/2022    ALKPHOS 86 01/18/2022    AST 26 01/18/2022    ALT 16 01/18/2022    LABGLOM 54 (L) 02/10/2022 GFRAA >60 02/10/2022    AGRATIO 1.3 05/02/2016    GLOB 3.0 05/02/2016    PHOS 4.3 07/08/2021    MG 2.4 01/28/2022     Lab Results   Component Value Date     11/16/2021     No components found for: LD  Lab Results   Component Value Date    TSHHS 0.978 11/06/2017    T4FREE 1.61 05/26/2017     Immunology:  Lab Results   Component Value Date    PROT 5.6 (L) 01/18/2022    SPEP  11/16/2021     INTERPRETATION - Monoclonal gammopathy, 600 mg/dL, IgM lambda, decreased from 900 mg/dL on 8/17/2021. SAF    SPEP  11/16/2021     INTERPRETATION - An IgM lambda monoclonal band is detected. SAF    ALBUMINELP 3.6 11/16/2021    LABALPH 0.3 11/16/2021    LABALPH 0.8 11/16/2021    LABBETA 0.8 11/16/2021    GAMGLOB 0.8 11/16/2021     Lab Results   Component Value Date    KAPPAUVOL 10.20 11/16/2021    LAMBDAUVOL 465.38 (H) 07/16/2020    KLFLCR 0.19 (L) 11/16/2021     Lab Results   Component Value Date    B2M 3.8 (H) 11/16/2021     Coagulation Panel:  No results found for: PROTIME, INR, APTT, DDIMER  Anemia Panel:  Lab Results   Component Value Date    ZRHTMUJQ61 371.1 01/12/2021    FOLATE >20.0 (H) 01/12/2021     Tumor Markers:  Lab Results   Component Value Date     6.2 06/11/2013     Observations:  PHQ-9 Total Score: 0 (5/12/2022 11:17 AM)      Assessment & Plan:   1. Chronic lymphocytic leukemia  2. Monoclonal gammopathy (IgM Lambda). PLAN:  Ms. Samia Solo has been followed for Waldenstrom's macroglobulinemia (MYD88 gene was detected). Since she becomes more anemia with mildly elevated serum creatinine level (most likely due to  hemoconcentration/hyperviscosity from macroglobulinemia), I recommend her to start first line chemotherapy with Ibrutinib. Ibrutinib was started on 1/30/21. On May 12, 2022, she presented to me for follow up. I have been following Ms. Samia Solo for IgM lambda monoclonal gammopathy and monoclonal lymphocytosis.  Further work ups with bone marrow biopsy revealed that they all are due to Waldenstrom's macroglobulinemia (MYD88 gene was detected). Repeat work ups on 1/12/2021 because of worsening anemia and elevated serum creatinine showed increasing IgM level (3262 mg/dl from 2513mg in 11/24/20 and 2430 mg/dl in 7/16/20). Her M protein is also went up (2130 mg/dl from 1400 mg/dl in 11/24/20 and 1300 mg/dl in 7/16/20). Other anemia work ups were normal. ESR was significantly elevated (ESR >140) due to Waldenstrom's macroglobulinemia. Since she becomes more anemia with mildly elevated serum creatinine level (most likely due to  hemoconcentration/hyperviscosity from macroglobulinemia), I recommend her to start first line chemotherapy with Ibrutinib. It ws started on 1/30/21. I recognize that her hemoglobin is getting better after starting ibrutinib. Her IgM level was   3262 mg/dl on 1/12/21  2011 mg/dl on 3/16/21   1439 mg/dl on 5/18/21  2116 mg/dl on 8/17/21  1059 mg/dl on 11/16/21    Monoclonal protein were  2130 mg/dl on 1/12/21  1100 mg/dl on 3/16/21   900 mg/dl on 5/18/21  900 mg/dl on 8/17/21  600 mg/dl on 11/16/21    Since her parameters are getting better and she is tolerating well to ibrutinib, I recommend that he continue with ibrutinib on a regular basis. I will repeat all the blood test today and I will follow up with the results. I will see her again in 3 months. Elevated serum creatinine - it is getting better. Recommend to follow up with her nephrologist regularly. Chemo induced nausea - recommend to continue with zofran prn. Hypertension - her BP is stable. Recommend to continue with triamterene/HCTZ 37.5-25 mg daily. Hypothyroidism - also recommend to continue with synthroid 88 mcg daily. Health maintenance -I recommend for age-appropriate cancer screening, exercise, low-salt and low-fat diet. I answered all her questions and concerns for today. I asked her to follow up with primary care physician on regular basis.      Recent imaging and labs were reviewed and discussed with the patient.

## 2022-05-12 ENCOUNTER — OFFICE VISIT (OUTPATIENT)
Dept: ONCOLOGY | Age: 77
End: 2022-05-12
Payer: MEDICARE

## 2022-05-12 ENCOUNTER — HOSPITAL ENCOUNTER (OUTPATIENT)
Dept: INFUSION THERAPY | Age: 77
Discharge: HOME | End: 2022-05-12
Payer: MEDICARE

## 2022-05-12 VITALS
DIASTOLIC BLOOD PRESSURE: 54 MMHG | WEIGHT: 106 LBS | SYSTOLIC BLOOD PRESSURE: 123 MMHG | HEART RATE: 72 BPM | TEMPERATURE: 97.8 F | BODY MASS INDEX: 19.51 KG/M2 | OXYGEN SATURATION: 97 % | RESPIRATION RATE: 16 BRPM | HEIGHT: 62 IN

## 2022-05-12 DIAGNOSIS — C88.0 WALDENSTROM MACROGLOBULINEMIA (HCC): ICD-10-CM

## 2022-05-12 DIAGNOSIS — C88.0 WALDENSTROM MACROGLOBULINEMIA (HCC): Primary | ICD-10-CM

## 2022-05-12 LAB
ALBUMIN SERPL-MCNC: 3.9 GM/DL (ref 3.4–5)
ALP BLD-CCNC: 86 IU/L (ref 40–129)
ALT SERPL-CCNC: 7 U/L (ref 10–40)
ANION GAP SERPL CALCULATED.3IONS-SCNC: 9 MMOL/L (ref 4–16)
AST SERPL-CCNC: 12 IU/L (ref 15–37)
BASOPHILS ABSOLUTE: 0 K/CU MM
BASOPHILS RELATIVE PERCENT: 0.1 % (ref 0–1)
BILIRUB SERPL-MCNC: 0.6 MG/DL (ref 0–1)
BUN BLDV-MCNC: 21 MG/DL (ref 6–23)
CALCIUM SERPL-MCNC: 8.3 MG/DL (ref 8.3–10.6)
CHLORIDE BLD-SCNC: 106 MMOL/L (ref 99–110)
CO2: 26 MMOL/L (ref 21–32)
CREAT SERPL-MCNC: 1.1 MG/DL (ref 0.6–1.1)
DIFFERENTIAL TYPE: ABNORMAL
EOSINOPHILS ABSOLUTE: 0 K/CU MM
EOSINOPHILS RELATIVE PERCENT: 0.5 % (ref 0–3)
ERYTHROCYTE SEDIMENTATION RATE: 4 MM/HR (ref 0–30)
GFR AFRICAN AMERICAN: 58 ML/MIN/1.73M2
GFR NON-AFRICAN AMERICAN: 48 ML/MIN/1.73M2
GLUCOSE BLD-MCNC: 83 MG/DL (ref 70–99)
HCT VFR BLD CALC: 35.6 % (ref 37–47)
HEMOGLOBIN: 11 GM/DL (ref 12.5–16)
LACTATE DEHYDROGENASE: 119 IU/L (ref 120–246)
LYMPHOCYTES ABSOLUTE: 2 K/CU MM
LYMPHOCYTES RELATIVE PERCENT: 25 % (ref 24–44)
MCH RBC QN AUTO: 29.2 PG (ref 27–31)
MCHC RBC AUTO-ENTMCNC: 30.9 % (ref 32–36)
MCV RBC AUTO: 94.4 FL (ref 78–100)
MONOCYTES ABSOLUTE: 0.6 K/CU MM
MONOCYTES RELATIVE PERCENT: 7.2 % (ref 0–4)
PDW BLD-RTO: 14.1 % (ref 11.7–14.9)
PLATELET # BLD: 154 K/CU MM (ref 140–440)
PMV BLD AUTO: 11.5 FL (ref 7.5–11.1)
POTASSIUM SERPL-SCNC: 4 MMOL/L (ref 3.5–5.1)
RBC # BLD: 3.77 M/CU MM (ref 4.2–5.4)
SEGMENTED NEUTROPHILS ABSOLUTE COUNT: 5.3 K/CU MM
SEGMENTED NEUTROPHILS RELATIVE PERCENT: 67.2 % (ref 36–66)
SODIUM BLD-SCNC: 141 MMOL/L (ref 135–145)
TOTAL PROTEIN: 5.6 GM/DL (ref 6.4–8.2)
WBC # BLD: 7.9 K/CU MM (ref 4–10.5)

## 2022-05-12 PROCEDURE — 82784 ASSAY IGA/IGD/IGG/IGM EACH: CPT

## 2022-05-12 PROCEDURE — G8399 PT W/DXA RESULTS DOCUMENT: HCPCS | Performed by: INTERNAL MEDICINE

## 2022-05-12 PROCEDURE — 86334 IMMUNOFIX E-PHORESIS SERUM: CPT

## 2022-05-12 PROCEDURE — 85652 RBC SED RATE AUTOMATED: CPT

## 2022-05-12 PROCEDURE — 84155 ASSAY OF PROTEIN SERUM: CPT

## 2022-05-12 PROCEDURE — G8420 CALC BMI NORM PARAMETERS: HCPCS | Performed by: INTERNAL MEDICINE

## 2022-05-12 PROCEDURE — 99214 OFFICE O/P EST MOD 30 MIN: CPT | Performed by: INTERNAL MEDICINE

## 2022-05-12 PROCEDURE — 85025 COMPLETE CBC W/AUTO DIFF WBC: CPT

## 2022-05-12 PROCEDURE — 86320 SERUM IMMUNOELECTROPHORESIS: CPT

## 2022-05-12 PROCEDURE — 4040F PNEUMOC VAC/ADMIN/RCVD: CPT | Performed by: INTERNAL MEDICINE

## 2022-05-12 PROCEDURE — 82232 ASSAY OF BETA-2 PROTEIN: CPT

## 2022-05-12 PROCEDURE — 1036F TOBACCO NON-USER: CPT | Performed by: INTERNAL MEDICINE

## 2022-05-12 PROCEDURE — 84165 PROTEIN E-PHORESIS SERUM: CPT

## 2022-05-12 PROCEDURE — 1090F PRES/ABSN URINE INCON ASSESS: CPT | Performed by: INTERNAL MEDICINE

## 2022-05-12 PROCEDURE — 1123F ACP DISCUSS/DSCN MKR DOCD: CPT | Performed by: INTERNAL MEDICINE

## 2022-05-12 PROCEDURE — G8427 DOCREV CUR MEDS BY ELIG CLIN: HCPCS | Performed by: INTERNAL MEDICINE

## 2022-05-12 PROCEDURE — 36415 COLL VENOUS BLD VENIPUNCTURE: CPT

## 2022-05-12 PROCEDURE — 80053 COMPREHEN METABOLIC PANEL: CPT

## 2022-05-12 PROCEDURE — 83883 ASSAY NEPHELOMETRY NOT SPEC: CPT

## 2022-05-12 PROCEDURE — 83615 LACTATE (LD) (LDH) ENZYME: CPT

## 2022-05-12 ASSESSMENT — PATIENT HEALTH QUESTIONNAIRE - PHQ9
1. LITTLE INTEREST OR PLEASURE IN DOING THINGS: 0
SUM OF ALL RESPONSES TO PHQ QUESTIONS 1-9: 0
2. FEELING DOWN, DEPRESSED OR HOPELESS: 0
SUM OF ALL RESPONSES TO PHQ QUESTIONS 1-9: 0
SUM OF ALL RESPONSES TO PHQ9 QUESTIONS 1 & 2: 0

## 2022-05-12 NOTE — PROGRESS NOTES
MA Rooming Questions  Patient: Gurpreet Ge  MRN: [id number]    Date: 5/12/2022        1. Do you have any new issues?   no         2. Do you need any refills on medications?    no    3. Have you had any imaging done since your last visit? yes -     4. Have you been hospitalized or seen in the emergency room since your last visit here?   yes -     5. Did the patient have a depression screening completed today?  Yes    PHQ-9 Total Score: 0 (5/12/2022 11:17 AM)       PHQ-9 Given to (if applicable):               PHQ-9 Score (if applicable):                     [] Positive     []  Negative              Does question #9 need addressed (if applicable)                     [] Yes    []  No               Lisa Ruiz CMA

## 2022-05-13 LAB
IGA: <50 MG/DL (ref 69–382)
IGG,SERUM: <300 MG/DL (ref 723–1685)
IGM,SERUM: 781 MG/DL (ref 62–277)

## 2022-05-14 LAB
BETA-2 MICROGLOBULIN: 3.9 MG/L
KAPPA QUANT FREE LIGHT CHAINS: 12.45 MG/L (ref 3.3–19.4)
KAPPA/LAMBDA FREE LIGHT CHAIN RATIO: 0.19 (ref 0.26–1.65)
LAMBDA FREE LIGHT CHAINS QNT: 65.9 MG/L (ref 5.71–26.3)

## 2022-05-15 LAB
ALBUMIN SERPL-MCNC: 3.37 G/DL (ref 3.75–5.01)
ALPHA-1-GLOBULIN: 0.36 G/DL (ref 0.19–0.46)
ALPHA-2-GLOBULIN: 0.68 G/DL (ref 0.48–1.05)
BETA GLOBULIN: 0.64 G/DL (ref 0.48–1.1)
GAMMA: 0.64 G/DL (ref 0.62–1.51)
IMMUNOFIXATION REFLEX: ABNORMAL
PROTEIN ELECTROPHORESIS, SERUM: ABNORMAL
SPE/IFE INTERPRETATION: ABNORMAL
TOTAL PROTEIN: 5.7 G/DL (ref 6.3–8.2)

## 2022-08-02 LAB
EER IMMUNOFIX ELECTROPHORESIS GEL: NORMAL
IMMUNOFIX ELECTROPHORESIS GEL: NORMAL

## 2022-08-12 ENCOUNTER — HOSPITAL ENCOUNTER (OUTPATIENT)
Dept: INFUSION THERAPY | Age: 77
Discharge: HOME | End: 2022-08-12
Payer: MEDICARE

## 2022-08-12 ENCOUNTER — OFFICE VISIT (OUTPATIENT)
Dept: ONCOLOGY | Age: 77
End: 2022-08-12
Payer: MEDICARE

## 2022-08-12 VITALS
HEIGHT: 61 IN | TEMPERATURE: 98.4 F | SYSTOLIC BLOOD PRESSURE: 131 MMHG | WEIGHT: 109.3 LBS | RESPIRATION RATE: 16 BRPM | BODY MASS INDEX: 20.64 KG/M2 | OXYGEN SATURATION: 99 % | DIASTOLIC BLOOD PRESSURE: 61 MMHG | HEART RATE: 75 BPM

## 2022-08-12 DIAGNOSIS — C88.0 WALDENSTROM MACROGLOBULINEMIA (HCC): ICD-10-CM

## 2022-08-12 DIAGNOSIS — C88.0 WALDENSTROM MACROGLOBULINEMIA (HCC): Primary | ICD-10-CM

## 2022-08-12 LAB
ALBUMIN SERPL-MCNC: 4.1 GM/DL (ref 3.4–5)
ALP BLD-CCNC: 86 IU/L (ref 40–129)
ALT SERPL-CCNC: 16 U/L (ref 10–40)
ANION GAP SERPL CALCULATED.3IONS-SCNC: 12 MMOL/L (ref 4–16)
AST SERPL-CCNC: 17 IU/L (ref 15–37)
BASOPHILS ABSOLUTE: 0 K/CU MM
BASOPHILS RELATIVE PERCENT: 0.1 % (ref 0–1)
BILIRUB SERPL-MCNC: 0.6 MG/DL (ref 0–1)
BUN BLDV-MCNC: 29 MG/DL (ref 6–23)
CALCIUM SERPL-MCNC: 8.5 MG/DL (ref 8.3–10.6)
CHLORIDE BLD-SCNC: 100 MMOL/L (ref 99–110)
CO2: 28 MMOL/L (ref 21–32)
CREAT SERPL-MCNC: 1.2 MG/DL (ref 0.6–1.1)
DIFFERENTIAL TYPE: ABNORMAL
EOSINOPHILS ABSOLUTE: 0.1 K/CU MM
EOSINOPHILS RELATIVE PERCENT: 0.8 % (ref 0–3)
ERYTHROCYTE SEDIMENTATION RATE: 12 MM/HR (ref 0–30)
GFR AFRICAN AMERICAN: 53 ML/MIN/1.73M2
GFR NON-AFRICAN AMERICAN: 43 ML/MIN/1.73M2
GLUCOSE BLD-MCNC: 83 MG/DL (ref 70–99)
HCT VFR BLD CALC: 37.9 % (ref 37–47)
HEMOGLOBIN: 12.1 GM/DL (ref 12.5–16)
IGA: <50 MG/DL (ref 69–382)
IGG,SERUM: <300 MG/DL (ref 723–1685)
IGM,SERUM: 1434 MG/DL (ref 62–277)
LACTATE DEHYDROGENASE: 115 IU/L (ref 120–246)
LYMPHOCYTES ABSOLUTE: 1.8 K/CU MM
LYMPHOCYTES RELATIVE PERCENT: 21.4 % (ref 24–44)
MCH RBC QN AUTO: 30.4 PG (ref 27–31)
MCHC RBC AUTO-ENTMCNC: 31.9 % (ref 32–36)
MCV RBC AUTO: 95.2 FL (ref 78–100)
MONOCYTES ABSOLUTE: 0.5 K/CU MM
MONOCYTES RELATIVE PERCENT: 6.2 % (ref 0–4)
PDW BLD-RTO: 13 % (ref 11.7–14.9)
PLATELET # BLD: 172 K/CU MM (ref 140–440)
PMV BLD AUTO: 10.4 FL (ref 7.5–11.1)
POTASSIUM SERPL-SCNC: 4 MMOL/L (ref 3.5–5.1)
RBC # BLD: 3.98 M/CU MM (ref 4.2–5.4)
SEGMENTED NEUTROPHILS ABSOLUTE COUNT: 6.1 K/CU MM
SEGMENTED NEUTROPHILS RELATIVE PERCENT: 71.5 % (ref 36–66)
SODIUM BLD-SCNC: 140 MMOL/L (ref 135–145)
TOTAL PROTEIN: 6 GM/DL (ref 6.4–8.2)
WBC # BLD: 8.5 K/CU MM (ref 4–10.5)

## 2022-08-12 PROCEDURE — G8399 PT W/DXA RESULTS DOCUMENT: HCPCS | Performed by: INTERNAL MEDICINE

## 2022-08-12 PROCEDURE — 83615 LACTATE (LD) (LDH) ENZYME: CPT

## 2022-08-12 PROCEDURE — 1036F TOBACCO NON-USER: CPT | Performed by: INTERNAL MEDICINE

## 2022-08-12 PROCEDURE — 86320 SERUM IMMUNOELECTROPHORESIS: CPT

## 2022-08-12 PROCEDURE — 99214 OFFICE O/P EST MOD 30 MIN: CPT | Performed by: INTERNAL MEDICINE

## 2022-08-12 PROCEDURE — 84155 ASSAY OF PROTEIN SERUM: CPT

## 2022-08-12 PROCEDURE — 82232 ASSAY OF BETA-2 PROTEIN: CPT

## 2022-08-12 PROCEDURE — 1123F ACP DISCUSS/DSCN MKR DOCD: CPT | Performed by: INTERNAL MEDICINE

## 2022-08-12 PROCEDURE — G8427 DOCREV CUR MEDS BY ELIG CLIN: HCPCS | Performed by: INTERNAL MEDICINE

## 2022-08-12 PROCEDURE — 85652 RBC SED RATE AUTOMATED: CPT

## 2022-08-12 PROCEDURE — G8420 CALC BMI NORM PARAMETERS: HCPCS | Performed by: INTERNAL MEDICINE

## 2022-08-12 PROCEDURE — 83883 ASSAY NEPHELOMETRY NOT SPEC: CPT

## 2022-08-12 PROCEDURE — 82784 ASSAY IGA/IGD/IGG/IGM EACH: CPT

## 2022-08-12 PROCEDURE — 85025 COMPLETE CBC W/AUTO DIFF WBC: CPT

## 2022-08-12 PROCEDURE — 1090F PRES/ABSN URINE INCON ASSESS: CPT | Performed by: INTERNAL MEDICINE

## 2022-08-12 PROCEDURE — 36415 COLL VENOUS BLD VENIPUNCTURE: CPT

## 2022-08-12 PROCEDURE — 84165 PROTEIN E-PHORESIS SERUM: CPT

## 2022-08-12 PROCEDURE — 80053 COMPREHEN METABOLIC PANEL: CPT

## 2022-08-12 ASSESSMENT — PATIENT HEALTH QUESTIONNAIRE - PHQ9
SUM OF ALL RESPONSES TO PHQ9 QUESTIONS 1 & 2: 0
SUM OF ALL RESPONSES TO PHQ QUESTIONS 1-9: 0
1. LITTLE INTEREST OR PLEASURE IN DOING THINGS: 0
SUM OF ALL RESPONSES TO PHQ QUESTIONS 1-9: 0
2. FEELING DOWN, DEPRESSED OR HOPELESS: 0

## 2022-08-12 NOTE — PROGRESS NOTES
Patient Name: Jannis Hashimoto  Patient : 1945  Patient MRN: [id number]     Primary Oncologist: Yoselyn Lemons MD  Referring Provider: Lazaro Cruz MD     Date of Service: 2022      Chief Complaint:   Chief Complaint   Patient presents with    Discuss Labs       Patient Active Problem List:     Hypothyroidism     Hyperlipidemia     Vitamin D deficiency     Osteopenia     Essential hypertension     COPD (chronic obstructive pulmonary disease) ([facility]     Macular degeneration, dry-left eye     Macular degeneration, right eye-wet      PMR (polymyalgia rheumatica) (HCC)     CLL (chronic lymphocytic leukemia) ([facility]     CCC (chronic calculous cholecystitis)     Monoclonal gammopathy     Chronic insomnia     GERD (gastroesophageal reflux disease)     Elevated erythrocyte sedimentation rate     Lymphocytosis (symptomatic)    HPI:  Ms. 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 She stated that she was initially referred to Dr. Remedios Mitchell because of elevated ESR and arthritis. She has been following regularly with Dr. Remedios Mitchell for that. Recent blood tests on 2017, showed significant elevation in her white blood cell count with elevated absolute lymphocyte count. Manual differential revealed atypical lymphocytes and Pathologist recommend sending peripheral blood flow cytometry for further analysis. Peripheral blood flow cytometry was sent on 2017, and it revealed monoclonal B-cell population, surface immunoglobulin M and D, Lambda, co-expressing 25, is detected. CD20 is strongly positive.   The lack of CD5 co-expression by lesional lymphocytes effectively excludes circulating Mantel cell lymphoma and B cell chronic lymphocytic leukemia.  and CD11C are negative which exclude hairy cell leukemia. 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 She complains of some tiredness and about 13 pound weight loss over three months duration. She claims that she has tiredness and weight loss because she recently moved to [facility]. She denies fever and night sweats. She denies hyperviscosity symptoms (headache, lightheadedness, blurry vision, or double vision). Her family history is significant for leukocytosis in her paternal grandmother and she passed away in 80. She was not diagnosed with leukemia or lymphoma at that time; however, family has suspected that she might have leukemia or lymphoma. No other family history of lymphoproliferative disorder. Her father has prostate cancer. Laboratory workups done on February 23, 2017, showed persistent lymphocytosis (WBC 25.4 and absolute lymphocyte count was 18.5), mild anemia (hemoglobin 11.3), and she has a normal complete metabolic panel. Her LDH is normal and hepatitis panels are negative. Serum protein electrophoresis and immunofixation revealed biclonal gammopathy (800 mg/dL of IgM Lambda, IgG Kappa, and free Lambda). Peripheral blood flow cytometry was done on February 23, 2017, and it revealed peripheral blood with CD5 positive monoclonal B cells (53 percent of the nucleated cells), consistent with mature B cell neoplasm. B cells are predominantly small, but scattered properties, positive for CD5 (partial), CD19, CD20, CD23 (partial), FMC-7 (partial), and CD38. B cells are negative for  and CD10. These findings are consistent with mature B cell neoplasm. Differential diagnosis includes, but is not limited to, atypical chronic lymphocytic leukemia/small lymphocytic lymphoma, mantel cell lymphoma, and marginal zone B-cell lymphoma.   Pathologist recommends correlating with clinical data, morphology, and cytology studies. FISH analysis for t(11;14) was sent to rule out Mantle cell lymphoma on 17 and it was a normal study. Pathogenic alteration is detected in MYD88 gene. Genomic alterations of uncertain significance are detected in javon SETD2 and TET2 genes. She stated that she will have colonoscopy at the end of . She had mammogram in 2017 and it was a negative study. She also had a Pap smear and it also was a normal study. Bone marrow biopsy done on August 15, 2019 showed mature B-cell neoplasm with plasmacytic differentiation [overall 80 to 85% of the cells]. 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 FISH studies for B-cell neoplasm reveal 18q+, which is a recurrent finding in many types of non-Hodgkin lymphoma. CT scan of the chest, abdomen and pelvis done on 20 showed splenomegaly with multiple hypoenhancing splenic lesions the largest measuring up to 1.6 x 1.2 cm. Given history of Waldenstroms macroglobulinemia, findings suggest lymphomatous involvement. Prominent but otherwise not pathologically enlarged axillary lymph nodes bilaterally. No other adenopathy in the chest, abdomen or pelvis. These can be followed on subsequent imaging. Multiple 2-3 mm pulmonary nodules which can be followed per clinical protocol or in 3 months. Mild emphysematous changes. Since she becomes more anemia with mildly elevated serum creatinine level (most likely due to  hemoconcentration/hyperviscosity from macroglobulinemia), I recommend her to start first line chemotherapy with Ibrutinib. Ibrutinib was started on 21. On 2022, she presented to me for follow up. I have been following Ms. Nallely Duong for IgM lambda monoclonal gammopathy and monoclonal lymphocytosis.  Further work ups on today's visit. PAST MEDICAL HISTORY:  Past medical history significant for:  1. Osteoarthritis. 2.      Hypothyroidism. 3.      Hyperlipidemia. 4.      Gastroesophageal reflux disease. 5.      Hypertension. PAST SURGICAL HISTORY:  No significant surgical history. FAMILY HISTORY:  Significant for prostate cancer in her father. Her maternal grandmother might have had lymphoproliferative disorder. She passed away in 1940. SOCIAL HISTORY:  She was a former smoker and she quit smoking in 1997. She used to smoke two packs a day for 20 years. She socially drinks alcohol and denies illicit drug abuse. She is  and she is currently living in [facility]. She is a retired . She does not have any children. ALLERGIES:  No known drug allergies. Oncology History    No history exists. Review of Systems: \"Per interval history; otherwise 10 point ROS is negative. \"  Her energy level is good and her sleep is fine. She doesn't have fever, chills, night sweats, cough, shortness of breath, chest pain, hemoptysis or palpitations. Her bowel and bladder functions are normal. She denies nausea, vomiting, abdominal pain, diarrhea, constipation, dysuria, loss of appetite or weight loss. She doesn't have neuropathy and she denies bleeding or clotting issues. She doesn't have any pain in her body. No anxiety or depression. The rest of the systems are unremarkable.      Vital Signs:  /61 (Site: Right Upper Arm, Position: Sitting, Cuff Size: Medium Adult)   Pulse 75   Temp 98.4 °F (36.9 °C) (Infrared)   Resp 16   Ht 5' 1\" (1.549 m)   Wt 109 lb 4.8 oz (49.6 kg)   SpO2 99%   BMI 20.65 kg/m²     Physical Exam:  CONSTITUTIONAL: awake, alert, cooperative, no apparent distress,      EYES: pupils equal, round and reactive to light, sclera clear and conjunctiva normal  ENT: Normocephalic, without obvious abnormality, atraumatic  NECK: supple, symmetrical, no jugular venous distension and no carotid bruits   HEMATOLOGIC/LYMPHATIC: no cervical, supraclavicular or axillary lymphadenopathy   LUNGS: VBS, no wheezes, clear to auscultation, no crackles, no increased work of breathing, no rhonchi,   CARDIOVASCULAR: regular rate and rhythm, normal S1 and S2, no murmur noted  ABDOMEN: soft, non-distended, normal bowel sounds x 4, non-tender, no masses palpated, no hepatosplenomegaly   MUSCULOSKELETAL: full range of motion noted, tone is normal  NEUROLOGIC: awake, alert, oriented to name, place and time. Motor skills grossly intact. SKIN: Normal skin color, texture, turgor and no jaundice.  appears intact   EXTREMITIES: no leg swelling, no clubbing, no cyanosis, no LE edema,      Labs:  Hematology:  Lab Results   Component Value Date    WBC 7.9 05/12/2022    RBC 3.77 (L) 05/12/2022    HGB 11.0 (L) 05/12/2022    HCT 35.6 (L) 05/12/2022    MCV 94.4 05/12/2022    MCH 29.2 05/12/2022    MCHC 30.9 (L) 05/12/2022    RDW 14.1 05/12/2022     05/12/2022    MPV 11.5 (H) 05/12/2022    BANDSPCT 5 01/30/2022    SEGSPCT 67.2 (H) 05/12/2022    EOSRELPCT 0.5 05/12/2022    BASOPCT 0.1 05/12/2022    LYMPHOPCT 25.0 05/12/2022    MONOPCT 7.2 (H) 05/12/2022    BANDABS 0.71 01/30/2022    SEGSABS 5.3 05/12/2022    EOSABS 0.0 05/12/2022    BASOSABS 0.0 05/12/2022    LYMPHSABS 2.0 05/12/2022    MONOSABS 0.6 05/12/2022    DIFFTYPE AUTOMATED DIFFERENTIAL 05/12/2022    ANISOCYTOSIS 1+ 01/30/2022    POLYCHROM 1+ 02/23/2017    WBCMORP FEW 11/06/2017    PLTM ADEQUATE 01/30/2022     Lab Results   Component Value Date    ESR 4 05/12/2022     Chemistry:  Lab Results   Component Value Date     05/12/2022    K 4.0 05/12/2022     05/12/2022    CO2 26 05/12/2022    BUN 21 05/12/2022    CREATININE 1.1 05/12/2022    GLUCOSE 83 05/12/2022    CALCIUM 8.3 05/12/2022    PROT 5.7 (L) 05/12/2022    PROT 5.6 (L) 05/12/2022    LABALBU 3.37 (L) 05/12/2022    LABALBU 3.9 05/12/2022    BILITOT 0.6 05/12/2022    ALKPHOS 86 05/12/2022    AST 12 (L) 05/12/2022    ALT 7 (L) 05/12/2022    LABGLOM 48 (L) 05/12/2022    GFRAA 58 (L) 05/12/2022    AGRATIO 1.3 05/02/2016    GLOB 3.0 05/02/2016    PHOS 4.3 07/08/2021    MG 2.4 01/28/2022     Lab Results   Component Value Date     (L) 05/12/2022     No components found for: LD  Lab Results   Component Value Date    TSHHS 0.978 11/06/2017    T4FREE 1.61 05/26/2017     Immunology:  Lab Results   Component Value Date    PROT 5.7 (L) 05/12/2022    PROT 5.6 (L) 05/12/2022    SPEP  11/16/2021     INTERPRETATION - Monoclonal gammopathy, 600 mg/dL, IgM lambda, decreased from 900 mg/dL on 8/17/2021. SAF    SPEP  11/16/2021     INTERPRETATION - An IgM lambda monoclonal band is detected. SAF    ALBUMINELP 3.6 11/16/2021    LABALPH 0.36 05/12/2022    LABALPH 0.68 05/12/2022    LABBETA 0.64 05/12/2022    GAMGLOB 0.8 11/16/2021     Lab Results   Component Value Date    KAPPAUVOL 12.45 05/12/2022    LAMBDAUVOL 465.38 (H) 07/16/2020    KLFLCR 0.19 (L) 05/12/2022     Lab Results   Component Value Date    B2M 3.9 (H) 05/12/2022     Coagulation Panel:  No results found for: PROTIME, INR, APTT, DDIMER  Anemia Panel:  Lab Results   Component Value Date    DSAEIWST89 371.1 01/12/2021    FOLATE >20.0 (H) 01/12/2021     Tumor Markers:  Lab Results   Component Value Date     6.2 06/11/2013     Observations:  PHQ-9 Total Score: 0 (8/12/2022 10:46 AM)      Assessment & Plan:   1. Chronic lymphocytic leukemia  2. Monoclonal gammopathy (IgM Lambda). PLAN:  Ms. Randy Horan has been followed for Waldenstrom's macroglobulinemia (MYD88 gene was detected). Since she becomes more anemia with mildly elevated serum creatinine level (most likely due to  hemoconcentration/hyperviscosity from macroglobulinemia), I recommend her to start first line chemotherapy with Ibrutinib. Ibrutinib was started on 1/30/21. On August 12, 2022, she presented to me for follow up.  I have been following Ms. Randy Horan for IgM lambda monoclonal gammopathy and monoclonal lymphocytosis. Further work ups with bone marrow biopsy revealed that they all are due to Waldenstrom's macroglobulinemia (MYD88 gene was detected). Repeat work ups on 1/12/2021 because of worsening anemia and elevated serum creatinine showed increasing IgM level (3262 mg/dl from 2513mg in 11/24/20 and 2430 mg/dl in 7/16/20). Her M protein is also went up (2130 mg/dl from 1400 mg/dl in 11/24/20 and 1300 mg/dl in 7/16/20). Other anemia work ups were normal. ESR was significantly elevated (ESR >140) due to Waldenstrom's macroglobulinemia. Since she becomes more anemia with mildly elevated serum creatinine level (most likely due to  hemoconcentration/hyperviscosity from macroglobulinemia), I recommend her to start first line chemotherapy with Ibrutinib. It ws started on 1/30/21. I recognize that her hemoglobin is getting better after starting ibrutinib. Her IgM level was   3262 mg/dl on 1/12/21  2011 mg/dl on 3/16/21   1439 mg/dl on 5/18/21  2116 mg/dl on 8/17/21  1059 mg/dl on 11/16/21  781 mg/dl on 5/12/22    Monoclonal protein were  2130 mg/dl on 1/12/21  1100 mg/dl on 3/16/21   900 mg/dl on 5/18/21  900 mg/dl on 8/17/21  600 mg/dl on 11/16/21  710 mg/dl on 5/12/22    Since her parameters are getting better and she is tolerating well to ibrutinib, I recommend that she continues with ibrutinib on a regular basis. I will repeat all the blood test today and I will follow up with the results. I will see her again in 3 months. Elevated serum creatinine - it is getting better. Recommend to follow up with her nephrologist regularly. Chemo induced nausea - recommend to continue with zofran prn. Hypertension - her BP is stable. Recommend to continue with triamterene/HCTZ 37.5-25 mg daily. Hypothyroidism - also recommend to continue with synthroid 88 mcg daily.       Health maintenance -I recommend for age-appropriate cancer screening, exercise, low-salt and low-fat diet. I answered all her questions and concerns for today. Recent imaging and labs were reviewed and discussed with the patient.

## 2022-08-13 LAB — BETA-2 MICROGLOBULIN: 4.8 MG/L

## 2022-08-14 LAB
KAPPA QUANT FREE LIGHT CHAINS: 13.93 MG/L (ref 3.3–19.4)
KAPPA/LAMBDA FREE LIGHT CHAIN RATIO: 0.08 (ref 0.26–1.65)
LAMBDA FREE LIGHT CHAINS QNT: 172.1 MG/L (ref 5.71–26.3)

## 2022-08-16 LAB
ALBUMIN ELP: 3.4 GM/DL (ref 3.2–5.6)
ALPHA-1-GLOBULIN: 0.2 GM/DL (ref 0.1–0.4)
ALPHA-2-GLOBULIN: 0.7 GM/DL (ref 0.4–1.2)
BETA GLOBULIN: 0.8 GM/DL (ref 0.5–1.3)
GAMMA GLOBULIN: 0.9 GM/DL (ref 0.5–1.6)
SPEP INTERPRETATION: ABNORMAL
SPEP INTERPRETATION: NORMAL
TOTAL PROTEIN: 6 GM/DL (ref 6.4–8.2)

## 2022-08-18 DIAGNOSIS — C88.0 WALDENSTROM MACROGLOBULINEMIA (HCC): ICD-10-CM

## 2022-08-18 RX ORDER — IBRUTINIB 140 MG/1
CAPSULE ORAL
Qty: 90 CAPSULE | Refills: 3 | Status: ACTIVE | OUTPATIENT
Start: 2022-08-18 | End: 2022-09-13 | Stop reason: SDUPTHER

## 2022-08-18 NOTE — PROGRESS NOTES
Imbruvica 140 mg capsules (patient takes 3 tablets PO daily) #90 reordered and e-scribed to [facility] er physician order.

## 2022-09-13 DIAGNOSIS — C88.0 WALDENSTROM MACROGLOBULINEMIA (HCC): ICD-10-CM

## 2022-09-13 RX ORDER — IBRUTINIB 140 MG/1
CAPSULE ORAL
Qty: 90 CAPSULE | Refills: 3 | Status: ACTIVE | OUTPATIENT
Start: 2022-09-13

## 2022-09-13 NOTE — PROGRESS NOTES
54 G[address] [place] Update    Date: 09/13/22    Medication is currently being filled at [facility] and has been routed there. Pays thru Medicare Part B. Please call us with any questions at [phone] opt.  2.

## 2022-11-07 ENCOUNTER — HOSPITAL ENCOUNTER (OUTPATIENT)
Dept: INFUSION THERAPY | Age: 77
Discharge: HOME | End: 2022-11-07
Payer: MEDICARE

## 2022-11-07 DIAGNOSIS — C88.0 WALDENSTROM MACROGLOBULINEMIA (HCC): ICD-10-CM

## 2022-11-07 LAB
ALBUMIN SERPL-MCNC: 3.9 GM/DL (ref 3.4–5)
ALP BLD-CCNC: 70 IU/L (ref 40–128)
ALT SERPL-CCNC: 8 U/L (ref 10–40)
ANION GAP SERPL CALCULATED.3IONS-SCNC: 11 MMOL/L (ref 4–16)
AST SERPL-CCNC: 15 IU/L (ref 15–37)
BASOPHILS ABSOLUTE: 0 K/CU MM
BASOPHILS RELATIVE PERCENT: 0.2 % (ref 0–1)
BILIRUB SERPL-MCNC: 0.4 MG/DL (ref 0–1)
BUN BLDV-MCNC: 21 MG/DL (ref 6–23)
CALCIUM SERPL-MCNC: 8.7 MG/DL (ref 8.3–10.6)
CHLORIDE BLD-SCNC: 103 MMOL/L (ref 99–110)
CO2: 26 MMOL/L (ref 21–32)
CREAT SERPL-MCNC: 1.2 MG/DL (ref 0.6–1.1)
DIFFERENTIAL TYPE: ABNORMAL
EOSINOPHILS ABSOLUTE: 0.1 K/CU MM
EOSINOPHILS RELATIVE PERCENT: 0.7 % (ref 0–3)
GFR SERPL CREATININE-BSD FRML MDRD: 46 ML/MIN/1.73M2
GLUCOSE BLD-MCNC: 88 MG/DL (ref 70–99)
HCT VFR BLD CALC: 38 % (ref 37–47)
HEMOGLOBIN: 12 GM/DL (ref 12.5–16)
IGA: <50 MG/DL (ref 69–382)
IGG,SERUM: <300 MG/DL (ref 723–1685)
IGM,SERUM: 1130 MG/DL (ref 62–277)
LACTATE DEHYDROGENASE: 139 IU/L (ref 120–246)
LYMPHOCYTES ABSOLUTE: 1.7 K/CU MM
LYMPHOCYTES RELATIVE PERCENT: 17.2 % (ref 24–44)
MCH RBC QN AUTO: 30.2 PG (ref 27–31)
MCHC RBC AUTO-ENTMCNC: 31.6 % (ref 32–36)
MCV RBC AUTO: 95.5 FL (ref 78–100)
MONOCYTES ABSOLUTE: 0.6 K/CU MM
MONOCYTES RELATIVE PERCENT: 6.3 % (ref 0–4)
PDW BLD-RTO: 12.9 % (ref 11.7–14.9)
PLATELET # BLD: 187 K/CU MM (ref 140–440)
PMV BLD AUTO: 10.5 FL (ref 7.5–11.1)
POTASSIUM SERPL-SCNC: 4.7 MMOL/L (ref 3.5–5.1)
RBC # BLD: 3.98 M/CU MM (ref 4.2–5.4)
SEGMENTED NEUTROPHILS ABSOLUTE COUNT: 7.5 K/CU MM
SEGMENTED NEUTROPHILS RELATIVE PERCENT: 75.6 % (ref 36–66)
SODIUM BLD-SCNC: 140 MMOL/L (ref 135–145)
TOTAL PROTEIN: 5.7 GM/DL (ref 6.4–8.2)
WBC # BLD: 9.9 K/CU MM (ref 4–10.5)

## 2022-11-07 PROCEDURE — 84155 ASSAY OF PROTEIN SERUM: CPT

## 2022-11-07 PROCEDURE — 83615 LACTATE (LD) (LDH) ENZYME: CPT

## 2022-11-07 PROCEDURE — 84165 PROTEIN E-PHORESIS SERUM: CPT

## 2022-11-07 PROCEDURE — 80053 COMPREHEN METABOLIC PANEL: CPT

## 2022-11-07 PROCEDURE — 82784 ASSAY IGA/IGD/IGG/IGM EACH: CPT

## 2022-11-07 PROCEDURE — 85652 RBC SED RATE AUTOMATED: CPT

## 2022-11-07 PROCEDURE — 86320 SERUM IMMUNOELECTROPHORESIS: CPT

## 2022-11-07 PROCEDURE — 83883 ASSAY NEPHELOMETRY NOT SPEC: CPT

## 2022-11-07 PROCEDURE — 36415 COLL VENOUS BLD VENIPUNCTURE: CPT

## 2022-11-07 PROCEDURE — 85025 COMPLETE CBC W/AUTO DIFF WBC: CPT

## 2022-11-07 PROCEDURE — 82232 ASSAY OF BETA-2 PROTEIN: CPT

## 2022-11-08 LAB
ALBUMIN ELP: 3.2 GM/DL (ref 3.2–5.6)
ALPHA-1-GLOBULIN: 0.2 GM/DL (ref 0.1–0.4)
ALPHA-2-GLOBULIN: 0.7 GM/DL (ref 0.4–1.2)
BETA GLOBULIN: 0.7 GM/DL (ref 0.5–1.3)
ERYTHROCYTE SEDIMENTATION RATE: 19 MM/HR (ref 0–30)
GAMMA GLOBULIN: 0.9 GM/DL (ref 0.5–1.6)
SPEP INTERPRETATION: ABNORMAL
SPEP INTERPRETATION: NORMAL
TOTAL PROTEIN: 5.7 GM/DL (ref 6.4–8.2)

## 2022-11-10 LAB
BETA-2 MICROGLOBULIN: 4.1 MG/L
KAPPA QUANT FREE LIGHT CHAINS: 15.43 MG/L (ref 3.3–19.4)
KAPPA/LAMBDA FREE LIGHT CHAIN RATIO: 0.17 (ref 0.26–1.65)
LAMBDA FREE LIGHT CHAINS QNT: 89.78 MG/L (ref 5.71–26.3)

## 2022-11-14 DIAGNOSIS — C88.0 WALDENSTROM MACROGLOBULINEMIA (HCC): ICD-10-CM

## 2022-11-14 RX ORDER — IBRUTINIB 140 MG/1
CAPSULE ORAL
Qty: 90 CAPSULE | Refills: 3 | Status: ACTIVE | OUTPATIENT
Start: 2022-11-14

## 2022-11-14 NOTE — PROGRESS NOTES
Received VM from [facility] requesting that imbruvica be routed to another specialty pharmacy since they can no longer fill medication. RX for Imbruvica 140 mg capsules (patient takes 3 tablets PO daily) #90 reordered and e-scribed to [address] [facility] per physician order.

## 2022-11-14 NOTE — PROGRESS NOTES
Patient Name: Lety Kay  Patient : 1945  Patient MRN: [id number]     Primary Oncologist: Demario Moore MD  Referring Provider: Seema Shankar MD     Date of Service: 11/15/2022      Chief Complaint:   Chief Complaint   Patient presents with    Follow-up     Patient Active Problem List:     Hypothyroidism     Hyperlipidemia     Vitamin D deficiency     Osteopenia     Essential hypertension     COPD (chronic obstructive pulmonary disease) ([facility]     Macular degeneration, dry-left eye     Macular degeneration, right eye-wet      PMR (polymyalgia rheumatica) (HCC)     CLL (chronic lymphocytic leukemia) ([facility]     CCC (chronic calculous cholecystitis)     Monoclonal gammopathy     Chronic insomnia     GERD (gastroesophageal reflux disease)     Elevated erythrocyte sedimentation rate     Lymphocytosis (symptomatic)    HPI:  Ms. 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 She stated that she was initially referred to Dr. Kyler Mccormick because of elevated ESR and arthritis. She has been following regularly with Dr. Kyler Mccormick for that. Recent blood tests on 2017, showed significant elevation in her white blood cell count with elevated absolute lymphocyte count. Manual differential revealed atypical lymphocytes and Pathologist recommend sending peripheral blood flow cytometry for further analysis. Peripheral blood flow cytometry was sent on 2017, and it revealed monoclonal B-cell population, surface immunoglobulin M and D, Lambda, co-expressing 25, is detected. CD20 is strongly positive. The lack of CD5 co-expression by lesional lymphocytes effectively excludes circulating Mantel cell lymphoma and B cell chronic lymphocytic leukemia.  and CD11C are negative which exclude hairy cell leukemia. 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 She complains of some tiredness and about 13 pound weight loss over three months duration. She claims that she has tiredness and weight loss because she recently moved to [facility]. She denies fever and night sweats. She denies hyperviscosity symptoms (headache, lightheadedness, blurry vision, or double vision). Her family history is significant for leukocytosis in her paternal grandmother and she passed away in 80. She was not diagnosed with leukemia or lymphoma at that time; however, family has suspected that she might have leukemia or lymphoma. No other family history of lymphoproliferative disorder. Her father has prostate cancer. Laboratory workups done on February 23, 2017, showed persistent lymphocytosis (WBC 25.4 and absolute lymphocyte count was 18.5), mild anemia (hemoglobin 11.3), and she has a normal complete metabolic panel. Her LDH is normal and hepatitis panels are negative. Serum protein electrophoresis and immunofixation revealed biclonal gammopathy (800 mg/dL of IgM Lambda, IgG Kappa, and free Lambda). Peripheral blood flow cytometry was done on February 23, 2017, and it revealed peripheral blood with CD5 positive monoclonal B cells (53 percent of the nucleated cells), consistent with mature B cell neoplasm. B cells are predominantly small, but scattered properties, positive for CD5 (partial), CD19, CD20, CD23 (partial), FMC-7 (partial), and CD38. B cells are negative for  and CD10. These findings are consistent with mature B cell neoplasm. Differential diagnosis includes, but is not limited to, atypical chronic lymphocytic leukemia/small lymphocytic lymphoma, mantel cell lymphoma, and marginal zone B-cell lymphoma.   Pathologist recommends correlating with clinical data, morphology, and cytology studies. FISH analysis for t(11;14) was sent to rule out Mantle cell lymphoma on 8/9/17 and it was a normal study. Pathogenic alteration is detected in MYD88 gene. Genomic alterations of uncertain significance are detected in javon SETD2 and TET2 genes. She stated that she will have colonoscopy at the end of 2017. She had mammogram in April 2017 and it was a negative study. She also had a Pap smear and it also was a normal study. Bone marrow biopsy done on August 15, 2019 showed mature B-cell neoplasm with plasmacytic differentiation [overall 80 to 85% of the cells]. 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 FISH studies for B-cell neoplasm reveal 18q+, which is a recurrent finding in many types of non-Hodgkin lymphoma. CT scan of the chest, abdomen and pelvis done on 2/17/20 showed splenomegaly with multiple hypoenhancing splenic lesions the largest measuring up to 1.6 x 1.2 cm. Given history of Waldenstroms macroglobulinemia, findings suggest lymphomatous involvement. Prominent but otherwise not pathologically enlarged axillary lymph nodes bilaterally. No other adenopathy in the chest, abdomen or pelvis. These can be followed on subsequent imaging. Multiple 2-3 mm pulmonary nodules which can be followed per clinical protocol or in 3 months. Mild emphysematous changes. Since she becomes more anemia with mildly elevated serum creatinine level (most likely due to  hemoconcentration/hyperviscosity from macroglobulinemia), I recommend her to start first line chemotherapy with Ibrutinib. Ibrutinib was started on 1/30/21. On November 15, 2022, she presented to me for follow up. I have been following Ms. Marianela Dickerson for IgM lambda monoclonal gammopathy and monoclonal lymphocytosis.  Further work ups with bone marrow biopsy revealed that they all are due to Waldenstrom's macroglobulinemia (MYD88 gene was detected). Repeat work ups on 1/12/2021 because of worsening anemia and elevated serum creatinine showed increasing IgM level (3262 mg/dl from 2513mg in 11/24/20 and 2430 mg/dl in 7/16/20). Her M protein is also went up (2130 mg/dl from 1400 mg/dl in 11/24/20 and 1300 mg/dl in 7/16/20). Other anemia work ups were normal. ESR was significantly elevated (ESR >140) due to Waldenstrom's macroglobulinemia. Since she becomes more anemia with mildly elevated serum creatinine level (most likely due to  hemoconcentration/hyperviscosity from macroglobulinemia), I recommend her to start first line chemotherapy with Ibrutinib. It ws started on 1/30/21. I recognize that her hemoglobin is getting better after starting ibrutinib. Her IgM level was   3262 mg/dl on 1/12/21  2011 mg/dl on 3/16/21   1439 mg/dl on 5/18/21  2116 mg/dl on 8/17/21  1059 mg/dl on 11/16/21  781 mg/dl on 5/12/22  1434 mg/dl on 8/12/22  1130 mg/dl on 11/7/22    Monoclonal protein were  2130 mg/dl on 1/12/21  1100 mg/dl on 3/16/21   900 mg/dl on 5/18/21  900 mg/dl on 8/17/21  600 mg/dl on 11/16/21  710 mg/dl on 5/12/22  700 mg/dl on 8/12/22  700 mg/dl on 11/7/22    Since her parameters are getting better and she is tolerating well to ibrutinib, I recommend that she continues with ibrutinib on a regular basis. I will repeat all the blood test in 3 months and I will see her again after that. Elevated serum creatinine - it is getting better. Recommend to follow up with her nephrologist regularly. Chemo induced nausea - recommend to continue with zofran prn. Hypertension - her BP is stable. Recommend to continue with triamterene/HCTZ 37.5-25 mg daily. Hypothyroidism - also recommend to continue with synthroid 88 mcg daily.       Health maintenance -I recommend for age-appropriate cancer screening, exercise, low-salt and low-fat diet. She does not have any other significant symptoms on today's visit. PAST MEDICAL HISTORY:  Past medical history significant for:  1. Osteoarthritis. 2.      Hypothyroidism. 3.      Hyperlipidemia. 4.      Gastroesophageal reflux disease. 5.      Hypertension. PAST SURGICAL HISTORY:  No significant surgical history. FAMILY HISTORY:  Significant for prostate cancer in her father. Her maternal grandmother might have had lymphoproliferative disorder. She passed away in 1940. SOCIAL HISTORY:  She was a former smoker and she quit smoking in 1997. She used to smoke two packs a day for 20 years. She socially drinks alcohol and denies illicit drug abuse. She is  and she is currently living in [place]. She is a retired . She does not have any children. ALLERGIES:  No known drug allergies. Oncology History    No history exists. Review of Systems: \"Per interval history; otherwise 10 point ROS is negative. \"  Her energy level is pretty good and her sleep is fine. She doesn't have fever, chills, night sweats, cough, shortness of breath, chest pain, hemoptysis or palpitations. Her bowel and bladder functions are normal. She denies nausea, vomiting, abdominal pain, diarrhea, constipation, dysuria, loss of appetite or weight loss. She doesn't have neuropathy and she denies bleeding or clotting issues. She denies any pain in her body. No anxiety or depression. The rest of the systems are unremarkable.      Vital Signs:  BP (!) 114/57 (Site: Right Upper Arm, Position: Sitting, Cuff Size: Medium Adult)   Pulse 80   Temp 97.9 °F (36.6 °C) (Infrared)   Ht 5' 1\" (1.549 m)   Wt 114 lb (51.7 kg)   SpO2 98%   BMI 21.54 kg/m²     Physical Exam:  CONSTITUTIONAL: awake, alert, cooperative, no apparent distress,      EYES: pupils equal, round and reactive to light, sclera clear and conjunctiva normal  ENT: Normocephalic, without obvious abnormality, 11/07/2022    ALKPHOS 70 11/07/2022    AST 15 11/07/2022    ALT 8 (L) 11/07/2022    LABGLOM 46 (L) 11/07/2022    GFRAA 53 (L) 08/12/2022    AGRATIO 1.3 05/02/2016    GLOB 3.0 05/02/2016    PHOS 4.3 07/08/2021    MG 2.4 01/28/2022     Lab Results   Component Value Date     11/07/2022     No components found for: LD  Lab Results   Component Value Date    TSHHS 0.978 11/06/2017    T4FREE 1.61 05/26/2017     Immunology:  Lab Results   Component Value Date    PROT 5.7 (L) 11/07/2022    PROT 5.7 (L) 11/07/2022    SPEP  11/07/2022     INTERPRETATION - Monoclonal gammopathy, 700 mg/dL of IgM lambda, stable since 5/12/2022. Slightly decreased total proteins. LP.    SPEP  11/07/2022     INTERPRETATION - Monoclonal IgM lambda proteins. LP.    ALBUMINELP 3.2 11/07/2022    LABALPH 0.2 11/07/2022    LABALPH 0.7 11/07/2022    LABBETA 0.7 11/07/2022    GAMGLOB 0.9 11/07/2022     Lab Results   Component Value Date    KAPPAUVOL 15.43 11/07/2022    LAMBDAUVOL 465.38 (H) 07/16/2020    KLFLCR 0.17 (L) 11/07/2022     Lab Results   Component Value Date    B2M 4.1 (H) 11/07/2022     Coagulation Panel:  No results found for: PROTIME, INR, APTT, DDIMER  Anemia Panel:  Lab Results   Component Value Date    JEDYPRJS08 371.1 01/12/2021    FOLATE >20.0 (H) 01/12/2021     Tumor Markers:  Lab Results   Component Value Date     6.2 06/11/2013     Observations:  No data recorded      Assessment & Plan:   1. Chronic lymphocytic leukemia  2. Monoclonal gammopathy (IgM Lambda). PLAN:  Ms. Ronna Pantoja has been followed for Waldenstrom's macroglobulinemia (MYD88 gene was detected). Since she becomes more anemia with mildly elevated serum creatinine level (most likely due to  hemoconcentration/hyperviscosity from macroglobulinemia), I recommend her to start first line chemotherapy with Ibrutinib. Ibrutinib was started on 1/30/21. On November 15, 2022, she presented to me for follow up.  I have been following Ms. Ronna Pantoja for IgM lambda monoclonal gammopathy and monoclonal lymphocytosis. Further work ups with bone marrow biopsy revealed that they all are due to Waldenstrom's macroglobulinemia (MYD88 gene was detected). Repeat work ups on 1/12/2021 because of worsening anemia and elevated serum creatinine showed increasing IgM level (3262 mg/dl from 2513mg in 11/24/20 and 2430 mg/dl in 7/16/20). Her M protein is also went up (2130 mg/dl from 1400 mg/dl in 11/24/20 and 1300 mg/dl in 7/16/20). Other anemia work ups were normal. ESR was significantly elevated (ESR >140) due to Waldenstrom's macroglobulinemia. Since she becomes more anemia with mildly elevated serum creatinine level (most likely due to  hemoconcentration/hyperviscosity from macroglobulinemia), I recommend her to start first line chemotherapy with Ibrutinib. It ws started on 1/30/21. I recognize that her hemoglobin is getting better after starting ibrutinib. Her IgM level was   3262 mg/dl on 1/12/21  2011 mg/dl on 3/16/21   1439 mg/dl on 5/18/21  2116 mg/dl on 8/17/21  1059 mg/dl on 11/16/21  781 mg/dl on 5/12/22  1434 mg/dl on 8/12/22  1130 mg/dl on 11/7/22    Monoclonal protein were  2130 mg/dl on 1/12/21  1100 mg/dl on 3/16/21   900 mg/dl on 5/18/21  900 mg/dl on 8/17/21  600 mg/dl on 11/16/21  710 mg/dl on 5/12/22  700 mg/dl on 8/12/22  700 mg/dl on 11/7/22    Since her parameters are getting better and she is tolerating well to ibrutinib, I recommend that she continues with ibrutinib on a regular basis. I will repeat all the blood test in 3 months and I will see her again after that. Elevated serum creatinine - it is getting better. Recommend to follow up with her nephrologist regularly. Chemo induced nausea - recommend to continue with zofran prn. Hypertension - her BP is stable. Recommend to continue with triamterene/HCTZ 37.5-25 mg daily. Hypothyroidism - also recommend to continue with synthroid 88 mcg daily.       Health maintenance -I recommend for age-appropriate cancer screening, exercise, low-salt and low-fat diet. I answered all her questions and concerns for today. Recent imaging and labs were reviewed and discussed with the patient.

## 2022-11-15 ENCOUNTER — OFFICE VISIT (OUTPATIENT)
Dept: ONCOLOGY | Age: 77
End: 2022-11-15
Payer: MEDICARE

## 2022-11-15 ENCOUNTER — HOSPITAL ENCOUNTER (OUTPATIENT)
Dept: INFUSION THERAPY | Age: 77
Discharge: HOME | End: 2022-11-15
Payer: MEDICARE

## 2022-11-15 VITALS
OXYGEN SATURATION: 98 % | HEIGHT: 61 IN | TEMPERATURE: 97.9 F | SYSTOLIC BLOOD PRESSURE: 114 MMHG | WEIGHT: 114 LBS | DIASTOLIC BLOOD PRESSURE: 57 MMHG | HEART RATE: 80 BPM | BODY MASS INDEX: 21.52 KG/M2

## 2022-11-15 DIAGNOSIS — C88.0 WALDENSTROM MACROGLOBULINEMIA (HCC): Primary | ICD-10-CM

## 2022-11-15 PROCEDURE — G8427 DOCREV CUR MEDS BY ELIG CLIN: HCPCS | Performed by: INTERNAL MEDICINE

## 2022-11-15 PROCEDURE — 1123F ACP DISCUSS/DSCN MKR DOCD: CPT | Performed by: INTERNAL MEDICINE

## 2022-11-15 PROCEDURE — 99214 OFFICE O/P EST MOD 30 MIN: CPT | Performed by: INTERNAL MEDICINE

## 2022-11-15 PROCEDURE — 1090F PRES/ABSN URINE INCON ASSESS: CPT | Performed by: INTERNAL MEDICINE

## 2022-11-15 PROCEDURE — 3074F SYST BP LT 130 MM HG: CPT | Performed by: INTERNAL MEDICINE

## 2022-11-15 PROCEDURE — G8420 CALC BMI NORM PARAMETERS: HCPCS | Performed by: INTERNAL MEDICINE

## 2022-11-15 PROCEDURE — 1036F TOBACCO NON-USER: CPT | Performed by: INTERNAL MEDICINE

## 2022-11-15 PROCEDURE — 3078F DIAST BP <80 MM HG: CPT | Performed by: INTERNAL MEDICINE

## 2022-11-15 PROCEDURE — G8484 FLU IMMUNIZE NO ADMIN: HCPCS | Performed by: INTERNAL MEDICINE

## 2022-11-15 PROCEDURE — G8399 PT W/DXA RESULTS DOCUMENT: HCPCS | Performed by: INTERNAL MEDICINE

## 2022-11-15 PROCEDURE — 99211 OFF/OP EST MAY X REQ PHY/QHP: CPT

## 2022-11-15 NOTE — PROGRESS NOTES
MA Rooming Questions  Patient: Dimple Winkler  MRN: [id number]    Date: 11/15/2022        1. Do you have any new issues?   no         2. Do you need any refills on medications?    no    3. Have you had any imaging done since your last visit?   no    4. Have you been hospitalized or seen in the emergency room since your last visit here?   no    5. Did the patient have a depression screening completed today?  No    No data recorded     PHQ-9 Given to (if applicable):               PHQ-9 Score (if applicable):                     [] Positive     []  Negative              Does question #9 need addressed (if applicable)                     [] Yes    []  No               Patt Davis CMA

## 2022-12-27 ENCOUNTER — APPOINTMENT (OUTPATIENT)
Dept: CT IMAGING | Age: 77
DRG: 493 | End: 2022-12-27
Payer: MEDICARE

## 2022-12-27 ENCOUNTER — APPOINTMENT (OUTPATIENT)
Dept: GENERAL RADIOLOGY | Age: 77
DRG: 493 | End: 2022-12-27
Payer: MEDICARE

## 2022-12-27 ENCOUNTER — HOSPITAL ENCOUNTER (EMERGENCY)
Age: 77
Discharge: HOME | DRG: 493 | End: 2022-12-27
Attending: EMERGENCY MEDICINE
Payer: MEDICARE

## 2022-12-27 VITALS
OXYGEN SATURATION: 97 % | TEMPERATURE: 97.9 F | WEIGHT: 114 LBS | DIASTOLIC BLOOD PRESSURE: 62 MMHG | RESPIRATION RATE: 16 BRPM | BODY MASS INDEX: 21.54 KG/M2 | HEART RATE: 72 BPM | SYSTOLIC BLOOD PRESSURE: 138 MMHG

## 2022-12-27 DIAGNOSIS — S82.002A CLOSED NONDISPLACED FRACTURE OF LEFT PATELLA, UNSPECIFIED FRACTURE MORPHOLOGY, INITIAL ENCOUNTER: Primary | ICD-10-CM

## 2022-12-27 DIAGNOSIS — S82.102A CLOSED FRACTURE OF PROXIMAL END OF LEFT TIBIA, UNSPECIFIED FRACTURE MORPHOLOGY, INITIAL ENCOUNTER: ICD-10-CM

## 2022-12-27 DIAGNOSIS — S82.402A CLOSED FRACTURE OF LEFT TIBIA AND FIBULA, INITIAL ENCOUNTER: ICD-10-CM

## 2022-12-27 DIAGNOSIS — S82.202A CLOSED FRACTURE OF LEFT TIBIA AND FIBULA, INITIAL ENCOUNTER: ICD-10-CM

## 2022-12-27 PROCEDURE — 73700 CT LOWER EXTREMITY W/O DYE: CPT

## 2022-12-27 PROCEDURE — 6370000000 HC RX 637 (ALT 250 FOR IP): Performed by: EMERGENCY MEDICINE

## 2022-12-27 PROCEDURE — 73590 X-RAY EXAM OF LOWER LEG: CPT

## 2022-12-27 PROCEDURE — 73564 X-RAY EXAM KNEE 4 OR MORE: CPT

## 2022-12-27 PROCEDURE — 73552 X-RAY EXAM OF FEMUR 2/>: CPT

## 2022-12-27 PROCEDURE — 73080 X-RAY EXAM OF ELBOW: CPT

## 2022-12-27 PROCEDURE — 99284 EMERGENCY DEPT VISIT MOD MDM: CPT

## 2022-12-27 RX ORDER — ONDANSETRON 4 MG/1
4 TABLET, ORALLY DISINTEGRATING ORAL ONCE
Status: COMPLETED | OUTPATIENT
Start: 2022-12-27 | End: 2022-12-27

## 2022-12-27 RX ORDER — ONDANSETRON 4 MG/1
4 TABLET, ORALLY DISINTEGRATING ORAL 3 TIMES DAILY PRN
Qty: 21 TABLET | Refills: 0 | Status: ON HOLD | OUTPATIENT
Start: 2022-12-27 | End: 2023-01-10 | Stop reason: HOSPADM

## 2022-12-27 RX ORDER — HYDROCODONE BITARTRATE AND ACETAMINOPHEN 5; 325 MG/1; MG/1
1-2 TABLET ORAL EVERY 6 HOURS PRN
Qty: 15 TABLET | Refills: 0 | Status: ON HOLD | OUTPATIENT
Start: 2022-12-27 | End: 2023-01-10

## 2022-12-27 RX ORDER — HYDROCODONE BITARTRATE AND ACETAMINOPHEN 5; 325 MG/1; MG/1
1 TABLET ORAL ONCE
Status: COMPLETED | OUTPATIENT
Start: 2022-12-27 | End: 2022-12-27

## 2022-12-27 RX ADMIN — HYDROCODONE BITARTRATE AND ACETAMINOPHEN 1 TABLET: 5; 325 TABLET ORAL at 11:00

## 2022-12-27 RX ADMIN — ONDANSETRON 4 MG: 4 TABLET, ORALLY DISINTEGRATING ORAL at 11:00

## 2022-12-27 ASSESSMENT — PAIN - FUNCTIONAL ASSESSMENT: PAIN_FUNCTIONAL_ASSESSMENT: 0-10

## 2022-12-27 ASSESSMENT — ENCOUNTER SYMPTOMS
ALLERGIC/IMMUNOLOGIC NEGATIVE: 1
RESPIRATORY NEGATIVE: 1
EYES NEGATIVE: 1
GASTROINTESTINAL NEGATIVE: 1

## 2022-12-27 ASSESSMENT — PAIN DESCRIPTION - LOCATION: LOCATION: KNEE

## 2022-12-27 ASSESSMENT — PAIN SCALES - GENERAL: PAINLEVEL_OUTOF10: 10

## 2022-12-27 ASSESSMENT — PAIN DESCRIPTION - ORIENTATION: ORIENTATION: LEFT

## 2022-12-27 NOTE — ED NOTES
[phone] paged Dr Godwin Servin  12/27/22 1342    1343 Dr Dilshad Hahn returned call     Margaret Cameron  12/27/22 1348

## 2022-12-27 NOTE — ED PROVIDER NOTES
[address] CHIEF COMPLAINT:   Fall [place] while taking trash out this morning. States she fell forward and landed on her knee. Left knee is currently splinted. Left arm has some soreness but otherwise no other complaints.)      [place]:  Melissa Gonzalez is a 65 y.o. female that presents with complaint of left knee pain and swelling. Patient prior to arrival was taking out the trash when she slipped and fell on the ice she heard a crack in her left knee. Denies other questions or concerns. .  Except for left elbow pain. Did not hit her head did not pass out no neck back pain or other extremity pain. REVIEW OF SYSTEMS:  At least 10 systems reviewed and otherwise acutely negative except as in the [address] Review of Systems   Constitutional: Negative. HENT: Negative. Eyes: Negative. Respiratory: Negative. Cardiovascular: Negative. Gastrointestinal: Negative. Endocrine: Negative. Genitourinary: Negative. Musculoskeletal:  Positive for arthralgias, joint swelling and myalgias. Skin: Negative. Allergic/Immunologic: Negative. Neurological: Negative. Hematological: Negative. Psychiatric/Behavioral: Negative. All other systems reviewed and are negative.     Past Medical History:   Diagnosis Date    Arm fracture, left 05/16/2019    Casted - no surgery - Dr. 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 pylori infection 8/1996    S/P Biaxin and Prilosec    Hiatal hernia 8/1994    with reflux by UGI    Hyperlipidemia     Hypertension     Hypothyroidism 1990's    MDRO (multiple drug resistant organisms) resistance 2005    Nasal passages    Osteoporosis Smoking hx      Past Surgical History:   Procedure Laterality Date    CHOLECYSTECTOMY, LAPAROSCOPIC  2018    Dr Kevin Roth    COLONOSCOPY  10/26/2007    Normal exam - Dr. Joaquin Desai    COLONOSCOPY  2019    COLONOSCOPY N/A 2019    COLORECTAL CANCER SCREENING, NOT HIGH RISK performed by Yan Baxter MD at [address] Left 10/21/2013    Lesion excision-squamous papillomaDr Francesco    SKIN BIOPSY  1960's    Moles removed - face, neck     Family History   Problem Relation Age of Onset    High Blood Pressure Mother     High Blood Pressure Father      Social History     Socioeconomic History    Marital status:      Spouse name: Not on file    Number of children: Not on file    Years of education: Not on file    Highest education level: Not on file   Occupational History    Not on file   Tobacco Use    Smoking status: Former     Packs/day: 2.00     Years: 30.00     Pack years: 60.00     Types: Cigarettes     Start date: 1965     Quit date: 1997     Years since quittin.0    Smokeless tobacco: Never   Vaping Use    Vaping Use: Never used   Substance and Sexual Activity    Alcohol use: No    Drug use: No    Sexual activity: Not on file   Other Topics Concern    Not on file   Social History Narrative    Not on file     Social Determinants of Health     Financial Resource Strain: Not on file   Food Insecurity: Not on file   Transportation Needs: Not on file   Physical Activity: Not on file   Stress: Not on file   Social Connections: Not on file   Intimate Partner Violence: Not on file   Housing Stability: Not on file     No current facility-administered medications for this encounter.      Current Outpatient Medications   Medication Sig Dispense Refill    ibrutinib (IMBRUVICA) 140 MG chemo capsule Take 3 capsules daily 90 capsule 3    potassium chloride (KLOR-CON M) 10 MEQ extended release tablet Take 1 tablet by mouth 2 times daily 60 tablet 3    levothyroxine (SYNTHROID) 100 MCG tablet Take 1 tablet by mouth Daily 30 tablet 0    traMADol (ULTRAM) 50 MG tablet TAKE 1 TABLET BY MOUTH TWICE A DAY AS NEEDED      vitamin D (D-3-5) 125 MCG (5000 UT) CAPS capsule Take 5,000 Units by mouth daily Wed and sat      zolpidem (AMBIEN) 5 MG tablet TAKE 1 TABLET BY MOUTH NIGHTLY AS NEEDED FOR SLEEP FOR UP TO 30 DAYS.      esomeprazole (NEXIUM) 40 MG delayed release capsule TAKE 1 CAPSULE DAILY 90 capsule 0    albuterol sulfate HFA (VENTOLIN HFA) 108 (90 Base) MCG/ACT inhaler Inhale 2 puffs into the lungs every 6 hours as needed for Wheezing 3 Inhaler 3      Allergies   Allergen Reactions    Amitiza [Lubiprostone]     Clindamycin/Lincomycin      GI intolerance    Evista [Raloxifene Hydrochloride]     Flexeril [Cyclobenzaprine Hcl]      CNS    Omega-3 Fatty Acids [Fish Oil] Diarrhea    Prilosec [Omeprazole]      Pt sts meds didn't work - states not an allergy 6/3/19    Skelaxin [Metaxalone]      Itching    Spiriva Handihaler [Tiotropium Bromide Monohydrate]      \"Made my throat dry\" - not allergic too. 6/3/19     No current facility-administered medications for this encounter.      Current Outpatient Medications   Medication Sig Dispense Refill    ibrutinib (IMBRUVICA) 140 MG chemo capsule Take 3 capsules daily 90 capsule 3    potassium chloride (KLOR-CON M) 10 MEQ extended release tablet Take 1 tablet by mouth 2 times daily 60 tablet 3    levothyroxine (SYNTHROID) 100 MCG tablet Take 1 tablet by mouth Daily 30 tablet 0    traMADol (ULTRAM) 50 MG tablet TAKE 1 TABLET BY MOUTH TWICE A DAY AS NEEDED      vitamin D (D-3-5) 125 MCG (5000 UT) CAPS capsule Take 5,000 Units by mouth daily Wed and sat      zolpidem (AMBIEN) 5 MG tablet TAKE 1 TABLET BY MOUTH NIGHTLY AS NEEDED FOR SLEEP FOR UP TO 30 DAYS.      esomeprazole (NEXIUM) 40 MG delayed release capsule TAKE 1 CAPSULE DAILY 90 capsule 0    albuterol sulfate HFA (VENTOLIN HFA) 108 (90 Base) MCG/ACT inhaler Inhale 2 puffs into the lungs every 6 hours as needed for Wheezing 3 Inhaler 3       Nursing Notes Reviewed    VITAL SIGNS:  ED Triage Vitals   Enc Vitals Group      BP       Pulse       Resp       Temp       Temp src       SpO2       Weight       Height       Head Circumference       Peak Flow       Pain Score       Pain Loc       Pain Edu? Excl. in [address]? PHYSICAL EXAM:  Physical Exam  Vitals and nursing note reviewed. Constitutional:       General: She is not in acute distress. Appearance: Normal appearance. She is well-developed and well-groomed. She is not ill-appearing, toxic-appearing or diaphoretic. HENT:      Head: Normocephalic and atraumatic. Right Ear: External ear normal.      Left Ear: External ear normal.   Eyes:      General: No scleral icterus. Right eye: No discharge. Left eye: No discharge. Extraocular Movements: Extraocular movements intact. Conjunctiva/sclera: Conjunctivae normal.   Cardiovascular:      Rate and Rhythm: Normal rate and regular rhythm. Pulses: Normal pulses. Heart sounds: Normal heart sounds. Pulmonary:      Effort: No respiratory distress. Breath sounds: Normal breath sounds. No stridor. No wheezing, rhonchi or rales. Abdominal:      General: Bowel sounds are normal. There is no distension. Palpations: Abdomen is soft. There is no mass. Tenderness: There is no abdominal tenderness. There is no guarding or rebound. Negative signs include Almazan's sign, Rovsing's sign and McBurney's sign. Hernia: No hernia is present. Musculoskeletal:         General: Swelling and tenderness present. No deformity or signs of injury. Left elbow: Tenderness present. Cervical back: Normal range of motion. No rigidity. Left upper leg: Normal.      Left knee: Swelling present. Decreased range of motion. Tenderness present. Right lower leg: No edema. Left lower leg: Normal. No edema.       Left ankle: Normal.        Legs:    Skin: General: Skin is warm. Coloration: Skin is not jaundiced or pale. Findings: Bruising present. No erythema, lesion or rash. Neurological:      General: No focal deficit present. Mental Status: She is alert and oriented to person, place, and time. Cranial Nerves: No cranial nerve deficit. Sensory: No sensory deficit. Motor: No weakness. Coordination: Coordination normal.   Psychiatric:         Mood and Affect: Mood normal.         Behavior: Behavior normal. Behavior is cooperative. Thought Content: Thought content normal.         Judgment: Judgment normal.         I have reviewed andinterpreted all of the currently available lab results from this visit (if applicable):    No results found for this visit on 12/27/22. Radiographs (if obtained):  [] The following radiograph was interpreted by myself in the absence of a radiologist:  [x] Radiologist's Report Reviewed:    Xray L knee    EKG (if obtained): (All EKG's are interpreted by myself in the absence of a cardiologist)    MDM:    Patient with complaint of fall, left knee pain, left elbow pain. Again prior to arrival patient was take out the trash when she slipped on the ice and fell. She injured her left knee, left elbow. She did not pass out did not hit her head no neck or back pain no other extremity pain. Came by EMS legs in a splint she cannot ambulate. No blood thinners. No other questions or concerns she has follow-up with orthopedic surgery before. On exam she appears otherwise well she does have some tenderness to her left elbow she has full range of motion. My concern is left knee. She has pain over the lateral aspect, patella, tibia, fibula. I did get imaging of elbow, tib-fib, knee, femur. X-ray shows fracture of patella, tibial plateau, fibula.   I did talk to orthopedic surgery we will do a CT scan of her knee for preop planning we will hopeful discharge home with knee immobilizer, crutches, nonweightbearing, pain medicine, outpatient follow-up. If not able to go home will admit and do surgery on Thursday. Patient recheck doing well. Knee immobilizer put on crutches given. She feels okay going home with pain medicine crutches advised nonweightbearing and in the meantime. Will discharge home with orthopedic follow-up. CT scan confirms fracture seen on x-ray. Patient otherwise stable discharged. CLINICAL IMPRESSION:  Final diagnoses:   Closed fracture of proximal end of left tibia, unspecified fracture morphology, initial encounter   Closed nondisplaced fracture of left patella, unspecified fracture morphology, initial encounter   Closed fracture of left tibia and fibula, initial encounter       (Please note that portions of this note may have been completed with a voice recognition program. Efforts were made to edit the dictations but occasionally words aremis-transcribed.)    DISPOSITION REFERRAL (if applicable):   Cristhian Schneider MD  [address] [place] [address]  [phone]    Schedule an appointment as soon as possible for a visit in 1 day      [facility] Emergency Department  [place] [id number] [phone]    If symptoms worsen    [facility], DO  [address] [facility] [address]  [phone]    Schedule an appointment as soon as possible for a visit in 1 day  Orthopedic Surgery    DISPOSITION MEDICATIONS (if applicable):  New Prescriptions    No medications on file          Sada Coombs, [facility],   12/28/22 [id number]

## 2022-12-29 ENCOUNTER — HOSPITAL ENCOUNTER (INPATIENT)
Age: 77
LOS: 12 days | Discharge: NURSING, REHAB OR LONG-TERM CARE | DRG: 493 | End: 2023-01-10
Attending: STUDENT IN AN ORGANIZED HEALTH CARE EDUCATION/TRAINING PROGRAM | Admitting: STUDENT IN AN ORGANIZED HEALTH CARE EDUCATION/TRAINING PROGRAM
Payer: MEDICARE

## 2022-12-29 ENCOUNTER — TELEPHONE (OUTPATIENT)
Dept: ORTHOPEDIC SURGERY | Age: 77
End: 2022-12-29

## 2022-12-29 ENCOUNTER — APPOINTMENT (OUTPATIENT)
Dept: ULTRASOUND IMAGING | Age: 77
DRG: 493 | End: 2022-12-29
Payer: MEDICARE

## 2022-12-29 DIAGNOSIS — S82.832A CLOSED FRACTURE OF HEAD OF LEFT FIBULA, INITIAL ENCOUNTER: ICD-10-CM

## 2022-12-29 DIAGNOSIS — S82.202A CLOSED FRACTURE OF LEFT TIBIA AND FIBULA, INITIAL ENCOUNTER: ICD-10-CM

## 2022-12-29 DIAGNOSIS — S82.142A CLOSED FRACTURE OF LEFT TIBIAL PLATEAU, INITIAL ENCOUNTER: ICD-10-CM

## 2022-12-29 DIAGNOSIS — S82.402A CLOSED FRACTURE OF LEFT TIBIA AND FIBULA, INITIAL ENCOUNTER: ICD-10-CM

## 2022-12-29 DIAGNOSIS — S82.102A CLOSED FRACTURE OF PROXIMAL END OF LEFT TIBIA, UNSPECIFIED FRACTURE MORPHOLOGY, INITIAL ENCOUNTER: ICD-10-CM

## 2022-12-29 DIAGNOSIS — S82.002A CLOSED NONDISPLACED FRACTURE OF LEFT PATELLA, UNSPECIFIED FRACTURE MORPHOLOGY, INITIAL ENCOUNTER: ICD-10-CM

## 2022-12-29 DIAGNOSIS — S82.042A CLOSED DISPLACED COMMINUTED FRACTURE OF LEFT PATELLA, INITIAL ENCOUNTER: Primary | ICD-10-CM

## 2022-12-29 PROBLEM — S82.002S: Status: ACTIVE | Noted: 2022-12-29

## 2022-12-29 LAB
ALBUMIN SERPL-MCNC: 3.6 GM/DL (ref 3.4–5)
ALP BLD-CCNC: 115 IU/L (ref 40–128)
ALT SERPL-CCNC: 76 U/L (ref 10–40)
ANION GAP SERPL CALCULATED.3IONS-SCNC: 10 MMOL/L (ref 4–16)
AST SERPL-CCNC: 64 IU/L (ref 15–37)
BASOPHILS ABSOLUTE: 0 K/CU MM
BASOPHILS RELATIVE PERCENT: 0.2 % (ref 0–1)
BILIRUB SERPL-MCNC: 1.6 MG/DL (ref 0–1)
BUN BLDV-MCNC: 25 MG/DL (ref 6–23)
CALCIUM SERPL-MCNC: 8.6 MG/DL (ref 8.3–10.6)
CHLORIDE BLD-SCNC: 103 MMOL/L (ref 99–110)
CO2: 28 MMOL/L (ref 21–32)
CREAT SERPL-MCNC: 1.2 MG/DL (ref 0.6–1.1)
DIFFERENTIAL TYPE: ABNORMAL
EOSINOPHILS ABSOLUTE: 0 K/CU MM
EOSINOPHILS RELATIVE PERCENT: 0.3 % (ref 0–3)
GFR SERPL CREATININE-BSD FRML MDRD: 46 ML/MIN/1.73M2
GLUCOSE BLD-MCNC: 101 MG/DL (ref 70–99)
HCT VFR BLD CALC: 31.8 % (ref 37–47)
HEMOGLOBIN: 10.3 GM/DL (ref 12.5–16)
IMMATURE NEUTROPHIL %: 1.7 % (ref 0–0.43)
LACTATE: 0.9 MMOL/L (ref 0.5–1.9)
LYMPHOCYTES ABSOLUTE: 0.7 K/CU MM
LYMPHOCYTES RELATIVE PERCENT: 8.1 % (ref 24–44)
MCH RBC QN AUTO: 30.1 PG (ref 27–31)
MCHC RBC AUTO-ENTMCNC: 32.4 % (ref 32–36)
MCV RBC AUTO: 93 FL (ref 78–100)
MONOCYTES ABSOLUTE: 0.6 K/CU MM
MONOCYTES RELATIVE PERCENT: 7 % (ref 0–4)
NUCLEATED RBC %: 0 %
PDW BLD-RTO: 12.3 % (ref 11.7–14.9)
PLATELET # BLD: 118 K/CU MM (ref 140–440)
PMV BLD AUTO: 12.2 FL (ref 7.5–11.1)
POTASSIUM SERPL-SCNC: 4.2 MMOL/L (ref 3.5–5.1)
RBC # BLD: 3.42 M/CU MM (ref 4.2–5.4)
SEGMENTED NEUTROPHILS ABSOLUTE COUNT: 7.2 K/CU MM
SEGMENTED NEUTROPHILS RELATIVE PERCENT: 82.7 % (ref 36–66)
SODIUM BLD-SCNC: 141 MMOL/L (ref 135–145)
TOTAL IMMATURE NEUTOROPHIL: 0.15 K/CU MM
TOTAL NUCLEATED RBC: 0 K/CU MM
TOTAL PROTEIN: 6.1 GM/DL (ref 6.4–8.2)
WBC # BLD: 8.7 K/CU MM (ref 4–10.5)

## 2022-12-29 PROCEDURE — 85025 COMPLETE CBC W/AUTO DIFF WBC: CPT

## 2022-12-29 PROCEDURE — 93971 EXTREMITY STUDY: CPT

## 2022-12-29 PROCEDURE — 83605 ASSAY OF LACTIC ACID: CPT

## 2022-12-29 PROCEDURE — 6370000000 HC RX 637 (ALT 250 FOR IP): Performed by: NURSE PRACTITIONER

## 2022-12-29 PROCEDURE — 1200000000 HC SEMI PRIVATE

## 2022-12-29 PROCEDURE — 93005 ELECTROCARDIOGRAM TRACING: CPT | Performed by: NURSE PRACTITIONER

## 2022-12-29 PROCEDURE — 93926 LOWER EXTREMITY STUDY: CPT

## 2022-12-29 PROCEDURE — 6360000002 HC RX W HCPCS: Performed by: PHYSICIAN ASSISTANT

## 2022-12-29 PROCEDURE — 80053 COMPREHEN METABOLIC PANEL: CPT

## 2022-12-29 PROCEDURE — 99285 EMERGENCY DEPT VISIT HI MDM: CPT

## 2022-12-29 PROCEDURE — 2580000003 HC RX 258: Performed by: PHYSICIAN ASSISTANT

## 2022-12-29 PROCEDURE — 96374 THER/PROPH/DIAG INJ IV PUSH: CPT

## 2022-12-29 PROCEDURE — 2580000003 HC RX 258: Performed by: NURSE PRACTITIONER

## 2022-12-29 RX ORDER — 0.9 % SODIUM CHLORIDE 0.9 %
500 INTRAVENOUS SOLUTION INTRAVENOUS ONCE
Status: COMPLETED | OUTPATIENT
Start: 2022-12-29 | End: 2022-12-29

## 2022-12-29 RX ORDER — ONDANSETRON 2 MG/ML
4 INJECTION INTRAMUSCULAR; INTRAVENOUS EVERY 6 HOURS PRN
Status: DISCONTINUED | OUTPATIENT
Start: 2022-12-29 | End: 2023-01-10 | Stop reason: HOSPADM

## 2022-12-29 RX ORDER — ACETAMINOPHEN 650 MG/1
650 SUPPOSITORY RECTAL EVERY 6 HOURS PRN
Status: DISCONTINUED | OUTPATIENT
Start: 2022-12-29 | End: 2023-01-10 | Stop reason: HOSPADM

## 2022-12-29 RX ORDER — MORPHINE SULFATE 2 MG/ML
2 INJECTION, SOLUTION INTRAMUSCULAR; INTRAVENOUS EVERY 4 HOURS PRN
Status: COMPLETED | OUTPATIENT
Start: 2022-12-29 | End: 2023-01-09

## 2022-12-29 RX ORDER — ONDANSETRON 4 MG/1
4 TABLET, ORALLY DISINTEGRATING ORAL EVERY 8 HOURS PRN
Status: DISCONTINUED | OUTPATIENT
Start: 2022-12-29 | End: 2023-01-10 | Stop reason: HOSPADM

## 2022-12-29 RX ORDER — VITAMIN B COMPLEX
5000 TABLET ORAL DAILY
Status: DISCONTINUED | OUTPATIENT
Start: 2022-12-31 | End: 2023-01-10 | Stop reason: HOSPADM

## 2022-12-29 RX ORDER — ALBUTEROL SULFATE 90 UG/1
2 AEROSOL, METERED RESPIRATORY (INHALATION) EVERY 6 HOURS PRN
Status: DISCONTINUED | OUTPATIENT
Start: 2022-12-29 | End: 2023-01-10 | Stop reason: HOSPADM

## 2022-12-29 RX ORDER — FENTANYL CITRATE 50 UG/ML
50 INJECTION, SOLUTION INTRAMUSCULAR; INTRAVENOUS ONCE
Status: COMPLETED | OUTPATIENT
Start: 2022-12-29 | End: 2022-12-29

## 2022-12-29 RX ORDER — ZOLPIDEM TARTRATE 5 MG/1
5 TABLET ORAL NIGHTLY PRN
Status: DISCONTINUED | OUTPATIENT
Start: 2022-12-29 | End: 2023-01-10 | Stop reason: HOSPADM

## 2022-12-29 RX ORDER — SODIUM CHLORIDE 0.9 % (FLUSH) 0.9 %
5-40 SYRINGE (ML) INJECTION PRN
Status: DISCONTINUED | OUTPATIENT
Start: 2022-12-29 | End: 2023-01-10 | Stop reason: HOSPADM

## 2022-12-29 RX ORDER — POLYETHYLENE GLYCOL 3350 17 G/17G
17 POWDER, FOR SOLUTION ORAL DAILY PRN
Status: DISCONTINUED | OUTPATIENT
Start: 2022-12-29 | End: 2023-01-10 | Stop reason: HOSPADM

## 2022-12-29 RX ORDER — SODIUM CHLORIDE 9 MG/ML
INJECTION, SOLUTION INTRAVENOUS CONTINUOUS
Status: ACTIVE | OUTPATIENT
Start: 2022-12-29 | End: 2022-12-30

## 2022-12-29 RX ORDER — LEVOTHYROXINE SODIUM 0.1 MG/1
100 TABLET ORAL DAILY
Status: DISCONTINUED | OUTPATIENT
Start: 2022-12-30 | End: 2023-01-10 | Stop reason: HOSPADM

## 2022-12-29 RX ORDER — PANTOPRAZOLE SODIUM 40 MG/1
40 TABLET, DELAYED RELEASE ORAL
Status: DISCONTINUED | OUTPATIENT
Start: 2022-12-30 | End: 2023-01-10 | Stop reason: HOSPADM

## 2022-12-29 RX ORDER — SODIUM CHLORIDE 9 MG/ML
25 INJECTION, SOLUTION INTRAVENOUS PRN
Status: DISCONTINUED | OUTPATIENT
Start: 2022-12-29 | End: 2023-01-04

## 2022-12-29 RX ORDER — HYDROCODONE BITARTRATE AND ACETAMINOPHEN 5; 325 MG/1; MG/1
1 TABLET ORAL EVERY 6 HOURS PRN
Status: DISCONTINUED | OUTPATIENT
Start: 2022-12-29 | End: 2023-01-10 | Stop reason: HOSPADM

## 2022-12-29 RX ORDER — SODIUM CHLORIDE 0.9 % (FLUSH) 0.9 %
5-40 SYRINGE (ML) INJECTION EVERY 12 HOURS SCHEDULED
Status: DISCONTINUED | OUTPATIENT
Start: 2022-12-29 | End: 2023-01-10 | Stop reason: HOSPADM

## 2022-12-29 RX ORDER — ACETAMINOPHEN 325 MG/1
650 TABLET ORAL EVERY 6 HOURS PRN
Status: DISCONTINUED | OUTPATIENT
Start: 2022-12-29 | End: 2023-01-09 | Stop reason: SDUPTHER

## 2022-12-29 RX ORDER — ZOLPIDEM TARTRATE 5 MG/1
5 TABLET ORAL NIGHTLY PRN
Status: DISCONTINUED | OUTPATIENT
Start: 2022-12-30 | End: 2022-12-29

## 2022-12-29 RX ADMIN — SODIUM CHLORIDE: 9 INJECTION, SOLUTION INTRAVENOUS at 22:27

## 2022-12-29 RX ADMIN — SODIUM CHLORIDE, PRESERVATIVE FREE 10 ML: 5 INJECTION INTRAVENOUS at 22:28

## 2022-12-29 RX ADMIN — ZOLPIDEM TARTRATE 5 MG: 5 TABLET ORAL at 22:49

## 2022-12-29 RX ADMIN — FENTANYL CITRATE 50 MCG: 50 INJECTION, SOLUTION INTRAMUSCULAR; INTRAVENOUS at 15:51

## 2022-12-29 RX ADMIN — SODIUM CHLORIDE 500 ML: 9 INJECTION, SOLUTION INTRAVENOUS at 15:51

## 2022-12-29 ASSESSMENT — PAIN DESCRIPTION - PAIN TYPE
TYPE: ACUTE PAIN
TYPE: ACUTE PAIN

## 2022-12-29 ASSESSMENT — PAIN DESCRIPTION - LOCATION
LOCATION: KNEE;LEG
LOCATION: KNEE;LEG

## 2022-12-29 ASSESSMENT — PAIN DESCRIPTION - ORIENTATION
ORIENTATION: LEFT
ORIENTATION: LEFT

## 2022-12-29 ASSESSMENT — ENCOUNTER SYMPTOMS
ABDOMINAL PAIN: 0
COUGH: 0
SHORTNESS OF BREATH: 0
NAUSEA: 0
VOMITING: 0

## 2022-12-29 ASSESSMENT — PAIN SCALES - GENERAL
PAINLEVEL_OUTOF10: 0
PAINLEVEL_OUTOF10: 2
PAINLEVEL_OUTOF10: 0

## 2022-12-29 ASSESSMENT — PAIN - FUNCTIONAL ASSESSMENT: PAIN_FUNCTIONAL_ASSESSMENT: 0-10

## 2022-12-29 NOTE — ED TRIAGE NOTES
Patient was seen 12/27 for fall, diagnosed with leg fracture. Patient says she was sent home hydrocodone and last took it this morning around 0800 and says that it helps for a short time. Patient came to ED today because of the pain in her leg. Patient does have knee immobilizer on. Follow up with Dr. Ivelisse Shen next week.

## 2022-12-29 NOTE — ED PROVIDER NOTES
eMERGENCY dEPARTMENT eNCOUnter    [address] [facility]      PCP: Simeon Cosme MD    [address] [facility]    Chief Complaint   Patient presents with    Leg Pain       HPI    Del Mason is a 65 y.o. female who presents with family for left knee/leg pain. Context is patient was seen in the ED x3 days ago after mechanical slip and fall on [address]. Following imaging studies including CT noncontrast study of the left knee, patient was found to have comminuted medial and lateral tibial plateau fractures with intra-articular extension. Largest depressed fragment involving the lateral tibial plateau measuring 8 mm with additional nondisplaced comminuted fibular head fracture and minimal displaced comminuted patellar fracture. She is orthopedist Dr. Sarah Fox have been consulted at that time and per ED provider note, patient cannot tolerate discharged home with crutches and knee immobilizer pain control, will plan for admission for surgical management today. Patient had elected to be discharged with outpatient follow-up however she has had persistent pain, 10/10 throughout the left knee. Has been in the knee immobilizer since the injury, no new trauma or injury. Did call orthopedist office today who recommended that she come to the ED for evaluation. No other chest pain shortness of breath dizziness or lightheadedness. No new numbness or tingling into the left foot. No left hip pain. No previous history of left lower leg fracture surgery. Not currently on anticoagulation therapy. Patient does have history of cancer and is on oral chemo medication at this time.   Family has noted increasing bruising to the anterior shin as well as increasing swelling to the calf       REVIEW OF SYSTEMS    General: Denies fever or chills  Cardiac: Denies chest pain  Pulmonary: Denies shortness of breath  GI: Denies abdominal pain, vomiting, or diarrhea  : No dysuria or hematuria    Denies any other muscles skeletal injuries, including chest wall and back. All other review of systems are negative  See HPI and nursing notes for additional information     PAST MEDICAL & SURGICAL HISTORY    Past Medical History:   Diagnosis Date    Arm fracture, left 05/16/2019    Casted - no surgery - Dr. 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 pylori infection 8/1996    S/P Biaxin and Prilosec    Hiatal hernia 8/1994    with reflux by UGI    Hyperlipidemia     Hypertension     Hypothyroidism 1990's    MDRO (multiple drug resistant organisms) resistance 2005    Nasal passages    Osteoporosis     Smoking hx      Past Surgical History:   Procedure Laterality Date    CHOLECYSTECTOMY, LAPAROSCOPIC  04/17/2018    Dr Gisell Monae    COLONOSCOPY  10/26/2007    Normal exam - Dr. Rosa Maria Garcia    COLONOSCOPY  06/06/2019    COLONOSCOPY N/A 6/6/2019    COLORECTAL CANCER SCREENING, NOT HIGH RISK [facility] [address] [facility]    Lesion excision-squamous papillomaDr Francesco    SKIN BIOPSY  1960's    Moles removed - face, neck       CURRENT MEDICATIONS    Current Outpatient Rx   Medication Sig Dispense Refill    HYDROcodone-acetaminophen (NORCO) 5-325 MG per tablet Take 1-2 tablets by mouth every 6 hours as needed for Pain for up to 3 days.  Max Daily Amount: 8 tablets 15 tablet 0    ondansetron (ZOFRAN-ODT) 4 MG disintegrating tablet Take 1 tablet by mouth 3 times daily as needed for Nausea or Vomiting 21 tablet 0    ibrutinib (IMBRUVICA) 140 MG chemo capsule Take 3 capsules daily 90 capsule 3    potassium chloride (KLOR-CON M) 10 MEQ extended release tablet Take 1 tablet by mouth 2 times daily 60 tablet 3    levothyroxine (SYNTHROID) 100 MCG tablet Take 1 tablet by mouth Daily 30 tablet 0    traMADol (ULTRAM) 50 MG tablet TAKE 1 TABLET BY MOUTH TWICE A DAY AS NEEDED      vitamin D (D-3-5) 125 MCG (5000 UT) CAPS capsule Take 5,000 Units by mouth daily Wed and sat      zolpidem (AMBIEN) 5 MG tablet TAKE 1 TABLET BY MOUTH NIGHTLY AS NEEDED FOR SLEEP FOR UP TO 30 DAYS.      esomeprazole (NEXIUM) 40 MG delayed release capsule TAKE 1 CAPSULE DAILY 90 capsule 0    albuterol sulfate HFA (VENTOLIN HFA) 108 (90 Base) MCG/ACT inhaler Inhale 2 puffs into the lungs every 6 hours as needed for Wheezing 3 Inhaler 3       ALLERGIES    Allergies   Allergen Reactions    Amitiza [Lubiprostone]     Clindamycin/Lincomycin      GI intolerance    Evista [Raloxifene Hydrochloride]     Flexeril [Cyclobenzaprine Hcl]      CNS    Omega-3 Fatty Acids [Fish Oil] Diarrhea    Prilosec [Omeprazole]      Pt sts meds didn't work - states not an allergy 6/3/19    Skelaxin [Metaxalone]      Itching    Spiriva Handihaler [Tiotropium Bromide Monohydrate]      \"Made my throat dry\" - not allergic too.  6/3/19       SOCIAL & FAMILY HISTORY    Social History     Socioeconomic History    Marital status:      Spouse name: None    Number of children: None    Years of education: None    Highest education level: None   Tobacco Use    Smoking status: Former     Packs/day: 2.00     Years: 30.00     Pack years: 60.00     Types: Cigarettes     Start date: 1965     Quit date: 1997     Years since quittin.0    Smokeless tobacco: Never   Vaping Use    Vaping Use: Never used   Substance and Sexual Activity    Alcohol use: No    Drug use: No     Family History   Problem Relation Age of Onset    High Blood Pressure Mother     High Blood Pressure Father            PHYSICAL EXAM    VITAL SIGNS: BP (!) 153/67   Pulse 77   Temp 99.6 °F (37.6 °C) (Oral)   Resp 18   Ht 5' 1\" (1.549 m)   Wt 114 lb (51.7 kg)    SpO2 95%   BMI 21.54 kg/m²   Constitutional:  Well developed, well nourished, no acute distress, non-toxic appearance   HENT:  Atraumatic, Normocephalic, EOMIs, conjunctiva clear, nasal bones midline  Cardiac: Regular rate and rhythm. Normal S1/S2  Lungs: 95% on room air. Lungs are clear auscultation. Nonlabored breathing. Speaking full sentences without difficulty  Musculoskeletal:    Left lower leg exam: Knee immobilizer removed at bedside. There is generalized swelling throughout the left knee, left calf with ecchymotic bruising to the anterior proximal sanches. Calf is tender to palpation but not firm or woody. 1+ pitting edema to the anterior mid shin. Moderate joint effusion of the knee. Unable to assess any active or passive range of motion testing of the left knee secondary to pain but no laxity. Moderate tenderness throughout the anterior knee especially over the lateral greater than medial tibial plateau region. Left knee and left calf are warm to touch, no evidence of cyanosis. Difficult to palpate pedal pulses to the bilateral feet. Did attempt to confirm bedside Doppler which does show a number on the machine but no audible pulse. Intact and equal sensation to light and sharp touch throughout the left lower leg. Capillary refill 4 to 5 seconds. .  Left thigh is nontender and soft. Vascular: See above  Integument:  Well hydrated, no rash   Neurologic:  Awake and alert, normal flow of speech. No foot drop of the affected extremity. 4/5 dorsi/plantar flexion against resistance. DTRs and distal sensation equal/intact.   Psychiatric: Cooperative, pleasant affect         LABS:  Results for orders placed or performed during the hospital encounter of 12/29/22   CBC with Auto Differential   Result Value Ref Range    WBC 8.7 4.0 - 10.5 K/CU MM    RBC 3.42 (L) 4.2 - 5.4 M/CU MM    Hemoglobin 10.3 (L) 12.5 - 16.0 GM/DL    Hematocrit 31.8 (L) 37 - 47 %    MCV 93.0 78 - 100 FL    MCH 30.1 27 - 31 PG MCHC 32.4 32.0 - 36.0 %    RDW 12.3 11.7 - 14.9 %    Platelets 517 (L) 078 - 440 K/CU MM    MPV 12.2 (H) 7.5 - 11.1 FL    Differential Type AUTOMATED DIFFERENTIAL     Segs Relative 82.7 (H) 36 - 66 %    Lymphocytes % 8.1 (L) 24 - 44 %    Monocytes % 7.0 (H) 0 - 4 %    Eosinophils % 0.3 0 - 3 %    Basophils % 0.2 0 - 1 %    Segs Absolute 7.2 K/CU MM    Lymphocytes Absolute 0.7 K/CU MM    Monocytes Absolute 0.6 K/CU MM    Eosinophils Absolute 0.0 K/CU MM    Basophils Absolute 0.0 K/CU MM    Nucleated RBC % 0.0 %    Total Nucleated RBC 0.0 K/CU MM    Total Immature Neutrophil 0.15 K/CU MM    Immature Neutrophil % 1.7 (H) 0 - 0.43 %   Comprehensive Metabolic Panel   Result Value Ref Range    Sodium 141 135 - 145 MMOL/L    Potassium 4.2 3.5 - 5.1 MMOL/L    Chloride 103 99 - 110 mMol/L    CO2 28 21 - 32 MMOL/L    BUN 25 (H) 6 - 23 MG/DL    Creatinine 1.2 (H) 0.6 - 1.1 MG/DL    Est, Glom Filt Rate 46 (L) >60 mL/min/1.73m2    Glucose 101 (H) 70 - 99 MG/DL    Calcium 8.6 8.3 - 10.6 MG/DL    Albumin 3.6 3.4 - 5.0 GM/DL    Total Protein 6.1 (L) 6.4 - 8.2 GM/DL    Total Bilirubin 1.6 (H) 0.0 - 1.0 MG/DL    ALT 76 (H) 10 - 40 U/L    AST 64 (H) 15 - 37 IU/L    Alkaline Phosphatase 115 40 - 128 IU/L    Anion Gap 10 4 - 16   Lactic Acid   Result Value Ref Range    Lactate 0.9 0.5 - 1.9 mMOL/L         RADIOLOGY/PROCEDURES          VL DUP LOWER EXTREMITY ARTERIES LEFT (Final result)  Result time 12/29/22 18:21:41  Final result by Jorge Mai MD (12/29/22 18:21:41)                Impression:    All arteries visualized in the left lower extremity are patent however   femoropopliteal arteries demonstrate elevated velocities. Monophasic   waveforms in the femoral circulation indicate aortoiliac disease with likely   hemodynamic stenosis. No evidence of arterial occlusions.              Narrative:    EXAMINATION:   ARTERIAL DUPLEX ULTRASOUND OF THE  LOWER EXTREMITY  12/29/2022 4:52 pm     TECHNIQUE:   Duplex ultrasound using B-mode/gray scaled imaging, Doppler spectral analysis   and color flow Doppler was obtained of the lower extremity. COMPARISON:   None     HISTORY:   ORDERING SYSTEM PROVIDED HISTORY: leg pain, recent fracture   TECHNOLOGIST PROVIDED HISTORY:   Reason for exam:->leg pain, recent fracture     FINDINGS:   Arterial waveforms in the femoral and popliteal territories are monophasic. The trifurcation vessels also demonstrate monophasic waveforms. Flow velocities were measured as follows:     Com. Fem. 288 cm/sec     Prof.           125 cm/sec     SFA Prox. 273 cm/sec     SFA Mid.     253 cm/sec     SFA Dist.     209 cm/sec     Pop. 170 cm/sec     PTA            98 cm/sec     Peron. 98 cm/sec     REENA            86 cm/sec                       VL DUP LOWER EXTREMITY VENOUS LEFT (Final result)  Result time 12/29/22 18:16:44  Final result by Riley German MD (12/29/22 18:16:44)                Impression:    No evidence of DVT in the left lower extremity. Narrative:    EXAMINATION:   DUPLEX VENOUS ULTRASOUND OF THE LEFT LOWER EXTREMITY     12/29/2022 4:52 pm     TECHNIQUE:   Duplex ultrasound using B-mode/gray scaled imaging and Doppler spectral   analysis and color flow was obtained of the deep venous structures of the   left extremity. COMPARISON:   None. HISTORY:   ORDERING SYSTEM PROVIDED HISTORY: calf pain, knee fracture, increased   swelling, eval for DVT   TECHNOLOGIST PROVIDED HISTORY:   Reason for exam:->calf pain, knee fracture, increased swelling, eval for DVT     FINDINGS:   The visualized veins of the left lower extremity are patent and free of   echogenic thrombus. The veins demonstrate good compressibility with normal   color flow study and spectral analysis. ED COURSE & MEDICAL DECISION MAKING       Vital signs and nursing notes reviewed during ED course. I have independently evaluated this patient .   Supervising MD  - Dr. Kayla Gayle - present in the Emergency Department, available for consultation, throughout entirety of  patient care. All pertinent Lab data and radiographic results reviewed with patient at bedside. The patient and/or the family were informed of the results of any tests/labs/imaging, the treatment plan, and time was allotted to answer questions. Differential diagnosis: includes but not limited to ligamentous injury, tendon injury, soft tissue contusion/hematoma, fracture, dislocation, Infection, Neurologic Deficit/Injury, Meniscus tear, ACL tear, tibial plateau fracture, extensor mechanism rupture, dislocation, Arterial Injury/Ischemia. Clinical  IMPRESSION    1. Closed displaced comminuted fracture of left patella, initial encounter    2. Closed fracture of head of left fibula, initial encounter    3. Closed fracture of left tibial plateau, initial encounter          Patient presents with ongoing left knee pain following recent fall and known fracture. On exam, pleasant 74-year-old female, laying comfortable appearing, no acute distress. Vitals reassuring, she is not febrile. Noted generalized soft tissue swelling as well as ecchymotic bruising to the anterior shin and throughout the knee joint. Extremities warm to touch, difficult to palpate pedal pulses bilateral feet, able to confirm pulse on the bedside Doppler. Toes with 4+ second capillary refill but they do not appear cyanotic. Compartments otherwise soft throughout the left lower leg, mild calf tenderness without firm or woody sensation. Unable to assess any range of motion of the left knee secondary to pain. She is otherwise tenderness to palpation throughout the left lower leg without gross evidence of rapidly expanding hematoma or compartment syndrome at this time. Patient started on IV fluids and fentanyl for pain.   Did add on baseline labs as well as arterial/venous Doppler imaging of the left lower leg to rule out DVT versus other vascular issue..  I did call and speak with patient's orthopedist, Dr. Ayleen Marques who is agreeable with plan for admission for pain control and ultrasound imaging however unfortunately he is out for the holiday weekend and would not be able to perform any surgical management until this coming Tuesday. He will inform his partner Dr. Julito Iverson of patient's admission in case patient should develop any neurovascular compromise. Is reassuring. Normal white count, hemoglobin 10.3. CMP with BUN and creatinine of 25/1.2, stable to her baseline. Normal potassium. Mild transaminitis with ALT/AST of 76/64 with a bilirubin of 1.6 however patient is asymptomatic for any abdominal pain. Arterial Doppler of the left lower leg shows patent arteries without evidence of arterial occlusions however there is monophasic waveforms in the femoral circulation indicating for possible aortoiliac disease. He has Doppler of the left lower leg shows no evidence of DVT. At this time, plan to consult to hospitalist service for admission, pain control. Patient is happy and agreeable with this plan. I did consult with Dr. Michaela Bruno - hospitalist - and discussed patient's history, ED presentation/course including any pertinent laboratory findings and imaging study findings. He/she agrees to hospital admission. Patient is admitted to the hospital in stable condition. In consideration of current COVID19 pandemic, with effort to minimize unnecessary provider exposure, this patient was seen at bedside by me independently. However, in compliance with current hospital ELISSA/ED protocol, prior to admission I did discuss this patient case with emergency department physician, Dr. Soheila Garcia, who did agree with ED workup/evaluation and plan for admission however but ED attending physician did not independently evaluate the patient. Of note, this Pt was NOT admitted to the ICU.             Diagnosis and plan discussed in detail with patient who understands and agrees.        Comment: Please note this report has been produced using speech recognition software and may contain errors related to that system including errors in grammar, punctuation, and spelling, as well as words and phrases that may be inappropriate. If there are any questions or concerns please feel free to contact the dictating provider for clarification.         Korinne Lusterio, PA-C  12/29/22 8826

## 2022-12-30 LAB
ANION GAP SERPL CALCULATED.3IONS-SCNC: 10 MMOL/L (ref 4–16)
BASOPHILS ABSOLUTE: 0 K/CU MM
BASOPHILS RELATIVE PERCENT: 0.3 % (ref 0–1)
BUN BLDV-MCNC: 21 MG/DL (ref 6–23)
CALCIUM SERPL-MCNC: 7.6 MG/DL (ref 8.3–10.6)
CHLORIDE BLD-SCNC: 107 MMOL/L (ref 99–110)
CO2: 25 MMOL/L (ref 21–32)
CREAT SERPL-MCNC: 1.1 MG/DL (ref 0.6–1.1)
DIFFERENTIAL TYPE: ABNORMAL
EOSINOPHILS ABSOLUTE: 0 K/CU MM
EOSINOPHILS RELATIVE PERCENT: 0.2 % (ref 0–3)
GFR SERPL CREATININE-BSD FRML MDRD: 51 ML/MIN/1.73M2
GLUCOSE BLD-MCNC: 109 MG/DL (ref 70–99)
HCT VFR BLD CALC: 28.5 % (ref 37–47)
HEMOGLOBIN: 9 GM/DL (ref 12.5–16)
IMMATURE NEUTROPHIL %: 1.9 % (ref 0–0.43)
LYMPHOCYTES ABSOLUTE: 1 K/CU MM
LYMPHOCYTES RELATIVE PERCENT: 11.7 % (ref 24–44)
MAGNESIUM: 2.1 MG/DL (ref 1.8–2.4)
MCH RBC QN AUTO: 29.9 PG (ref 27–31)
MCHC RBC AUTO-ENTMCNC: 31.6 % (ref 32–36)
MCV RBC AUTO: 94.7 FL (ref 78–100)
MONOCYTES ABSOLUTE: 0.8 K/CU MM
MONOCYTES RELATIVE PERCENT: 8.9 % (ref 0–4)
NUCLEATED RBC %: 0 %
PDW BLD-RTO: 12.5 % (ref 11.7–14.9)
PLATELET # BLD: 102 K/CU MM (ref 140–440)
PMV BLD AUTO: 12.2 FL (ref 7.5–11.1)
POTASSIUM SERPL-SCNC: 3.5 MMOL/L (ref 3.5–5.1)
RBC # BLD: 3.01 M/CU MM (ref 4.2–5.4)
SEGMENTED NEUTROPHILS ABSOLUTE COUNT: 6.8 K/CU MM
SEGMENTED NEUTROPHILS RELATIVE PERCENT: 77 % (ref 36–66)
SODIUM BLD-SCNC: 142 MMOL/L (ref 135–145)
TOTAL IMMATURE NEUTOROPHIL: 0.17 K/CU MM
TOTAL NUCLEATED RBC: 0 K/CU MM
WBC # BLD: 8.8 K/CU MM (ref 4–10.5)

## 2022-12-30 PROCEDURE — 6370000000 HC RX 637 (ALT 250 FOR IP): Performed by: NURSE PRACTITIONER

## 2022-12-30 PROCEDURE — 80048 BASIC METABOLIC PNL TOTAL CA: CPT

## 2022-12-30 PROCEDURE — 1200000000 HC SEMI PRIVATE

## 2022-12-30 PROCEDURE — 2580000003 HC RX 258: Performed by: NURSE PRACTITIONER

## 2022-12-30 PROCEDURE — 85025 COMPLETE CBC W/AUTO DIFF WBC: CPT

## 2022-12-30 PROCEDURE — 6360000002 HC RX W HCPCS: Performed by: STUDENT IN AN ORGANIZED HEALTH CARE EDUCATION/TRAINING PROGRAM

## 2022-12-30 PROCEDURE — 94761 N-INVAS EAR/PLS OXIMETRY MLT: CPT

## 2022-12-30 PROCEDURE — 83735 ASSAY OF MAGNESIUM: CPT

## 2022-12-30 PROCEDURE — 36415 COLL VENOUS BLD VENIPUNCTURE: CPT

## 2022-12-30 RX ORDER — ENOXAPARIN SODIUM 100 MG/ML
30 INJECTION SUBCUTANEOUS 2 TIMES DAILY
Status: DISCONTINUED | OUTPATIENT
Start: 2022-12-30 | End: 2023-01-05

## 2022-12-30 RX ADMIN — SODIUM CHLORIDE, PRESERVATIVE FREE 10 ML: 5 INJECTION INTRAVENOUS at 10:21

## 2022-12-30 RX ADMIN — ENOXAPARIN SODIUM 30 MG: 100 INJECTION SUBCUTANEOUS at 21:32

## 2022-12-30 RX ADMIN — HYDROCODONE BITARTRATE AND ACETAMINOPHEN 1 TABLET: 5; 325 TABLET ORAL at 21:43

## 2022-12-30 RX ADMIN — SODIUM CHLORIDE, PRESERVATIVE FREE 10 ML: 5 INJECTION INTRAVENOUS at 21:40

## 2022-12-30 RX ADMIN — ZOLPIDEM TARTRATE 5 MG: 5 TABLET ORAL at 21:32

## 2022-12-30 ASSESSMENT — PAIN SCALES - GENERAL: PAINLEVEL_OUTOF10: 5

## 2022-12-30 ASSESSMENT — PAIN DESCRIPTION - DESCRIPTORS: DESCRIPTORS: ACHING;DISCOMFORT

## 2022-12-30 ASSESSMENT — PAIN DESCRIPTION - LOCATION: LOCATION: LEG;KNEE

## 2022-12-30 ASSESSMENT — PAIN DESCRIPTION - ORIENTATION: ORIENTATION: LEFT

## 2022-12-30 NOTE — PROGRESS NOTES
Patient arrived to unit via stretcher. Unable to ambulate. X 3 assist to transfer from stretcher to bed. Alert and oriented. Scattered bruising noted during skin assessment. Swelling to LLE. Pain with movement. Leg brace in place. Bed alarm on and functioning properly. Call light within reach. Will continue to monitor.

## 2022-12-30 NOTE — PROGRESS NOTES
4 Eyes Skin Assessment     NAME:  Olga Shaw OF BIRTH:  1945  MEDICAL RECORD NUMBER:  [id number]    The patient is being assessed for  Admission    I agree that One RN have performed a thorough Head to Toe Skin Assessment on the patient. ALL assessment sites listed below have been assessed. Areas assessed by both nurses:    Head, Face, Ears, Shoulders, Back, Chest, Arms, Elbows, Hands, Sacrum. Buttock, Coccyx, Ischium, and Legs. Feet and Heels        Does the Patient have a Wound?  No noted wound(s)       Dandy Prevention initiated by RN: NA   Wound Care Orders initiated by RN: NA    Pressure Injury (Stage 3,4, Unstageable, DTI, NWPT, and Complex wounds) if present place referral order by RN under : NA    New and Established Ostomies, if present place, referral order under : NA      Nurse 1 eSignature: Electronically signed by Sarah Hargrove RN on 12/29/22 at 9:02 PM EST    **SHARE this note so that the co-signing nurse is able to place an eSignature**    Nurse 2 eSignature: Electronically signed by Marily Etienne LPN on 50/68/57 at 04:87 PM EST

## 2022-12-30 NOTE — PROGRESS NOTES
Patient NPO today awaiting ortho consult. This nurse contacted Dr Myles who states Dr Suzy Plaza is managing the patient. Dr Myles states he spoke with Dr Suzy Plaza who will do surgery next week. Updated hospitalist and order for diet received.

## 2022-12-30 NOTE — ED NOTES
ED TO INPATIENT SBAR HANDOFF    Patient Name: Cleo Ortiz   :  1945  77 y.o.   MRN:  [id number]  Preferred Name    ED Room #:  ED22/ED-22  Family/Caregiver Present no   Restraints no   Sitter no   Sepsis Risk Score Sepsis Risk Score: 1.21    Situation  Code Status: Prior No additional code details.    Allergies: Amitiza [lubiprostone], Clindamycin/lincomycin, Evista [raloxifene hydrochloride], Flexeril [cyclobenzaprine hcl], Omega-3 fatty acids [fish oil], Prilosec [omeprazole], Skelaxin [metaxalone], and Spiriva handihaler [tiotropium bromide monohydrate]  Weight: Patient Vitals for the past 96 hrs (Last 3 readings):   Weight   22 1353 114 lb (51.7 kg)     Arrived from: home  Chief Complaint:   Chief Complaint   Patient presents with    Leg Pain     Hospital Problem/Diagnosis:  Active Problems:    * No active hospital problems. *  Resolved Problems:    * No resolved hospital problems. *    Imaging:   VL DUP LOWER EXTREMITY ARTERIES LEFT   Final Result   All arteries visualized in the left lower extremity are patent however   femoropopliteal arteries demonstrate elevated velocities.  Monophasic   waveforms in the femoral circulation indicate aortoiliac disease with likely   hemodynamic stenosis.  No evidence of arterial occlusions.         VL DUP LOWER EXTREMITY VENOUS LEFT   Final Result   No evidence of DVT in the left lower extremity.           Abnormal labs:   Abnormal Labs Reviewed   CBC WITH AUTO DIFFERENTIAL - Abnormal; Notable for the following components:       Result Value    RBC 3.42 (*)     Hemoglobin 10.3 (*)     Hematocrit 31.8 (*)     Platelets 118 (*)     MPV 12.2 (*)     Segs Relative 82.7 (*)     Lymphocytes % 8.1 (*)     Monocytes % 7.0 (*)     Immature Neutrophil % 1.7 (*)     All other components within normal limits   COMPREHENSIVE METABOLIC PANEL - Abnormal; Notable for the following components:    BUN 25 (*)     Creatinine 1.2 (*)     Est, Glom Filt Rate 46 (*)     Glucose 101  (*)     Total Protein 6.1 (*)     Total Bilirubin 1.6 (*)     ALT 76 (*)     AST 64 (*)     All other components within normal limits     Critical values: yes     Abnormal Assessment Findings: RBC 3.2, broken left leg    Background  History:   Past Medical History:   Diagnosis Date    Arm fracture, left 05/16/2019    Casted - no surgery - Dr. 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 pylori infection 8/1996    S/P Biaxin and Prilosec    Hiatal hernia 8/1994    with reflux by UGI    Hyperlipidemia     Hypertension     Hypothyroidism 1990's    MDRO (multiple drug resistant organisms) resistance 2005    Nasal passages    Osteoporosis     Smoking hx        Assessment    Vitals/MEWS: MEWS Score: 1  Level of Consciousness: Alert (0)   Vitals:    12/29/22 1601 12/29/22 1632 12/29/22 1702 12/29/22 1837   BP: (!) 145/57 (!) 162/56 (!) 153/67    Pulse:       Resp:       Temp:       TempSrc:       SpO2: 94% 96% 97% 95%   Weight:       Height:         FiO2 (%):   O2 Flow Rate: O2 Device: None (Room air) O2 Flow Rate (L/min): 0 L/min  Cardiac Rhythm:    Pain Assessment:  [x] Verbal [] Jone Mile Scale  Pain Scale: Pain Assessment  Pain Assessment: 0-10  Pain Level: 0  Patient's Stated Pain Goal: 0 - No pain  Pain Location: Knee, Leg  Pain Orientation: Left  Pain Type: Acute pain  Last documented pain score (0-10 scale) Pain Level: 0  Last documented pain medication administered: 1551  Mental Status: oriented  NIH Score:    C-SSRS: Risk of Suicide: No Risk  Bedside swallow:    Blanca Coma Scale (GCS): [place] Coma Scale  Eye Opening: Spontaneous  Best Verbal Response: Oriented  Best Motor Response: Obeys commands  Blanca Coma Scale Score: 15  Active LDA's:   Peripheral IV 12/29/22 Left Antecubital (Active)     PO Status: Regular  Pertinent or High Risk Medications/Drips: no   If Yes, please provide details:   Pending Blood Product Administration: no     You may also review the ED PT Care Timeline found under the Summary Nursing Index tab. Recommendation    Pending orders   Plan for Discharge (if known):    Additional Comments:    If any further questions, please call Sending RN at [phone]    Electronically signed by: Electronically signed by Elissa Seip, RN on 12/29/2022 at 7:02 PM      Xena Valdes RN  12/29/22 7841

## 2022-12-30 NOTE — H&P
V2.0  History and Physical      Name:  Dimple Winkler /Age/Sex: 1945  (65 y.o. female)   MRN & CSN:  [id number] & [id number] Encounter Date/Time: 2022 9:49 PM EST   Location:  [address] [place] [address]- PCP: Leah Raygoza MD       Hospital Day: 1    Assessment and Plan:   Dimple Winkler is a 65 y.o. female with a past medical history of CLL, COPD, hyperlipidemia, hypertension, hypothyroidism, osteoporosis who presents with difficulty ambulating recent left leg fracture      Closed displaced comminuted fracture of left patella  Closed fracture head of the left fibula  Closed fracture of the left tibial plateau  -Subsequent encounter patient initially presented to the emergency room  with complaint of left knee pain after fall. Patient was discharged home with a left knee immobilizer and follow-up with Ortho.  -Inpatient  -Analgesics  -Neurovascular checks  -Consult to orthopedic surgery per ER attending Dr. Zelia Sandifer is out of town over the weekend states he will inform his partner Dr. Kevin Frias of patient's admission  -N.p.o. after midnight  -MIVF  -Vascular studies in ER arterial Doppler and venous Doppler negative for acute DVTs or arterial occlusions.  -Fall precautions  -Bedrest until further evaluation by orthopedics    Ambulatory dysfunction: Secondary to fracture as above unable to do ADLs due to fracture and knee immobilizer.  -After orthopedic evaluation and plan  -PT OT postop    Chronic lymphocytic leukemia: Follows with oncology-Dr. Jairon Hahn macroglobulinemia   -Continue ibrutinib  -Antiemetics as needed    Hypothyroidism: Continue levothyroxine    Hypertension: Patient currently not on medication  -Has been on Maxide in the past.    GERD: Continue PPI    CKD D stage IIIa: BUN 25, creatinine 1.2  -Baseline creatinine around 1  -MIVF  -MP daily    Transaminitis: ALT 76, AST 64    Chronic Conditions: Continue all home medications except as stated above or contraindicated.     Normal weight BMI 21.50: lifestyle modifications  -Follow-up PCP for longitudinal care      This patient was seen and examined in conjunction with Dr. Dustin Paul. He/She was agreeable with the plan and management as dictated above. Hospital Problems             Last Modified POA    * (Principal) Closed displaced fracture of left patella, unspecified fracture morphology, sequela 12/29/2022 Yes       Disposition:   Current Living situation: home with spouse  Expected Disposition: possible rehab candidate  Estimated D/C: 3-4 days    Diet Diet NPO  ADULT DIET; Regular   DVT Prophylaxis [] Lovenox, []  Heparin, [x] SCDs, [] Ambulation,  [] Eliquis, [] Xarelto   Code Status Full Code   Surrogate Decision Maker/ POA Spouse     History from:     patient, electronic medical record, Quality of history: Inconsistent, patient states she gets confused. No family members at bedside      History of Present Illness:     Chief Complaint: Left leg pain, recent fall and leg fracture  Jackie Liao is a 65 y.o. female who presents with complaints of worsening left knee and leg pain. Most recently patient was seen emergency room 3 days ago she had a mechanical slip and fall in her driveway which was icy. Patient had CT Noncon study complaint of left knee which was found to have a comminuted medial and lateral tibial plateau fractures with intra-articular extension. Largest depressed fragment involving the lateral tibial plateau measured 8 mm with nondisplaced comminuted fibular head fracture and patellar fracture. She follows with orthopedics Dr. Maddi Oneill. On 12/27 patient was discharged home with knee immobilizer to follow-up with orthopedics outpatient. Patient was unable to tolerate ambulation with crutches and knee immobilizer at home pain has been intolerable with oral pain medications patient denies any new falls or injuries. States they called the orthopedic office today and was told to come to the emergency room.   Patient is having increased bruising to her left leg. States is tender to touch. Review of Systems:   Review of Systems   Constitutional:  Negative for chills, fatigue and fever. HENT:  Negative for congestion. Respiratory:  Negative for cough and shortness of breath. Cardiovascular:  Negative for chest pain, palpitations and leg swelling. Gastrointestinal:  Negative for abdominal pain, nausea and vomiting. Genitourinary:  Negative for dysuria. Musculoskeletal:  Positive for arthralgias, gait problem and joint swelling. Neurological:  Negative for dizziness and light-headedness. Hematological: Negative. Psychiatric/Behavioral: Negative. Objective:   No intake or output data in the 24 hours ending 12/29/22 2149   Vitals:   Vitals:    12/29/22 1837 12/29/22 1955 12/29/22 2003 12/29/22 2041   BP:  (!) 142/55 (!) 140/55 (!) 138/50   Pulse:  78  79   Resp:  16  18   Temp:  98.2 °F (36.8 °C)  98.6 °F (37 °C)   TempSrc:  Oral     SpO2: 95% 93% 94% 92%   Weight:    113 lb 12.8 oz (51.6 kg)   Height:    5' 1\" (1.549 m)       Medications Prior to Admission     Prior to Admission medications    Medication Sig Start Date End Date Taking? Authorizing Provider   HYDROcodone-acetaminophen (NORCO) 5-325 MG per tablet Take 1-2 tablets by mouth every 6 hours as needed for Pain for up to 3 days.  Max Daily Amount: 8 tablets 12/27/22 12/30/22  Stephanie Hamilton DO   ondansetron (ZOFRAN-ODT) 4 MG disintegrating tablet Take 1 tablet by mouth 3 times daily as needed for Nausea or Vomiting 12/27/22   Stephanie Hamilton DO   ibrutinib (IMBRUVICA) 140 MG chemo capsule Take 3 capsules daily 11/14/22   Sri Castrejon MD   potassium chloride (KLOR-CON M) 10 MEQ extended release tablet Take 1 tablet by mouth 2 times daily 6/17/22   Jimbo Morales MD   levothyroxine (SYNTHROID) 100 MCG tablet Take 1 tablet by mouth Daily 2/11/22   Meera Patel MD   traMADol (ULTRAM) 50 MG tablet TAKE 1 TABLET BY MOUTH TWICE A DAY AS NEEDED 7/22/21 Historical Provider, MD   vitamin D (D-3-5) 125 MCG (5000 UT) CAPS capsule Take 5,000 Units by mouth daily Wed and sat    Historical Provider, MD   zolpidem (AMBIEN) 5 MG tablet TAKE 1 TABLET BY MOUTH NIGHTLY AS NEEDED FOR SLEEP FOR UP TO 30 DAYS. 1/6/21   Historical Provider, MD   esomeprazole (NEXIUM) 40 MG delayed release capsule TAKE 1 CAPSULE DAILY 10/22/20   Frank Morgan MD   albuterol sulfate HFA (VENTOLIN HFA) 108 (90 Base) MCG/ACT inhaler Inhale 2 puffs into the lungs every 6 hours as needed for Wheezing 10/16/18   Bobby Mcintosh MD       Physical Exam:       GEN Awake female, sitting upright in bed in no apparent distress. Appears given age. EYES   No scleral erythema, discharge, or conjunctivitis. HENT Mucous membranes are moist.  NECK Supple  RESP Clear to auscultation bilaterally, no wheezes, rales or rhonchi. Respirations even and unlabored on RA. CARDIO/VASC   Regular rate without appreciable murmurs. No peripheral edema. GI Abdomen is soft without significant tenderness, masses, or guarding.   Moreno catheter is not present. MSK No gross joint deformities. Left knee ecchymotic, tender to touch, swelling noted. Limited range of motion. SKIN Normal coloration, warm, dry. NEURO Cranial nerves appear grossly intact, normal speech, no lateralizing weakness. PSYCH Awake, alert, oriented x 4. Affect appropriate.       Past Medical History:   PMHx   Past Medical History:   Diagnosis Date    Arm fracture, left 05/16/2019    Casted - no surgery - Dr. 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 pylori infection 8/1996    S/P Biaxin and Prilosec    Hiatal hernia 8/1994    with reflux by UGI Hyperlipidemia     Hypertension     Hypothyroidism     MDRO (multiple drug resistant organisms) resistance     Nasal passages    Osteoporosis     Smoking hx      PSHX:  has a past surgical history that includes Nasal septum surgery (Left, 10/21/2013); Cholecystectomy, laparoscopic (2018); Colonoscopy (10/26/2007); skin biopsy (); Colonoscopy (2019); and Colonoscopy (N/A, 2019). Allergies: Allergies   Allergen Reactions    Amitiza [Lubiprostone]     Clindamycin/Lincomycin      GI intolerance    Evista [Raloxifene Hydrochloride]     Flexeril [Cyclobenzaprine Hcl]      CNS    Omega-3 Fatty Acids [Fish Oil] Diarrhea    Prilosec [Omeprazole]      Pt sts meds didn't work - states not an allergy 6/3/19    Skelaxin [Metaxalone]      Itching    Spiriva Handihaler [Tiotropium Bromide Monohydrate]      \"Made my throat dry\" - not allergic too. 6/3/19     Fam HX:  family history includes High Blood Pressure in her father and mother.   Soc HX:   Social History     Socioeconomic History    Marital status:      Spouse name: None    Number of children: None    Years of education: None    Highest education level: None   Tobacco Use    Smoking status: Former     Packs/day: 2.00     Years: 30.00     Pack years: 60.00     Types: Cigarettes     Start date: 1965     Quit date: 1997     Years since quittin.0    Smokeless tobacco: Never   Vaping Use    Vaping Use: Never used   Substance and Sexual Activity    Alcohol use: No    Drug use: No       Medications:   Medications:    [START ON 2022] pantoprazole  40 mg Oral QAM AC    [START ON 2022] ibrutinib  420 mg Oral Daily    [START ON 2022] levothyroxine  100 mcg Oral Daily    [START ON 2022] vitamin D  5,000 Units Oral Daily    sodium chloride flush  5-40 mL IntraVENous 2 times per day      Infusions:    sodium chloride       PRN Meds: HYDROmorphone, 1 mg, Q30 Min PRN  albuterol sulfate HFA, 2 puff, Q6H PRN  [START ON 12/30/2022] zolpidem, 5 mg, Nightly PRN  sodium chloride flush, 5-40 mL, PRN  sodium chloride, 25 mL, PRN  ondansetron, 4 mg, Q8H PRN   Or  ondansetron, 4 mg, Q6H PRN  polyethylene glycol, 17 g, Daily PRN  acetaminophen, 650 mg, Q6H PRN   Or  acetaminophen, 650 mg, Q6H PRN  HYDROcodone-acetaminophen, 1 tablet, Q6H PRN  morphine, 2 mg, Q4H PRN        Labs    CBC:   Recent Labs     12/29/22  1542   WBC 8.7   HGB 10.3*   *     BMP:    Recent Labs     12/29/22  1542      K 4.2      CO2 28   BUN 25*   CREATININE 1.2*   GLUCOSE 101*     Hepatic:   Recent Labs     12/29/22  1542   AST 64*   ALT 76*   BILITOT 1.6*   ALKPHOS 115     Lipids:   Lab Results   Component Value Date/Time    CHOL 102 12/23/2019 09:43 AM    HDL 28 12/23/2019 09:43 AM    HDL 24 05/25/2012 10:42 AM    TRIG 142 12/23/2019 09:43 AM     Hemoglobin A1C:   Lab Results   Component Value Date/Time    LABA1C 5.2 09/30/2019 10:36 AM     TSH:   Lab Results   Component Value Date/Time    TSH 4.08 12/23/2019 09:43 AM     Troponin:   Lab Results   Component Value Date/Time    TROPONINT <0.010 01/18/2022 09:15 PM     Lactic Acid: No results for input(s): LACTA in the last 72 hours. BNP: No results for input(s): PROBNP in the last 72 hours.   UA:  Lab Results   Component Value Date/Time    NITRU NEGATIVE 01/30/2022 08:36 AM    NITRU Negative 05/17/2019 09:15 AM    COLORU YELLOW 01/30/2022 08:36 AM    PHUR 6.0 05/17/2019 09:15 AM    WBCUA 0 TO 1 01/30/2022 08:36 AM    RBCUA NEGATIVE 01/30/2022 08:36 AM    MUCUS RARE 03/08/2021 09:30 AM    TRICHOMONAS NONE SEEN 03/08/2021 09:30 AM    BACTERIA NEGATIVE 01/30/2022 08:36 AM    CLARITYU CLEAR 01/30/2022 08:36 AM    SPECGRAV 1.015 01/30/2022 08:36 AM    LEUKOCYTESUR NEGATIVE 01/30/2022 08:36 AM    UROBILINOGEN 0.2 01/30/2022 08:36 AM    BILIRUBINUR NEGATIVE 01/30/2022 08:36 AM    BILIRUBINUR NEGATIVE 01/11/2012 08:40 PM    BLOODU NEGATIVE 01/30/2022 08:36 AM    GLUCOSEU Negative 05/17/2019 09:15 AM    GLUCOSEU NEGATIVE 01/11/2012 08:40 PM    KETUA NEGATIVE 01/30/2022 08:36 AM    AMORPHOUS 1+ 03/31/2014 10:10 AM     Urine Cultures: No results found for: Clement Doing  Blood Cultures: No results found for: BC  No results found for: BLOODCULT2  Organism: No results found for: ORG    Imaging/Diagnostics Last 24 Hours   XR ELBOW LEFT (MIN 3 VIEWS)    Result Date: 12/27/2022  EXAMINATION: 2 X-RAY VIEWS OF THE LEFT FEMUR; FOUR X-RAY VIEWS OF THE LEFT KNEE; 2 X-RAY VIEWS OF THE LEFT TIBIA AND FIBULA; THREE X-RAY VIEWS OF THE LEFT ELBOW 12/27/2022 11:58 am COMPARISON: Left knee radiographs 04/25/2022 HISTORY: ORDERING SYSTEM PROVIDED HISTORY:  Pain TECHNOLOGIST PROVIDED HISTORY: Reason for Exam:  Pain Reason for Exam:  Pain, fall FINDINGS: Acute tibial plateau fracture, with impaction of the lateral tibial plateau. The fracture extends to the medial tibial plateau. No evidence of a knee joint dislocation. Small lipohemarthrosis is suspected. There is lucency at the patella, which a nondisplaced fracture is also a possibility. No acute bony abnormality is seen involving the left femur. Moderate left hip joint degenerative changes. Nondisplaced impacted fibular head fracture. No acute bony abnormality is seen in the mid or distal fibula/tibia. No acute fracture or evidence of dislocation involving the left elbow. No elbow joint effusion. Moderate elbow joint degenerative changes. Acute left tibial plateau fracture, with impaction of the lateral tibial plateau. The fracture extends to the medial tibial plateau. Small lipohemarthrosis. Nondisplaced impacted fibular head fracture. Possible nondisplaced patellar fracture. No acute bony abnormality is seen in the mid or distal left fibula/tibia. No acute bony abnormality is seen in the left femur. No acute bony abnormality is seen in the left elbow.      XR FEMUR LEFT (MIN 2 VIEWS)    Result Date: 12/27/2022  EXAMINATION: 2 X-RAY VIEWS OF THE LEFT FEMUR; FOUR X-RAY VIEWS OF THE LEFT KNEE; 2 X-RAY VIEWS OF THE LEFT TIBIA AND FIBULA; THREE X-RAY VIEWS OF THE LEFT ELBOW 12/27/2022 11:58 am COMPARISON: Left knee radiographs 04/25/2022 HISTORY: ORDERING SYSTEM PROVIDED HISTORY:  Pain TECHNOLOGIST PROVIDED HISTORY: Reason for Exam:  Pain Reason for Exam:  Pain, fall FINDINGS: Acute tibial plateau fracture, with impaction of the lateral tibial plateau. The fracture extends to the medial tibial plateau. No evidence of a knee joint dislocation. Small lipohemarthrosis is suspected. There is lucency at the patella, which a nondisplaced fracture is also a possibility. No acute bony abnormality is seen involving the left femur. Moderate left hip joint degenerative changes. Nondisplaced impacted fibular head fracture. No acute bony abnormality is seen in the mid or distal fibula/tibia. No acute fracture or evidence of dislocation involving the left elbow. No elbow joint effusion. Moderate elbow joint degenerative changes. Acute left tibial plateau fracture, with impaction of the lateral tibial plateau. The fracture extends to the medial tibial plateau. Small lipohemarthrosis. Nondisplaced impacted fibular head fracture. Possible nondisplaced patellar fracture. No acute bony abnormality is seen in the mid or distal left fibula/tibia. No acute bony abnormality is seen in the left femur. No acute bony abnormality is seen in the left elbow. XR KNEE LEFT (MIN 4 VIEWS)    Result Date: 12/27/2022  EXAMINATION: 2 X-RAY VIEWS OF THE LEFT FEMUR; FOUR X-RAY VIEWS OF THE LEFT KNEE; 2 X-RAY VIEWS OF THE LEFT TIBIA AND FIBULA; THREE X-RAY VIEWS OF THE LEFT ELBOW 12/27/2022 11:58 am COMPARISON: Left knee radiographs 04/25/2022 HISTORY: ORDERING SYSTEM PROVIDED HISTORY:  Pain TECHNOLOGIST PROVIDED HISTORY: Reason for Exam:  Pain Reason for Exam:  Pain, fall FINDINGS: Acute tibial plateau fracture, with impaction of the lateral tibial plateau.  The fracture extends to the medial tibial plateau. No evidence of a knee joint dislocation. Small lipohemarthrosis is suspected. There is lucency at the patella, which a nondisplaced fracture is also a possibility. No acute bony abnormality is seen involving the left femur. Moderate left hip joint degenerative changes. Nondisplaced impacted fibular head fracture. No acute bony abnormality is seen in the mid or distal fibula/tibia. No acute fracture or evidence of dislocation involving the left elbow. No elbow joint effusion. Moderate elbow joint degenerative changes. Acute left tibial plateau fracture, with impaction of the lateral tibial plateau. The fracture extends to the medial tibial plateau. Small lipohemarthrosis. Nondisplaced impacted fibular head fracture. Possible nondisplaced patellar fracture. No acute bony abnormality is seen in the mid or distal left fibula/tibia. No acute bony abnormality is seen in the left femur. No acute bony abnormality is seen in the left elbow. XR TIBIA FIBULA LEFT (2 VIEWS)    Result Date: 12/27/2022  EXAMINATION: 2 X-RAY VIEWS OF THE LEFT FEMUR; FOUR X-RAY VIEWS OF THE LEFT KNEE; 2 X-RAY VIEWS OF THE LEFT TIBIA AND FIBULA; THREE X-RAY VIEWS OF THE LEFT ELBOW 12/27/2022 11:58 am COMPARISON: Left knee radiographs 04/25/2022 HISTORY: ORDERING SYSTEM PROVIDED HISTORY:  Pain TECHNOLOGIST PROVIDED HISTORY: Reason for Exam:  Pain Reason for Exam:  Pain, fall FINDINGS: Acute tibial plateau fracture, with impaction of the lateral tibial plateau. The fracture extends to the medial tibial plateau. No evidence of a knee joint dislocation. Small lipohemarthrosis is suspected. There is lucency at the patella, which a nondisplaced fracture is also a possibility. No acute bony abnormality is seen involving the left femur. Moderate left hip joint degenerative changes. Nondisplaced impacted fibular head fracture. No acute bony abnormality is seen in the mid or distal fibula/tibia.  No acute fracture or evidence of dislocation involving the left elbow. No elbow joint effusion. Moderate elbow joint degenerative changes. Acute left tibial plateau fracture, with impaction of the lateral tibial plateau. The fracture extends to the medial tibial plateau. Small lipohemarthrosis. Nondisplaced impacted fibular head fracture. Possible nondisplaced patellar fracture. No acute bony abnormality is seen in the mid or distal left fibula/tibia. No acute bony abnormality is seen in the left femur. No acute bony abnormality is seen in the left elbow. CT KNEE LEFT WO CONTRAST    Result Date: 12/27/2022  EXAMINATION: CT OF THE LEFT KNEE WITHOUT CONTRAST 12/27/2022 2:13 pm TECHNIQUE: CT of the left knee was performed without the administration of intravenous contrast.  Multiplanar reformatted images are provided for review. Automated exposure control, iterative reconstruction, and/or weight based adjustment of the mA/kV was utilized to reduce the radiation dose to as low as reasonably achievable. COMPARISON: Same day left knee radiographs HISTORY ORDERING SYSTEM PROVIDED HISTORY: fracture L knee TECHNOLOGIST PROVIDED HISTORY: Reason for exam:->fracture L knee Decision Support Exception - unselect if not a suspected or confirmed emergency medical condition->Emergency Medical Condition (MA) Reason for Exam: fracture L knee Additional signs and symptoms: fall Relevant Medical/Surgical History: none FINDINGS: Bones: Diffuse demineralization. Nondisplaced comminuted fibular head fracture. Minimally displaced comminuted patella fracture. Comminuted fracture of the tibial plateau involving the medial and lateral tibial plateau extending intra-articularly into the level of the proximal tibia. The largest depressed fragment involving the lateral tibial plateau measures up to 8 mm. Soft Tissue: Small lipohemarthrosis. Diffuse soft tissue swelling and edema overlying the knee.      Comminuted medial and lateral tibial plateau fractures with intra-articular extension. The largest depressed fragment involving the lateral tibial plateau measures up to 8 mm. Nondisplaced comminuted fibular head fracture. Minimally displaced comminuted patellar fracture.  DUP LOWER EXTREMITY ARTERIES LEFT    Result Date: 12/29/2022  EXAMINATION: ARTERIAL DUPLEX ULTRASOUND OF THE  LOWER EXTREMITY  12/29/2022 4:52 pm TECHNIQUE: Duplex ultrasound using B-mode/gray scaled imaging, Doppler spectral analysis and color flow Doppler was obtained of the lower extremity. COMPARISON: None HISTORY: ORDERING SYSTEM PROVIDED HISTORY: leg pain, recent fracture TECHNOLOGIST PROVIDED HISTORY: Reason for exam:->leg pain, recent fracture FINDINGS: Arterial waveforms in the femoral and popliteal territories are monophasic. The trifurcation vessels also demonstrate monophasic waveforms. Flow velocities were measured as follows: Com. Fem. 288 cm/sec Prof.           125 cm/sec SFA Prox. 273 cm/sec SFA Mid.     253 cm/sec SFA Dist.     209 cm/sec Pop. 170 cm/sec PTA            98 cm/sec Peron. 98 cm/sec REENA            86 cm/sec     All arteries visualized in the left lower extremity are patent however femoropopliteal arteries demonstrate elevated velocities. Monophasic waveforms in the femoral circulation indicate aortoiliac disease with likely hemodynamic stenosis. No evidence of arterial occlusions.  DUP LOWER EXTREMITY VENOUS LEFT    Result Date: 12/29/2022  EXAMINATION: DUPLEX VENOUS ULTRASOUND OF THE LEFT LOWER EXTREMITY 12/29/2022 4:52 pm TECHNIQUE: Duplex ultrasound using B-mode/gray scaled imaging and Doppler spectral analysis and color flow was obtained of the deep venous structures of the left extremity. COMPARISON: None.  HISTORY: ORDERING SYSTEM PROVIDED HISTORY: calf pain, knee fracture, increased swelling, eval for DVT TECHNOLOGIST PROVIDED HISTORY: Reason for exam:->calf pain, knee fracture, increased swelling, eval for DVT FINDINGS: The visualized veins of the left lower extremity are patent and free of echogenic thrombus. The veins demonstrate good compressibility with normal color flow study and spectral analysis. No evidence of DVT in the left lower extremity. I discussed this patient with the ED provider and Dr. Jon Harmon. I did a review of patient's medical records, lab results and imaging conducted today. I personally reviewed patient's vital signs including pulse ox and EKG on admission for preop.      Electronically signed by RODRIGUEZ Shah CNP on 12/29/2022 at 9:49 PM

## 2022-12-30 NOTE — PROGRESS NOTES
V2.0  [facility] [place] Progress Note      Name:  Benedict Monet /Age/Sex: 1945  (65 y.o. female)   MRN & CSN:  [id number] & [id number] Encounter Date/Time: 2022 3:59 PM EST    Location:  [address] [place] [address]-A PCP: Ashley Davis MD       Hospital Day: 2    Assessment and Plan:   Benedict Monet is a 65 y.o. female with pmh of CLL, COPD, hyperlipidemia, hypertension, hypothyroidism, osteoporosis who presents with Closed displaced fracture of left patella, unspecified fracture morphology, sequela      Plan:    Closed displaced comminuted fracture of left patella  Closed fracture head of the left fibula  Closed fracture of the left tibial plateau  Mechanical fall-patient slipped on ice  -Subsequent encounter patient initially presented to the emergency room  with complaint of left knee pain after fall. Patient was discharged home with a left knee immobilizer and follow-up with Ortho.  -Inpatient  -Analgesics  -Neurovascular checks  -Consult to orthopedic surgery per ER attending Dr. Kraig Harrison is out of town over the weekend states he will inform his partner Dr. Azra King of patient's admission  -MIVF  -Vascular studies in ER arterial Doppler and venous Doppler negative for acute DVTs or arterial occlusions.  -Fall precautions  -Ortho planning surgery next week. Till then pain management.       Ambulatory dysfunction: Secondary to fracture as above unable to do ADLs due to fracture and knee immobilizer.  -After orthopedic evaluation and plan  -PT OT postop     Chronic lymphocytic leukemia: Follows with oncology-Dr. Barrera macroglobulinemia   -Continue ibrutinib  -Antiemetics as needed    Hypothyroidism: Continue levothyroxine    Hypertension: Patient currently not on medication  -Has been on Maxide in the past.    GERD: Continue PPI     CKD D stage IIIa: BUN 25, creatinine 1.2  -Baseline creatinine around 1  -MIVF  -MP daily    Transaminitis: ALT 76, AST 64     Chronic Conditions: Continue all home medications except as stated above or contraindicated. Normal weight BMI 21.50: lifestyle modifications  -Follow-up PCP for longitudinal care     Diet ADULT DIET; Regular   DVT Prophylaxis [x] Lovenox, []  Heparin, [] SCDs, [] Ambulation,  [] Eliquis, [] Xarelto  [] Coumadin   Code Status Full Code   Disposition From: Home  Expected Disposition: TBD  Estimated Date of Discharge: 3 Days  Patient requires continued admission due to Pending surgery and pain management   Surrogate Decision Maker/ POA      Subjective:     Chief Complaint: Leg Pain       Jackie Liao is a 65 y.o. female who presents with Fall    Patient had a fall on Friday and going to the ED. Found to have a fracture, was discharged home on pain medication. Advised to immobilized the left leg and walk with crutches. However since the pain was unbearable patient decided to come back yesterday. ED had contacted Ortho who advised to admit inpatient for possible surgery next week. Review of Systems:    Review of Systems    Patient denies any fever, chest pain, dizziness, shortness of breath, abdominal pain, dysuria. Bowel and bladder habit normal    Objective: Intake/Output Summary (Last 24 hours) at 12/30/2022 1559  Last data filed at 12/30/2022 0739  Gross per 24 hour   Intake 650 ml   Output 1 ml   Net 649 ml        Vitals:   Vitals:    12/30/22 1535   BP: (!) 135/53   Pulse: 71   Resp: 18   Temp: 98.8 °F (37.1 °C)   SpO2: 95%       Physical Exam:     General: NAD  Eyes: EOMI  ENT: neck supple  Cardiovascular: Regular rate. Respiratory: Clear to auscultation  Gastrointestinal: Soft, non tender  Genitourinary: no suprapubic tenderness  Musculoskeletal: Immobilizer on left knee. Able to move her toes. Left knee range of motion restricted due to pain. Skin: warm, dry  Neuro: Alert. Psych: Mood appropriate.      Medications:   Medications:    pantoprazole  40 mg Oral QAM AC    ibrutinib  420 mg Oral Daily    levothyroxine  100 mcg Oral Daily    [START ON 12/31/2022] Vitamin D  5,000 Units Oral Daily    sodium chloride flush  5-40 mL IntraVENous 2 times per day      Infusions:    sodium chloride       PRN Meds: HYDROmorphone, 1 mg, Q30 Min PRN  albuterol sulfate HFA, 2 puff, Q6H PRN  sodium chloride flush, 5-40 mL, PRN  sodium chloride, 25 mL, PRN  ondansetron, 4 mg, Q8H PRN   Or  ondansetron, 4 mg, Q6H PRN  polyethylene glycol, 17 g, Daily PRN  acetaminophen, 650 mg, Q6H PRN   Or  acetaminophen, 650 mg, Q6H PRN  HYDROcodone-acetaminophen, 1 tablet, Q6H PRN  morphine, 2 mg, Q4H PRN  zolpidem, 5 mg, Nightly PRN        Labs      Recent Results (from the past 24 hour(s))   EKG 12 Lead    Collection Time: 12/29/22 10:11 PM   Result Value Ref Range    Ventricular Rate 71 BPM    Atrial Rate 71 BPM    P-R Interval 154 ms    QRS Duration 74 ms    Q-T Interval 392 ms    QTc Calculation (Bazett) 425 ms    P Axis 67 degrees    R Axis 65 degrees    T Axis 66 degrees    Diagnosis       Normal sinus rhythm  Normal ECG  When compared with ECG of 18-JAN-2022 22:34,  No significant change was found     CBC auto differential    Collection Time: 12/30/22  5:34 AM   Result Value Ref Range    WBC 8.8 4.0 - 10.5 K/CU MM    RBC 3.01 (L) 4.2 - 5.4 M/CU MM    Hemoglobin 9.0 (L) 12.5 - 16.0 GM/DL    Hematocrit 28.5 (L) 37 - 47 %    MCV 94.7 78 - 100 FL    MCH 29.9 27 - 31 PG    MCHC 31.6 (L) 32.0 - 36.0 %    RDW 12.5 11.7 - 14.9 %    Platelets 613 (L) 366 - 440 K/CU MM    MPV 12.2 (H) 7.5 - 11.1 FL    Differential Type AUTOMATED DIFFERENTIAL     Segs Relative 77.0 (H) 36 - 66 %    Lymphocytes % 11.7 (L) 24 - 44 %    Monocytes % 8.9 (H) 0 - 4 %    Eosinophils % 0.2 0 - 3 %    Basophils % 0.3 0 - 1 %    Segs Absolute 6.8 K/CU MM    Lymphocytes Absolute 1.0 K/CU MM    Monocytes Absolute 0.8 K/CU MM    Eosinophils Absolute 0.0 K/CU MM    Basophils Absolute 0.0 K/CU MM    Nucleated RBC % 0.0 %    Total Nucleated RBC 0.0 K/CU MM    Total Immature Neutrophil 0.17 K/CU MM Immature Neutrophil % 1.9 (H) 0 - 0.43 %   Basic Metabolic Panel w/ Reflex to MG    Collection Time: 12/30/22  5:34 AM   Result Value Ref Range    Sodium 142 135 - 145 MMOL/L    Potassium 3.5 3.5 - 5.1 MMOL/L    Chloride 107 99 - 110 mMol/L    CO2 25 21 - 32 MMOL/L    Anion Gap 10 4 - 16    BUN 21 6 - 23 MG/DL    Creatinine 1.1 0.6 - 1.1 MG/DL    Est, Glom Filt Rate 51 (L) >60 mL/min/1.73m2    Glucose 109 (H) 70 - 99 MG/DL    Calcium 7.6 (L) 8.3 - 10.6 MG/DL   Magnesium    Collection Time: 12/30/22  5:34 AM   Result Value Ref Range    Magnesium 2.1 1.8 - 2.4 mg/dl        Imaging/Diagnostics Last 24 Hours   VL DUP LOWER EXTREMITY ARTERIES LEFT    Result Date: 12/29/2022  EXAMINATION: ARTERIAL DUPLEX ULTRASOUND OF THE  LOWER EXTREMITY  12/29/2022 4:52 pm TECHNIQUE: Duplex ultrasound using B-mode/gray scaled imaging, Doppler spectral analysis and color flow Doppler was obtained of the lower extremity. COMPARISON: None HISTORY: ORDERING SYSTEM PROVIDED HISTORY: leg pain, recent fracture TECHNOLOGIST PROVIDED HISTORY: Reason for exam:->leg pain, recent fracture FINDINGS: Arterial waveforms in the femoral and popliteal territories are monophasic. The trifurcation vessels also demonstrate monophasic waveforms. Flow velocities were measured as follows: Com. Fem. 288 cm/sec Prof.           125 cm/sec SFA Prox. 273 cm/sec SFA Mid.     253 cm/sec SFA Dist.     209 cm/sec Pop. 170 cm/sec PTA            98 cm/sec Peron. 98 cm/sec REENA            86 cm/sec     All arteries visualized in the left lower extremity are patent however femoropopliteal arteries demonstrate elevated velocities. Monophasic waveforms in the femoral circulation indicate aortoiliac disease with likely hemodynamic stenosis. No evidence of arterial occlusions.      VL DUP LOWER EXTREMITY VENOUS LEFT    Result Date: 12/29/2022  EXAMINATION: DUPLEX VENOUS ULTRASOUND OF THE LEFT LOWER EXTREMITY 12/29/2022 4:52 pm TECHNIQUE: Duplex ultrasound using B-mode/gray scaled imaging and Doppler spectral analysis and color flow was obtained of the deep venous structures of the left extremity. COMPARISON: None. HISTORY: ORDERING SYSTEM PROVIDED HISTORY: calf pain, knee fracture, increased swelling, eval for DVT TECHNOLOGIST PROVIDED HISTORY: Reason for exam:->calf pain, knee fracture, increased swelling, eval for DVT FINDINGS: The visualized veins of the left lower extremity are patent and free of echogenic thrombus. The veins demonstrate good compressibility with normal color flow study and spectral analysis. No evidence of DVT in the left lower extremity.        Electronically signed by Rosa Petersen MD on 12/30/2022 at 3:59 PM

## 2022-12-31 LAB
ANION GAP SERPL CALCULATED.3IONS-SCNC: 12 MMOL/L (ref 4–16)
BUN BLDV-MCNC: 23 MG/DL (ref 6–23)
CALCIUM SERPL-MCNC: 7.9 MG/DL (ref 8.3–10.6)
CHLORIDE BLD-SCNC: 103 MMOL/L (ref 99–110)
CO2: 24 MMOL/L (ref 21–32)
CREAT SERPL-MCNC: 1 MG/DL (ref 0.6–1.1)
EKG ATRIAL RATE: 71 BPM
EKG DIAGNOSIS: NORMAL
EKG P AXIS: 67 DEGREES
EKG P-R INTERVAL: 154 MS
EKG Q-T INTERVAL: 392 MS
EKG QRS DURATION: 74 MS
EKG QTC CALCULATION (BAZETT): 425 MS
EKG R AXIS: 65 DEGREES
EKG T AXIS: 66 DEGREES
EKG VENTRICULAR RATE: 71 BPM
GFR SERPL CREATININE-BSD FRML MDRD: 58 ML/MIN/1.73M2
GLUCOSE BLD-MCNC: 99 MG/DL (ref 70–99)
MAGNESIUM: 1.8 MG/DL (ref 1.8–2.4)
POTASSIUM SERPL-SCNC: 3.5 MMOL/L (ref 3.5–5.1)
SODIUM BLD-SCNC: 139 MMOL/L (ref 135–145)

## 2022-12-31 PROCEDURE — 2580000003 HC RX 258: Performed by: NURSE PRACTITIONER

## 2022-12-31 PROCEDURE — 94761 N-INVAS EAR/PLS OXIMETRY MLT: CPT

## 2022-12-31 PROCEDURE — 93010 ELECTROCARDIOGRAM REPORT: CPT | Performed by: INTERNAL MEDICINE

## 2022-12-31 PROCEDURE — 80048 BASIC METABOLIC PNL TOTAL CA: CPT

## 2022-12-31 PROCEDURE — 85025 COMPLETE CBC W/AUTO DIFF WBC: CPT

## 2022-12-31 PROCEDURE — 83735 ASSAY OF MAGNESIUM: CPT

## 2022-12-31 PROCEDURE — 1200000000 HC SEMI PRIVATE

## 2022-12-31 PROCEDURE — 36415 COLL VENOUS BLD VENIPUNCTURE: CPT

## 2022-12-31 PROCEDURE — 6370000000 HC RX 637 (ALT 250 FOR IP): Performed by: NURSE PRACTITIONER

## 2022-12-31 PROCEDURE — 6360000002 HC RX W HCPCS: Performed by: STUDENT IN AN ORGANIZED HEALTH CARE EDUCATION/TRAINING PROGRAM

## 2022-12-31 RX ADMIN — ZOLPIDEM TARTRATE 5 MG: 5 TABLET ORAL at 23:27

## 2022-12-31 RX ADMIN — SODIUM CHLORIDE, PRESERVATIVE FREE 10 ML: 5 INJECTION INTRAVENOUS at 08:30

## 2022-12-31 RX ADMIN — ENOXAPARIN SODIUM 30 MG: 100 INJECTION SUBCUTANEOUS at 10:46

## 2022-12-31 RX ADMIN — PANTOPRAZOLE SODIUM 40 MG: 40 TABLET, DELAYED RELEASE ORAL at 05:46

## 2022-12-31 RX ADMIN — LEVOTHYROXINE SODIUM 100 MCG: 0.1 TABLET ORAL at 05:46

## 2022-12-31 RX ADMIN — Medication 5000 UNITS: at 10:46

## 2022-12-31 RX ADMIN — SODIUM CHLORIDE, PRESERVATIVE FREE 5 ML: 5 INJECTION INTRAVENOUS at 20:36

## 2022-12-31 NOTE — PROGRESS NOTES
V2.0  [facility] [place] Hospitalist Progress Note      Name:  Lety Kay /Age/Sex: 1945  (65 y.o. female)   MRN & CSN:  [id number] & [id number] Encounter Date/Time: 2022 3:59 PM EST    [facility] PCP: Seema Shankar MD       Hospital Day: 3    Assessment and Plan:   Lety Kay is a 65 y.o. female with pmh of CLL, COPD, hyperlipidemia, hypertension, hypothyroidism, osteoporosis who presents with Closed displaced fracture of left patella, unspecified fracture morphology, sequela      Plan:    Closed displaced comminuted fracture of left patella  Closed fracture head of the left fibula  Closed fracture of the left tibial plateau  Mechanical fall-patient slipped on ice  -Subsequent encounter patient initially presented to the emergency room  with complaint of left knee pain after fall. Patient was discharged home with a left knee immobilizer and follow-up with Ortho.  -Inpatient  -Analgesics  -Neurovascular checks  -Consult to orthopedic surgery per ER attending Dr. Moe Arellano is out of town over the weekend states he will inform his partner Dr. Stephan Oseguera of patient's admission  -MIVF  -Vascular studies in ER arterial Doppler and venous Doppler negative for acute DVTs or arterial occlusions.  -Fall precautions  -Ortho planning surgery next week. Till then pain management.       Ambulatory dysfunction: Secondary to fracture as above unable to do ADLs due to fracture and knee immobilizer.  -After orthopedic evaluation and plan  -PT OT postop     Chronic lymphocytic leukemia: Follows with oncology-Dr. Cornelia Powell macroglobulinemia   -Continue ibrutinib  -Antiemetics as needed    Hypothyroidism: Continue levothyroxine    Hypertension: Patient currently not on medication  -Has been on Maxide in the past.    GERD: Continue PPI     CKD D stage IIIa: BUN 25, creatinine 1.2  -Baseline creatinine around 1  -MIVF  -MP daily    Transaminitis: ALT 76, AST 64     Chronic Conditions: Continue all home medications except as stated above or contraindicated. Normal weight BMI 21.50: lifestyle modifications  -Follow-up PCP for longitudinal care     Diet ADULT DIET; Regular   DVT Prophylaxis [x] Lovenox, []  Heparin, [] SCDs, [] Ambulation,  [] Eliquis, [] Xarelto  [] Coumadin   Code Status Full Code   Disposition From: Home  Expected Disposition: TBD  Estimated Date of Discharge: 3 Days  Patient requires continued admission due to Pending surgery and pain management   Surrogate Decision Maker/ POA      Subjective:     Chief Complaint: Leg Pain       Sy Chu is a 65 y.o. female who presents with Fall    Patient had a fall on Friday and going to the ED. Found to have a fracture, was discharged home on pain medication. Advised to immobilized the left leg and walk with crutches. However since the pain was unbearable patient decided to come back yesterday. ED had contacted Ortho who advised to admit inpatient for possible surgery next week. Review of Systems:    Review of Systems    Patient denies any fever, chest pain, dizziness, shortness of breath, abdominal pain, dysuria. Bowel and bladder habit normal    Objective:   No intake or output data in the 24 hours ending 12/31/22 1521       Vitals:   Vitals:    12/31/22 0806   BP: (!) 143/58   Pulse: 75   Resp: 11   Temp: 99.3 °F (37.4 °C)   SpO2: 94%       Physical Exam:     General: NAD  Eyes: EOMI  ENT: neck supple  Cardiovascular: Regular rate. Respiratory: Clear to auscultation  Gastrointestinal: Soft, non tender  Genitourinary: no suprapubic tenderness  Musculoskeletal: Immobilizer on left knee. Able to move her toes. Left knee range of motion restricted due to pain. Skin: warm, dry  Neuro: Alert. Psych: Mood appropriate.      Medications:   Medications:    enoxaparin  30 mg SubCUTAneous BID    pantoprazole  40 mg Oral QAM AC    ibrutinib  420 mg Oral Daily    levothyroxine  100 mcg Oral Daily    Vitamin D  5,000 Units Oral Daily    sodium chloride flush  5-40 mL IntraVENous 2 times per day      Infusions:    sodium chloride       PRN Meds: albuterol sulfate HFA, 2 puff, Q6H PRN  sodium chloride flush, 5-40 mL, PRN  sodium chloride, 25 mL, PRN  ondansetron, 4 mg, Q8H PRN   Or  ondansetron, 4 mg, Q6H PRN  polyethylene glycol, 17 g, Daily PRN  acetaminophen, 650 mg, Q6H PRN   Or  acetaminophen, 650 mg, Q6H PRN  HYDROcodone-acetaminophen, 1 tablet, Q6H PRN  morphine, 2 mg, Q4H PRN  zolpidem, 5 mg, Nightly PRN      Labs      No results found for this or any previous visit (from the past 24 hour(s)). Imaging/Diagnostics Last 24 Hours   VL DUP LOWER EXTREMITY ARTERIES LEFT    Result Date: 12/29/2022  EXAMINATION: ARTERIAL DUPLEX ULTRASOUND OF THE  LOWER EXTREMITY  12/29/2022 4:52 pm TECHNIQUE: Duplex ultrasound using B-mode/gray scaled imaging, Doppler spectral analysis and color flow Doppler was obtained of the lower extremity. COMPARISON: None HISTORY: ORDERING SYSTEM PROVIDED HISTORY: leg pain, recent fracture TECHNOLOGIST PROVIDED HISTORY: Reason for exam:->leg pain, recent fracture FINDINGS: Arterial waveforms in the femoral and popliteal territories are monophasic. The trifurcation vessels also demonstrate monophasic waveforms. Flow velocities were measured as follows: Com. Fem. 288 cm/sec Prof.           125 cm/sec SFA Prox. 273 cm/sec SFA Mid.     253 cm/sec SFA Dist.     209 cm/sec Pop. 170 cm/sec PTA            98 cm/sec Peron. 98 cm/sec REENA            86 cm/sec     All arteries visualized in the left lower extremity are patent however femoropopliteal arteries demonstrate elevated velocities. Monophasic waveforms in the femoral circulation indicate aortoiliac disease with likely hemodynamic stenosis. No evidence of arterial occlusions.       DUP LOWER EXTREMITY VENOUS LEFT    Result Date: 12/29/2022  EXAMINATION: DUPLEX VENOUS ULTRASOUND OF THE LEFT LOWER EXTREMITY 12/29/2022 4:52 pm TECHNIQUE: Duplex ultrasound using B-mode/gray scaled imaging and Doppler spectral analysis and color flow was obtained of the deep venous structures of the left extremity. COMPARISON: None. HISTORY: ORDERING SYSTEM PROVIDED HISTORY: calf pain, knee fracture, increased swelling, eval for DVT TECHNOLOGIST PROVIDED HISTORY: Reason for exam:->calf pain, knee fracture, increased swelling, eval for DVT FINDINGS: The visualized veins of the left lower extremity are patent and free of echogenic thrombus. The veins demonstrate good compressibility with normal color flow study and spectral analysis. No evidence of DVT in the left lower extremity.        Electronically signed by Tiago Olivarez MD on 12/31/2022 at 3:21 PM

## 2023-01-01 LAB
ANION GAP SERPL CALCULATED.3IONS-SCNC: 14 MMOL/L (ref 4–16)
BANDED NEUTROPHILS ABSOLUTE COUNT: 0.4 K/CU MM
BANDED NEUTROPHILS RELATIVE PERCENT: 4 % (ref 5–11)
BASOPHILS ABSOLUTE: 0 K/CU MM
BASOPHILS RELATIVE PERCENT: 0.4 % (ref 0–1)
BUN BLDV-MCNC: 24 MG/DL (ref 6–23)
CALCIUM SERPL-MCNC: 8.1 MG/DL (ref 8.3–10.6)
CHLORIDE BLD-SCNC: 105 MMOL/L (ref 99–110)
CO2: 22 MMOL/L (ref 21–32)
CREAT SERPL-MCNC: 1 MG/DL (ref 0.6–1.1)
DIFFERENTIAL TYPE: ABNORMAL
DIFFERENTIAL TYPE: ABNORMAL
EOSINOPHILS ABSOLUTE: 0 K/CU MM
EOSINOPHILS ABSOLUTE: 0.1 K/CU MM
EOSINOPHILS RELATIVE PERCENT: 0.3 % (ref 0–3)
EOSINOPHILS RELATIVE PERCENT: 1 % (ref 0–3)
GFR SERPL CREATININE-BSD FRML MDRD: 58 ML/MIN/1.73M2
GLUCOSE BLD-MCNC: 91 MG/DL (ref 70–99)
HCT VFR BLD CALC: 27 % (ref 37–47)
HCT VFR BLD CALC: 28.9 % (ref 37–47)
HEMOGLOBIN: 8.4 GM/DL (ref 12.5–16)
HEMOGLOBIN: 9.3 GM/DL (ref 12.5–16)
IMMATURE NEUTROPHIL %: 3.6 % (ref 0–0.43)
LYMPHOCYTES ABSOLUTE: 1.2 K/CU MM
LYMPHOCYTES ABSOLUTE: 2 K/CU MM
LYMPHOCYTES RELATIVE PERCENT: 11.3 % (ref 24–44)
LYMPHOCYTES RELATIVE PERCENT: 20 % (ref 24–44)
MCH RBC QN AUTO: 29.5 PG (ref 27–31)
MCH RBC QN AUTO: 30.3 PG (ref 27–31)
MCHC RBC AUTO-ENTMCNC: 31.1 % (ref 32–36)
MCHC RBC AUTO-ENTMCNC: 32.2 % (ref 32–36)
MCV RBC AUTO: 94.1 FL (ref 78–100)
MCV RBC AUTO: 94.7 FL (ref 78–100)
METAMYELOCYTES ABSOLUTE COUNT: 0.1 K/CU MM
METAMYELOCYTES PERCENT: 1 %
MONOCYTES ABSOLUTE: 0.9 K/CU MM
MONOCYTES ABSOLUTE: 1 K/CU MM
MONOCYTES RELATIVE PERCENT: 10 % (ref 0–4)
MONOCYTES RELATIVE PERCENT: 8.3 % (ref 0–4)
PDW BLD-RTO: 12.4 % (ref 11.7–14.9)
PDW BLD-RTO: 12.4 % (ref 11.7–14.9)
PLATELET # BLD: 110 K/CU MM (ref 140–440)
PLATELET # BLD: 114 K/CU MM (ref 140–440)
PMV BLD AUTO: 11.8 FL (ref 7.5–11.1)
PMV BLD AUTO: 12 FL (ref 7.5–11.1)
POTASSIUM SERPL-SCNC: 3.6 MMOL/L (ref 3.5–5.1)
RBC # BLD: 2.85 M/CU MM (ref 4.2–5.4)
RBC # BLD: 3.07 M/CU MM (ref 4.2–5.4)
SEGMENTED NEUTROPHILS ABSOLUTE COUNT: 6.4 K/CU MM
SEGMENTED NEUTROPHILS ABSOLUTE COUNT: 8.4 K/CU MM
SEGMENTED NEUTROPHILS RELATIVE PERCENT: 64 % (ref 36–66)
SEGMENTED NEUTROPHILS RELATIVE PERCENT: 76.1 % (ref 36–66)
SODIUM BLD-SCNC: 141 MMOL/L (ref 135–145)
TOTAL IMMATURE NEUTOROPHIL: 0.4 K/CU MM
WBC # BLD: 10 K/CU MM (ref 4–10.5)
WBC # BLD: 11 K/CU MM (ref 4–10.5)
WBC # BLD: ABNORMAL 10*3/UL

## 2023-01-01 PROCEDURE — 80048 BASIC METABOLIC PNL TOTAL CA: CPT

## 2023-01-01 PROCEDURE — 36415 COLL VENOUS BLD VENIPUNCTURE: CPT

## 2023-01-01 PROCEDURE — 94761 N-INVAS EAR/PLS OXIMETRY MLT: CPT

## 2023-01-01 PROCEDURE — 85027 COMPLETE CBC AUTOMATED: CPT

## 2023-01-01 PROCEDURE — 2580000003 HC RX 258: Performed by: NURSE PRACTITIONER

## 2023-01-01 PROCEDURE — 85007 BL SMEAR W/DIFF WBC COUNT: CPT

## 2023-01-01 PROCEDURE — 6360000002 HC RX W HCPCS: Performed by: STUDENT IN AN ORGANIZED HEALTH CARE EDUCATION/TRAINING PROGRAM

## 2023-01-01 PROCEDURE — 6370000000 HC RX 637 (ALT 250 FOR IP): Performed by: NURSE PRACTITIONER

## 2023-01-01 PROCEDURE — 1200000000 HC SEMI PRIVATE

## 2023-01-01 RX ADMIN — LEVOTHYROXINE SODIUM 100 MCG: 0.1 TABLET ORAL at 06:10

## 2023-01-01 RX ADMIN — HYDROCODONE BITARTRATE AND ACETAMINOPHEN 1 TABLET: 5; 325 TABLET ORAL at 06:10

## 2023-01-01 RX ADMIN — ZOLPIDEM TARTRATE 5 MG: 5 TABLET ORAL at 22:12

## 2023-01-01 RX ADMIN — PANTOPRAZOLE SODIUM 40 MG: 40 TABLET, DELAYED RELEASE ORAL at 06:10

## 2023-01-01 RX ADMIN — ENOXAPARIN SODIUM 30 MG: 100 INJECTION SUBCUTANEOUS at 21:55

## 2023-01-01 RX ADMIN — Medication 5000 UNITS: at 09:38

## 2023-01-01 RX ADMIN — SODIUM CHLORIDE, PRESERVATIVE FREE 10 ML: 5 INJECTION INTRAVENOUS at 22:13

## 2023-01-01 RX ADMIN — ENOXAPARIN SODIUM 30 MG: 100 INJECTION SUBCUTANEOUS at 09:39

## 2023-01-01 ASSESSMENT — PAIN SCALES - GENERAL: PAINLEVEL_OUTOF10: 0

## 2023-01-01 NOTE — PLAN OF CARE

## 2023-01-01 NOTE — PROGRESS NOTES
V2.0  [facility] [place] Hospital[place] Note      Name:  Peter Mckeon /Age/Sex: 1945  (65 y.o. female)   MRN & CSN:  [id number] & [id number] Encounter Date/Time: 2023 3:59 PM EST    [facility] P[facility]: Ayan Oconnor MD       Hospital Day: 4    Assessment and Plan:   Peter Mckeon is a 65 y.o. female with pmh of CLL, COPD, hyperlipidemia, hypertension, hypothyroidism, osteoporosis who presents with Closed displaced fracture of left patella, unspecified fracture morphology, sequela      Plan:    Closed displaced comminuted fracture of left patella  Closed fracture head of the left fibula  Closed fracture of the left tibial plateau  Mechanical fall-patient slipped on ice  -Subsequent encounter patient initially presented to the emergency room  with complaint of left knee pain after fall. Patient was discharged home with a left knee immobilizer and follow-up with Ortho.  -Inpatient  -Analgesics  -Neurovascular checks  -Consult to orthopedic surgery per ER attending Dr. Kiera Tovar is out of town over the weekend states he will inform his partner Dr. Bryan Guadalupe of patient's admission  -MIVF  -Vascular studies in ER arterial Doppler and venous Doppler negative for acute DVTs or arterial occlusions.  -Fall precautions  -Ortho planning surgery next week. Till then pain management. Chronic Anemia - Hb trended down today. No active bleed. Will just monitor for now. Ambulatory dysfunction: Secondary to fracture as above unable to do ADLs due to fracture and knee immobilizer.   -PT OT postop     Chronic lymphocytic leukemia: Follows with oncology-Dr. Joi Godfrey   Waldenstrom's macroglobulinemia   -Continue ibrutinib  -Antiemetics as needed    Hypothyroidism: Continue levothyroxine    Hypertension: Patient currently not on medication  -Has been on Maxide in the past.    GERD: Continue PPI     CKD D stage IIIa: BUN 25, creatinine 1.2  -Baseline creatinine around 1  -MIVF  -MP daily    Transaminitis: ALT 76, AST 64 Chronic Conditions: Continue all home medications except as stated above or contraindicated. Normal weight BMI 21.50: lifestyle modifications  -Follow-up PCP for longitudinal care     Diet ADULT DIET; Regular   DVT Prophylaxis [x] Lovenox, []  Heparin, [] SCDs, [] Ambulation,  [] Eliquis, [] Xarelto  [] Coumadin   Code Status Full Code   Disposition From: Home  Expected Disposition: TBD  Estimated Date of Discharge: 3 Days  Patient requires continued admission due to Pending surgery and pain management   Surrogate Decision Maker/ POA      Subjective:     Chief Complaint: Leg Pain       Aaron Marsh is a 65 y.o. female who presents with Fall    Patient had a fall on Friday and going to the ED. Found to have a fracture, was discharged home on pain medication. Advised to immobilized the left leg and walk with crutches. However since the pain was unbearable patient decided to come back. Patient is doing well. Pain well controlled with medication. Waiting to get surgery       Review of Systems:    Review of Systems    Patient denies any fever, chest pain, dizziness, shortness of breath, abdominal pain, dysuria. Bowel and bladder habit normal    Objective:   No intake or output data in the 24 hours ending 01/01/23 1327       Vitals:   Vitals:    01/01/23 0818   BP: (!) 136/51   Pulse: 71   Resp: 24   Temp: 98.4 °F (36.9 °C)   SpO2: 94%       Physical Exam:     General: NAD  Eyes: EOMI  ENT: neck supple  Cardiovascular: Regular rate. Respiratory: Clear to auscultation  Gastrointestinal: Soft, non tender  Genitourinary: no suprapubic tenderness  Musculoskeletal: Immobilizer on left knee. Able to move her toes. Left knee range of motion restricted due to pain. Skin: warm, dry  Neuro: Alert. Psych: Mood appropriate.      Medications:   Medications:    enoxaparin  30 mg SubCUTAneous BID    pantoprazole  40 mg Oral QAM AC    ibrutinib  420 mg Oral Daily    levothyroxine  100 mcg Oral Daily    Vitamin D  5,000 Units Oral Daily    sodium chloride flush  5-40 mL IntraVENous 2 times per day      Infusions:    sodium chloride       PRN Meds: albuterol sulfate HFA, 2 puff, Q6H PRN  sodium chloride flush, 5-40 mL, PRN  sodium chloride, 25 mL, PRN  ondansetron, 4 mg, Q8H PRN   Or  ondansetron, 4 mg, Q6H PRN  polyethylene glycol, 17 g, Daily PRN  acetaminophen, 650 mg, Q6H PRN   Or  acetaminophen, 650 mg, Q6H PRN  HYDROcodone-acetaminophen, 1 tablet, Q6H PRN  morphine, 2 mg, Q4H PRN  zolpidem, 5 mg, Nightly PRN      Labs      Recent Results (from the past 24 hour(s))   CBC auto differential    Collection Time: 12/31/22  2:58 PM   Result Value Ref Range    WBC 11.0 (H) 4.0 - 10.5 K/CU MM    RBC 3.07 (L) 4.2 - 5.4 M/CU MM    Hemoglobin 9.3 (L) 12.5 - 16.0 GM/DL    Hematocrit 28.9 (L) 37 - 47 %    MCV 94.1 78 - 100 FL    MCH 30.3 27 - 31 PG    MCHC 32.2 32.0 - 36.0 %    RDW 12.4 11.7 - 14.9 %    Platelets 114 (L) 140 - 440 K/CU MM    MPV 12.0 (H) 7.5 - 11.1 FL   Basic Metabolic Panel w/ Reflex to MG    Collection Time: 12/31/22  2:58 PM   Result Value Ref Range    Sodium 139 135 - 145 MMOL/L    Potassium 3.5 3.5 - 5.1 MMOL/L    Chloride 103 99 - 110 mMol/L    CO2 24 21 - 32 MMOL/L    Anion Gap 12 4 - 16    BUN 23 6 - 23 MG/DL    Creatinine 1.0 0.6 - 1.1 MG/DL    Est, Glom Filt Rate 58 (L) >60 mL/min/1.73m2    Glucose 99 70 - 99 MG/DL    Calcium 7.9 (L) 8.3 - 10.6 MG/DL   Magnesium    Collection Time: 12/31/22  2:58 PM   Result Value Ref Range    Magnesium 1.8 1.8 - 2.4 mg/dl   CBC auto differential    Collection Time: 01/01/23  7:46 AM   Result Value Ref Range    WBC 10.0 4.0 - 10.5 K/CU MM    RBC 2.85 (L) 4.2 - 5.4 M/CU MM    Hemoglobin 8.4 (L) 12.5 - 16.0 GM/DL    Hematocrit 27.0 (L) 37 - 47 %    MCV 94.7 78 - 100 FL    MCH 29.5 27 - 31 PG    MCHC 31.1 (L) 32.0 - 36.0 %    RDW 12.4 11.7 - 14.9 %    Platelets 110 (L) 140 - 440 K/CU MM    MPV 11.8 (H) 7.5 - 11.1 FL    Metamyelocytes Relative 1 (H) 0.0 %    Bands Relative 4 (L) 5 -  11 %    Segs Relative 64.0 36 - 66 %    Eosinophils % 1.0 0 - 3 %    Lymphocytes % 20.0 (L) 24 - 44 %    Monocytes % 10.0 (H) 0 - 4 %    Metamyelocytes Absolute 0.10 K/CU MM    Bands Absolute 0.40 K/CU MM    Segs Absolute 6.4 K/CU MM    Eosinophils Absolute 0.1 K/CU MM    Lymphocytes Absolute 2.0 K/CU MM    Monocytes Absolute 1.0 K/CU MM    Differential Type MANUAL DIFFERENTIAL    Basic Metabolic Panel w/ Reflex to MG    Collection Time: 01/01/23  7:46 AM   Result Value Ref Range    Sodium 141 135 - 145 MMOL/L    Potassium 3.6 3.5 - 5.1 MMOL/L    Chloride 105 99 - 110 mMol/L    CO2 22 21 - 32 MMOL/L    Anion Gap 14 4 - 16    BUN 24 (H) 6 - 23 MG/DL    Creatinine 1.0 0.6 - 1.1 MG/DL    Est, Glom Filt Rate 58 (L) >60 mL/min/1.73m2    Glucose 91 70 - 99 MG/DL    Calcium 8.1 (L) 8.3 - 10.6 MG/DL          Imaging/Diagnostics Last 24 Hours   VL DUP LOWER EXTREMITY ARTERIES LEFT    Result Date: 12/29/2022  EXAMINATION: ARTERIAL DUPLEX ULTRASOUND OF THE  LOWER EXTREMITY  12/29/2022 4:52 pm TECHNIQUE: Duplex ultrasound using B-mode/gray scaled imaging, Doppler spectral analysis and color flow Doppler was obtained of the lower extremity. COMPARISON: None HISTORY: ORDERING SYSTEM PROVIDED HISTORY: leg pain, recent fracture TECHNOLOGIST PROVIDED HISTORY: Reason for exam:->leg pain, recent fracture FINDINGS: Arterial waveforms in the femoral and popliteal territories are monophasic. The trifurcation vessels also demonstrate monophasic waveforms. Flow velocities were measured as follows: Com. Fem. 288 cm/sec Prof.           125 cm/sec SFA Prox. 273 cm/sec SFA Mid.     253 cm/sec SFA Dist.     209 cm/sec Pop. 170 cm/sec PTA            98 cm/sec Peron. 98 cm/sec REENA            86 cm/sec     All arteries visualized in the left lower extremity are patent however femoropopliteal arteries demonstrate elevated velocities.   Monophasic waveforms in the femoral circulation indicate aortoiliac disease with likely hemodynamic stenosis. No evidence of arterial occlusions. VL DUP LOWER EXTREMITY VENOUS LEFT    Result Date: 12/29/2022  EXAMINATION: DUPLEX VENOUS ULTRASOUND OF THE LEFT LOWER EXTREMITY 12/29/2022 4:52 pm TECHNIQUE: Duplex ultrasound using B-mode/gray scaled imaging and Doppler spectral analysis and color flow was obtained of the deep venous structures of the left extremity. COMPARISON: None. HISTORY: ORDERING SYSTEM PROVIDED HISTORY: calf pain, knee fracture, increased swelling, eval for DVT TECHNOLOGIST PROVIDED HISTORY: Reason for exam:->calf pain, knee fracture, increased swelling, eval for DVT FINDINGS: The visualized veins of the left lower extremity are patent and free of echogenic thrombus. The veins demonstrate good compressibility with normal color flow study and spectral analysis. No evidence of DVT in the left lower extremity.        Electronically signed by Susana Chapman MD on 1/1/2023 at 1:27 PM

## 2023-01-02 LAB
ANION GAP SERPL CALCULATED.3IONS-SCNC: 10 MMOL/L (ref 4–16)
ANISOCYTOSIS: ABNORMAL
BASOPHILS ABSOLUTE: 0 K/CU MM
BASOPHILS RELATIVE PERCENT: 0 % (ref 0–1)
BUN BLDV-MCNC: 25 MG/DL (ref 6–23)
CALCIUM SERPL-MCNC: 8 MG/DL (ref 8.3–10.6)
CHLORIDE BLD-SCNC: 106 MMOL/L (ref 99–110)
CO2: 25 MMOL/L (ref 21–32)
CREAT SERPL-MCNC: 1.1 MG/DL (ref 0.6–1.1)
DIFFERENTIAL TYPE: ABNORMAL
EOSINOPHILS ABSOLUTE: 0.2 K/CU MM
EOSINOPHILS RELATIVE PERCENT: 2 % (ref 0–3)
GFR SERPL CREATININE-BSD FRML MDRD: 51 ML/MIN/1.73M2
GLUCOSE BLD-MCNC: 105 MG/DL (ref 70–99)
HCT VFR BLD CALC: 28.6 % (ref 37–47)
HEMOGLOBIN: 9 GM/DL (ref 12.5–16)
IMMATURE NEUTROPHIL %: 2 % (ref 0–0.43)
LYMPHOCYTES ABSOLUTE: 1.2 K/CU MM
LYMPHOCYTES RELATIVE PERCENT: 12 % (ref 24–44)
MCH RBC QN AUTO: 30 PG (ref 27–31)
MCHC RBC AUTO-ENTMCNC: 31.5 % (ref 32–36)
MCV RBC AUTO: 95.3 FL (ref 78–100)
MONOCYTES ABSOLUTE: 0.8 K/CU MM
MONOCYTES RELATIVE PERCENT: 8 % (ref 0–4)
PDW BLD-RTO: 12.4 % (ref 11.7–14.9)
PLATELET # BLD: 142 K/CU MM (ref 140–440)
PMV BLD AUTO: 12.2 FL (ref 7.5–11.1)
POTASSIUM SERPL-SCNC: 3.6 MMOL/L (ref 3.5–5.1)
RBC # BLD: 3 M/CU MM (ref 4.2–5.4)
SEGMENTED NEUTROPHILS ABSOLUTE COUNT: 7.9 K/CU MM
SEGMENTED NEUTROPHILS RELATIVE PERCENT: 76 % (ref 36–66)
SODIUM BLD-SCNC: 141 MMOL/L (ref 135–145)
TOTAL IMMATURE NEUTOROPHIL: 0.21 K/CU MM
WBC # BLD: 10.3 K/CU MM (ref 4–10.5)

## 2023-01-02 PROCEDURE — 80048 BASIC METABOLIC PNL TOTAL CA: CPT

## 2023-01-02 PROCEDURE — 6370000000 HC RX 637 (ALT 250 FOR IP): Performed by: NURSE PRACTITIONER

## 2023-01-02 PROCEDURE — 99233 SBSQ HOSP IP/OBS HIGH 50: CPT | Performed by: INTERNAL MEDICINE

## 2023-01-02 PROCEDURE — 6360000002 HC RX W HCPCS: Performed by: STUDENT IN AN ORGANIZED HEALTH CARE EDUCATION/TRAINING PROGRAM

## 2023-01-02 PROCEDURE — 2580000003 HC RX 258: Performed by: NURSE PRACTITIONER

## 2023-01-02 PROCEDURE — 1200000000 HC SEMI PRIVATE

## 2023-01-02 PROCEDURE — 94761 N-INVAS EAR/PLS OXIMETRY MLT: CPT

## 2023-01-02 PROCEDURE — 85025 COMPLETE CBC W/AUTO DIFF WBC: CPT

## 2023-01-02 RX ADMIN — SODIUM CHLORIDE, PRESERVATIVE FREE 10 ML: 5 INJECTION INTRAVENOUS at 22:28

## 2023-01-02 RX ADMIN — HYDROCODONE BITARTRATE AND ACETAMINOPHEN 1 TABLET: 5; 325 TABLET ORAL at 17:39

## 2023-01-02 RX ADMIN — Medication 5000 UNITS: at 10:30

## 2023-01-02 RX ADMIN — SODIUM CHLORIDE, PRESERVATIVE FREE 10 ML: 5 INJECTION INTRAVENOUS at 10:31

## 2023-01-02 RX ADMIN — LEVOTHYROXINE SODIUM 100 MCG: 0.1 TABLET ORAL at 06:46

## 2023-01-02 RX ADMIN — ENOXAPARIN SODIUM 30 MG: 100 INJECTION SUBCUTANEOUS at 10:30

## 2023-01-02 RX ADMIN — PANTOPRAZOLE SODIUM 40 MG: 40 TABLET, DELAYED RELEASE ORAL at 06:46

## 2023-01-02 RX ADMIN — ZOLPIDEM TARTRATE 5 MG: 5 TABLET ORAL at 22:26

## 2023-01-02 RX ADMIN — ACETAMINOPHEN 650 MG: 325 TABLET ORAL at 22:25

## 2023-01-02 ASSESSMENT — PAIN SCALES - GENERAL
PAINLEVEL_OUTOF10: 4
PAINLEVEL_OUTOF10: 0
PAINLEVEL_OUTOF10: 3

## 2023-01-02 ASSESSMENT — PAIN DESCRIPTION - LOCATION
LOCATION: KNEE
LOCATION: KNEE

## 2023-01-02 ASSESSMENT — PAIN DESCRIPTION - DESCRIPTORS
DESCRIPTORS: CRAMPING
DESCRIPTORS: ACHING

## 2023-01-02 ASSESSMENT — PAIN DESCRIPTION - ORIENTATION
ORIENTATION: LEFT
ORIENTATION: LEFT

## 2023-01-02 NOTE — CONSULTS
HEMATOLOGY ONCOLOGY  Consultation Report    REASON FOR CONSULT  To evaluate the patient with Waldenstrom's macroglobulinemia. CHIEF COMPLAINT    Chief Complaint   Patient presents with    Leg Pain     HISTORY OF PRESENT ILLNESS   Leroy Garcia is a 65 y.o. female with medical history significant for Waldenstrom's macroglobulinemia, currently on first line chemotherapy with ibrutinib since 1/30/2021, presented to [facility] on 12/29/22 with worsening left knee and leg pain. She had a mechanical slip and fall on 12/27/22 in her driveway which was icy. She was found to have a comminuted medial and lateral tibial plateau fractures with intra-articular extension. Largest depressed fragment involving the lateral tibial plateau measured 8 mm with nondisplaced comminuted fibular head fracture and patellar fracture. She follows with orthopedics Dr. Gregg Moreira. On 12/27 patient was discharged home with knee immobilizer to follow-up with orthopedics outpatient. Patient was unable to tolerate ambulation with crutches and knee immobilizer at home pain has been intolerable with oral pain medications patient denies any new falls or injuries. States they called the orthopedic office today and was told to come to the emergency room. Orthopedics is planning for surgery. I was called to evaluate her.      PAST MEDICAL HISTORY    Past Medical History:   Diagnosis Date    Arm fracture, left 05/16/2019    Casted - no surgery - Dr. 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 pylori infection 8/1996    S/P Biaxin and Prilosec    Hiatal hernia 8/1994    with reflux by UGI    Hyperlipidemia     Hypertension     Hypothyroidism 1990's MDRO (multiple drug resistant organisms) resistance     Nasal passages    Osteoporosis     Smoking hx        SURGICAL HISTORY    Past Surgical History:   Procedure Laterality Date    CHOLECYSTECTOMY, LAPAROSCOPIC  2018    Dr Deep Wong    COLONOSCOPY  10/26/2007    Normal exam - Dr. Cindy Shen    COLONOSCOPY  2019    COLONOSCOPY N/A 2019    COLORECTAL CANCER SCREENING, NOT HIGH RISK performed by Zafar Zhao MD at [address] Left 10/21/2013    Lesion excision-squamous papillomaDr Francesco    SKIN BIOPSY  1960's    Moles removed - face, neck       FAMILY HISTORY    Family History   Problem Relation Age of Onset    High Blood Pressure Mother     High Blood Pressure Father        SOCIAL HISTORY    Social History     Socioeconomic History    Marital status:      Spouse name: None    Number of children: None    Years of education: None    Highest education level: None   Tobacco Use    Smoking status: Former     Packs/day: 2.00     Years: 30.00     Pack years: 60.00     Types: Cigarettes     Start date: 1965     Quit date: 1997     Years since quittin.0    Smokeless tobacco: Never   Vaping Use    Vaping Use: Never used   Substance and Sexual Activity    Alcohol use: No    Drug use: No       REVIEW OF SYSTEMS    Constitutional:  Denies fever, chills, loss of appetite, weight loss, tiredness, fatigue or weakness   HEENT:  Denies swelling of neck glands  Respiratory:  Denies cough, shortness of breath or hemoptysis  Cardiovascular:  Denies chest pain, palpitations or swelling   GI:  Denies abdominal pain, nausea, vomiting, constipation or diarrhea   Musculoskeletal:  Denies back pain, Has left knee pain,   Skin:  Denies rash   Neurologic:  Denies headache, focal weakness or sensory changes   All systems negative except as marked.      PHYSICAL EXAM    Vitals: BP (!) 149/63   Pulse 66   Temp 98.4 °F (36.9 °C) (Oral)   Resp 16   Ht 5' 1\" (1.549 m)   Wt 116 lb 3.2 oz (52.7 kg)   SpO2 95%   BMI 21.96 kg/m²   CONSTITUTIONAL: awake, alert, cooperative, no apparent distress   EYES: pupils equal, round and reactive to light, sclera clear and conjunctiva normal  ENT: Normocephalic, without obvious abnormality, atraumatic  NECK: supple, symmetrical, no jugular venous distension and no carotid bruits   HEMATOLOGIC/LYMPHATIC: no cervical, supraclavicular or axillary lymphadenopathy   LUNGS: VBS, no wheezes, no crackles, no rhonchi, no increased work of breathing and clear to auscultation   CARDIOVASCULAR: regular rate and rhythm, normal S1 and S2, no murmur noted  ABDOMEN: normal bowel sounds x 4, soft, non-distended, non-tender, no masses palpated, no hepatosplenomgaly   MUSCULOSKELETAL: full range of motion noted, tone is normal  NEUROLOGIC: awake, alert, oriented to name, place and time. Motor skills grossly intact. SKIN: Normal skin color, texture, turgor and no jaundice. Skin appears intact   EXTREMITIES: no LE edema, no cyanosis, left knee is on immobilizer, able to move her toes,     LABORATORY RESULTS  CBC:   Recent Labs     12/31/22  1458 01/01/23  0746 01/02/23  0004   WBC 11.0* 10.0 10.3   HGB 9.3* 8.4* 9.0*   * 110* 142     BMP:    Recent Labs     12/31/22  1458 01/01/23  0746 01/02/23  0004    141 141   K 3.5 3.6 3.6    105 106   CO2 24 22 25   BUN 23 24* 25*   CREATININE 1.0 1.0 1.1   GLUCOSE 99 91 105*     Hepatic: No results for input(s): AST, ALT, ALB, BILITOT, ALKPHOS in the last 72 hours. INR: No results for input(s): INR in the last 72 hours. ASSESSMENT/RECOMMENDATION  Waldenstrom's macroglobulinemia - she is currently on ibrutinib and her disease has been in stable condition. I recommend to hold ibrutinib for now. Will plan to resume it ~ a week after surgery. Anemia - multifactorial etiology (d/l Waldenstrom's, chemo, bleeding etc.). Will check anemia panel on next blood draw.     Left tibia and fibular fracture - plan for surgery by orthopedics soon. No contraindications from hem/onc stand point. We will follow the patient. Thank you for allowing me to participate in the care of this very pleasant patient.

## 2023-01-02 NOTE — PLAN OF CARE
Problem: ABCDS Injury Assessment  Goal: Absence of physical injury  Outcome: Progressing     Problem: Nutrition Deficit:  Goal: Optimize nutritional status  Outcome: Progressing

## 2023-01-02 NOTE — PROGRESS NOTES
V2.0  [facility] [place]      Name:  Candace Ramírez /Age/Sex: 1945  (65 y.o. female)   MRN & CSN:  [id number] & [id number] Encounter Date/Time: 2023 3:59 PM EST    [facility] PCP: Demarcus Adames MD       Hospital Day: 5    Assessment and Plan:   Candace Ramírez is a 65 y.o. female with pmh of CLL, COPD, hyperlipidemia, hypertension, hypothyroidism, osteoporosis who presents with Closed displaced fracture of left patella, unspecified fracture morphology, sequela      Plan:    Closed displaced comminuted fracture of left patella  Closed fracture head of the left fibula  Closed fracture of the left tibial plateau  Mechanical fall-patient slipped on ice  -Subsequent encounter patient initially presented to the emergency room  with complaint of left knee pain after fall. Patient was discharged home with a left knee immobilizer and instruction to follow-up with Ortho. Came back to ER again with uncontrolled pain  -Inpatient  -Analgesics  -Neurovascular checks  -MIVF  -Vascular studies in ER arterial Doppler and venous Doppler negative for acute DVTs or arterial occlusions.  -Fall precautions  - Dr. Sandy Verduzco was contacted who mentioned, Dr Racquel Kirby is following and will do the surgery next well. Till then pain management. Will put her NPO. Hopefully she will get surgery in AM.      Chronic Anemia - Hb trended down today. No active bleed. Will just monitor for now. Ambulatory dysfunction: Secondary to fracture as above unable to do ADLs due to fracture and knee immobilizer.   -PT OT postop     Chronic lymphocytic leukemia:  Dr. Rodney Nielsen following   Waldenstrom's macroglobulinemia   - ibrutinib Held  -Antiemetics as needed    Hypothyroidism: Continue levothyroxine    Hypertension: Patient currently not on medication  -Has been on Maxide in the past.    GERD: Continue PPI     CKD D stage IIIa:   -Baseline creatinine around 1  -MIVF  -MP daily    Transaminitis: ALT 76, AST 64     Chronic Conditions: Continue all home medications except as stated above or contraindicated. Normal weight BMI 21.50: lifestyle modifications  -Follow-up PCP for longitudinal care     Diet ADULT DIET; Regular  Diet NPO   DVT Prophylaxis [x] Lovenox, []  Heparin, [] SCDs, [] Ambulation,  [] Eliquis, [] Xarelto  [] Coumadin   Code Status Full Code   Disposition From: Home  Expected Disposition: TBD  Estimated Date of Discharge: 3 Days  Patient requires continued admission due to Pending surgery and pain management   Surrogate Decision Maker/ POA      Subjective:     Chief Complaint: Leg Pain       Danish Barbour is a 65 y.o. female who presents with Fall    Patient had a fall on Friday and going to the ED. Found to have a fracture, was discharged home on pain medication. Advised to immobilized the left leg and walk with crutches. However since the pain was unbearable patient decided to come back. Patient is doing well. Pain well controlled with medication. Waiting to get surgery       Review of Systems:    Review of Systems    Patient denies any fever, chest pain, dizziness, shortness of breath, abdominal pain, dysuria. Bowel and bladder habit normal    Objective:   No intake or output data in the 24 hours ending 01/02/23 1346       Vitals:   Vitals:    01/02/23 0909   BP: (!) 149/63   Pulse: 66   Resp: 16   Temp: 98.4 °F (36.9 °C)   SpO2: 95%       Physical Exam:     General: NAD  Eyes: EOMI  ENT: neck supple  Cardiovascular: Regular rate. Respiratory: Clear to auscultation  Gastrointestinal: Soft, non tender  Genitourinary: no suprapubic tenderness  Musculoskeletal: Immobilizer on left knee. Able to move her toes. Left knee range of motion restricted due to pain. Skin: warm, dry  Neuro: Alert. Psych: Mood appropriate.      Medications:   Medications:    enoxaparin  30 mg SubCUTAneous BID    pantoprazole  40 mg Oral QAM AC    [Held by provider] ibrutinib  420 mg Oral Daily    levothyroxine  100 mcg Oral Daily Vitamin D  5,000 Units Oral Daily    sodium chloride flush  5-40 mL IntraVENous 2 times per day      Infusions:    sodium chloride       PRN Meds: albuterol sulfate HFA, 2 puff, Q6H PRN  sodium chloride flush, 5-40 mL, PRN  sodium chloride, 25 mL, PRN  ondansetron, 4 mg, Q8H PRN   Or  ondansetron, 4 mg, Q6H PRN  polyethylene glycol, 17 g, Daily PRN  acetaminophen, 650 mg, Q6H PRN   Or  acetaminophen, 650 mg, Q6H PRN  HYDROcodone-acetaminophen, 1 tablet, Q6H PRN  morphine, 2 mg, Q4H PRN  zolpidem, 5 mg, Nightly PRN      Labs      Recent Results (from the past 24 hour(s))   CBC with Auto Differential    Collection Time: 01/02/23 12:04 AM   Result Value Ref Range    WBC 10.3 4.0 - 10.5 K/CU MM    RBC 3.00 (L) 4.2 - 5.4 M/CU MM    Hemoglobin 9.0 (L) 12.5 - 16.0 GM/DL    Hematocrit 28.6 (L) 37 - 47 %    MCV 95.3 78 - 100 FL    MCH 30.0 27 - 31 PG    MCHC 31.5 (L) 32.0 - 36.0 %    RDW 12.4 11.7 - 14.9 %    Platelets 656 380 - 586 K/CU MM    MPV 12.2 (H) 7.5 - 11.1 FL    Immature Neutrophil % 2.0 (H) 0 - 0.43 %    Segs Relative 76.0 (H) 36 - 66 %    Eosinophils % 2.0 0 - 3 %    Basophils % 0.0 0 - 1 %    Lymphocytes % 12.0 (L) 24 - 44 %    Monocytes % 8.0 (H) 0 - 4 %    Total Immature Neutrophil 0.21 K/CU MM    Segs Absolute 7.9 K/CU MM    Eosinophils Absolute 0.2 K/CU MM    Basophils Absolute 0.0 K/CU MM    Lymphocytes Absolute 1.2 K/CU MM    Monocytes Absolute 0.8 K/CU MM    Differential Type AUTOMATED DIFFERENTIAL     Anisocytosis 1+    Basic Metabolic Panel    Collection Time: 01/02/23 12:04 AM   Result Value Ref Range    Sodium 141 135 - 145 MMOL/L    Potassium 3.6 3.5 - 5.1 MMOL/L    Chloride 106 99 - 110 mMol/L    CO2 25 21 - 32 MMOL/L    Anion Gap 10 4 - 16    BUN 25 (H) 6 - 23 MG/DL    Creatinine 1.1 0.6 - 1.1 MG/DL    Est, Glom Filt Rate 51 (L) >60 mL/min/1.73m2    Glucose 105 (H) 70 - 99 MG/DL    Calcium 8.0 (L) 8.3 - 10.6 MG/DL          Imaging/Diagnostics Last 24 Hours   VL DUP LOWER EXTREMITY ARTERIES LEFT    Result Date: 12/29/2022  EXAMINATION: ARTERIAL DUPLEX ULTRASOUND OF THE  LOWER EXTREMITY  12/29/2022 4:52 pm TECHNIQUE: Duplex ultrasound using B-mode/gray scaled imaging, Doppler spectral analysis and color flow Doppler was obtained of the lower extremity. COMPARISON: None HISTORY: ORDERING SYSTEM PROVIDED HISTORY: leg pain, recent fracture TECHNOLOGIST PROVIDED HISTORY: Reason for exam:->leg pain, recent fracture FINDINGS: Arterial waveforms in the femoral and popliteal territories are monophasic. The trifurcation vessels also demonstrate monophasic waveforms. Flow velocities were measured as follows: Com. Fem. 288 cm/sec Prof.           125 cm/sec SFA Prox. 273 cm/sec SFA Mid.     253 cm/sec SFA Dist.     209 cm/sec Pop. 170 cm/sec PTA            98 cm/sec Peron. 98 cm/sec REENA            86 cm/sec     All arteries visualized in the left lower extremity are patent however femoropopliteal arteries demonstrate elevated velocities. Monophasic waveforms in the femoral circulation indicate aortoiliac disease with likely hemodynamic stenosis. No evidence of arterial occlusions. VL DUP LOWER EXTREMITY VENOUS LEFT    Result Date: 12/29/2022  EXAMINATION: DUPLEX VENOUS ULTRASOUND OF THE LEFT LOWER EXTREMITY 12/29/2022 4:52 pm TECHNIQUE: Duplex ultrasound using B-mode/gray scaled imaging and Doppler spectral analysis and color flow was obtained of the deep venous structures of the left extremity. COMPARISON: None. HISTORY: ORDERING SYSTEM PROVIDED HISTORY: calf pain, knee fracture, increased swelling, eval for DVT TECHNOLOGIST PROVIDED HISTORY: Reason for exam:->calf pain, knee fracture, increased swelling, eval for DVT FINDINGS: The visualized veins of the left lower extremity are patent and free of echogenic thrombus. The veins demonstrate good compressibility with normal color flow study and spectral analysis. No evidence of DVT in the left lower extremity.        Electronically signed by Gino Lujan MD on 1/2/2023 at 1:46 PM

## 2023-01-03 ENCOUNTER — ANESTHESIA (OUTPATIENT)
Dept: OPERATING ROOM | Age: 78
DRG: 493 | End: 2023-01-03
Payer: MEDICARE

## 2023-01-03 ENCOUNTER — ANESTHESIA EVENT (OUTPATIENT)
Dept: OPERATING ROOM | Age: 78
DRG: 493 | End: 2023-01-03
Payer: MEDICARE

## 2023-01-03 PROBLEM — S82.042A CLOSED DISPLACED COMMINUTED FRACTURE OF LEFT PATELLA: Status: ACTIVE | Noted: 2023-01-03

## 2023-01-03 LAB
ALBUMIN SERPL-MCNC: 3.1 GM/DL (ref 3.4–5)
ALP BLD-CCNC: 88 IU/L (ref 40–129)
ALT SERPL-CCNC: 13 U/L (ref 10–40)
ANION GAP SERPL CALCULATED.3IONS-SCNC: 11 MMOL/L (ref 4–16)
ANISOCYTOSIS: ABNORMAL
AST SERPL-CCNC: 10 IU/L (ref 15–37)
BANDED NEUTROPHILS ABSOLUTE COUNT: 0.56 K/CU MM
BANDED NEUTROPHILS RELATIVE PERCENT: 6 % (ref 5–11)
BILIRUB SERPL-MCNC: 0.8 MG/DL (ref 0–1)
BILIRUBIN DIRECT: 0.3 MG/DL (ref 0–0.3)
BILIRUBIN, INDIRECT: 0.5 MG/DL (ref 0–0.7)
BUN BLDV-MCNC: 23 MG/DL (ref 6–23)
CALCIUM SERPL-MCNC: 8.3 MG/DL (ref 8.3–10.6)
CHLORIDE BLD-SCNC: 107 MMOL/L (ref 99–110)
CO2: 25 MMOL/L (ref 21–32)
CREAT SERPL-MCNC: 0.9 MG/DL (ref 0.6–1.1)
DIFFERENTIAL TYPE: ABNORMAL
EOSINOPHILS ABSOLUTE: 0.2 K/CU MM
EOSINOPHILS RELATIVE PERCENT: 2 % (ref 0–3)
FERRITIN: 100 NG/ML (ref 15–150)
FOLATE: >20 NG/ML (ref 3.1–17.5)
GFR SERPL CREATININE-BSD FRML MDRD: >60 ML/MIN/1.73M2
GLUCOSE BLD-MCNC: 96 MG/DL (ref 70–99)
HCT VFR BLD CALC: 28.9 % (ref 37–47)
HEMOGLOBIN: 9 GM/DL (ref 12.5–16)
IRON: 26 UG/DL (ref 37–145)
LACTATE DEHYDROGENASE: 157 IU/L (ref 120–246)
LYMPHOCYTES ABSOLUTE: 0.9 K/CU MM
LYMPHOCYTES RELATIVE PERCENT: 10 % (ref 24–44)
MCH RBC QN AUTO: 29.5 PG (ref 27–31)
MCHC RBC AUTO-ENTMCNC: 31.1 % (ref 32–36)
MCV RBC AUTO: 94.8 FL (ref 78–100)
METAMYELOCYTES ABSOLUTE COUNT: 0.19 K/CU MM
METAMYELOCYTES PERCENT: 2 %
MONOCYTES ABSOLUTE: 0.6 K/CU MM
MONOCYTES RELATIVE PERCENT: 6 % (ref 0–4)
PCT TRANSFERRIN: 14 % (ref 10–44)
PDW BLD-RTO: 12.5 % (ref 11.7–14.9)
PLATELET # BLD: 148 K/CU MM (ref 140–440)
PMV BLD AUTO: 11.6 FL (ref 7.5–11.1)
POTASSIUM SERPL-SCNC: 3.8 MMOL/L (ref 3.5–5.1)
RBC # BLD: 3.05 M/CU MM (ref 4.2–5.4)
RBC # BLD: ABNORMAL 10*6/UL
RETICULOCYTE COUNT PCT: 4.7 % (ref 0.2–2.2)
SEGMENTED NEUTROPHILS ABSOLUTE COUNT: 6.8 K/CU MM
SEGMENTED NEUTROPHILS RELATIVE PERCENT: 74 % (ref 36–66)
SODIUM BLD-SCNC: 143 MMOL/L (ref 135–145)
TOTAL IRON BINDING CAPACITY: 184 UG/DL (ref 250–450)
TOTAL PROTEIN: 5.3 GM/DL (ref 6.4–8.2)
UNSATURATED IRON BINDING CAPACITY: 158 UG/DL (ref 110–370)
VITAMIN B-12: 556.2 PG/ML (ref 211–911)
WBC # BLD: 9.3 K/CU MM (ref 4–10.5)

## 2023-01-03 PROCEDURE — 94761 N-INVAS EAR/PLS OXIMETRY MLT: CPT

## 2023-01-03 PROCEDURE — 85007 BL SMEAR W/DIFF WBC COUNT: CPT

## 2023-01-03 PROCEDURE — 85027 COMPLETE CBC AUTOMATED: CPT

## 2023-01-03 PROCEDURE — 6370000000 HC RX 637 (ALT 250 FOR IP): Performed by: NURSE PRACTITIONER

## 2023-01-03 PROCEDURE — 83615 LACTATE (LD) (LDH) ENZYME: CPT

## 2023-01-03 PROCEDURE — 2580000003 HC RX 258: Performed by: NURSE PRACTITIONER

## 2023-01-03 PROCEDURE — 83550 IRON BINDING TEST: CPT

## 2023-01-03 PROCEDURE — 85045 AUTOMATED RETICULOCYTE COUNT: CPT

## 2023-01-03 PROCEDURE — 83010 ASSAY OF HAPTOGLOBIN QUANT: CPT

## 2023-01-03 PROCEDURE — 36415 COLL VENOUS BLD VENIPUNCTURE: CPT

## 2023-01-03 PROCEDURE — 82728 ASSAY OF FERRITIN: CPT

## 2023-01-03 PROCEDURE — 99221 1ST HOSP IP/OBS SF/LOW 40: CPT | Performed by: ORTHOPAEDIC SURGERY

## 2023-01-03 PROCEDURE — 82607 VITAMIN B-12: CPT

## 2023-01-03 PROCEDURE — 80048 BASIC METABOLIC PNL TOTAL CA: CPT

## 2023-01-03 PROCEDURE — 83540 ASSAY OF IRON: CPT

## 2023-01-03 PROCEDURE — 80076 HEPATIC FUNCTION PANEL: CPT

## 2023-01-03 PROCEDURE — 82746 ASSAY OF FOLIC ACID SERUM: CPT

## 2023-01-03 PROCEDURE — 99232 SBSQ HOSP IP/OBS MODERATE 35: CPT | Performed by: INTERNAL MEDICINE

## 2023-01-03 PROCEDURE — 1200000000 HC SEMI PRIVATE

## 2023-01-03 PROCEDURE — 94640 AIRWAY INHALATION TREATMENT: CPT

## 2023-01-03 RX ADMIN — Medication 5000 UNITS: at 08:44

## 2023-01-03 RX ADMIN — LEVOTHYROXINE SODIUM 100 MCG: 0.1 TABLET ORAL at 06:17

## 2023-01-03 RX ADMIN — PANTOPRAZOLE SODIUM 40 MG: 40 TABLET, DELAYED RELEASE ORAL at 06:17

## 2023-01-03 RX ADMIN — ALBUTEROL SULFATE 2 PUFF: 90 AEROSOL, METERED RESPIRATORY (INHALATION) at 22:21

## 2023-01-03 RX ADMIN — SODIUM CHLORIDE, PRESERVATIVE FREE 5 ML: 5 INJECTION INTRAVENOUS at 09:39

## 2023-01-03 RX ADMIN — SODIUM CHLORIDE, PRESERVATIVE FREE 10 ML: 5 INJECTION INTRAVENOUS at 21:58

## 2023-01-03 RX ADMIN — ZOLPIDEM TARTRATE 5 MG: 5 TABLET ORAL at 21:57

## 2023-01-03 RX ADMIN — HYDROCODONE BITARTRATE AND ACETAMINOPHEN 1 TABLET: 5; 325 TABLET ORAL at 22:13

## 2023-01-03 RX ADMIN — HYDROCODONE BITARTRATE AND ACETAMINOPHEN 1 TABLET: 5; 325 TABLET ORAL at 08:44

## 2023-01-03 ASSESSMENT — ENCOUNTER SYMPTOMS
BACK PAIN: 0
ABDOMINAL PAIN: 0
CHEST TIGHTNESS: 0
EYE REDNESS: 0
COLOR CHANGE: 0

## 2023-01-03 ASSESSMENT — PAIN SCALES - GENERAL
PAINLEVEL_OUTOF10: 9
PAINLEVEL_OUTOF10: 0

## 2023-01-03 ASSESSMENT — PAIN DESCRIPTION - DESCRIPTORS: DESCRIPTORS: SORE

## 2023-01-03 ASSESSMENT — PAIN DESCRIPTION - LOCATION: LOCATION: CHEST

## 2023-01-03 ASSESSMENT — PAIN DESCRIPTION - ORIENTATION: ORIENTATION: LEFT

## 2023-01-03 NOTE — PROGRESS NOTES
V2.0  [facility] Hospitalist Progress Note      Name:  Aj Roblero /Age/Sex: 1945  (65 y.o. female)   MRN & CSN:  [id number] & [id number] Encounter Date/Time: 1/3/2023 3:59 PM EST    Location:  [facility] PCP: Eliana Duncan MD       Hospital Day: 6    Assessment and Plan:   Aj Roblero is a 65 y.o. female with pmh of CLL, COPD, hyperlipidemia, hypertension, hypothyroidism, osteoporosis who presents with Closed displaced fracture of left patella, unspecified fracture morphology, sequela      Plan:    Closed displaced comminuted fracture of left patella  Closed fracture head of the left fibula  Closed fracture of the left tibial plateau  Mechanical fall-patient slipped on ice  -Subsequent encounter patient initially presented to the emergency room  with complaint of left knee pain after fall. Patient was discharged home with a left knee immobilizer and instruction to follow-up with Ortho. Came back to ER again with uncontrolled pain  -Inpatient  -Analgesics  -Neurovascular checks  -MIVF  -Vascular studies in ER arterial Doppler and venous Doppler negative for acute DVTs or arterial occlusions.  -Fall precautions  - orthopedic surgery consulted; await recs; not sure if going for surgery tomorrow or not     Chronic Anemia - Hb trended down today. No active bleed. Will just monitor for now. Ambulatory dysfunction: Secondary to fracture as above unable to do ADLs due to fracture and knee immobilizer.   -PT OT postop     Chronic lymphocytic leukemia:  Dr. Antonio Cochran following   Waldenstrom's macroglobulinemia   - ibrutinib Held  -Antiemetics as needed    Hypothyroidism: Continue levothyroxine    Hypertension: Patient currently not on medication  -Has been on Maxide in the past.    GERD: Continue PPI     CKD D stage IIIa:   -Baseline creatinine around 1    Transaminitis: ALT 76, AST 64  - check liver function panel       Normal weight BMI 21.50: lifestyle modifications  -Follow-up PCP for longitudinal care     Diet ADULT DIET; Regular   DVT Prophylaxis [x] Lovenox, []  Heparin, [] SCDs, [] Ambulation,  [] Eliquis, [] Xarelto  [] Coumadin   Code Status Full Code   Disposition From: Home  Expected Disposition: TBD  Estimated Date of Discharge: TD  Patient requires continued admission due to Pending surgery and pain management   Surrogate Decision Maker/ POA      Subjective:     Chief Complaint: Leg Pain       Anisa Brizuela is a 65 y.o. female who presents with Fall    No plan for surgery today. Pt states pain is well controlled. No new complaints or concerns. Denies lightheadedness, dizziness, fever, night sweats, chills, chest pain, cough, dyspnea, palpitations, abd pain, nausea, vomiting, diarrhea, dysuria. Review of Systems:    Review of Systems    See above    Objective: Intake/Output Summary (Last 24 hours) at 1/3/2023 1121  Last data filed at 1/2/2023 1930  Gross per 24 hour   Intake 120 ml   Output --   Net 120 ml        Vitals:   Vitals:    01/03/23 0921   BP: (!) 156/57   Pulse: 69   Resp: 24   Temp: 98.2 °F (36.8 °C)   SpO2: 94%       Physical Exam:     General: NAD  Eyes: EOMI  ENT: neck supple  Cardiovascular: Regular rate. Respiratory: Clear to auscultation  Gastrointestinal: Soft, non tender  Genitourinary: no suprapubic tenderness  Musculoskeletal: Immobilizer on left knee. Able to move her toes. Left knee range of motion restricted due to pain , left leg warm. Skin: warm, dry  Neuro: Alert. Psych: Mood appropriate.      Medications:   Medications:    [Held by provider] enoxaparin  30 mg SubCUTAneous BID    pantoprazole  40 mg Oral QAM AC    [Held by provider] ibrutinib  420 mg Oral Daily    levothyroxine  100 mcg Oral Daily    Vitamin D  5,000 Units Oral Daily    sodium chloride flush  5-40 mL IntraVENous 2 times per day      Infusions:    sodium chloride       PRN Meds: albuterol sulfate HFA, 2 puff, Q6H PRN  sodium chloride flush, 5-40 mL, PRN  sodium chloride, 25 mL, PRN  ondansetron, 4 mg, Q8H PRN   Or  ondansetron, 4 mg, Q6H PRN  polyethylene glycol, 17 g, Daily PRN  acetaminophen, 650 mg, Q6H PRN   Or  acetaminophen, 650 mg, Q6H PRN  HYDROcodone-acetaminophen, 1 tablet, Q6H PRN  morphine, 2 mg, Q4H PRN  zolpidem, 5 mg, Nightly PRN      Labs      Recent Results (from the past 24 hour(s))   CBC with Auto Differential    Collection Time: 01/03/23  7:35 AM   Result Value Ref Range    WBC 9.3 4.0 - 10.5 K/CU MM    RBC 3.05 (L) 4.2 - 5.4 M/CU MM    Hemoglobin 9.0 (L) 12.5 - 16.0 GM/DL    Hematocrit 28.9 (L) 37 - 47 %    MCV 94.8 78 - 100 FL    MCH 29.5 27 - 31 PG    MCHC 31.1 (L) 32.0 - 36.0 %    RDW 12.5 11.7 - 14.9 %    Platelets 562 933 - 336 K/CU MM    MPV 11.6 (H) 7.5 - 11.1 FL    Metamyelocytes Relative 2 (H) 0.0 %    Bands Relative 6 5 - 11 %    Segs Relative 74.0 (H) 36 - 66 %    Eosinophils % 2.0 0 - 3 %    Lymphocytes % 10.0 (L) 24 - 44 %    Monocytes % 6.0 (H) 0 - 4 %    Metamyelocytes Absolute 0.19 K/CU MM    Bands Absolute 0.56 K/CU MM    Segs Absolute 6.8 K/CU MM    Eosinophils Absolute 0.2 K/CU MM    Lymphocytes Absolute 0.9 K/CU MM    Monocytes Absolute 0.6 K/CU MM    Differential Type MANUAL DIFFERENTIAL     RBC Morphology OCCASIONAL     Anisocytosis 1+    Basic Metabolic Panel    Collection Time: 01/03/23  7:35 AM   Result Value Ref Range    Sodium 143 135 - 145 MMOL/L    Potassium 3.8 3.5 - 5.1 MMOL/L    Chloride 107 99 - 110 mMol/L    CO2 25 21 - 32 MMOL/L    Anion Gap 11 4 - 16    BUN 23 6 - 23 MG/DL    Creatinine 0.9 0.6 - 1.1 MG/DL    Est, Glom Filt Rate >60 >60 mL/min/1.73m2    Glucose 96 70 - 99 MG/DL    Calcium 8.3 8.3 - 10.6 MG/DL   Iron and TIBC    Collection Time: 01/03/23  7:35 AM   Result Value Ref Range    Iron 26 (L) 37 - 145 ug/dL    UIBC 158 110 - 370 ug/dL    TIBC 184 (L) 250 - 450 ug/dL    Transferrin % 14 10 - 44 %   Ferritin    Collection Time: 01/03/23  7:35 AM   Result Value Ref Range    Ferritin 100 15 - 150 NG/ML   Reticulocytes    Collection Time: 01/03/23  7:35 AM Result Value Ref Range    Retic Ct Pct 4.7 (H) 0.2 - 2.20 %   Vitamin B12 & Folate    Collection Time: 01/03/23  7:35 AM   Result Value Ref Range    Vitamin B-12 556.2 211 - 911 pg/ml    Folate >20.0 (H) 3.1 - 17.5 NG/ML   Lactate Dehydrogenase    Collection Time: 01/03/23  7:35 AM   Result Value Ref Range     120 - 246 IU/L          Imaging/Diagnostics Last 24 Hours   VL DUP LOWER EXTREMITY ARTERIES LEFT    Result Date: 12/29/2022  EXAMINATION: ARTERIAL DUPLEX ULTRASOUND OF THE  LOWER EXTREMITY  12/29/2022 4:52 pm TECHNIQUE: Duplex ultrasound using B-mode/gray scaled imaging, Doppler spectral analysis and color flow Doppler was obtained of the lower extremity. COMPARISON: None HISTORY: ORDERING SYSTEM PROVIDED HISTORY: leg pain, recent fracture TECHNOLOGIST PROVIDED HISTORY: Reason for exam:->leg pain, recent fracture FINDINGS: Arterial waveforms in the femoral and popliteal territories are monophasic. The trifurcation vessels also demonstrate monophasic waveforms. Flow velocities were measured as follows: Com. Fem. 288 cm/sec Prof.           125 cm/sec SFA Prox. 273 cm/sec SFA Mid.     253 cm/sec SFA Dist.     209 cm/sec Pop. 170 cm/sec PTA            98 cm/sec Peron. 98 cm/sec REENA            86 cm/sec     All arteries visualized in the left lower extremity are patent however femoropopliteal arteries demonstrate elevated velocities. Monophasic waveforms in the femoral circulation indicate aortoiliac disease with likely hemodynamic stenosis. No evidence of arterial occlusions. VL DUP LOWER EXTREMITY VENOUS LEFT    Result Date: 12/29/2022  EXAMINATION: DUPLEX VENOUS ULTRASOUND OF THE LEFT LOWER EXTREMITY 12/29/2022 4:52 pm TECHNIQUE: Duplex ultrasound using B-mode/gray scaled imaging and Doppler spectral analysis and color flow was obtained of the deep venous structures of the left extremity. COMPARISON: None.  HISTORY: ORDERING SYSTEM PROVIDED HISTORY: calf pain, knee fracture, increased swelling, eval for DVT TECHNOLOGIST PROVIDED HISTORY: Reason for exam:->calf pain, knee fracture, increased swelling, eval for DVT FINDINGS: The visualized veins of the left lower extremity are patent and free of echogenic thrombus. The veins demonstrate good compressibility with normal color flow study and spectral analysis. No evidence of DVT in the left lower extremity.        Electronically signed by Luis Hamilton MD on 1/3/2023 at 11:21 AM

## 2023-01-03 NOTE — CARE COORDINATION
Spoke with patient regarding discharge planning . Patient's friend Rony Domínguez present also. Patient is from home with her spouse and reported normally being independent with mobility. Patient has PCP and insurance to assist with RX coverage. CM discussed rehab for patient post op and patient stated that she would like to go to [facility] since she has been there before. Referral called to [facility].

## 2023-01-03 NOTE — CONSULTS
Consult to Orthopedic Surgery  Consult performed by: Antonia Flores DO  Consult ordered by: Sharl Merlin, MD  Reason for consult: Left tibial plateau fracture  Assessment/Recommendations:     Left bicondylar tibial plateau fracture    I discussed with her today her x-ray and CT findings. I explained to her that she does have displaced fractures in her left tibial plateau which will require surgical treatment. I discussed with her today performing open reduction and internal fixation of her left bicondylar tibial plateau fracture. I explained risks, benefits, possible complications of the procedure and answered all questions for the patient. I explained postoperative rehabilitation protocol and expectations with the patient today. The patient understands and consents to the procedure. Continue bedrest.  Remain nonweightbearing/no lifting pushing or pulling on the injured extremity  Ice and elevate as needed. Pain medication as needed. Patient will be kept n.p.o. After midnight tonightfor planned surgical treatment Wednesday morning. Ella Colindres is a 76-year-old female who sustained a fall last week landing directly onto her left knee. She had immediate pain and was unable to bear weight on the left lower extremity. She was initially brought to the ED, x-rays and CT scan were obtained and she was able to tolerate outpatient follow-up. A couple of days later she developed worsening pain and swelling in the left leg so she presented back to the ED and was admitted to the hospitalist for medical management. I was then consulted for continuing treatment of her left knee injury. She is currently complaining of deep, aching and throbbing pain globally in her left knee. Patient denies any new injury to the involved extremity/ joint, denies numbness or tingling in the involved extremity and denies fever or chills.         Review of Systems   Constitutional:  Negative for activity change, chills and fever. HENT:  Negative for congestion and drooling. Eyes:  Negative for redness. Respiratory:  Negative for chest tightness. Cardiovascular:  Negative for chest pain. Gastrointestinal:  Negative for abdominal pain. Endocrine: Negative for cold intolerance and heat intolerance. Musculoskeletal:  Positive for arthralgias, gait problem, joint swelling and myalgias. Negative for back pain. Skin:  Negative for color change, pallor, rash and wound. Neurological:  Negative for weakness and numbness. Psychiatric/Behavioral:  Negative for confusion. Past Medical History:   Diagnosis Date    Arm fracture, left 05/16/2019    Casted - no surgery - Dr. 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 pylori infection 8/1996    S/P Biaxin and Prilosec    Hiatal hernia 8/1994    with reflux by UGI    Hyperlipidemia     Hypertension     Hypothyroidism 1990's    MDRO (multiple drug resistant organisms) resistance 2005    Nasal passages    Osteoporosis     Smoking hx        No current facility-administered medications on file prior to encounter.      Current Outpatient Medications on File Prior to Encounter   Medication Sig Dispense Refill    ondansetron (ZOFRAN-ODT) 4 MG disintegrating tablet Take 1 tablet by mouth 3 times daily as needed for Nausea or Vomiting 21 tablet 0    ibrutinib (IMBRUVICA) 140 MG chemo capsule Take 3 capsules daily 90 capsule 3    potassium chloride (KLOR-CON M) 10 MEQ extended release tablet Take 1 tablet by mouth 2 times daily 60 tablet 3    levothyroxine (SYNTHROID) 100 MCG tablet Take 1 tablet by mouth Daily 30 tablet 0    traMADol (ULTRAM) 50 MG tablet TAKE 1 TABLET BY MOUTH TWICE A DAY AS NEEDED      vitamin D (D-3-5) 125 MCG (5000 UT) CAPS capsule Take 5,000 Units by mouth daily Wed and sat      zolpidem (AMBIEN) 5 MG tablet TAKE 1 TABLET BY MOUTH NIGHTLY AS NEEDED FOR SLEEP FOR UP TO 30 DAYS.      esomeprazole (NEXIUM) 40 MG delayed release capsule TAKE 1 CAPSULE DAILY 90 capsule 0    albuterol sulfate HFA (VENTOLIN HFA) 108 (90 Base) MCG/ACT inhaler Inhale 2 puffs into the lungs every 6 hours as needed for Wheezing 3 Inhaler 3     Allergies   Allergen Reactions    Amitiza [Lubiprostone]     Clindamycin/Lincomycin      GI intolerance    Evista [Raloxifene Hydrochloride]     Flexeril [Cyclobenzaprine Hcl]      CNS    Omega-3 Fatty Acids [Fish Oil] Diarrhea    Prilosec [Omeprazole]      Pt sts meds didn't work - states not an allergy 6/3/19    Skelaxin [Metaxalone]      Itching    Spiriva Handihaler [Tiotropium Bromide Monohydrate]      \"Made my throat dry\" - not allergic too.   6/3/19               Past Surgical History:   Procedure Laterality Date    CHOLECYSTECTOMY, LAPAROSCOPIC  2018    Dr Radha Malik    COLONOSCOPY  10/26/2007    Normal exam - Dr. Eduardo Joy    COLONOSCOPY  2019    COLONOSCOPY N/A 2019    COLORECTAL CANCER SCREENING, NOT HIGH RISK performed by Madi Guy MD at [facility] Left 10/21/2013    Lesion excision-squamous papillomaDr Francesco    SKIN BIOPSY  1960's    Moles removed - face, neck     Social History     Tobacco Use    Smoking status: Former     Packs/day: 2.00     Years: 30.00     Pack years: 60.00     Types: Cigarettes     Start date: 1965     Quit date: 1997     Years since quittin.0    Smokeless tobacco: Never   Vaping Use    Vaping Use: Never used   Substance Use Topics    Alcohol use: No    Drug use: No     Family History   Problem Relation Age of Onset    High Blood Pressure Mother     High Blood Pressure Father        Right Ankle Exam   Right ankle exam is normal.    Tenderness   The patient is experiencing no tenderness. Swelling: none    Range of Motion   The patient has normal right ankle ROM. Muscle Strength   The patient has normal right ankle strength. Other   Erythema: absent  Sensation: normal  Pulse: present       Left Ankle Exam     Tenderness   The patient is experiencing no tenderness. Swelling: moderate    Muscle Strength   The patient has normal left ankle strength. Other   Erythema: absent  Sensation: normal  Pulse: present      Right Knee Exam     Muscle Strength   The patient has normal right knee strength. Tenderness   The patient is experiencing no tenderness. Range of Motion   The patient has normal right knee ROM. Other   Erythema: absent  Sensation: normal  Pulse: present  Swelling: none  Effusion: no effusion present      Left Knee Exam     Tenderness   The patient is experiencing tenderness in the lateral joint line, medial joint line, patella and patellar tendon. Other   Erythema: absent  Scars: absent  Sensation: normal  Pulse: present  Swelling: moderate  Effusion: effusion present    Comments:  Left knee-skin intact, moderate ecchymosis, moderate swelling, no blisters present, wrinkle sign present. Full range of motion not assessed due to recent injury. Intact sensation motor function to all nerve distributions of the extremity. +2 DP  Compartment soft. BP (!) 156/57   Pulse 69   Temp 98.2 °F (36.8 °C) (Oral)   Resp 24   Ht 5' 1\" (1.549 m)   Wt 116 lb 3.2 oz (52.7 kg)   SpO2 94%   BMI 21.96 kg/m²     VL DUP LOWER EXTREMITY ARTERIES LEFT    Result Date: 12/29/2022  EXAMINATION: ARTERIAL DUPLEX ULTRASOUND OF THE  LOWER EXTREMITY  12/29/2022 4:52 pm TECHNIQUE: Duplex ultrasound using B-mode/gray scaled imaging, Doppler spectral analysis and color flow Doppler was obtained of the lower extremity.  COMPARISON: None HISTORY: ORDERING SYSTEM PROVIDED HISTORY: leg pain, recent fracture TECHNOLOGIST PROVIDED HISTORY: Reason for exam:->leg pain, recent fracture FINDINGS: Arterial waveforms in the femoral and popliteal territories are monophasic. The trifurcation vessels also demonstrate monophasic waveforms. Flow velocities were measured as follows: Com. Fem. 288 cm/sec Prof.           125 cm/sec SFA Prox. 273 cm/sec SFA Mid.     253 cm/sec SFA Dist.     209 cm/sec Pop. 170 cm/sec PTA            98 cm/sec Peron. 98 cm/sec REENA            86 cm/sec     All arteries visualized in the left lower extremity are patent however femoropopliteal arteries demonstrate elevated velocities. Monophasic waveforms in the femoral circulation indicate aortoiliac disease with likely hemodynamic stenosis. No evidence of arterial occlusions. VL DUP LOWER EXTREMITY VENOUS LEFT    Result Date: 12/29/2022  EXAMINATION: DUPLEX VENOUS ULTRASOUND OF THE LEFT LOWER EXTREMITY 12/29/2022 4:52 pm TECHNIQUE: Duplex ultrasound using B-mode/gray scaled imaging and Doppler spectral analysis and color flow was obtained of the deep venous structures of the left extremity. COMPARISON: None. HISTORY: ORDERING SYSTEM PROVIDED HISTORY: calf pain, knee fracture, increased swelling, eval for DVT TECHNOLOGIST PROVIDED HISTORY: Reason for exam:->calf pain, knee fracture, increased swelling, eval for DVT FINDINGS: The visualized veins of the left lower extremity are patent and free of echogenic thrombus. The veins demonstrate good compressibility with normal color flow study and spectral analysis. No evidence of DVT in the left lower extremity. CT scan of left knee from 12/27/2022 reviewed by me today demonstrates:  Impression   Comminuted medial and lateral tibial plateau fractures with intra-articular   extension. The largest depressed fragment involving the lateral tibial   plateau measures up to 8 mm. Nondisplaced comminuted fibular head fracture. Minimally displaced comminuted patellar fracture.
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
## 2023-01-03 NOTE — PLAN OF CARE
Problem: ABCDS Injury Assessment  Goal: Absence of physical injury  1/3/2023 0339 by Bridger Oneil RN  Outcome: Progressing  1/2/2023 1430 by Sergei Gregorio LPN  Outcome: Progressing     Problem: Nutrition Deficit:  Goal: Optimize nutritional status  1/3/2023 0339 by Birdger Oneil RN  Outcome: Progressing  1/2/2023 1430 by Sergei Gregorio LPN  Outcome: Progressing     Problem: Skin/Tissue Integrity  Goal: Absence of new skin breakdown  Description: 1. Monitor for areas of redness and/or skin breakdown  2. Assess vascular access sites hourly  3. Every 4-6 hours minimum:  Change oxygen saturation probe site  4. Every 4-6 hours:  If on nasal continuous positive airway pressure, respiratory therapy assess nares and determine need for appliance change or resting period.   Outcome: Progressing

## 2023-01-03 NOTE — PROGRESS NOTES
HEMATOLOGY ONCOLOGY  Progress Note     Mike Brownlee is a 65 y.o. female with medical history significant for Waldenstrom's macroglobulinemia, currently on first line chemotherapy with ibrutinib since 1/30/2021, presented to [facility]22 with worsening left knee and leg pain. She had a mechanical slip and fall on 12/27/22 in her driveway which was icy. She was found to have a comminuted medial and lateral tibial plateau fractures with intra-articular extension. Largest depressed fragment involving the lateral tibial plateau measured 8 mm with nondisplaced comminuted fibular head fracture and patellar fracture. She follows with orthopedics Dr. Royal Singleton. On 12/27 patient was discharged home with knee immobilizer to follow-up with orthopedics outpatient. Patient was unable to tolerate ambulation with crutches and knee immobilizer at home pain has been intolerable with oral pain medications patient denies any new falls or injuries. States they called the orthopedic office today and was told to come to the emergency room. Orthopedics is planning for surgery. I was called to evaluate her. 1/3/22  Patient was seen and examined. She is npo for possible surgery by Dr. Royal Singleton. Discussed her case with Dr. Kristine Cedillo. Still has pain in her left knee. No new complaints.      PHYSICAL EXAM    Vitals: /65   Pulse 62   Temp 98.3 °F (36.8 °C) (Oral)   Resp 26   Ht 5' 1\" (1.549 m)   Wt 116 lb 3.2 oz (52.7 kg)   SpO2 94%   BMI 21.96 kg/m²   CONSTITUTIONAL: awake, alert, cooperative, no apparent distress   EYES: pupils equal, round and reactive to light, sclera clear and conjunctiva normal  ENT: Normocephalic, without obvious abnormality, atraumatic  NECK: supple, symmetrical, no jugular venous distension and no carotid bruits   HEMATOLOGIC/LYMPHATIC: no cervical, supraclavicular or axillary lymphadenopathy   LUNGS: VBS, no wheezes, no crackles, no rhonchi, no increased work of breathing and clear to auscultation   CARDIOVASCULAR: regular rate and rhythm, normal S1 and S2, no murmur noted  ABDOMEN: normal bowel sounds x 4, soft, non-distended, non-tender, no masses palpated, no hepatosplenomgaly   MUSCULOSKELETAL: full range of motion noted, tone is normal  NEUROLOGIC: awake, alert, oriented to name, place and time. Motor skills grossly intact. SKIN: Normal skin color, texture, turgor and no jaundice. Skin appears intact   EXTREMITIES: no LE edema, left knee is on immobilizer, able to move her toes, no cyanosis,     LABORATORY RESULTS  CBC:   Recent Labs     12/31/22  1458 01/01/23  0746 01/02/23  0004   WBC 11.0* 10.0 10.3   HGB 9.3* 8.4* 9.0*   * 110* 142     BMP:    Recent Labs     12/31/22  1458 01/01/23  0746 01/02/23  0004    141 141   K 3.5 3.6 3.6    105 106   CO2 24 22 25   BUN 23 24* 25*   CREATININE 1.0 1.0 1.1   GLUCOSE 99 91 105*     Hepatic: No results for input(s): AST, ALT, ALB, BILITOT, ALKPHOS in the last 72 hours. INR: No results for input(s): INR in the last 72 hours. ASSESSMENT/RECOMMENDATION  Waldenstrom's macroglobulinemia - she is currently on ibrutinib and her disease has been in stable condition. I recommend to hold ibrutinib for now. Will plan to resume it ~ a week after surgery. Anemia - multifactorial etiology (d/l Waldenstrom's, chemo, bleeding etc.). Will check anemia panel on next blood draw. Left tibia and fibular fracture - plan for surgery by Dr. Alberto Campa. Discussed with Dr. Juventino Juárez and her nurse this morning. She is NPO for possible surgery today. No contraindications from hem/onc stand point. We will follow the patient. Thank you.

## 2023-01-04 ENCOUNTER — APPOINTMENT (OUTPATIENT)
Dept: GENERAL RADIOLOGY | Age: 78
DRG: 493 | End: 2023-01-04
Payer: MEDICARE

## 2023-01-04 LAB
ABO/RH: NORMAL
ANION GAP SERPL CALCULATED.3IONS-SCNC: 14 MMOL/L (ref 4–16)
ANISOCYTOSIS: ABNORMAL
ANTIBODY SCREEN: NEGATIVE
BANDED NEUTROPHILS ABSOLUTE COUNT: 0.67 K/CU MM
BANDED NEUTROPHILS RELATIVE PERCENT: 5 % (ref 5–11)
BASOPHILS ABSOLUTE: 0 K/CU MM
BASOPHILS RELATIVE PERCENT: 0 % (ref 0–1)
BUN BLDV-MCNC: 24 MG/DL (ref 6–23)
CALCIUM SERPL-MCNC: 7.9 MG/DL (ref 8.3–10.6)
CHLORIDE BLD-SCNC: 108 MMOL/L (ref 99–110)
CO2: 22 MMOL/L (ref 21–32)
CREAT SERPL-MCNC: 1 MG/DL (ref 0.6–1.1)
DIFFERENTIAL TYPE: ABNORMAL
EOSINOPHILS ABSOLUTE: 0 K/CU MM
EOSINOPHILS RELATIVE PERCENT: 0 % (ref 0–3)
GFR SERPL CREATININE-BSD FRML MDRD: 58 ML/MIN/1.73M2
GLUCOSE BLD-MCNC: 131 MG/DL (ref 70–99)
HCT VFR BLD CALC: 31.2 % (ref 37–47)
HEMOGLOBIN: 9.5 GM/DL (ref 12.5–16)
IMMATURE NEUTROPHIL %: 0 % (ref 0–0.43)
LYMPHOCYTES ABSOLUTE: 0.3 K/CU MM
LYMPHOCYTES RELATIVE PERCENT: 2 % (ref 24–44)
MCH RBC QN AUTO: 30.1 PG (ref 27–31)
MCHC RBC AUTO-ENTMCNC: 30.4 % (ref 32–36)
MCV RBC AUTO: 98.7 FL (ref 78–100)
MONOCYTES ABSOLUTE: 0.4 K/CU MM
MONOCYTES RELATIVE PERCENT: 3 % (ref 0–4)
PDW BLD-RTO: 12.6 % (ref 11.7–14.9)
PLATELET # BLD: 172 K/CU MM (ref 140–440)
PMV BLD AUTO: 11.6 FL (ref 7.5–11.1)
POTASSIUM SERPL-SCNC: 4.3 MMOL/L (ref 3.5–5.1)
RBC # BLD: 3.16 M/CU MM (ref 4.2–5.4)
SEGMENTED NEUTROPHILS ABSOLUTE COUNT: 11.9 K/CU MM
SEGMENTED NEUTROPHILS RELATIVE PERCENT: 90 % (ref 36–66)
SODIUM BLD-SCNC: 144 MMOL/L (ref 135–145)
TOTAL IMMATURE NEUTOROPHIL: 0 K/CU MM
WBC # BLD: 13.3 K/CU MM (ref 4–10.5)

## 2023-01-04 PROCEDURE — 6370000000 HC RX 637 (ALT 250 FOR IP): Performed by: NURSE PRACTITIONER

## 2023-01-04 PROCEDURE — 2580000003 HC RX 258: Performed by: ORTHOPAEDIC SURGERY

## 2023-01-04 PROCEDURE — 85027 COMPLETE CBC AUTOMATED: CPT

## 2023-01-04 PROCEDURE — 6360000002 HC RX W HCPCS: Performed by: ORTHOPAEDIC SURGERY

## 2023-01-04 PROCEDURE — 86901 BLOOD TYPING SEROLOGIC RH(D): CPT

## 2023-01-04 PROCEDURE — P9045 ALBUMIN (HUMAN), 5%, 250 ML: HCPCS | Performed by: NURSE ANESTHETIST, CERTIFIED REGISTERED

## 2023-01-04 PROCEDURE — 2709999900 HC NON-CHARGEABLE SUPPLY: Performed by: ORTHOPAEDIC SURGERY

## 2023-01-04 PROCEDURE — 3700000001 HC ADD 15 MINUTES (ANESTHESIA): Performed by: ORTHOPAEDIC SURGERY

## 2023-01-04 PROCEDURE — 3700000000 HC ANESTHESIA ATTENDED CARE: Performed by: ORTHOPAEDIC SURGERY

## 2023-01-04 PROCEDURE — 6370000000 HC RX 637 (ALT 250 FOR IP): Performed by: ORTHOPAEDIC SURGERY

## 2023-01-04 PROCEDURE — A4217 STERILE WATER/SALINE, 500 ML: HCPCS | Performed by: ORTHOPAEDIC SURGERY

## 2023-01-04 PROCEDURE — 80048 BASIC METABOLIC PNL TOTAL CA: CPT

## 2023-01-04 PROCEDURE — 85007 BL SMEAR W/DIFF WBC COUNT: CPT

## 2023-01-04 PROCEDURE — 7100000001 HC PACU RECOVERY - ADDTL 15 MIN: Performed by: ORTHOPAEDIC SURGERY

## 2023-01-04 PROCEDURE — 0QSH04Z REPOSITION LEFT TIBIA WITH INTERNAL FIXATION DEVICE, OPEN APPROACH: ICD-10-PCS | Performed by: ORTHOPAEDIC SURGERY

## 2023-01-04 PROCEDURE — 2500000003 HC RX 250 WO HCPCS: Performed by: NURSE ANESTHETIST, CERTIFIED REGISTERED

## 2023-01-04 PROCEDURE — 1200000000 HC SEMI PRIVATE

## 2023-01-04 PROCEDURE — 2780000010 HC IMPLANT OTHER: Performed by: ORTHOPAEDIC SURGERY

## 2023-01-04 PROCEDURE — 94150 VITAL CAPACITY TEST: CPT

## 2023-01-04 PROCEDURE — 86900 BLOOD TYPING SEROLOGIC ABO: CPT

## 2023-01-04 PROCEDURE — 94664 DEMO&/EVAL PT USE INHALER: CPT

## 2023-01-04 PROCEDURE — 94761 N-INVAS EAR/PLS OXIMETRY MLT: CPT

## 2023-01-04 PROCEDURE — 86850 RBC ANTIBODY SCREEN: CPT

## 2023-01-04 PROCEDURE — 76000 FLUOROSCOPY <1 HR PHYS/QHP: CPT

## 2023-01-04 PROCEDURE — 3600000004 HC SURGERY LEVEL 4 BASE: Performed by: ORTHOPAEDIC SURGERY

## 2023-01-04 PROCEDURE — 6360000002 HC RX W HCPCS: Performed by: NURSE ANESTHETIST, CERTIFIED REGISTERED

## 2023-01-04 PROCEDURE — 27536 TREAT KNEE FRACTURE: CPT | Performed by: ORTHOPAEDIC SURGERY

## 2023-01-04 PROCEDURE — 3600000014 HC SURGERY LEVEL 4 ADDTL 15MIN: Performed by: ORTHOPAEDIC SURGERY

## 2023-01-04 PROCEDURE — 7100000000 HC PACU RECOVERY - FIRST 15 MIN: Performed by: ORTHOPAEDIC SURGERY

## 2023-01-04 PROCEDURE — 6360000002 HC RX W HCPCS: Performed by: ANESTHESIOLOGY

## 2023-01-04 PROCEDURE — C1713 ANCHOR/SCREW BN/BN,TIS/BN: HCPCS | Performed by: ORTHOPAEDIC SURGERY

## 2023-01-04 PROCEDURE — 2700000000 HC OXYGEN THERAPY PER DAY

## 2023-01-04 PROCEDURE — 2580000003 HC RX 258: Performed by: NURSE PRACTITIONER

## 2023-01-04 DEVICE — LOQTEQ® CORTICAL SCREW 3.5, SMALL HEAD, T15, SELF-TAPP, L 85
Type: IMPLANTABLE DEVICE | Site: TIBIA | Status: FUNCTIONAL
Brand: LOQTEQ®

## 2023-01-04 DEVICE — CORTICAL SCREW 3.5, T15, SELF-TAPP. L 75: Type: IMPLANTABLE DEVICE | Site: TIBIA | Status: FUNCTIONAL

## 2023-01-04 DEVICE — CORTICAL SCREW 3.5, T15, SELF-TAPP. L 38: Type: IMPLANTABLE DEVICE | Site: TIBIA | Status: FUNCTIONAL

## 2023-01-04 DEVICE — IMPLANTABLE DEVICE: Type: IMPLANTABLE DEVICE | Site: TIBIA | Status: FUNCTIONAL

## 2023-01-04 DEVICE — CORTICAL SCREW 3.5, T15, SELF-TAPP. L 34: Type: IMPLANTABLE DEVICE | Site: TIBIA | Status: FUNCTIONAL

## 2023-01-04 DEVICE — LOQTEQ® CORTICAL SCREW 3.5, SMALL HEAD, T15, SELF-TAPP. L 34
Type: IMPLANTABLE DEVICE | Site: TIBIA | Status: FUNCTIONAL
Brand: LOQTEQ®

## 2023-01-04 DEVICE — LOQTEQ® CORTICAL SCREW 3.5, SMALL HEAD, T15, SELF-TAPP. L 75
Type: IMPLANTABLE DEVICE | Site: TIBIA | Status: FUNCTIONAL
Brand: LOQTEQ®

## 2023-01-04 DEVICE — LOQTEQ® CORTICAL SCREW 3.5, SMALL HEAD, T15, SELF-TAPP. L 65
Type: IMPLANTABLE DEVICE | Site: TIBIA | Status: FUNCTIONAL
Brand: LOQTEQ®

## 2023-01-04 DEVICE — LOQTEQ® PROXIMAL LAT. TIBIA PLATE 3.5, 7 HOLES, L 113, L
Type: IMPLANTABLE DEVICE | Site: TIBIA | Status: FUNCTIONAL
Brand: LOQTEQ®

## 2023-01-04 DEVICE — LOQTEQ® CORTICAL SCREW 3.5, T15, SELF-TAPPING, L 32
Type: IMPLANTABLE DEVICE | Site: TIBIA | Status: FUNCTIONAL
Brand: LOQTEQ®

## 2023-01-04 DEVICE — CORTICAL SCREW 3.5, T15, SELF-TAPP. L 80: Type: IMPLANTABLE DEVICE | Site: TIBIA | Status: FUNCTIONAL

## 2023-01-04 DEVICE — LOQTEQ® CORTICAL SCREW 3.5, SMALL HEAD, T15, SELF-TAPP. L 36
Type: IMPLANTABLE DEVICE | Site: TIBIA | Status: FUNCTIONAL
Brand: LOQTEQ®

## 2023-01-04 DEVICE — LOQTEQ® PROXIMAL MEDIAL TIBIA PLATE 3.5, 3 HOLES, L 58, L
Type: IMPLANTABLE DEVICE | Site: TIBIA | Status: FUNCTIONAL
Brand: LOQTEQ®

## 2023-01-04 RX ORDER — ROCURONIUM BROMIDE 10 MG/ML
INJECTION, SOLUTION INTRAVENOUS PRN
Status: DISCONTINUED | OUTPATIENT
Start: 2023-01-04 | End: 2023-01-04 | Stop reason: SDUPTHER

## 2023-01-04 RX ORDER — DROPERIDOL 2.5 MG/ML
0.62 INJECTION, SOLUTION INTRAMUSCULAR; INTRAVENOUS
Status: DISCONTINUED | OUTPATIENT
Start: 2023-01-04 | End: 2023-01-04 | Stop reason: HOSPADM

## 2023-01-04 RX ORDER — HYDRALAZINE HYDROCHLORIDE 20 MG/ML
10 INJECTION INTRAMUSCULAR; INTRAVENOUS
Status: DISCONTINUED | OUTPATIENT
Start: 2023-01-04 | End: 2023-01-04 | Stop reason: HOSPADM

## 2023-01-04 RX ORDER — SODIUM CHLORIDE, SODIUM LACTATE, POTASSIUM CHLORIDE, CALCIUM CHLORIDE 600; 310; 30; 20 MG/100ML; MG/100ML; MG/100ML; MG/100ML
INJECTION, SOLUTION INTRAVENOUS CONTINUOUS
Status: DISCONTINUED | OUTPATIENT
Start: 2023-01-04 | End: 2023-01-05

## 2023-01-04 RX ORDER — CEFAZOLIN SODIUM 2 G/100ML
2000 INJECTION, SOLUTION INTRAVENOUS ONCE
Status: DISCONTINUED | OUTPATIENT
Start: 2023-01-04 | End: 2023-01-04

## 2023-01-04 RX ORDER — SODIUM CHLORIDE 0.9 % (FLUSH) 0.9 %
5-40 SYRINGE (ML) INJECTION PRN
Status: DISCONTINUED | OUTPATIENT
Start: 2023-01-04 | End: 2023-01-04 | Stop reason: HOSPADM

## 2023-01-04 RX ORDER — TRANEXAMIC ACID 10 MG/ML
INJECTION, SOLUTION INTRAVENOUS PRN
Status: DISCONTINUED | OUTPATIENT
Start: 2023-01-04 | End: 2023-01-04 | Stop reason: SDUPTHER

## 2023-01-04 RX ORDER — SODIUM CHLORIDE 0.9 % (FLUSH) 0.9 %
5-40 SYRINGE (ML) INJECTION EVERY 12 HOURS SCHEDULED
Status: DISCONTINUED | OUTPATIENT
Start: 2023-01-04 | End: 2023-01-04 | Stop reason: HOSPADM

## 2023-01-04 RX ORDER — DEXAMETHASONE SODIUM PHOSPHATE 4 MG/ML
INJECTION, SOLUTION INTRA-ARTICULAR; INTRALESIONAL; INTRAMUSCULAR; INTRAVENOUS; SOFT TISSUE PRN
Status: DISCONTINUED | OUTPATIENT
Start: 2023-01-04 | End: 2023-01-04 | Stop reason: SDUPTHER

## 2023-01-04 RX ORDER — ONDANSETRON 2 MG/ML
4 INJECTION INTRAMUSCULAR; INTRAVENOUS
Status: DISCONTINUED | OUTPATIENT
Start: 2023-01-04 | End: 2023-01-04 | Stop reason: HOSPADM

## 2023-01-04 RX ORDER — PROPOFOL 10 MG/ML
INJECTION, EMULSION INTRAVENOUS PRN
Status: DISCONTINUED | OUTPATIENT
Start: 2023-01-04 | End: 2023-01-04 | Stop reason: SDUPTHER

## 2023-01-04 RX ORDER — SODIUM CHLORIDE 9 MG/ML
INJECTION, SOLUTION INTRAVENOUS PRN
Status: DISCONTINUED | OUTPATIENT
Start: 2023-01-04 | End: 2023-01-10 | Stop reason: HOSPADM

## 2023-01-04 RX ORDER — ACETAMINOPHEN 325 MG/1
650 TABLET ORAL EVERY 6 HOURS
Status: DISCONTINUED | OUTPATIENT
Start: 2023-01-04 | End: 2023-01-09

## 2023-01-04 RX ORDER — KETOROLAC TROMETHAMINE 30 MG/ML
15 INJECTION, SOLUTION INTRAMUSCULAR; INTRAVENOUS EVERY 6 HOURS
Status: DISCONTINUED | OUTPATIENT
Start: 2023-01-04 | End: 2023-01-06

## 2023-01-04 RX ORDER — SODIUM CHLORIDE 0.9 % (FLUSH) 0.9 %
5-40 SYRINGE (ML) INJECTION PRN
Status: DISCONTINUED | OUTPATIENT
Start: 2023-01-04 | End: 2023-01-10 | Stop reason: HOSPADM

## 2023-01-04 RX ORDER — SODIUM CHLORIDE 9 MG/ML
25 INJECTION, SOLUTION INTRAVENOUS PRN
Status: DISCONTINUED | OUTPATIENT
Start: 2023-01-04 | End: 2023-01-04 | Stop reason: HOSPADM

## 2023-01-04 RX ORDER — ESMOLOL HYDROCHLORIDE 10 MG/ML
INJECTION INTRAVENOUS PRN
Status: DISCONTINUED | OUTPATIENT
Start: 2023-01-04 | End: 2023-01-04 | Stop reason: SDUPTHER

## 2023-01-04 RX ORDER — SODIUM CHLORIDE 0.9 % (FLUSH) 0.9 %
5-40 SYRINGE (ML) INJECTION EVERY 12 HOURS SCHEDULED
Status: DISCONTINUED | OUTPATIENT
Start: 2023-01-04 | End: 2023-01-10 | Stop reason: HOSPADM

## 2023-01-04 RX ORDER — FENTANYL CITRATE 50 UG/ML
INJECTION, SOLUTION INTRAMUSCULAR; INTRAVENOUS PRN
Status: DISCONTINUED | OUTPATIENT
Start: 2023-01-04 | End: 2023-01-04 | Stop reason: SDUPTHER

## 2023-01-04 RX ORDER — ALBUMIN, HUMAN INJ 5% 5 %
SOLUTION INTRAVENOUS PRN
Status: DISCONTINUED | OUTPATIENT
Start: 2023-01-04 | End: 2023-01-04 | Stop reason: SDUPTHER

## 2023-01-04 RX ORDER — METOPROLOL TARTRATE 5 MG/5ML
INJECTION INTRAVENOUS PRN
Status: DISCONTINUED | OUTPATIENT
Start: 2023-01-04 | End: 2023-01-04 | Stop reason: SDUPTHER

## 2023-01-04 RX ORDER — LABETALOL HYDROCHLORIDE 5 MG/ML
10 INJECTION, SOLUTION INTRAVENOUS
Status: DISCONTINUED | OUTPATIENT
Start: 2023-01-04 | End: 2023-01-04 | Stop reason: HOSPADM

## 2023-01-04 RX ORDER — FENTANYL CITRATE 50 UG/ML
25 INJECTION, SOLUTION INTRAMUSCULAR; INTRAVENOUS EVERY 5 MIN PRN
Status: COMPLETED | OUTPATIENT
Start: 2023-01-04 | End: 2023-01-04

## 2023-01-04 RX ORDER — ONDANSETRON 2 MG/ML
INJECTION INTRAMUSCULAR; INTRAVENOUS PRN
Status: DISCONTINUED | OUTPATIENT
Start: 2023-01-04 | End: 2023-01-04 | Stop reason: SDUPTHER

## 2023-01-04 RX ADMIN — ROCURONIUM BROMIDE 50 MG: 10 INJECTION INTRAVENOUS at 07:39

## 2023-01-04 RX ADMIN — ACETAMINOPHEN 650 MG: 325 TABLET ORAL at 23:57

## 2023-01-04 RX ADMIN — CEFAZOLIN 2000 MG: 2 INJECTION, POWDER, FOR SOLUTION INTRAMUSCULAR; INTRAVENOUS at 22:44

## 2023-01-04 RX ADMIN — ALBUMIN HUMAN 12.5 G: 50 SOLUTION INTRAVENOUS at 07:51

## 2023-01-04 RX ADMIN — SODIUM CHLORIDE, PRESERVATIVE FREE 10 ML: 5 INJECTION INTRAVENOUS at 20:39

## 2023-01-04 RX ADMIN — PROPOFOL 60 MG: 10 INJECTION, EMULSION INTRAVENOUS at 07:39

## 2023-01-04 RX ADMIN — METOPROLOL TARTRATE 1 MG: 5 INJECTION INTRAVENOUS at 09:00

## 2023-01-04 RX ADMIN — FENTANYL CITRATE 25 MCG: 50 INJECTION INTRAMUSCULAR; INTRAVENOUS at 10:37

## 2023-01-04 RX ADMIN — ASPIRIN 325 MG: 325 TABLET, COATED ORAL at 12:21

## 2023-01-04 RX ADMIN — ZOLPIDEM TARTRATE 5 MG: 5 TABLET ORAL at 20:43

## 2023-01-04 RX ADMIN — ESMOLOL HYDROCHLORIDE 10 MG: 100 INJECTION, SOLUTION INTRAVENOUS at 08:11

## 2023-01-04 RX ADMIN — FENTANYL CITRATE 25 MCG: 50 INJECTION INTRAMUSCULAR; INTRAVENOUS at 10:42

## 2023-01-04 RX ADMIN — SODIUM CHLORIDE: 9 INJECTION, SOLUTION INTRAVENOUS at 07:30

## 2023-01-04 RX ADMIN — METOPROLOL TARTRATE 1 MG: 5 INJECTION INTRAVENOUS at 08:40

## 2023-01-04 RX ADMIN — TRANEXAMIC ACID 1 G: 10 INJECTION, SOLUTION INTRAVENOUS at 07:45

## 2023-01-04 RX ADMIN — SUGAMMADEX 50 MG: 100 INJECTION, SOLUTION INTRAVENOUS at 10:12

## 2023-01-04 RX ADMIN — METOPROLOL TARTRATE 1 MG: 5 INJECTION INTRAVENOUS at 08:24

## 2023-01-04 RX ADMIN — METOPROLOL TARTRATE 1 MG: 5 INJECTION INTRAVENOUS at 09:12

## 2023-01-04 RX ADMIN — FENTANYL CITRATE 25 MCG: 50 INJECTION INTRAMUSCULAR; INTRAVENOUS at 10:49

## 2023-01-04 RX ADMIN — CEFAZOLIN 2000 MG: 2 INJECTION, POWDER, FOR SOLUTION INTRAMUSCULAR; INTRAVENOUS at 16:48

## 2023-01-04 RX ADMIN — ONDANSETRON 4 MG: 2 INJECTION INTRAMUSCULAR; INTRAVENOUS at 10:10

## 2023-01-04 RX ADMIN — HYDROCODONE BITARTRATE AND ACETAMINOPHEN 1 TABLET: 5; 325 TABLET ORAL at 20:43

## 2023-01-04 RX ADMIN — KETOROLAC TROMETHAMINE 15 MG: 30 INJECTION, SOLUTION INTRAMUSCULAR; INTRAVENOUS at 16:39

## 2023-01-04 RX ADMIN — ASPIRIN 325 MG: 325 TABLET, COATED ORAL at 20:39

## 2023-01-04 RX ADMIN — ACETAMINOPHEN 650 MG: 325 TABLET ORAL at 16:39

## 2023-01-04 RX ADMIN — KETOROLAC TROMETHAMINE 15 MG: 30 INJECTION, SOLUTION INTRAMUSCULAR; INTRAVENOUS at 23:57

## 2023-01-04 RX ADMIN — METOPROLOL TARTRATE 1 MG: 5 INJECTION INTRAVENOUS at 08:50

## 2023-01-04 RX ADMIN — CEFAZOLIN 2000 MG: 2 INJECTION, POWDER, FOR SOLUTION INTRAMUSCULAR; INTRAVENOUS at 07:30

## 2023-01-04 RX ADMIN — MORPHINE SULFATE 2 MG: 2 INJECTION, SOLUTION INTRAMUSCULAR; INTRAVENOUS at 12:31

## 2023-01-04 RX ADMIN — SUGAMMADEX 50 MG: 100 INJECTION, SOLUTION INTRAVENOUS at 10:10

## 2023-01-04 RX ADMIN — ACETAMINOPHEN 650 MG: 325 TABLET ORAL at 12:20

## 2023-01-04 RX ADMIN — KETOROLAC TROMETHAMINE 15 MG: 30 INJECTION, SOLUTION INTRAMUSCULAR; INTRAVENOUS at 12:30

## 2023-01-04 RX ADMIN — SODIUM CHLORIDE, POTASSIUM CHLORIDE, SODIUM LACTATE AND CALCIUM CHLORIDE: 600; 310; 30; 20 INJECTION, SOLUTION INTRAVENOUS at 11:33

## 2023-01-04 RX ADMIN — DEXAMETHASONE SODIUM PHOSPHATE 8 MG: 4 INJECTION, SOLUTION INTRAMUSCULAR; INTRAVENOUS at 08:00

## 2023-01-04 RX ADMIN — HYDROMORPHONE HYDROCHLORIDE 0.5 MG: 1 INJECTION, SOLUTION INTRAMUSCULAR; INTRAVENOUS; SUBCUTANEOUS at 08:00

## 2023-01-04 RX ADMIN — MORPHINE SULFATE 2 MG: 2 INJECTION, SOLUTION INTRAMUSCULAR; INTRAVENOUS at 16:40

## 2023-01-04 RX ADMIN — SUGAMMADEX 50 MG: 100 INJECTION, SOLUTION INTRAVENOUS at 10:15

## 2023-01-04 RX ADMIN — FENTANYL CITRATE 25 MCG: 50 INJECTION INTRAMUSCULAR; INTRAVENOUS at 11:02

## 2023-01-04 RX ADMIN — FENTANYL CITRATE 100 MCG: 50 INJECTION, SOLUTION INTRAMUSCULAR; INTRAVENOUS at 07:39

## 2023-01-04 ASSESSMENT — PAIN DESCRIPTION - PAIN TYPE
TYPE: SURGICAL PAIN

## 2023-01-04 ASSESSMENT — PAIN DESCRIPTION - DESCRIPTORS
DESCRIPTORS: ACHING;CRAMPING;THROBBING;SHARP
DESCRIPTORS: ACHING;DISCOMFORT
DESCRIPTORS: ACHING;THROBBING
DESCRIPTORS: THROBBING;ACHING;CRAMPING
DESCRIPTORS: DISCOMFORT
DESCRIPTORS: DISCOMFORT
DESCRIPTORS: SPASM
DESCRIPTORS: DISCOMFORT
DESCRIPTORS: DISCOMFORT

## 2023-01-04 ASSESSMENT — PAIN DESCRIPTION - ORIENTATION
ORIENTATION: LEFT

## 2023-01-04 ASSESSMENT — PAIN DESCRIPTION - FREQUENCY
FREQUENCY: CONTINUOUS

## 2023-01-04 ASSESSMENT — PAIN DESCRIPTION - LOCATION
LOCATION: LEG
LOCATION: KNEE
LOCATION: LEG

## 2023-01-04 ASSESSMENT — PAIN SCALES - GENERAL
PAINLEVEL_OUTOF10: 6
PAINLEVEL_OUTOF10: 6
PAINLEVEL_OUTOF10: 0
PAINLEVEL_OUTOF10: 10
PAINLEVEL_OUTOF10: 5
PAINLEVEL_OUTOF10: 6
PAINLEVEL_OUTOF10: 10
PAINLEVEL_OUTOF10: 6
PAINLEVEL_OUTOF10: 10
PAINLEVEL_OUTOF10: 2
PAINLEVEL_OUTOF10: 6
PAINLEVEL_OUTOF10: 7

## 2023-01-04 ASSESSMENT — PAIN DESCRIPTION - ONSET
ONSET: GRADUAL
ONSET: ON-GOING
ONSET: GRADUAL
ONSET: GRADUAL

## 2023-01-04 ASSESSMENT — PAIN - FUNCTIONAL ASSESSMENT
PAIN_FUNCTIONAL_ASSESSMENT: PREVENTS OR INTERFERES SOME ACTIVE ACTIVITIES AND ADLS
PAIN_FUNCTIONAL_ASSESSMENT: 0-10

## 2023-01-04 NOTE — PROGRESS NOTES
1021 Patient arrived to PACU, monitors applied and alarms on. Received report from [address] Patient arouses easily. Denies any pain or nausea. 1037 Medicated for pain. Patient tolerating ice chips. 46 Medicated for pain  1049 Medicated for pain. 1102 Medicated for pain. 1105 Patient dozing. Denies any further complaints. Report called to [facility].    1115 Transported back to room 1114. Patient friend aware of patient status. No need for further w/u by Neurology is recommended.  See note from Jennifer Garcia RN from this afternoon.  KPavelRN   No

## 2023-01-04 NOTE — PROGRESS NOTES
01/04/23 1028   Encounter Summary   Encounter Overview/Reason  Attempted Encounter   Service Provided For: Patient not available   Last Encounter  01/04/23  (Attempted visit)   Begin Time 1028   End Time  1028   Total Time Calculated 0 min   Assessment/Intervention/Outcome   Assessment Unable to assess   Plan and Referrals   Plan/Referrals Continue Support (comment)  (as needed)

## 2023-01-04 NOTE — PROGRESS NOTES
V2.0  [facility]      Name:  Igor Clark /Age/Sex: 1945  (65 y.o. female)   MRN & CSN:  [id number] & [id number] Encounter Date/Time: 2023 3:59 PM EST    [facility] PCP: Gosia Alcala MD       Hospital Day: 7    Assessment and Plan:   Igor Clark is a 65 y.o. female with pmh of CLL, COPD, hyperlipidemia, hypertension, hypothyroidism, osteoporosis who presents with Closed displaced fracture of left patella, unspecified fracture morphology, sequela      Plan:    Closed displaced comminuted fracture of left patella  Closed fracture head of the left fibula  Closed fracture of the left tibial plateau  Mechanical fall-patient slipped on ice  -Subsequent encounter patient initially presented to the emergency room  with complaint of left knee pain after fall. Patient was discharged home with a left knee immobilizer and instruction to follow-up with Ortho. Came back to ER again with uncontrolled pain  -Vascular studies in ER arterial Doppler and venous Doppler negative for acute DVTs or arterial occlusions.  - S/p open reduction internal fixation of left bicondylar tibial plateau fracture 9443 by orthopedic surgery.  -continue pain management  -Neurovascular checks  -continue postop IVF  -Fall precautions  - orthopedic surgery following     Chronic Anemia - Hb trended down today. No active bleed. Will just monitor for now. Ambulatory dysfunction: Secondary to fracture as above unable to do ADLs due to fracture and knee immobilizer.   -PT OT postop     Chronic lymphocytic leukemia:  Dr. Susan Molina following   Waldenstrom's macroglobulinemia   - ibrutinib Held  -Antiemetics as needed    Hypothyroidism: Continue levothyroxine    Hypertension: Patient currently not on medication  -Has been on Maxide in the past.    GERD: Continue PPI     CKD D stage IIIa:   -Baseline creatinine around 1    Transaminitis: ALT 76, AST 64  - check liver function panel       Normal weight BMI 21.50: lifestyle modifications  -Follow-up PCP for longitudinal care     Diet ADULT DIET; Regular   DVT Prophylaxis [x] Lovenox, []  Heparin, [] SCDs, [] Ambulation,  [] Eliquis, [] Xarelto  [] Coumadin   Code Status Full Code   Disposition From: Home  Expected Disposition: TBD  Estimated Date of Discharge: TD  Patient requires continued admission due to Pending surgery and pain management   Surrogate Decision Maker/ POA      Subjective:     Chief Complaint: Leg Pain       Abdoulaye Verdugo is a 65 y.o. female who presents with Fall    Pt had surgery this morning. Seen and examined post op. She was in a lot of pain but she hasn't received the PRN morphine and Toradol yet. No other symptoms or complaints. Denies lightheadedness, dizziness, fever, night sweats, chills, chest pain, cough, dyspnea, palpitations, abd pain, nausea, vomiting, diarrhea, dysuria. Review of Systems:    Review of Systems    See above    Objective: Intake/Output Summary (Last 24 hours) at 1/4/2023 1329  Last data filed at 1/4/2023 1116  Gross per 24 hour   Intake 1500 ml   Output 25 ml   Net 1475 ml          Vitals:   Vitals:    01/04/23 1231   BP:    Pulse:    Resp: 14   Temp:    SpO2:        Physical Exam:     General: NAD  Eyes: EOMI  ENT: neck supple  Cardiovascular: Regular rate. Respiratory: Clear to auscultation  Gastrointestinal: Soft, non tender  Genitourinary: no suprapubic tenderness  Musculoskeletal: left knee ACE wrapped,  Left knee range of motion restricted due to pain , left leg warm. Skin: warm, dry  Neuro: Alert. Psych: Mood appropriate.      Medications:   Medications:    sodium chloride flush  5-40 mL IntraVENous 2 times per day    acetaminophen  650 mg Oral Q6H    ketorolac  15 mg IntraVENous Q6H    ceFAZolin (ANCEF) IVPB  2,000 mg IntraVENous Q8H    aspirin  325 mg Oral BID    [Held by provider] enoxaparin  30 mg SubCUTAneous BID    pantoprazole  40 mg Oral QAM AC    [Held by provider] ibrutinib  420 mg Oral Daily levothyroxine  100 mcg Oral Daily    Vitamin D  5,000 Units Oral Daily    sodium chloride flush  5-40 mL IntraVENous 2 times per day      Infusions:    lactated ringers 100 mL/hr at 01/04/23 1133    sodium chloride       PRN Meds: sodium chloride flush, 5-40 mL, PRN  sodium chloride, , PRN  albuterol sulfate HFA, 2 puff, Q6H PRN  sodium chloride flush, 5-40 mL, PRN  ondansetron, 4 mg, Q8H PRN   Or  ondansetron, 4 mg, Q6H PRN  polyethylene glycol, 17 g, Daily PRN  acetaminophen, 650 mg, Q6H PRN   Or  acetaminophen, 650 mg, Q6H PRN  HYDROcodone-acetaminophen, 1 tablet, Q6H PRN  morphine, 2 mg, Q4H PRN  zolpidem, 5 mg, Nightly PRN      Labs      Recent Results (from the past 24 hour(s))   TYPE AND SCREEN    Collection Time: 01/04/23  7:48 AM   Result Value Ref Range    ABO/Rh O POSITIVE     Antibody Screen NEGATIVE           Imaging/Diagnostics Last 24 Hours   VL DUP LOWER EXTREMITY ARTERIES LEFT    Result Date: 12/29/2022  EXAMINATION: ARTERIAL DUPLEX ULTRASOUND OF THE  LOWER EXTREMITY  12/29/2022 4:52 pm TECHNIQUE: Duplex ultrasound using B-mode/gray scaled imaging, Doppler spectral analysis and color flow Doppler was obtained of the lower extremity. COMPARISON: None HISTORY: ORDERING SYSTEM PROVIDED HISTORY: leg pain, recent fracture TECHNOLOGIST PROVIDED HISTORY: Reason for exam:->leg pain, recent fracture FINDINGS: Arterial waveforms in the femoral and popliteal territories are monophasic. The trifurcation vessels also demonstrate monophasic waveforms. Flow velocities were measured as follows: Com. Fem. 288 cm/sec Prof.           125 cm/sec SFA Prox. 273 cm/sec SFA Mid.     253 cm/sec SFA Dist.     209 cm/sec Pop. 170 cm/sec PTA            98 cm/sec Peron. 98 cm/sec REENA            86 cm/sec     All arteries visualized in the left lower extremity are patent however femoropopliteal arteries demonstrate elevated velocities.   Monophasic waveforms in the femoral circulation indicate aortoiliac disease with likely hemodynamic stenosis. No evidence of arterial occlusions. VL DUP LOWER EXTREMITY VENOUS LEFT    Result Date: 12/29/2022  EXAMINATION: DUPLEX VENOUS ULTRASOUND OF THE LEFT LOWER EXTREMITY 12/29/2022 4:52 pm TECHNIQUE: Duplex ultrasound using B-mode/gray scaled imaging and Doppler spectral analysis and color flow was obtained of the deep venous structures of the left extremity. COMPARISON: None. HISTORY: ORDERING SYSTEM PROVIDED HISTORY: calf pain, knee fracture, increased swelling, eval for DVT TECHNOLOGIST PROVIDED HISTORY: Reason for exam:->calf pain, knee fracture, increased swelling, eval for DVT FINDINGS: The visualized veins of the left lower extremity are patent and free of echogenic thrombus. The veins demonstrate good compressibility with normal color flow study and spectral analysis. No evidence of DVT in the left lower extremity.        Electronically signed by Deandre Mahmood MD on 1/4/2023 at 1:29 PM

## 2023-01-04 NOTE — BRIEF OP NOTE
Brief Postoperative Note      Patient: Danish Barbour  YOB: 1945  MRN: [id number]    Date of Procedure: 1/4/2023    Pre-Op Diagnosis: Fracture [T14. 8XXA]    Post-Op Diagnosis: Same       Procedure(s):  TIBIA OPEN REDUCTION INTERNAL FIXATION    Surgeon(s):  Antonia Flores DO    Assistant:  * No surgical staff found *    Anesthesia: General    Estimated Blood Loss (mL): Minimal    Complications: None    Specimens:   * No specimens in log *    Implants:  Implant Name Type Inv. Item Serial No.  Lot No. LRB No. Used Action   JONNY-TISSUE CANCELLOUS 15. Gemma Stanley - [id number]  JONNY-TISSUE CANCELLOUS 15. [facility] TISSUE [facility]- [id number] Left 1 Implanted   GRAFT BONE SUB 5CC VOID JUAN ANTONIO OSTEOCRETE - [id number]  GRAFT BONE SUB 5CC VOID JUAN ANTONIO OSTEOCRETE  [employer] [facility] [id number] Left 1 Implanted   PLATE BONE [id number] 3 H LT PROX MEDL TIB TI FOR 3.5MM SCR - [id number]  PLATE BONE [id number] 3 H LT PROX MEDL TIB TI FOR 3.5MM SCR  AAP IMPLANTS [facility]  Left 1 Implanted   PLATE BONE [id number] 7 H LT PROX LAT TIB TI FOR 3.5MM SCR - [id number]  PLATE BONE [id number] 7 H LT PROX LAT TIB TI FOR 3.5MM SCR  AAP IMPLANTS [facility]  Left 1 Implanted   SCREW CORTICAL 3.5X34MM SMALL HEAD - [id number]  SCREW CORTICAL 3.5X34MM SMALL HEAD  AAP IMPLANTS [facility]  Left 1 Implanted   SCREW BNE ST 3.5X36 MM CORTICAL SM HD T15 LOQTEQ - [id number]  SCREW BNE ST 3.5X36 MM CORTICAL SM HD T15 LOQTEQ  AAP IMPLANTS [facility]  Left 1 Implanted   SCREW BONE L65MM OD3.5MM TI KINZA SM HD T15 ST LOQTEQ - [id number]  SCREW BONE L65MM OD3.5MM TI KINZA SM HD T15 ST LOQTEQ  AAP IMPLANTS [facility]  Left 1 Implanted   SCREW BONE L75MM OD3.5MM TI KINZA SM HD T15 ST LOQTEQ - [id number]  SCREW BONE L75MM OD3.5MM TI KINZA SM HD T15 ST LOQTEQ  AAP IMPLANTS [facility]  Left 2 Implanted   SCREW BONE L85MM OD3.5MM TI KINZA SM HD T15 ST LOQTEQ - [id number]  SCREW BONE L85MM OD3.5MM TI KINZA SM HD T15 ST LOQTEQ  [facility] IMPLANTS [facility]-  Left 1 Implanted   SCREW BONE L32MM OD3.5MM TI KINZA T15 ST LOQTEQ - [id number]  SCREW BONE L32MM OD3.5MM TI KINZA T15 ST LOQTEQ  [employer]  Left 1 Implanted   LOQTEQ Cortical Screw 3.5, T15, self-tapping RED      Left 1 Implanted   SCREW BONE L34MM DIA3. 5MM TI KINZA TIB ST FULL THRD - [id number]  SCREW BONE L34MM DIA3. 5MM TI KINZA TIB ST FULL THRD  [employer]  Left 2 Implanted   SCREW BONE L38MM DIA3. 5MM TI KINZA TIB ST FULL THRD - [id number]  SCREW BONE L38MM DIA3. 5MM TI KINZA TIB ST FULL THRD  [employer]  Left 1 Implanted   SCREW BONE L75MM OD3.5MM TI KINZA T15 ST - [id number]  SCREW BONE L75MM OD3.5MM TI KINZA T15 ST  [employer]  Left 1 Implanted   SCREW BONE L80MM DIA3. 5MM TI KINZA TIB ST FULL THRD - [id number]  SCREW BONE L80MM DIA3. 5MM TI KINZA TIB ST FULL THRD  [employer]  Left 1 Implanted         Drains:   External Urinary Catheter (Active)   Site Assessment Clean,dry & intact 01/01/23 0800       Findings: L tibial plateau fx    Electronically signed by Emily Almeida DO on 1/4/2023 at 10:04 AM

## 2023-01-04 NOTE — OP NOTE
DATE OF PROCEDURE: 1/4/2023     PREOPERATIVE DIAGNOSES:  1. Left bicondylar tibial plateau fracture. POSTOPERATIVE DIAGNOSES:  1. Left bicondylar tibial plateau fracture. PROCEDURES PERFORMED:  1. Open reduction internal fixation of left bicondylar tibial plateau  fracture using AAP 3-hole right proximal medial tibial locking plate and AAP 7-hole proximal lateral tibial locking plate with 15 mL cancellous bone graft and 5 mL of osteocrete bone graft. 2.  Fluoroscopic guidance interpretation. ATTENDING SURGEON:  Geovanna Johnson DO     ANESTHESIA:  General.     ESTIMATED BLOOD LOSS: 100 mL. TOTAL TOURNIQUET TIME: 130 minutes. FLUIDS:  1000 mL of crystalloids and 250 mL albumin. INDICATION FOR PROCEDURE:  The patient is a 65-year-old female who  sustained an injury to her left knee. She was  immediately unable to bear weight on the left leg and was evaluated in the ED. X-rays and CT scan were obtained and she was diagnosed with a left bicondylar tibial plateau fracture. She was subsequently admitted to the hospital for treatment of her injury. Evaluation of her x-rays and CT scan revealed a highly comminuted split depression fracture of her left lateral tibial plateau as well as a comminuted posterior medial tibial plateau fracture. Given the fracture pattern on her tibial plateau, I  recommended surgical treatment. I explained the risks, benefits, possible complications of the procedures to the patient and after answering all of her questions she consented to  undergo the above procedure. I did delay the surgery by approximately 1 week to allow for swelling to resolve. REPORT OF PROCEDURE:  The patient was seen and evaluated in the  preoperative holding area where the left lower extremity was signed in  her presence. The swelling had significantly decreased and there were no blisters present at the time of surgery.   At this point of care, the patient was turned over to the  Anesthesia Team who transported her back to the operative suite. She was  placed supine on the operating table. General anesthesia was applied  and once adequate anesthesia was obtained, the left lower extremity was  prepped and draped in the usual sterile fashion. Preoperative  antibiotics were administered. At this point, a time-out was performed  and all in attendance were in agreement. I exsanguinated the left lower extremity with the use of Esmarch and  tourniquet was inflated to 250 mmHg. I first began by performing fixation of the posterior medial tibial plateau. I marked out the medial and lateral joint lines under fluoroscopy in AP and lateral planes. I then flexed the knee to approximately 45 degrees and allow the hip to abduct. I then marked out a curvilinear incision beginning just proximal to the joint line on the medial aspect of the leg curving slightly anterior just anterior to the gastroc muscle belly. I then made incision in this location and carried sharp dissection down through subcutaneous tissue keeping the saphenous vein within the subcutaneous fat. I then continued dissection down identifying the semimembranosus muscle belly. I then dissected through the fascia identifying the medial head of the gastroc muscle belly. I then placed a self-retaining retractor retracting the medial head of the gastroc posterior and the semimembranosus muscle belly anterior. I then identified the semitendinosus and gracilis tendons which were retracted inferiorly. I did release the gracilis and semitendinosus tendons off of the proximal tibia for further exposure. Once had exposure to the medial tibial plateau, I then elevated the popliteus muscle belly off of the posterior medial tibial plateau. I then used a key elevator to elevate subperiosteally identifying the posterior most fracture fragment of the posterior medial tibial plateau.   I then positioned the key elevator at the tip of the fracture and confirmed the fracture under fluoroscopy. I then positioned a large pelvic bone reduction clamp and positioned this on the tip of the posterior medial fracture fragment and then made a small anterior incision overlying the tibial tubercle and compressed the bone reduction clamp reducing the fracture. I then confirmed near anatomic alignment of the posterior medial fragment under fluoroscopy in the lateral plane. I then made another small stab incision just proximal to the bone reduction clamp and drilled and placed a 3.5 millimeter screw compressing at the fracture site. The bone reduction clamp was removed. I then confirmed position of the implant as well as maintenance of reduction under fluoroscopy in the AP and lateral planes. With satisfactory reduction obtained and initial fixation in place, I then selected an AAP 3-hole right proximal medial tibial locking plate and contoured this so that the shaft portion of the plate was straight and is curved upwards at the proximal aspect of the plate. I then positioned the plate in buttress fashion to act as a antiglide plate just at the inferior aspect of the fracture. I then confirmed position of the implant under fluoroscopy in the AP and lateral planes. I then drilled and placed a total of 1 cortical screw and 2 locking screws into the shaft of the tibia compressing the contoured plate up against the posterior medial tibia. With this and plate in place I confirmed near anatomic alignment of the posterior medial fracture fragment as well as position of the implant under fluoroscopy in the AP and lateral planes. With medial fixation complete, I then turned my attention to the lateral tibial plateau fracture. I made a lazy S-shaped incision overlying the lateral aspect of the left knee  beginning just above the joint line and extending just past Gerdy's  tubercle.   I then made incision in this location and carried sharp  dissection down to the level of the IT band. I then incised obliquely  through the IT band and carried dissection down onto the lateral aspect  of the tibia and elevated the anterior compartment musculature off of  the proximal tibia. I then palpated the capsule and made a sub meniscal  incision through the capsule with the use of a scalpel connecting this  to the IT band incision. At this point, I did encounter significant  fracture hematoma which was then evacuated. I then continued soft  tissue dissection exposing the lateral wall of the proximal tibia. I  then used a Adams elevator to elevate submuscularly along the lateral  tibial shaft. I then placed a Weitlaner retractor for further  visualization. I then used a #2 FiberWire suture and passed this through the lateral  meniscus exposing the joint surface. I found that the anterior aspect  of the lateral tibial plateau was significantly depressed and angulated. I then used an osteotome to elevate the lateral wall fracture fragment off  of the tibial plateau. I then used a lamina  to elevate the  lateral wall fracture for further visualization of the depression of the  articular surface of the lateral tibial plateau. With visualization of  the fracture, I used a curved bone tamp to mallet the depressed fragment  back in line with the distal femur into near anatomic alignment. While  holding the reduction in place, I confirmed adequate reduction under  fluoroscopy in the AP and lateral planes. I also adjusted the large tibial spine fracture fragment of the lateral tibial plateau using the bone tamp and elevated this up against the femur. Once satisfactory reduction was obtained, I then placed approximately 15 mL of cancellous bone graft into the bone void within the lateral tibial plateau. I then closed down the lateral wall fracture and held this in  Position with a large bone reduction clamp.   Holding the fracture reduced, I confirmed maintenance of the  articular surface reduction under fluoroscopy in the AP and lateral  planes. I then advanced a guidewire from lateral to medial direction to maintain  the reduction of the lateral wall fracture. I then used the K wire as a joystick to better manipulate the lateral depression fragment since the bone reduction clamp went through the extremely soft bone in the lateral wall. With satisfactory reduction obtained, I then selected an AAP 7 -hole  proximal tibial locking plate which was positioned over the lateral wall  of the proximal tibia. I confirmed position of the implant under  fluoroscopy. Prior to positioning the plate, I then elevated another cortical fragment off of the anterior proximal tibia and placed 5 mL of osteocrete bone graft to fill in the remaining void within the proximal tibia. I first drilled in place a cortical screw through one of  the proximal holes in the plate compressing the lateral wall up against  the medial tibia. I then drilled and placed 1 cortical screw through the tibial shaft buttressing the plate and further reducing the lateral articular surface. I then drilled and placed 1 additional cortical screw through the distalmost hole in the plate as well as 2 locking screws to complete distal shaft fixation. I then drilled in place 3 additional locking screws to complete the rafting screw fixation. I then drilled and placed the kickstand locking screw in standard fashion. The K wire which was used as a joystick was then removed. I then flexed and extended the knee under fluoroscopy and found that there remained some mild instability of the tibial tubercle fragment. I then used two #5 FiberWire sutures to grasp hold of soft tissue attachments of the tubercle fragment and tied these into holes in the plate for additional fixation since the fragment was too small and too soft to hold a screw.      With all fixation placed, I confirmed near anatomic alignment as well as  position of all hardware in the AP and lateral planes under fluoroscopy. The tourniquet was then deflated for a total of 130 minutes and adequate hemostasis was maintained at all times. The wounds were then irrigated with sterile normal saline. I  reapproximated the IT band incision using 0 Vicryl suture in  figure-of-eight fashion. I then closed subcutaneous tissue using 2-0  Vicryl suture followed by skin closure with stratafix and prineo. I then applied a sterile soft dressing to the left lower extremity. The patient was then awakened from anesthesia  and transported to PACU in stable condition. He appeared to have  tolerated the procedure well. PROGNOSIS:  At this point, she will be admitted back to the hospital for observation for  postoperative pain control, rehabilitation, medical monitoring. Hospitalist will be continue with medical management and physical therapy  will be consulted for gait training and she is to remain strictly  nonweightbearing on the left leg, but can have immediate range of motion  for the left knee. She will receive antibiotic  prophylaxis and DVT prophylaxis during her hospitalization. Upon her  discharge, I will see her back in the office in 2 weeks and will continue to monitor her progress in the outpatient  setting for radiographic evidence of healing and resolution of her  symptoms.            Emily 89, DO

## 2023-01-05 LAB
ALBUMIN SERPL-MCNC: 3.2 GM/DL (ref 3.4–5)
ALP BLD-CCNC: 183 IU/L (ref 40–128)
ALT SERPL-CCNC: 40 U/L (ref 10–40)
ANION GAP SERPL CALCULATED.3IONS-SCNC: 13 MMOL/L (ref 4–16)
ANISOCYTOSIS: ABNORMAL
AST SERPL-CCNC: 91 IU/L (ref 15–37)
BILIRUB SERPL-MCNC: 0.7 MG/DL (ref 0–1)
BUN BLDV-MCNC: 30 MG/DL (ref 6–23)
CALCIUM SERPL-MCNC: 7.9 MG/DL (ref 8.3–10.6)
CHLORIDE BLD-SCNC: 107 MMOL/L (ref 99–110)
CO2: 22 MMOL/L (ref 21–32)
CREAT SERPL-MCNC: 1.5 MG/DL (ref 0.6–1.1)
DIFFERENTIAL TYPE: ABNORMAL
EOSINOPHILS ABSOLUTE: 0.5 K/CU MM
EOSINOPHILS RELATIVE PERCENT: 7 % (ref 0–3)
GFR SERPL CREATININE-BSD FRML MDRD: 35 ML/MIN/1.73M2
GLUCOSE BLD-MCNC: 108 MG/DL (ref 70–99)
HCT VFR BLD CALC: 26 % (ref 37–47)
HEMOGLOBIN: 7.9 GM/DL (ref 12.5–16)
LYMPHOCYTES ABSOLUTE: 0.3 K/CU MM
LYMPHOCYTES RELATIVE PERCENT: 4 % (ref 24–44)
MCH RBC QN AUTO: 29.6 PG (ref 27–31)
MCHC RBC AUTO-ENTMCNC: 30.4 % (ref 32–36)
MCV RBC AUTO: 97.4 FL (ref 78–100)
METAMYELOCYTES ABSOLUTE COUNT: 0.13 K/CU MM
METAMYELOCYTES PERCENT: 2 %
MONOCYTES ABSOLUTE: 0.2 K/CU MM
MONOCYTES RELATIVE PERCENT: 3 % (ref 0–4)
PDW BLD-RTO: 13 % (ref 11.7–14.9)
PLATELET # BLD: 153 K/CU MM (ref 140–440)
PMV BLD AUTO: 11.3 FL (ref 7.5–11.1)
POLYCHROMASIA: ABNORMAL
POTASSIUM SERPL-SCNC: 3.8 MMOL/L (ref 3.5–5.1)
RBC # BLD: 2.67 M/CU MM (ref 4.2–5.4)
RBC # BLD: ABNORMAL 10*6/UL
SEGMENTED NEUTROPHILS ABSOLUTE COUNT: 5.5 K/CU MM
SEGMENTED NEUTROPHILS RELATIVE PERCENT: 84 % (ref 36–66)
SODIUM BLD-SCNC: 142 MMOL/L (ref 135–145)
TOTAL PROTEIN: 5.1 GM/DL (ref 6.4–8.2)
WBC # BLD: 6.6 K/CU MM (ref 4–10.5)

## 2023-01-05 PROCEDURE — 97116 GAIT TRAINING THERAPY: CPT

## 2023-01-05 PROCEDURE — 94761 N-INVAS EAR/PLS OXIMETRY MLT: CPT

## 2023-01-05 PROCEDURE — 6360000002 HC RX W HCPCS: Performed by: STUDENT IN AN ORGANIZED HEALTH CARE EDUCATION/TRAINING PROGRAM

## 2023-01-05 PROCEDURE — 80053 COMPREHEN METABOLIC PANEL: CPT

## 2023-01-05 PROCEDURE — 85027 COMPLETE CBC AUTOMATED: CPT

## 2023-01-05 PROCEDURE — 1200000000 HC SEMI PRIVATE

## 2023-01-05 PROCEDURE — 97162 PT EVAL MOD COMPLEX 30 MIN: CPT

## 2023-01-05 PROCEDURE — 94150 VITAL CAPACITY TEST: CPT

## 2023-01-05 PROCEDURE — 97535 SELF CARE MNGMENT TRAINING: CPT

## 2023-01-05 PROCEDURE — 99232 SBSQ HOSP IP/OBS MODERATE 35: CPT | Performed by: INTERNAL MEDICINE

## 2023-01-05 PROCEDURE — 97166 OT EVAL MOD COMPLEX 45 MIN: CPT

## 2023-01-05 PROCEDURE — 6370000000 HC RX 637 (ALT 250 FOR IP): Performed by: INTERNAL MEDICINE

## 2023-01-05 PROCEDURE — 2580000003 HC RX 258: Performed by: ORTHOPAEDIC SURGERY

## 2023-01-05 PROCEDURE — 97530 THERAPEUTIC ACTIVITIES: CPT

## 2023-01-05 PROCEDURE — 36415 COLL VENOUS BLD VENIPUNCTURE: CPT

## 2023-01-05 PROCEDURE — 6370000000 HC RX 637 (ALT 250 FOR IP): Performed by: ORTHOPAEDIC SURGERY

## 2023-01-05 PROCEDURE — 85007 BL SMEAR W/DIFF WBC COUNT: CPT

## 2023-01-05 PROCEDURE — 94664 DEMO&/EVAL PT USE INHALER: CPT

## 2023-01-05 PROCEDURE — 6360000002 HC RX W HCPCS: Performed by: ORTHOPAEDIC SURGERY

## 2023-01-05 RX ORDER — FERROUS SULFATE 325(65) MG
325 TABLET ORAL
Status: DISCONTINUED | OUTPATIENT
Start: 2023-01-05 | End: 2023-01-10 | Stop reason: HOSPADM

## 2023-01-05 RX ORDER — ENOXAPARIN SODIUM 100 MG/ML
30 INJECTION SUBCUTANEOUS 2 TIMES DAILY
Status: DISCONTINUED | OUTPATIENT
Start: 2023-01-05 | End: 2023-01-06

## 2023-01-05 RX ADMIN — HYDROCODONE BITARTRATE AND ACETAMINOPHEN 1 TABLET: 5; 325 TABLET ORAL at 09:00

## 2023-01-05 RX ADMIN — PANTOPRAZOLE SODIUM 40 MG: 40 TABLET, DELAYED RELEASE ORAL at 05:24

## 2023-01-05 RX ADMIN — SODIUM CHLORIDE, PRESERVATIVE FREE 10 ML: 5 INJECTION INTRAVENOUS at 23:34

## 2023-01-05 RX ADMIN — LEVOTHYROXINE SODIUM 100 MCG: 0.1 TABLET ORAL at 05:24

## 2023-01-05 RX ADMIN — ACETAMINOPHEN 650 MG: 325 TABLET ORAL at 12:18

## 2023-01-05 RX ADMIN — SODIUM CHLORIDE, PRESERVATIVE FREE 10 ML: 5 INJECTION INTRAVENOUS at 09:00

## 2023-01-05 RX ADMIN — ZOLPIDEM TARTRATE 5 MG: 5 TABLET ORAL at 23:31

## 2023-01-05 RX ADMIN — SODIUM CHLORIDE, POTASSIUM CHLORIDE, SODIUM LACTATE AND CALCIUM CHLORIDE: 600; 310; 30; 20 INJECTION, SOLUTION INTRAVENOUS at 03:39

## 2023-01-05 RX ADMIN — ASPIRIN 325 MG: 325 TABLET, COATED ORAL at 21:41

## 2023-01-05 RX ADMIN — ACETAMINOPHEN 650 MG: 325 TABLET ORAL at 05:24

## 2023-01-05 RX ADMIN — ASPIRIN 325 MG: 325 TABLET, COATED ORAL at 09:00

## 2023-01-05 RX ADMIN — ACETAMINOPHEN 650 MG: 325 TABLET ORAL at 18:10

## 2023-01-05 RX ADMIN — KETOROLAC TROMETHAMINE 15 MG: 30 INJECTION, SOLUTION INTRAMUSCULAR; INTRAVENOUS at 05:24

## 2023-01-05 RX ADMIN — ACETAMINOPHEN 650 MG: 325 TABLET ORAL at 23:32

## 2023-01-05 RX ADMIN — ENOXAPARIN SODIUM 30 MG: 100 INJECTION SUBCUTANEOUS at 21:41

## 2023-01-05 RX ADMIN — ENOXAPARIN SODIUM 30 MG: 100 INJECTION SUBCUTANEOUS at 13:04

## 2023-01-05 RX ADMIN — Medication 5000 UNITS: at 09:00

## 2023-01-05 RX ADMIN — KETOROLAC TROMETHAMINE 15 MG: 30 INJECTION, SOLUTION INTRAMUSCULAR; INTRAVENOUS at 18:10

## 2023-01-05 RX ADMIN — FERROUS SULFATE TAB 325 MG (65 MG ELEMENTAL FE) 325 MG: 325 (65 FE) TAB at 09:00

## 2023-01-05 RX ADMIN — KETOROLAC TROMETHAMINE 15 MG: 30 INJECTION, SOLUTION INTRAMUSCULAR; INTRAVENOUS at 12:18

## 2023-01-05 ASSESSMENT — PAIN SCALES - GENERAL
PAINLEVEL_OUTOF10: 2
PAINLEVEL_OUTOF10: 5
PAINLEVEL_OUTOF10: 0
PAINLEVEL_OUTOF10: 2

## 2023-01-05 ASSESSMENT — PAIN DESCRIPTION - FREQUENCY: FREQUENCY: CONTINUOUS

## 2023-01-05 ASSESSMENT — PAIN DESCRIPTION - ORIENTATION: ORIENTATION: LEFT

## 2023-01-05 ASSESSMENT — PAIN - FUNCTIONAL ASSESSMENT: PAIN_FUNCTIONAL_ASSESSMENT: PREVENTS OR INTERFERES WITH MANY ACTIVE NOT PASSIVE ACTIVITIES

## 2023-01-05 ASSESSMENT — PAIN DESCRIPTION - DESCRIPTORS: DESCRIPTORS: PATIENT UNABLE TO DESCRIBE

## 2023-01-05 ASSESSMENT — PAIN DESCRIPTION - PAIN TYPE: TYPE: SURGICAL PAIN

## 2023-01-05 ASSESSMENT — PAIN DESCRIPTION - ONSET: ONSET: ON-GOING

## 2023-01-05 ASSESSMENT — PAIN DESCRIPTION - LOCATION: LOCATION: LEG

## 2023-01-05 NOTE — CARE COORDINATION
Spoke with patient regarding discharge plan and  PT eval recommending ARU. Patient's initial choice for rehab was [facility] and referral has already been made. CM asked patient if she wanted to consider ARU and patient stated that she is not sure and wanted to think about and requested for CM to f/u with her tomorrow.

## 2023-01-05 NOTE — PROGRESS NOTES
Physical Therapy  [address] [facility] ACUTE CARE PHYSICAL THERAPY EVALUATION  Carito Pedersen, 1945, 1114/1114-A, 1/5/2023    History  [place]:  The primary encounter diagnosis was Closed displaced comminuted fracture of left patella, initial encounter. Diagnoses of Closed fracture of head of left fibula, initial encounter and Closed fracture of left tibial plateau, initial encounter were also pertinent to this visit. Patient  has a past medical history of Arm fracture, left, Arthritis, CLL (chronic lymphocytic leukemia) ([facility] [address] COPD (chronic obstructive pulmonary disease) ([facility] [address] Great vein anomaly, H. pylori infection, Hiatal hernia, Hyperlipidemia, Hypertension, Hypothyroidism, MDRO (multiple drug resistant organisms) resistance, Osteoporosis, and Smoking hx. Patient  has a past surgical history that includes Nasal septum surgery (Left, 10/21/2013); Cholecystectomy, laparoscopic (04/17/2018); Colonoscopy (10/26/2007); skin biopsy (1960's); Colonoscopy (06/06/2019); and Colonoscopy (N/A, 6/6/2019). Assessment:  Pt presents 7 days s/p admission for fall on 12/27 on L side ; suffered L tibial plateau fx w/ lateral condylar impaction w/ fx extending to medial condyle, additional subacute patella fx ; now POD 1, NWB on LLE. 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 Pt is primarily limited by strength and endurance, additional deficits include impaired functional mobility, pain, ROM. She is moving well w/ respect to precautions, endurance/strength limiting distance, motivated to make improvement. Recommending ARU. Complexity: Moderate  Prognosis: Good, pain may impact  Plan 4-5+/week, 1 week  Equipment: pend to next LOC    Recommendations for NURSING mobility: stand step to recliner/BSC/bed    Subjective:  Patient states:  \"I gotta use the bathroom before we get moving. \"    Pain:  denies at rest ; 2/10 w/ mobility  Communication with other providers:  Handoff to RN  Restrictions: fall risk; general precautions; NWB on LLE    Home Setup/Prior level of function  Social/Functional History  Lives With: Spouse  Type of Home: Condo  Home Layout: One level  Home Access: Stairs to enter without rails  Entrance Stairs - Number of Steps: 1 (small step from garage)  Bathroom Shower/Tub: Walk-in shower, Shower chair with back  Bathroom Toilet: Standard  Bathroom Equipment: Shower chair  Home Equipment: Walker, rolling, Shannon Dunker  Has the patient had two or more falls in the past year or any fall with injury in the past year?: No (1 leading to this admission)  Receives Help From: Family  ADL Assistance: Independent  Homemaking Assistance: Independent  Homemaking Responsibilities: 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)  Ambulation Assistance: Independent  Transfer Assistance: Independent  Active : Yes  Occupation: Retired  Type of Occupation:  at [employer] of body systems (includes body structures/functions, activity/participation limitations):  Observation:  Semifowlers, awake, alert, agreeable. Pt is painful w/ mobility, demonstrates good functional capabilities to offload affected LLE.  Tele, BP cuff, intermittent pulse ox, SCF in place upon arrival  Posture: good  Vision:  glasses  Hearing:  WFL  Cardiopulmonary:  WFL ; SOB w/ OOB - sats in low to mid 90's t/o, slightly tachy w/ OOB, BP WFL  Cognition: A&O x 4 ; alert, follows commands w// intermittent repetition, attention intact, impaired recall of recent events, good safety awareness, mod cues needed for sequencing/setup, mod cues needed for initiation, cues to bring inc awareness to errors    Musculoskeletal  ROM R/L:    R: AROM WFL  L: hip, ankle ROM ; limited last 33% 2/2 swelling and pain  Strength R/L:    R:  L: grossly 3-/5 as observed by ROM and function  Sensation: WFL  Tone: normotonic  Coordination: WFL  Proprioception: WFL    Mobility:  Supine to sit:  min A for completion of transition and LLE support  Transfers: min A for initiation and steady during completion  Sitting balance:  good. Standing balance:  fair. Gait: 3 ft + 3 ft using FWW at [facility] ; dec daryn, hop to w/ respect to LLE NWB, dec step clearance and length on R LE, endurance limiting distance, heavy UE reliance, sufficient clearance to complete stand step and short term ambulation at this time    [facility] 6 Clicks Inpatient Mobility:  AM-PAC Inpatient Mobility Raw Score : 16    Goals:  Pt Goals: return home  Short Term Goals  Time Frame for Short Term Goals: 1 week  Short Term Goal 1: pt will complete bed mobility at mod ind  Short Term Goal 2: pt will complete transfers at [facility]  Short Term Goal 3: pt will ambulate 30 ft using FWW at [facility]  Short Term Goal 4: pt will demonstrate and verbalize understanding of need for LLE NWB w/ min cues       Treatment plan:  Bed mobility, functional mobility, transfers, balance, gait, TA, TX, strength activities, neuro    Treatment:  Therapeutic Activity Training:   Therapeutic activity training was instructed today. Cues were given for safety, sequence, UE/LE placement, awareness, and balance. Activities performed today included bed mobility training, sup-sit, sit-stand, SPT. Gait Training:  Cues were given for safety, sequence, device management, balance, posture, awareness, path.       Positioned for comfort and pressure relief  Safety: patient left in chair, call light within reach, RN notified, gait belt used, all needs met    Time:   Time in: 0843  Time out: 0928  Timed treatment minutes: 35  Total time + Eval: 45    Electronically signed by:    Sharif Johnson PT, DPT  1/5/2023, 12:11 PM

## 2023-01-05 NOTE — PROGRESS NOTES
Physician Progress Note      PATIENT:               Mark Liz  CSN #:                  [id number]  :                       1945  ADMIT DATE:       2022 1:49 PM  [address] [place] DATE:  RESPONDING  PROVIDER #:        Ml Palomino MD          QUERY TEXT:    Pt admitted with Left tibia fracture. Pt noted to have osteoporosis. If   possible, please document in progress notes and discharge summary if you are   evaluating and/or treating any of the following: The medical record reflects the following:  Risk Factors: Osteoporosis, chemo  Clinical Indicators: Calcium 7.6 up to 8.1, H&P \"osteoporosis who presents   with difficulty ambulating recent left leg fracture\"  Treatment: Vitamin D, labs, Imaging, surgery    Thank you,  Lino Lehman, [facility] [phone]  Options provided:  -- Pathological L tibia fracture  -- Osteoporotic L tibia  Fracture  -- Osteoporotic L tibia  fracture following fall which would not usually break   a normal, healthy bone  -- Traumatic L tibia fracture  -- Other - I will add my own diagnosis  -- Disagree - Not applicable / Not valid  -- Disagree - Clinically unable to determine / Unknown  -- Refer to Clinical Documentation Reviewer    PROVIDER RESPONSE TEXT:    Provider is clinically unable to determine a response to this query.   no recent dexascan    Query created by: Maria Dolores Cuba on 2023 6:52 AM      Electronically signed by:  Ml Palomino MD 2023 8:47 AM

## 2023-01-05 NOTE — PROGRESS NOTES
HEMATOLOGY ONCOLOGY  Progress Note     Omar Song is a 65 y.o. female with medical history significant for Waldenstrom's macroglobulinemia, currently on first line chemotherapy with ibrutinib since 1/30/2021, presented to [facility] on 12/29/22 with worsening left knee and leg pain. She had a mechanical slip and fall on 12/27/22 in her driveway which was icy. She was found to have a comminuted medial and lateral tibial plateau fractures with intra-articular extension. Largest depressed fragment involving the lateral tibial plateau measured 8 mm with nondisplaced comminuted fibular head fracture and patellar fracture. She follows with orthopedics Dr. Tarsha Perkins. On 12/27 patient was discharged home with knee immobilizer to follow-up with orthopedics outpatient. Patient was unable to tolerate ambulation with crutches and knee immobilizer at home pain has been intolerable with oral pain medications patient denies any new falls or injuries. States they called the orthopedic office today and was told to come to the emergency room. Orthopedics is planning for surgery. I was called to evaluate her. 1/5/22  Patient was seen and examined. She is s/p ORIF of left bicondylar tibial plateau fracture by Dr. Tarsha Perkins on 1/4/23. She feels better this morning and pain is better. No new complaints.      PHYSICAL EXAM    Vitals: BP (!) 129/57   Pulse 61   Temp 97.8 °F (36.6 °C) (Oral)   Resp 17   Ht 5' 1\" (1.549 m)   Wt 121 lb 1.6 oz (54.9 kg)   SpO2 94%   BMI 22.88 kg/m²   CONSTITUTIONAL: awake, alert, cooperative, no apparent distress   EYES: pupils equal, round and reactive to light, sclera clear and conjunctiva normal  ENT: Normocephalic, without obvious abnormality, atraumatic  NECK: supple, symmetrical, no jugular venous distension and no carotid bruits   HEMATOLOGIC/LYMPHATIC: no cervical, supraclavicular or axillary lymphadenopathy   LUNGS: VBS, no wheezes, no crackles, no rhonchi, no increased work of breathing and clear to auscultation   CARDIOVASCULAR: regular rate and rhythm, normal S1 and S2, no murmur noted  ABDOMEN: normal bowel sounds x 4, soft, non-distended, non-tender, no masses palpated, no hepatosplenomgaly   MUSCULOSKELETAL: full range of motion noted, tone is normal  NEUROLOGIC: awake, alert, oriented to name, place and time. Motor skills grossly intact. SKIN: Normal skin color, texture, turgor and no jaundice. Skin appears intact   EXTREMITIES: no LE edema, left knee is on ace bandage, able to move her toes, no cyanosis,     LABORATORY RESULTS  CBC:   Recent Labs     01/03/23  0735 01/04/23  0600   WBC 9.3 13.3*   HGB 9.0* 9.5*    172     BMP:    Recent Labs     01/03/23  0735 01/04/23  0600    144   K 3.8 4.3    108   CO2 25 22   BUN 23 24*   CREATININE 0.9 1.0   GLUCOSE 96 131*     Hepatic:   Recent Labs     01/03/23  0735   AST 10*   ALT 13   BILITOT 0.8   ALKPHOS 88     INR: No results for input(s): INR in the last 72 hours. ASSESSMENT/RECOMMENDATION  Waldenstrom's macroglobulinemia - she is currently on ibrutinib and her disease has been in stable condition. I recommend to hold ibrutinib for now. Will plan to resume it ~ a week after surgery. Anemia - multifactorial etiology (d/l Waldenstrom's, chemo, bleeding etc.). Anemia panel on 1/3/23 was consistent with anemia due to chronic disease with functional iron deficiency. Will give short course of ferrous sulfate when she is in hospital.     Left tibia and fibular fracture - She is s/p ORIF of left bicondylar tibial plateau fracture by Dr. Royal Singleton on 1/4/23. We will follow the patient. Thank you.

## 2023-01-05 NOTE — PROGRESS NOTES
V2.0  [facility] Hospitalist Progress Note      Name:  Wenceslao Bridges /Age/Sex: 1945  (65 y.o. female)   MRN & CSN:  [id number] & [id number] Encounter Date/Time: 2023 3:59 PM EST    [facility]:  [facility] PCP: Sue Estrada MD       Hospital Day: 8    Assessment and Plan:   Wenceslao Bridges is a 65 y.o. female with pmh of CLL, COPD, hyperlipidemia, hypertension, hypothyroidism, osteoporosis who presents with Closed displaced fracture of left patella, unspecified fracture morphology, sequela      Plan:    Closed displaced comminuted fracture of left patella  Closed fracture head of the left fibula  Closed fracture of the left tibial plateau  Mechanical fall-patient slipped on ice  -Subsequent encounter patient initially presented to the emergency room  with complaint of left knee pain after fall. Patient was discharged home with a left knee immobilizer and instruction to follow-up with Ortho. Came back to ER again with uncontrolled pain  -Vascular studies in ER arterial Doppler and venous Doppler negative for acute DVTs or arterial occlusions.  - S/p open reduction internal fixation of left bicondylar tibial plateau fracture 6/3/1398 by orthopedic surgery.  -continue pain management  -started on  mg BID by orthopedic surgery  -d/c IVF  -Fall precautions  - orthopedic surgery following     Chronic Anemia - Hb trended down today. No active bleed. Will just monitor for now. Ambulatory dysfunction: Secondary to fracture as above unable to do ADLs due to fracture and knee immobilizer. -PT OT postop    Leukocytosis 90% seg relative . Likely reactive from fracture.  No sign of infection  - await today labs    Chronic lymphocytic leukemia:  Dr. Chiqui Hall following   Waldenstrom's macroglobulinemia   - ibrutinib Held  -Antiemetics as needed    Hypothyroidism: Continue levothyroxine    Hypertension: Patient currently not on medication  -Has been on Maxide in the past.    GERD: Continue PPI     CKD D stage IIIa: -Baseline creatinine around 1    Transaminitis: ALT 76, AST 64  - check liver function panel       Normal weight BMI 21.50: lifestyle modifications  -Follow-up PCP for longitudinal care     Diet ADULT DIET; Regular   DVT Prophylaxis [x] Lovenox, []  Heparin, [] SCDs, [] Ambulation,  [] Eliquis, [] Xarelto  [] Coumadin   Code Status Full Code   Disposition From: Home  Expected Disposition: TBD  Estimated Date of Discharge: TBD  Patient requires continued admission due to placement   Surrogate Decision Maker/ POA      Subjective:     Chief Complaint: Leg Pain       Dara Bah is a 65 y.o. female who presents with Fall    No acute event overnight. States pain is under control with meds. No other symptoms or complaints. Denies lightheadedness, dizziness, fever, night sweats, chills, chest pain, cough, dyspnea, palpitations, abd pain, nausea, vomiting, diarrhea, dysuria. Review of Systems:    Review of Systems    See above    Objective: Intake/Output Summary (Last 24 hours) at 1/5/2023 1007  Last data filed at 1/4/2023 1116  Gross per 24 hour   Intake 1450 ml   Output 25 ml   Net 1425 ml          Vitals:   Vitals:    01/05/23 0900   BP: (!) 113/55   Pulse: 67   Resp: 25   Temp: 98 °F (36.7 °C)   SpO2: 94%       Physical Exam:     General: NAD  Eyes: EOMI  ENT: neck supple  Cardiovascular: Regular rate. Respiratory: Clear to auscultation  Gastrointestinal: Soft, non tender  Genitourinary: no suprapubic tenderness  Musculoskeletal: left knee ACE wrapped,  Left knee range of motion restricted due to pain . Skin: warm, dry  Neuro: Alert. Psych: Mood appropriate.      Medications:   Medications:    ferrous sulfate  325 mg Oral Daily with breakfast    sodium chloride flush  5-40 mL IntraVENous 2 times per day    acetaminophen  650 mg Oral Q6H    ketorolac  15 mg IntraVENous Q6H    aspirin  325 mg Oral BID    enoxaparin  30 mg SubCUTAneous BID    pantoprazole  40 mg Oral QAM AC    [Held by provider] ibrutinib 420 mg Oral Daily    levothyroxine  100 mcg Oral Daily    Vitamin D  5,000 Units Oral Daily    sodium chloride flush  5-40 mL IntraVENous 2 times per day      Infusions:    sodium chloride       PRN Meds: sodium chloride flush, 5-40 mL, PRN  sodium chloride, , PRN  albuterol sulfate HFA, 2 puff, Q6H PRN  sodium chloride flush, 5-40 mL, PRN  ondansetron, 4 mg, Q8H PRN   Or  ondansetron, 4 mg, Q6H PRN  polyethylene glycol, 17 g, Daily PRN  acetaminophen, 650 mg, Q6H PRN   Or  acetaminophen, 650 mg, Q6H PRN  HYDROcodone-acetaminophen, 1 tablet, Q6H PRN  morphine, 2 mg, Q4H PRN  zolpidem, 5 mg, Nightly PRN      Labs      No results found for this or any previous visit (from the past 24 hour(s)). Imaging/Diagnostics Last 24 Hours   VL DUP LOWER EXTREMITY ARTERIES LEFT    Result Date: 12/29/2022  EXAMINATION: ARTERIAL DUPLEX ULTRASOUND OF THE  LOWER EXTREMITY  12/29/2022 4:52 pm TECHNIQUE: Duplex ultrasound using B-mode/gray scaled imaging, Doppler spectral analysis and color flow Doppler was obtained of the lower extremity. COMPARISON: None HISTORY: ORDERING SYSTEM PROVIDED HISTORY: leg pain, recent fracture TECHNOLOGIST PROVIDED HISTORY: Reason for exam:->leg pain, recent fracture FINDINGS: Arterial waveforms in the femoral and popliteal territories are monophasic. The trifurcation vessels also demonstrate monophasic waveforms. Flow velocities were measured as follows: Com. Fem. 288 cm/sec Prof.           125 cm/sec SFA Prox. 273 cm/sec SFA Mid.     253 cm/sec SFA Dist.     209 cm/sec Pop. 170 cm/sec PTA            98 cm/sec Peron. 98 cm/sec REENA            86 cm/sec     All arteries visualized in the left lower extremity are patent however femoropopliteal arteries demonstrate elevated velocities. Monophasic waveforms in the femoral circulation indicate aortoiliac disease with likely hemodynamic stenosis. No evidence of arterial occlusions.      VL DUP LOWER EXTREMITY VENOUS LEFT    Result Date: 12/29/2022  EXAMINATION: DUPLEX VENOUS ULTRASOUND OF THE LEFT LOWER EXTREMITY 12/29/2022 4:52 pm TECHNIQUE: Duplex ultrasound using B-mode/gray scaled imaging and Doppler spectral analysis and color flow was obtained of the deep venous structures of the left extremity. COMPARISON: None. HISTORY: ORDERING SYSTEM PROVIDED HISTORY: calf pain, knee fracture, increased swelling, eval for DVT TECHNOLOGIST PROVIDED HISTORY: Reason for exam:->calf pain, knee fracture, increased swelling, eval for DVT FINDINGS: The visualized veins of the left lower extremity are patent and free of echogenic thrombus. The veins demonstrate good compressibility with normal color flow study and spectral analysis.     No evidence of DVT in the left lower extremity.       Electronically signed by Yfn Villafuerte MD on 1/5/2023 at 10:07 AM

## 2023-01-05 NOTE — PROGRESS NOTES
Occupational Therapy    [facility] CARE OCCUPATIONAL THERAPY EVALUATION    Del Mason, 1945, 1114/1114-A, 1/5/2023    Discharge Recommendation: Inpatient Rehabilitation      History:  [place]:  The primary encounter diagnosis was Closed displaced comminuted fracture of left patella, initial encounter. Diagnoses of Closed fracture of head of left fibula, initial encounter and Closed fracture of left tibial plateau, initial encounter were also pertinent to this visit. Subjective:  Patient states: \"I'm small but I'm wild! \" (Pt stated in joking manner)  Pain: Pt denied pain this date at rest  Communication with other providers: RN Harman Atkins, RN EMILY Cash   Restrictions: General Precautions, Fall Risk, NWB Lt LE, Telemetry, Bed/chair alarm     Home Setup/Prior level of function:  Social/Functional History  Lives With: Spouse  Type of Home: Condo  Home Layout: One level  Home Access: Stairs to enter without rails  Entrance Stairs - Number of Steps: 1 (small step from garage)  Bathroom Shower/Tub: Walk-in shower, Shower chair with back  Bathroom Toilet: Standard  Bathroom Equipment: Shower chair  Home Equipment: Walker, rolling, Dy[place]  Has the patient had two or more falls in the past year or any fall with injury in the past year?: No (1 leading to this admission)  Receives Help From: Family  ADL Assistance: [address] [place] [address] Avenue: Independent  Homemaking Responsibilities: Yes (pt primarily manages household IADLs)  Ambulation Assistance: Independent (uses no AD)  Transfer Assistance: Independent  Active : Yes  Occupation: Retired  Type of Occupation: [place] at [employer]    Examination:  Observation: Sitting in chair upon [place] arrival. Pleasant and agreeable to evaluation. Friend present and supportive throughout session.   Vision: WFL (Glasses)   Hearing: WFL  Vitals: Stable vitals throughout session on room air    Body Systems and functions:  ROM: WNL all joints in BL UEs  Strength: 4+/5 MMT all major muscle groups BL UEs  Sensation: WFL in BL UEs (See PT evaluation for LE assessment)  Tone: Normal  Coordination: WNL  Perception: WNL    Activities of Daily Living (ADLs):  Feeding: Independent   Grooming: SBA (completed hand hygiene while seated EOB after toileting task; not feasible to complete in standing at this time due to NWB status Lt LE)  UB bathing: SBA   LB bathing: Mod A (reaching distal BL LEs)  UB dressing: SBA (donning clean robe at chair level)  LB dressing: Dependent (donning Rt sock)  Toileting: Max A (pt urinated while seated on regular toilet; assist required for clothing mgmt both directions, able to complete seated miguel care without assist)    Cognitive and Psychosocial Functioning:  Overall cognitive status: WFL  Affect: Normal     Balance:   Sitting: SBA in unsupported sitting EOB and on toilet  Standing: CGA with RW on Rt LE; able to maintain NWB status Lt LE with min cues    Functional Mobility:  Bed Mobility: Min A sitting EOB to supine (HOB elevated to 30', light assist for lifting Lt LE into bed but pt able to manage Rt LE and trunk)  Transfers: CGA on Rt LE to/from recliner, toilet, and bed (min cues for safe hand placement each direction/maintaining NWB status Lt LE)  Ambulation: CGA on Rt LE with RW using hop-gait pattern to/from bathroom for toileting; min cues for technique and maintaining NWB status Lt LE      AM-PAC 6 click short form for inpatient daily activity:   How much help from another person does the patient currently need. .. Unable  Dep A Lot  Max A A Lot   Mod A A Little  Min A A Little   CGA  SBA None   Mod I  Indep  Sup   1. Putting on and taking off regular lower body clothing? [x] 1    [] 2   [] 2   [] 3   [] 3   [] 4      2. Bathing (including washing, rinsing, drying)? [] 1   [] 2   [] 2 [x] 3 [] 3 [] 4   3. Toileting, which includes using toilet, bedpan, or urinal? [] 1    [x] 2   [] 2   [] 3   [] 3   [] 4     4. Putting on and taking off regular upper body clothing? [] 1   [] 2   [] 2   [] 3   [x] 3    [] 4      5. Taking care of personal grooming such as brushing teeth? [] 1   [] 2    [] 2 [] 3    [x] 3   [] 4      6. Eating meals? [] 1   [] 2   [] 2   [] 3   [] 3   [x] 4      Raw Score:  16   [24=0% impaired(CH), 23=1-19%(CI), 20-22=20-39%(CJ), 15-19=40-59%(CK), 10-14=60-79%(CL), 7-9=80-99%(CM), 6=100%(CN)]     Treatment:  Self Care Training:   Cues were given for safety, sequence, UE/LE placement, visual cues, and balance. Activities performed today included UB/LB dressing tasks, toileting, hand hygiene with wet wipes    Safety Measures: Gait belt used, Left in Bed, Alarm in place    Assessment:  Pt is a 65year old female with a past medical history of Arm fracture, left, Arthritis, CLL (chronic lymphocytic leukemia) ([facility] [address] COPD (chronic obstructive pulmonary disease) ([facility] [address] Great vein anomaly, H. pylori infection, Hiatal hernia, Hyperlipidemia, Hypertension, Hypothyroidism, MDRO (multiple drug resistant organisms) resistance, Osteoporosis, and Smoking hx. Pt admitted following a fall on ice and diagnosed with a Lt bicondylar tibial plateau fracture. Pt underwent ORIF on 1-4. Pt lives at home with her spouse and at baseline is fully independent with ADLs, IADLs, mobility, and driving. Pt currently presents with the above impairments. Recommend continued OT services in inpatient rehab setting at discharge. Complexity: Moderate  Prognosis: Good  Plan: 3x/week    Goals:  1. Pt will complete all aspects of bed mobility for EOB/OOB ADLs SBA  2. Pt will complete UB/LB bathing CGA with setup using long handled sponge/modified techniques PRN  3. Pt will complete all aspects of LB dressing min A with setup using AE/modified techniques PRN  4. Pt will complete all functional transfers to and from bed, chair, toilet, shower chair SBA on Rt LE  5.  Pt will ambulate HH distance to bathroom for toileting SBA with RW on Rt LE using hop gait pattern  6. Pt will complete all aspects of toileting task CGA  7. Pt will complete oral hygiene/grooming routine in unsupported sitting EOB with supervision  8.  Pt will complete ther ex/ther act with focus on UE strengthening and functional standing tolerance on Rt LE    Time:   Time in: 1529  Time out: 1557  Timed treatment minutes: 13  Total time: 28    Electronically signed by:    KELLY Judd/L, [address] [place] [phone]

## 2023-01-05 NOTE — PROGRESS NOTES
Comprehensive Nutrition Assessment    Type and Reason for Visit:  Reassess    Nutrition Recommendations/Plan:   Continue regular diet   Will offer standard oral nutrition supplement   Will continue to follow up during stay      Malnutrition Assessment:  Malnutrition Status: At risk for malnutrition (Comment) (12/30/22 1152)    Context:  Chronic Illness       Nutrition Assessment:    Able to resume regular diet. Limited po data. Patient reports eating meals, tolerating diet at this time. Will offer oral nutrition supplement during stay. Will continue to follow at moderate nutrition risk at this time. Nutrition Related Findings:    up in chair eating lunch Wound Type: None       Current Nutrition Intake & Therapies:    Average Meal Intake: Unable to assess  Average Supplements Intake: None Ordered  ADULT DIET; Regular    Anthropometric Measures:  Height: 5' 1\" (154.9 cm)  Ideal Body Weight (IBW): 105 lbs (48 kg)    Admission Body Weight: 116 lb 2.9 oz (52.7 kg)  Current Body Weight: 121 lb 0.5 oz (54.9 kg), 110.6 % IBW.  Weight Source: Bed Scale  Current BMI (kg/m2): 22.9  Usual Body Weight: 105 lb (47.6 kg) (hx 106# May, 109# August, 114# November)  % Weight Change (Calculated): 10.6  Weight Adjustment For: No Adjustment                 BMI Categories: Underweight (BMI less than 22) age over 72    Estimated Daily Nutrient Needs:  Energy Requirements Based On: Kcal/kg  Weight Used for Energy Requirements: Current  Energy (kcal/day): 8188-8250 (30-35 leon/kg)  Weight Used for Protein Requirements: Current  Protein (g/day): 53-64 (1-1.2 g/kg)  Method Used for Fluid Requirements: 1 ml/kcal  Fluid (ml/day): 1600    Nutrition Diagnosis:   Increased nutrient needs related to acute injury/trauma as evidenced by other (comment) (healing fracture)    Nutrition Interventions:   Food and/or Nutrient Delivery: Continue Current Diet, Continue Oral Nutrition Supplement  Nutrition Education/Counseling: No recommendation at this time  Coordination of Nutrition Care: Continue to monitor while inpatient  Plan of Care discussed with: patient    Goals:  Previous Goal Met: Goal(s) Achieved  Goals: Initiate PO diet, within 2 days       Nutrition Monitoring and Evaluation:   Behavioral-Environmental Outcomes: None Identified  Food/Nutrient Intake Outcomes: Food and Nutrient Intake, Supplement Intake  Physical Signs/Symptoms Outcomes: Biochemical Data, Meal Time Behavior, Skin, Weight    Discharge Planning:    Continue Oral Nutrition Supplement     Shoaib Nye, [address] [place] [address],   Contact: [phone]

## 2023-01-06 ENCOUNTER — APPOINTMENT (OUTPATIENT)
Dept: GENERAL RADIOLOGY | Age: 78
DRG: 493 | End: 2023-01-06
Payer: MEDICARE

## 2023-01-06 LAB
ANION GAP SERPL CALCULATED.3IONS-SCNC: 12 MMOL/L (ref 4–16)
ANISOCYTOSIS: ABNORMAL
BASOPHILS ABSOLUTE: 0.1 K/CU MM
BASOPHILS RELATIVE PERCENT: 1 % (ref 0–1)
BUN BLDV-MCNC: 36 MG/DL (ref 6–23)
CALCIUM SERPL-MCNC: 7.9 MG/DL (ref 8.3–10.6)
CHLORIDE BLD-SCNC: 107 MMOL/L (ref 99–110)
CO2: 22 MMOL/L (ref 21–32)
CREAT SERPL-MCNC: 1.3 MG/DL (ref 0.6–1.1)
DIFFERENTIAL TYPE: ABNORMAL
EOSINOPHILS ABSOLUTE: 0.1 K/CU MM
EOSINOPHILS RELATIVE PERCENT: 1 % (ref 0–3)
GFR SERPL CREATININE-BSD FRML MDRD: 42 ML/MIN/1.73M2
GLUCOSE BLD-MCNC: 96 MG/DL (ref 70–99)
HAPTOGLOBIN: 289 MG/DL (ref 30–200)
HCT VFR BLD CALC: 28.6 % (ref 37–47)
HEMOGLOBIN: 8.8 GM/DL (ref 12.5–16)
LYMPHOCYTES ABSOLUTE: 0.2 K/CU MM
LYMPHOCYTES RELATIVE PERCENT: 3 % (ref 24–44)
MCH RBC QN AUTO: 29.5 PG (ref 27–31)
MCHC RBC AUTO-ENTMCNC: 30.8 % (ref 32–36)
MCV RBC AUTO: 96 FL (ref 78–100)
PDW BLD-RTO: 12.9 % (ref 11.7–14.9)
PLATELET # BLD: 147 K/CU MM (ref 140–440)
PMV BLD AUTO: 11 FL (ref 7.5–11.1)
POTASSIUM SERPL-SCNC: 4.1 MMOL/L (ref 3.5–5.1)
RBC # BLD: 2.98 M/CU MM (ref 4.2–5.4)
SEGMENTED NEUTROPHILS ABSOLUTE COUNT: 6.7 K/CU MM
SEGMENTED NEUTROPHILS RELATIVE PERCENT: 95 % (ref 36–66)
SODIUM BLD-SCNC: 141 MMOL/L (ref 135–145)
WBC # BLD: 7.1 K/CU MM (ref 4–10.5)

## 2023-01-06 PROCEDURE — 2580000003 HC RX 258: Performed by: STUDENT IN AN ORGANIZED HEALTH CARE EDUCATION/TRAINING PROGRAM

## 2023-01-06 PROCEDURE — 6370000000 HC RX 637 (ALT 250 FOR IP): Performed by: STUDENT IN AN ORGANIZED HEALTH CARE EDUCATION/TRAINING PROGRAM

## 2023-01-06 PROCEDURE — 97110 THERAPEUTIC EXERCISES: CPT

## 2023-01-06 PROCEDURE — 2580000003 HC RX 258: Performed by: ORTHOPAEDIC SURGERY

## 2023-01-06 PROCEDURE — 97530 THERAPEUTIC ACTIVITIES: CPT

## 2023-01-06 PROCEDURE — 6370000000 HC RX 637 (ALT 250 FOR IP): Performed by: ORTHOPAEDIC SURGERY

## 2023-01-06 PROCEDURE — 1200000000 HC SEMI PRIVATE

## 2023-01-06 PROCEDURE — 99231 SBSQ HOSP IP/OBS SF/LOW 25: CPT | Performed by: INTERNAL MEDICINE

## 2023-01-06 PROCEDURE — 94150 VITAL CAPACITY TEST: CPT

## 2023-01-06 PROCEDURE — 80048 BASIC METABOLIC PNL TOTAL CA: CPT

## 2023-01-06 PROCEDURE — 6360000002 HC RX W HCPCS: Performed by: STUDENT IN AN ORGANIZED HEALTH CARE EDUCATION/TRAINING PROGRAM

## 2023-01-06 PROCEDURE — 94640 AIRWAY INHALATION TREATMENT: CPT

## 2023-01-06 PROCEDURE — 71045 X-RAY EXAM CHEST 1 VIEW: CPT

## 2023-01-06 PROCEDURE — 85027 COMPLETE CBC AUTOMATED: CPT

## 2023-01-06 PROCEDURE — 6370000000 HC RX 637 (ALT 250 FOR IP): Performed by: INTERNAL MEDICINE

## 2023-01-06 PROCEDURE — 94761 N-INVAS EAR/PLS OXIMETRY MLT: CPT

## 2023-01-06 PROCEDURE — 97116 GAIT TRAINING THERAPY: CPT

## 2023-01-06 PROCEDURE — 36415 COLL VENOUS BLD VENIPUNCTURE: CPT

## 2023-01-06 PROCEDURE — 85007 BL SMEAR W/DIFF WBC COUNT: CPT

## 2023-01-06 PROCEDURE — 6360000002 HC RX W HCPCS: Performed by: ORTHOPAEDIC SURGERY

## 2023-01-06 RX ORDER — ALBUTEROL SULFATE 90 UG/1
2 AEROSOL, METERED RESPIRATORY (INHALATION) 4 TIMES DAILY
Status: DISCONTINUED | OUTPATIENT
Start: 2023-01-06 | End: 2023-01-09

## 2023-01-06 RX ORDER — ENOXAPARIN SODIUM 100 MG/ML
30 INJECTION SUBCUTANEOUS DAILY
Status: DISCONTINUED | OUTPATIENT
Start: 2023-01-07 | End: 2023-01-10 | Stop reason: HOSPADM

## 2023-01-06 RX ORDER — SODIUM CHLORIDE, SODIUM LACTATE, POTASSIUM CHLORIDE, CALCIUM CHLORIDE 600; 310; 30; 20 MG/100ML; MG/100ML; MG/100ML; MG/100ML
INJECTION, SOLUTION INTRAVENOUS CONTINUOUS
Status: ACTIVE | OUTPATIENT
Start: 2023-01-06 | End: 2023-01-07

## 2023-01-06 RX ORDER — SODIUM CHLORIDE, SODIUM LACTATE, POTASSIUM CHLORIDE, CALCIUM CHLORIDE 600; 310; 30; 20 MG/100ML; MG/100ML; MG/100ML; MG/100ML
INJECTION, SOLUTION INTRAVENOUS CONTINUOUS
Status: DISPENSED | OUTPATIENT
Start: 2023-01-06 | End: 2023-01-06

## 2023-01-06 RX ADMIN — SODIUM CHLORIDE, PRESERVATIVE FREE 10 ML: 5 INJECTION INTRAVENOUS at 10:01

## 2023-01-06 RX ADMIN — FERROUS SULFATE TAB 325 MG (65 MG ELEMENTAL FE) 325 MG: 325 (65 FE) TAB at 10:00

## 2023-01-06 RX ADMIN — ASPIRIN 325 MG: 325 TABLET, COATED ORAL at 10:00

## 2023-01-06 RX ADMIN — ASPIRIN 325 MG: 325 TABLET, COATED ORAL at 20:17

## 2023-01-06 RX ADMIN — ACETAMINOPHEN 650 MG: 325 TABLET ORAL at 13:04

## 2023-01-06 RX ADMIN — ENOXAPARIN SODIUM 30 MG: 100 INJECTION SUBCUTANEOUS at 10:01

## 2023-01-06 RX ADMIN — PANTOPRAZOLE SODIUM 40 MG: 40 TABLET, DELAYED RELEASE ORAL at 06:02

## 2023-01-06 RX ADMIN — ZOLPIDEM TARTRATE 5 MG: 5 TABLET ORAL at 23:12

## 2023-01-06 RX ADMIN — SODIUM CHLORIDE, PRESERVATIVE FREE 10 ML: 5 INJECTION INTRAVENOUS at 20:17

## 2023-01-06 RX ADMIN — IPRATROPIUM BROMIDE 2 PUFF: 17 AEROSOL, METERED RESPIRATORY (INHALATION) at 20:21

## 2023-01-06 RX ADMIN — ACETAMINOPHEN 650 MG: 325 TABLET ORAL at 18:01

## 2023-01-06 RX ADMIN — ALBUTEROL SULFATE 2 PUFF: 90 AEROSOL, METERED RESPIRATORY (INHALATION) at 11:50

## 2023-01-06 RX ADMIN — KETOROLAC TROMETHAMINE 15 MG: 30 INJECTION, SOLUTION INTRAMUSCULAR; INTRAVENOUS at 06:22

## 2023-01-06 RX ADMIN — Medication 5000 UNITS: at 10:00

## 2023-01-06 RX ADMIN — ALBUTEROL SULFATE 2 PUFF: 90 AEROSOL, METERED RESPIRATORY (INHALATION) at 20:21

## 2023-01-06 RX ADMIN — ACETAMINOPHEN 650 MG: 325 TABLET ORAL at 06:02

## 2023-01-06 RX ADMIN — SODIUM CHLORIDE, POTASSIUM CHLORIDE, SODIUM LACTATE AND CALCIUM CHLORIDE: 600; 310; 30; 20 INJECTION, SOLUTION INTRAVENOUS at 18:08

## 2023-01-06 RX ADMIN — SODIUM CHLORIDE, PRESERVATIVE FREE 10 ML: 5 INJECTION INTRAVENOUS at 10:02

## 2023-01-06 RX ADMIN — ALBUTEROL SULFATE 2 PUFF: 90 AEROSOL, METERED RESPIRATORY (INHALATION) at 14:59

## 2023-01-06 RX ADMIN — IPRATROPIUM BROMIDE 2 PUFF: 17 AEROSOL, METERED RESPIRATORY (INHALATION) at 11:51

## 2023-01-06 RX ADMIN — ACETAMINOPHEN 650 MG: 325 TABLET ORAL at 23:11

## 2023-01-06 RX ADMIN — IPRATROPIUM BROMIDE 2 PUFF: 17 AEROSOL, METERED RESPIRATORY (INHALATION) at 14:59

## 2023-01-06 RX ADMIN — LEVOTHYROXINE SODIUM 100 MCG: 0.1 TABLET ORAL at 06:02

## 2023-01-06 ASSESSMENT — PAIN DESCRIPTION - DESCRIPTORS
DESCRIPTORS: ACHING;DISCOMFORT
DESCRIPTORS: ACHING
DESCRIPTORS: DISCOMFORT

## 2023-01-06 ASSESSMENT — PAIN DESCRIPTION - LOCATION
LOCATION: LEG
LOCATION: FOOT;LEG;KNEE
LOCATION: KNEE

## 2023-01-06 ASSESSMENT — PAIN DESCRIPTION - FREQUENCY: FREQUENCY: INTERMITTENT

## 2023-01-06 ASSESSMENT — PAIN DESCRIPTION - ORIENTATION
ORIENTATION: LEFT

## 2023-01-06 ASSESSMENT — PAIN SCALES - GENERAL
PAINLEVEL_OUTOF10: 0
PAINLEVEL_OUTOF10: 2
PAINLEVEL_OUTOF10: 0
PAINLEVEL_OUTOF10: 3
PAINLEVEL_OUTOF10: 3

## 2023-01-06 ASSESSMENT — PAIN - FUNCTIONAL ASSESSMENT
PAIN_FUNCTIONAL_ASSESSMENT: ACTIVITIES ARE NOT PREVENTED
PAIN_FUNCTIONAL_ASSESSMENT: PREVENTS OR INTERFERES WITH MANY ACTIVE NOT PASSIVE ACTIVITIES

## 2023-01-06 ASSESSMENT — PAIN DESCRIPTION - ONSET: ONSET: ON-GOING

## 2023-01-06 ASSESSMENT — PAIN DESCRIPTION - PAIN TYPE: TYPE: SURGICAL PAIN

## 2023-01-06 NOTE — PROGRESS NOTES
HEMATOLOGY ONCOLOGY  Progress Note     Augusta Diaz is a 65 y.o. female with medical history significant for Waldenstrom's macroglobulinemia, currently on first line chemotherapy with ibrutinib since 1/30/2021, presented to [address] [place] [address] on 12/29/22 with worsening left knee and leg pain. She had a mechanical slip and fall on 12/27/22 in her driveway which was icy. She was found to have a comminuted medial and lateral tibial plateau fractures with intra-articular extension. Largest depressed fragment involving the lateral tibial plateau measured 8 mm with nondisplaced comminuted fibular head fracture and patellar fracture. She follows with orthopedics Dr. Brandi Pearl. On 12/27 patient was discharged home with knee immobilizer to follow-up with orthopedics outpatient. Patient was unable to tolerate ambulation with crutches and knee immobilizer at home pain has been intolerable with oral pain medications patient denies any new falls or injuries. States they called the orthopedic office today and was told to come to the emergency room. Orthopedics is planning for surgery. I was called to evaluate her. 1/6/22  Patient was seen and examined. She is s/p ORIF of left bicondylar tibial plateau fracture by Dr. Brandi Pearl on 1/4/23. Pain is well controlled and she is working with therapy. She is awaiting facility for discharge. Having some difficulty urinating. Otherwise no new complaints.      PHYSICAL EXAM    Vitals: /63   Pulse 74   Temp 98.6 °F (37 °C)   Resp 16   Ht 5' 1\" (1.549 m)   Wt 121 lb 1.6 oz (54.9 kg)   SpO2 97%   BMI 22.88 kg/m²   CONSTITUTIONAL: awake, alert, cooperative, no apparent distress   EYES: pupils equal, round and reactive to light, sclera clear and conjunctiva normal  ENT: Normocephalic, without obvious abnormality, atraumatic  NECK: supple, symmetrical, no jugular venous distension and no carotid bruits   HEMATOLOGIC/LYMPHATIC: no cervical, supraclavicular or axillary lymphadenopathy LUNGS: VBS, no wheezes, no crackles, no rhonchi, no increased work of breathing and clear to auscultation   CARDIOVASCULAR: regular rate and rhythm, normal S1 and S2, no murmur noted  ABDOMEN: normal bowel sounds x 4, soft, non-distended, non-tender, no masses palpated, no hepatosplenomgaly   MUSCULOSKELETAL: full range of motion apart from L knee, tone is normal  NEUROLOGIC: awake, alert, oriented to name, place and time. Motor skills grossly intact. SKIN: Normal texture, turgor and no jaundice. Skin appears intact   EXTREMITIES: no LE edema, left knee is on ace bandage, able to move her toes, sensation grossly in tact    LABORATORY RESULTS  CBC:   Recent Labs     01/04/23  0600 01/05/23  1237 01/06/23  1054   WBC 13.3* 6.6 7.1   HGB 9.5* 7.9* 8.8*    153 147       BMP:    Recent Labs     01/04/23  0600 01/05/23  1237 01/06/23  1054    142 141   K 4.3 3.8 4.1    107 107   CO2 22 22 22   BUN 24* 30* 36*   CREATININE 1.0 1.5* 1.3*   GLUCOSE 131* 108* 96       Hepatic:   Recent Labs     01/05/23  1237   AST 91*   ALT 40   BILITOT 0.7   ALKPHOS 183*       INR: No results for input(s): INR in the last 72 hours. ASSESSMENT/RECOMMENDATION  Waldenstrom's macroglobulinemia - she is currently on ibrutinib and her disease has been in stable condition. I recommend to hold ibrutinib for now. Will plan to resume it ~ a week after surgery. Anemia - multifactorial etiology (d/l Waldenstrom's, chemo, bleeding etc.). Anemia panel on 1/3/23 was consistent with anemia due to chronic disease with functional iron deficiency. Will give short course of ferrous sulfate when she is in hospital.  Remains relatively stable currently    Left tibia and fibular fracture - She is s/p ORIF of left bicondylar tibial plateau fracture by Dr. Lisa Saxena on 1/4/23. On ASA 325mg BID per ortho for DVT ppx. We will follow the patient. Thank you.      Damaris Suarez PA-C    I have independently evaluated and examined this patient today. I have reviewed radiologic and biochemical tests on this patient. Management Plan is developed mutually with Andrew Mayes NP.  I have reviewed above note and agree with assessment and plan

## 2023-01-06 NOTE — PROGRESS NOTES
Dick Sung is a 65 y.o. female patient. 1. Closed displaced comminuted fracture of left patella, initial encounter    2. Closed fracture of head of left fibula, initial encounter    3. Closed fracture of left tibial plateau, initial encounter      Past Medical History:   Diagnosis Date    Arm fracture, left 05/16/2019    Casted - no surgery - Dr. 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 pylori infection 8/1996    S/P Biaxin and Prilosec    Hiatal hernia 8/1994    with reflux by UGI    Hyperlipidemia     Hypertension     Hypothyroidism 1990's    MDRO (multiple drug resistant organisms) resistance 2005    Nasal passages    Osteoporosis     Smoking hx      No past surgical history pertinent negatives on file.   Scheduled Meds:   albuterol sulfate HFA  2 puff Inhalation 4x daily    And    ipratropium  2 puff Inhalation 4x daily    ferrous sulfate  325 mg Oral Daily with breakfast    enoxaparin  30 mg SubCUTAneous BID    sodium chloride flush  5-40 mL IntraVENous 2 times per day    acetaminophen  650 mg Oral Q6H    aspirin  325 mg Oral BID    pantoprazole  40 mg Oral QAM AC    [Held by provider] ibrutinib  420 mg Oral Daily    levothyroxine  100 mcg Oral Daily    Vitamin D  5,000 Units Oral Daily    sodium chloride flush  5-40 mL IntraVENous 2 times per day     Continuous Infusions:   [Held by provider] lactated ringers      sodium chloride       PRN Meds:sodium chloride flush, sodium chloride, albuterol sulfate HFA, sodium chloride flush, ondansetron **OR** ondansetron, polyethylene glycol, acetaminophen **OR** acetaminophen, HYDROcodone-acetaminophen, morphine, zolpidem    Allergies   Allergen Reactions    Amitiza [Lubiprostone]     Clindamycin/Lincomycin      GI intolerance    Evista [Raloxifene Hydrochloride]     Flexeril [Cyclobenzaprine Hcl]      CNS    Omega-3 Fatty Acids [Fish Oil] Diarrhea    Prilosec [Omeprazole]      Pt sts meds didn't work - states not an allergy 6/3/19    Skelaxin [Metaxalone]      Itching    Spiriva Handihaler [Tiotropium Bromide Monohydrate]      \"Made my throat dry\" - not allergic too. 6/3/19     Principal Problem:    Closed displaced fracture of left patella, unspecified fracture morphology, sequela  Active Problems:    Closed displaced comminuted fracture of left patella  Resolved Problems:    * No resolved hospital problems. *    Blood pressure (!) 139/54, pulse 73, temperature 98.1 °F (36.7 °C), resp. rate 23, height 5' 1\" (1.549 m), weight 121 lb 1.6 oz (54.9 kg), SpO2 97 %, not currently breastfeeding. Subjective  Patient seen and examined, resting in chair comfortably, pain controlled, no new complaints. Doing well today. Objective:  Vital signs (most recent): Blood pressure (!) 139/54, pulse 73, temperature 98.1 °F (36.7 °C), resp. rate 23, height 5' 1\" (1.549 m), weight 121 lb 1.6 oz (54.9 kg), SpO2 97 %, not currently breastfeeding. LLE - Dressing removed, incisions clean, dry, intact, no calf TTP, compartments soft, neurovascularly intact distally. Assessment & Plan  POD #2 ORIF left tibial plateau fracture, Doing well postoperatively    Continue strict nonweightbearing to left lower extremity. Continue range of motion exercises for the left knee and ankle as tolerated. Pain control. DVT prophylaxis. Continue to ice and elevate. Continue PT/OT. Discharge planning likely for ECF versus ARU. Ortho stable for discharge, follow-up in office in 2 weeks.     Emily Almeida,   1/6/2023

## 2023-01-06 NOTE — PROGRESS NOTES
Wenceslao Bridges is a 65 y.o. female patient. 1. Closed displaced comminuted fracture of left patella, initial encounter    2. Closed fracture of head of left fibula, initial encounter    3. Closed fracture of left tibial plateau, initial encounter      Past Medical History:   Diagnosis Date    Arm fracture, left 05/16/2019    Casted - no surgery - Dr. 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 pylori infection 8/1996    S/P Biaxin and Prilosec    Hiatal hernia 8/1994    with reflux by UGI    Hyperlipidemia     Hypertension     Hypothyroidism 1990's    MDRO (multiple drug resistant organisms) resistance 2005    Nasal passages    Osteoporosis     Smoking hx      No past surgical history pertinent negatives on file.   Scheduled Meds:   ferrous sulfate  325 mg Oral Daily with breakfast    enoxaparin  30 mg SubCUTAneous BID    sodium chloride flush  5-40 mL IntraVENous 2 times per day    acetaminophen  650 mg Oral Q6H    ketorolac  15 mg IntraVENous Q6H    aspirin  325 mg Oral BID    pantoprazole  40 mg Oral QAM AC    [Held by provider] ibrutinib  420 mg Oral Daily    levothyroxine  100 mcg Oral Daily    Vitamin D  5,000 Units Oral Daily    sodium chloride flush  5-40 mL IntraVENous 2 times per day     Continuous Infusions:   sodium chloride       PRN Meds:sodium chloride flush, sodium chloride, albuterol sulfate HFA, sodium chloride flush, ondansetron **OR** ondansetron, polyethylene glycol, acetaminophen **OR** acetaminophen, HYDROcodone-acetaminophen, morphine, zolpidem    Allergies   Allergen Reactions    Amitiza [Lubiprostone]     Clindamycin/Lincomycin      GI intolerance    Evista [Raloxifene Hydrochloride]     Flexeril [Cyclobenzaprine Hcl]      CNS    Omega-3 Fatty Acids [Fish Oil] Diarrhea    Prilosec [Omeprazole]      Pt sts meds didn't work - states not an allergy 6/3/19    Skelaxin [Metaxalone]      Itching    Spiriva Handihaler [Tiotropium Bromide Monohydrate]      \"Made my throat dry\" - not allergic too. 6/3/19     Principal Problem:    Closed displaced fracture of left patella, unspecified fracture morphology, sequela  Active Problems:    Closed displaced comminuted fracture of left patella  Resolved Problems:    * No resolved hospital problems. *    Blood pressure (!) 113/55, pulse 68, temperature 98 °F (36.7 °C), temperature source Oral, resp. rate 20, height 5' 1\" (1.549 m), weight 121 lb 1.6 oz (54.9 kg), SpO2 95 %, not currently breastfeeding. Subjective  Patient seen and examined, resting in bed comfortably, pain controlled, no new complaints. Objective  LLE - Dressing clean, dry, intact, no calf TTP, compartments soft, neurovascularly intact distally. Assessment & Plan  POD #1 ORIF left tibial plateau fracture, Doing well postoperatively    Continue strict nonweightbearing to left lower extremity. Continue range of motion exercises for the left knee and ankle as tolerated. Pain control. DVT prophylaxis. Continue to ice and elevate. Continue PT/OT. Discharge planning likely for ECF versus ARU.     Emily 97, DO  1/5/2023

## 2023-01-06 NOTE — CARE COORDINATION
Spoke with patient in f/u regarding discharge plan to see if patient wanted to continue with plan for [facility] or try a referral to ARU. Patient stated that she would like to see if she would qualify for ARU. [facility] . She reviewed referral and let CM know that patient doesn't meed criteria for ARU per patient's insurance [facility] Medicare guidelines . Spoke with patient in f/u to provide this update . Plan is to continue with [facility] for rehab . Phoned   and spoke with Anibal Martinez at  [facility] to check on status of referral . She stated they can accept patient and will start precert. WB placed for OT eval . Anibal Martinez stated that patient will need to bring her chemo meds.

## 2023-01-06 NOTE — CONSULTS
RENAL DOSE ADJUSTMENT MADE PER P/T PROTOCOL    PREVIOUS ORDER:  Enoxaparin 30mg subq bid    Estimated Creatinine Clearance: 27 mL/min (A) (based on SCr of 1.3 mg/dL (H)). Recent Labs     01/04/23  0600 01/05/23  1237 01/06/23  1054   BUN 24* 30* 36*   CREATININE 1.0 1.5* 1.3*    153 147     NEW RENALLY ADJUSTED ORDER:  ENOXAPARIN 30MG SUBQ DAILY    Mini Zarate.  Jaja Johnson [facility]  1/6/2023 1:16 PM

## 2023-01-06 NOTE — PROGRESS NOTES
V2.0  [facility] Hospitalist Progress Note      Name:  Cleo Ortiz /Age/Sex: 1945  (77 y.o. female)   MRN & CSN:  [id number] & [id number] Encounter Date/Time: 2023 3:59 PM EST    [facility] PCP: Michel Vizcarra MD       Hospital Day: 9    Assessment and Plan:   Cleo Ortiz is a 77 y.o. female with pmh of CLL, COPD, hyperlipidemia, hypertension, hypothyroidism, osteoporosis who presents with Closed displaced fracture of left patella, unspecified fracture morphology, sequela      Plan:    Closed displaced comminuted fracture of left patella  Closed fracture head of the left fibula  Closed fracture of the left tibial plateau  Mechanical fall-patient slipped on ice  -Subsequent encounter patient initially presented to the emergency room  with complaint of left knee pain after fall.  Patient was discharged home with a left knee immobilizer and instruction to follow-up with Ortho. Came back to ER again with uncontrolled pain  -Vascular studies in ER arterial Doppler and venous Doppler negative for acute DVTs or arterial occlusions.  - S/p open reduction internal fixation of left bicondylar tibial plateau fracture 2023 by orthopedic surgery.  -continue pain management  -started on  mg BID by orthopedic surgery  -Fall precautions  - orthopedic surgery following     Shortness of breath. New complaint at rest and worse on exertion. Hx of smoking but no formal diagnosis of COPD.   - start breathing treatment  - get CXR    YURI on CKD D stage IIIa. Cr 1.5 2023 (baseline 0.9-1.0). Unknown etiology.   - check labs today  - await CXR; may need IVF but concern for volume overload  - urine studies  - d/c toradol given YURI    Chronic Anemia - Hb trended down today. No active bleed. Will just monitor for now.   - hem/onc following    Ambulatory dysfunction: Secondary to fracture as above unable to do ADLs due to fracture and knee immobilizer.  -PT OT postop    Leukocytosis 90% seg relative -  resolved . Likely reactive from fracture. No sign of infection  - monitor    Chronic lymphocytic leukemia:  Dr. Yodit Landon following   Waldenstrom's macroglobulinemia   - ibrutinib Held  -Antiemetics as needed    Hypothyroidism: Continue levothyroxine    Hypertension: Patient currently not on medication  -Has been on Maxide in the past.    GERD: Continue PPI     Transaminitis - stable  - only alk vinay slightly elevated 183       Normal weight BMI 21.50: lifestyle modifications  -Follow-up PCP for longitudinal care     Diet ADULT DIET; Regular  ADULT ORAL NUTRITION SUPPLEMENT; Breakfast, Dinner; Standard High Calorie/High Protein Oral Supplement   DVT Prophylaxis [x] Lovenox, []  Heparin, [] SCDs, [] Ambulation,  [] Eliquis, [] Xarelto  [] Coumadin   Code Status Full Code   Disposition From: Home  Expected Disposition: TBD  Estimated Date of Discharge: TBD  Patient requires continued admission due to placement   Surrogate Decision Maker/ POA      Subjective:     Chief Complaint: Leg Pain       John Meek is a 65 y.o. female who presents with Fall    No acute event overnight. States pain is under control with meds. Pt complains of SOB at rest and worse w ambulation. Denies cough or chest pain. Sating 95% on RA. Having BM. No other symptoms or complaints. Denies lightheadedness, dizziness, fever, night sweats, chills, palpitations, abd pain, nausea, vomiting, diarrhea, dysuria. Review of Systems:    Review of Systems    See above    Objective:   No intake or output data in the 24 hours ending 01/06/23 0857       Vitals:   Vitals:    01/06/23 0753   BP: (!) 139/54   Pulse: 73   Resp: 18   Temp: 98.1 °F (36.7 °C)   SpO2: 96%       Physical Exam:     General: NAD  Eyes: EOMI  ENT: neck supple  Cardiovascular: Regular rate.   Respiratory: wheezing bilaterally  Gastrointestinal: Soft, non tender  Genitourinary: no suprapubic tenderness  Musculoskeletal: left knee ACE wrapped,  Left knee range of motion restricted due to pain .  Skin: warm, dry  Neuro: Alert. Psych: Mood appropriate.      Medications:   Medications:    albuterol sulfate HFA  2 puff Inhalation 4x daily    And    ipratropium  2 puff Inhalation 4x daily    ferrous sulfate  325 mg Oral Daily with breakfast    enoxaparin  30 mg SubCUTAneous BID    sodium chloride flush  5-40 mL IntraVENous 2 times per day    acetaminophen  650 mg Oral Q6H    aspirin  325 mg Oral BID    pantoprazole  40 mg Oral QAM AC    [Held by provider] ibrutinib  420 mg Oral Daily    levothyroxine  100 mcg Oral Daily    Vitamin D  5,000 Units Oral Daily    sodium chloride flush  5-40 mL IntraVENous 2 times per day      Infusions:    [Held by provider] lactated ringers      sodium chloride       PRN Meds: sodium chloride flush, 5-40 mL, PRN  sodium chloride, , PRN  albuterol sulfate HFA, 2 puff, Q6H PRN  sodium chloride flush, 5-40 mL, PRN  ondansetron, 4 mg, Q8H PRN   Or  ondansetron, 4 mg, Q6H PRN  polyethylene glycol, 17 g, Daily PRN  acetaminophen, 650 mg, Q6H PRN   Or  acetaminophen, 650 mg, Q6H PRN  HYDROcodone-acetaminophen, 1 tablet, Q6H PRN  morphine, 2 mg, Q4H PRN  zolpidem, 5 mg, Nightly PRN      Labs      Recent Results (from the past 24 hour(s))   CBC with Auto Differential    Collection Time: 01/05/23 12:37 PM   Result Value Ref Range    WBC 6.6 4.0 - 10.5 K/CU MM    RBC 2.67 (L) 4.2 - 5.4 M/CU MM    Hemoglobin 7.9 (L) 12.5 - 16.0 GM/DL    Hematocrit 26.0 (L) 37 - 47 %    MCV 97.4 78 - 100 FL    MCH 29.6 27 - 31 PG    MCHC 30.4 (L) 32.0 - 36.0 %    RDW 13.0 11.7 - 14.9 %    Platelets 325 406 - 139 K/CU MM    MPV 11.3 (H) 7.5 - 11.1 FL    Metamyelocytes Relative 2 (H) 0.0 %    Segs Relative 84.0 (H) 36 - 66 %    Eosinophils % 7.0 (H) 0 - 3 %    Lymphocytes % 4.0 (L) 24 - 44 %    Monocytes % 3.0 0 - 4 %    Metamyelocytes Absolute 0.13 K/CU MM    Segs Absolute 5.5 K/CU MM    Eosinophils Absolute 0.5 K/CU MM    Lymphocytes Absolute 0.3 K/CU MM    Monocytes Absolute 0.2 K/CU MM    Differential Type MANUAL DIFFERENTIAL     RBC Morphology OCCASIONAL ANCANTHOCYTES AND SCHISTOCYTES NOTED     Anisocytosis 1+     Polychromasia 1+    Comprehensive Metabolic Panel w/ Reflex to MG    Collection Time: 01/05/23 12:37 PM   Result Value Ref Range    Sodium 142 135 - 145 MMOL/L    Potassium 3.8 3.5 - 5.1 MMOL/L    Chloride 107 99 - 110 mMol/L    CO2 22 21 - 32 MMOL/L    BUN 30 (H) 6 - 23 MG/DL    Creatinine 1.5 (H) 0.6 - 1.1 MG/DL    Est, Glom Filt Rate 35 (L) >60 mL/min/1.73m2    Glucose 108 (H) 70 - 99 MG/DL    Calcium 7.9 (L) 8.3 - 10.6 MG/DL    Albumin 3.2 (L) 3.4 - 5.0 GM/DL    Total Protein 5.1 (L) 6.4 - 8.2 GM/DL    Total Bilirubin 0.7 0.0 - 1.0 MG/DL    ALT 40 10 - 40 U/L    AST 91 (H) 15 - 37 IU/L    Alkaline Phosphatase 183 (H) 40 - 128 IU/L    Anion Gap 13 4 - 16            Imaging/Diagnostics Last 24 Hours   VL DUP LOWER EXTREMITY ARTERIES LEFT    Result Date: 12/29/2022  EXAMINATION: ARTERIAL DUPLEX ULTRASOUND OF THE  LOWER EXTREMITY  12/29/2022 4:52 pm TECHNIQUE: Duplex ultrasound using B-mode/gray scaled imaging, Doppler spectral analysis and color flow Doppler was obtained of the lower extremity. COMPARISON: None HISTORY: ORDERING SYSTEM PROVIDED HISTORY: leg pain, recent fracture TECHNOLOGIST PROVIDED HISTORY: Reason for exam:->leg pain, recent fracture FINDINGS: Arterial waveforms in the femoral and popliteal territories are monophasic. The trifurcation vessels also demonstrate monophasic waveforms. Flow velocities were measured as follows: Com. Fem. 288 cm/sec Prof.           125 cm/sec SFA Prox. 273 cm/sec SFA Mid.     253 cm/sec SFA Dist.     209 cm/sec Pop. 170 cm/sec PTA            98 cm/sec Peron. 98 cm/sec REENA            86 cm/sec     All arteries visualized in the left lower extremity are patent however femoropopliteal arteries demonstrate elevated velocities. Monophasic waveforms in the femoral circulation indicate aortoiliac disease with likely hemodynamic stenosis.   No evidence of arterial occlusions. VL DUP LOWER EXTREMITY VENOUS LEFT    Result Date: 12/29/2022  EXAMINATION: DUPLEX VENOUS ULTRASOUND OF THE LEFT LOWER EXTREMITY 12/29/2022 4:52 pm TECHNIQUE: Duplex ultrasound using B-mode/gray scaled imaging and Doppler spectral analysis and color flow was obtained of the deep venous structures of the left extremity. COMPARISON: None. HISTORY: ORDERING SYSTEM PROVIDED HISTORY: calf pain, knee fracture, increased swelling, eval for DVT TECHNOLOGIST PROVIDED HISTORY: Reason for exam:->calf pain, knee fracture, increased swelling, eval for DVT FINDINGS: The visualized veins of the left lower extremity are patent and free of echogenic thrombus. The veins demonstrate good compressibility with normal color flow study and spectral analysis. No evidence of DVT in the left lower extremity.        Electronically signed by Tracie Irene MD on 1/6/2023 at 8:57 AM

## 2023-01-06 NOTE — PROGRESS NOTES
Occupational Therapy      Occupational Therapy Treatment Note    Name: Rossy Velazquez MRN: [id number] :   1945   Date:  2023   Admission Date: 2022 Room:  [address] [place] [address]-     Primary Problem: Lt bicondylar tibial plateau fracture s/p ORIF    Restrictions/Precautions: General Precautions, Fall Risk, NWB Lt LE, Telemetry, Bed/chair alarm      Communication with other providers: RN, RT    Subjective:  Patient states: \"Honey, I'm ready to get back to bed. I'm wiped out! \" (Pt had PT session with PTA earlier this AM)  Pain: Pt denied pain this date at rest    Objective:    Observation: Pt received sitting in chair upon OT arrival. Agreeable to limited treatment and requesting to get back into bed. Objective Measures: Stable HR throughout session, A & O x 4    Treatment, including education:  Therapeutic Activity Training:   Therapeutic activity training was instructed today. Cues were given for safety, sequence, UE/LE placement, awareness, and balance. Pt received sitting in chair upon OT arrival. Agreeable to limited treatment and requesting to get back into bed. Pt donned Rt sock seated at chair level SBA by crossing leg into \"figure 4 position\" with increased time/effort. Pt utilized only Lt hand for task completion and kept Rt UE support on armrest during task. Pt completed sit to stand transfer from chair CGA on Rt LE with min cues for safe hand placement and maintaining NWB status Lt LE. Pt ambulated ~6 ft from chair to bed CGA on Rt LE with RW using hop gait pattern. Pt transferred stand sit to bed CGA on Rt LE with cues for reaching back with arms. Pt transferred sitting EOB to supine min A for lifting Lt LE into bed but pt able to manage trunk. Pt able to scoot hips up in bed min A with min coaching for technique. Pt left positioned for comfort in bed with all lines intact, all needs within reach, and bed alarm on.      Assessment / Impression:    Patient's tolerance of treatment: Well  Adverse Reaction: None  Significant change in status and impact: Improved performance with LB dressing task this date  Barriers to improvement: None noted    Plan for Next Session:    Continue per OT POC. Continue to recommend inpatient rehab at discharge.     Time in: 1145  Time out: 1200  Timed treatment minutes: 15  Total treatment time: 15    Electronically signed by:    KELLY Garcia/L, [address] [place] [phone]

## 2023-01-06 NOTE — CARE COORDINATION
Received referral for ARU. Reviewed patients clinicals and PT/OT notes. Patients primary insurance is [facility] Medicare. Per [place] Medicare guidelines patient does not meet ARU criteria d/t lack of medical complexity. Discussed with [facility] CM.

## 2023-01-06 NOTE — PROGRESS NOTES
Physical Therapy    Physical Therapy Treatment Note  Name: Wenceslao Clemensr MRN: [id number] :   1945   Date:  2023   Admission Date: 2022 Room:  [address] [place] [address]   Restrictions/Precautions:          fall risk; general precautions; NWB on LLE   L tibial plateau fx  Communication with other providers: per nurse ok to tx and notified at end of tx pt needs chair alarm. Subjective:  Patient states:  pt very pleasant and motivated  Pain:   Location, Type, Intensity (0/10 to 10/10):  pt reports no pain at rest and only throbbing when up and legs is hanging down but did not give rating. Objective:    Observation:  alert and oriented with ace wrap to LLE and on room air. Treatment, including education/measures:  Sup to sit sba but needing increased time to move LLE out of bed  Sit<=>stand min assist from bed, chair and commode and cues for hand placement. Pt needing extended time on commode but was able to do miguel care in sitting independently but did need to prop left foot up on wast can due to leg throbbing. Amb/hop on RLE with cga/min assist and cues 10' x 2. Pt needing increased time and assist to turn walker small bathroom and has increased SOB. Ex in sitting:  Trunk stretches with deep breathing  10 reps aps  5 reps quad sets  10 reps glut sets  Safety  Patient left safely in the chair, with call light/phone in reach. Gait belt was used for transfers and gait. Assessment / Impression:      Patient's tolerance of treatment:  good but increased SOB needing increased time to recover with cues for PLB   Adverse Reaction: na  Significant change in status and impact:  na  Barriers to improvement:  strength and safety  Plan for Next Session:    Cont.  POC  Time in:  0850  Time out:  0930  Timed treatment minutes:  40  Total treatment time:  40    Previously filed items:  Social/Functional History  Lives With: Spouse  Type of Home: Condo  Home Layout: One level  Home Access: Stairs to enter without rails  Entrance Stairs - Number of Steps: 1 (small step from garage)  Bathroom Shower/Tub: Walk-in shower, Shower chair with back  Bathroom Toilet: Standard  Bathroom Equipment: Shower chair  Home Equipment: [employer], rolling, [employer]  Has the patient had two or more falls in the past year or any fall with injury in the past year?: No (1 leading to this admission)  Receives Help From: Family  ADL Assistance: [address]: Independent  Homemaking Responsibilities: 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)  Ambulation Assistance: Independent  Transfer Assistance: Independent  Active : Yes  Occupation: Retired  Type of Occupation:  at [employer]  Patient Goals   Patient Goals : return to [address] [place] [address]  Time Frame for Short Term Goals: 1 week  Short Term Goal 1: pt will complete bed mobility at [facility] 2: pt will complete transfers at [place] [address] 3: pt will ambulate 30 ft using FWW at [address] [place] [address] 4: pt will demonstrate and verbalize understanding of need for LLE NWB w/ min cues    Electronically signed by:    Catherine Draper PTA  1/6/2023, 8:39 AM

## 2023-01-07 PROCEDURE — 94761 N-INVAS EAR/PLS OXIMETRY MLT: CPT

## 2023-01-07 PROCEDURE — 2580000003 HC RX 258: Performed by: ORTHOPAEDIC SURGERY

## 2023-01-07 PROCEDURE — 6370000000 HC RX 637 (ALT 250 FOR IP): Performed by: ORTHOPAEDIC SURGERY

## 2023-01-07 PROCEDURE — 6360000002 HC RX W HCPCS: Performed by: STUDENT IN AN ORGANIZED HEALTH CARE EDUCATION/TRAINING PROGRAM

## 2023-01-07 PROCEDURE — 6370000000 HC RX 637 (ALT 250 FOR IP): Performed by: STUDENT IN AN ORGANIZED HEALTH CARE EDUCATION/TRAINING PROGRAM

## 2023-01-07 PROCEDURE — 85025 COMPLETE CBC W/AUTO DIFF WBC: CPT

## 2023-01-07 PROCEDURE — 94640 AIRWAY INHALATION TREATMENT: CPT

## 2023-01-07 PROCEDURE — 1200000000 HC SEMI PRIVATE

## 2023-01-07 PROCEDURE — 97110 THERAPEUTIC EXERCISES: CPT

## 2023-01-07 PROCEDURE — 6370000000 HC RX 637 (ALT 250 FOR IP): Performed by: INTERNAL MEDICINE

## 2023-01-07 PROCEDURE — 97530 THERAPEUTIC ACTIVITIES: CPT

## 2023-01-07 RX ADMIN — ZOLPIDEM TARTRATE 5 MG: 5 TABLET ORAL at 21:52

## 2023-01-07 RX ADMIN — SODIUM CHLORIDE, PRESERVATIVE FREE 10 ML: 5 INJECTION INTRAVENOUS at 10:28

## 2023-01-07 RX ADMIN — HYDROCODONE BITARTRATE AND ACETAMINOPHEN 1 TABLET: 5; 325 TABLET ORAL at 21:52

## 2023-01-07 RX ADMIN — ACETAMINOPHEN 650 MG: 325 TABLET ORAL at 18:20

## 2023-01-07 RX ADMIN — SODIUM CHLORIDE, PRESERVATIVE FREE 10 ML: 5 INJECTION INTRAVENOUS at 10:27

## 2023-01-07 RX ADMIN — LEVOTHYROXINE SODIUM 100 MCG: 0.1 TABLET ORAL at 05:29

## 2023-01-07 RX ADMIN — IPRATROPIUM BROMIDE 2 PUFF: 17 AEROSOL, METERED RESPIRATORY (INHALATION) at 11:18

## 2023-01-07 RX ADMIN — ALBUTEROL SULFATE 2 PUFF: 90 AEROSOL, METERED RESPIRATORY (INHALATION) at 15:27

## 2023-01-07 RX ADMIN — ASPIRIN 325 MG: 325 TABLET, COATED ORAL at 21:52

## 2023-01-07 RX ADMIN — ALBUTEROL SULFATE 2 PUFF: 90 AEROSOL, METERED RESPIRATORY (INHALATION) at 11:18

## 2023-01-07 RX ADMIN — IPRATROPIUM BROMIDE 2 PUFF: 17 AEROSOL, METERED RESPIRATORY (INHALATION) at 07:41

## 2023-01-07 RX ADMIN — SODIUM CHLORIDE, PRESERVATIVE FREE 10 ML: 5 INJECTION INTRAVENOUS at 21:53

## 2023-01-07 RX ADMIN — ACETAMINOPHEN 650 MG: 325 TABLET ORAL at 05:29

## 2023-01-07 RX ADMIN — IPRATROPIUM BROMIDE 2 PUFF: 17 AEROSOL, METERED RESPIRATORY (INHALATION) at 15:27

## 2023-01-07 RX ADMIN — ACETAMINOPHEN 650 MG: 325 TABLET ORAL at 12:39

## 2023-01-07 RX ADMIN — ENOXAPARIN SODIUM 30 MG: 100 INJECTION SUBCUTANEOUS at 10:25

## 2023-01-07 RX ADMIN — ALBUTEROL SULFATE 2 PUFF: 90 AEROSOL, METERED RESPIRATORY (INHALATION) at 07:41

## 2023-01-07 RX ADMIN — FERROUS SULFATE TAB 325 MG (65 MG ELEMENTAL FE) 325 MG: 325 (65 FE) TAB at 10:25

## 2023-01-07 RX ADMIN — PANTOPRAZOLE SODIUM 40 MG: 40 TABLET, DELAYED RELEASE ORAL at 05:29

## 2023-01-07 RX ADMIN — ASPIRIN 325 MG: 325 TABLET, COATED ORAL at 10:25

## 2023-01-07 RX ADMIN — Medication 5000 UNITS: at 10:25

## 2023-01-07 ASSESSMENT — PAIN DESCRIPTION - DESCRIPTORS: DESCRIPTORS: DULL;ACHING

## 2023-01-07 ASSESSMENT — PAIN SCALES - GENERAL
PAINLEVEL_OUTOF10: 3
PAINLEVEL_OUTOF10: 3

## 2023-01-07 ASSESSMENT — PAIN DESCRIPTION - LOCATION: LOCATION: LEG;HIP

## 2023-01-07 ASSESSMENT — PAIN DESCRIPTION - ORIENTATION: ORIENTATION: LEFT
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
## 2023-01-07 NOTE — PROGRESS NOTES
Physical Therapy    Physical Therapy Treatment Note  Name: Sy Chu MRN: [id number] :   1945   Date:  2023   Admission Date: 2022 Room:  [address] [place] [address]   Restrictions/Precautions:           fall risk; general precautions; NWB on LLE  Communication with other providers:    Subjective:  Patient states:  initially asks PTA to return later on first attempt. Agreeable to PT upon second attempt  Pain:   Location, Type, Intensity (0/10 to 10/10):  no pain at rest, but has pain /c movement; does not numerically rate    Objective:    Observation:  semi fowlers in bed upon entry. LLE distal edema   Treatment, including education/measures:  Therapeutic Ex  Supine:  DF/PF x20  QS 2x10x5\" /c tc/vc for proper form   Heel slides 2x6 /c vc for proper form, minimal AROM displayed but tolerable for pt in this range. Slow movement through range and rest breaks between sets. Sitting:  DF/PF x20  LAQ 2x5 reps /c vc for allowing full tolerable ROM. Pt improves AROM on second set. Transfers with line management of tele  Rolling: Jo Ann for LE management  Supine to sit :Jo Ann for LLE management  Sit to supine :Jo Ann for LLE management  Scooting :Jo Ann for LLE management, gradual knee flex ROM /c PTA holding LLE for comfort until pt scoots and can comfortably relax. Requires extended support on LLE per PTA  Sit to stand :Jo Ann to FWW  Stand to sit :Jo Ann/CGA  Vc for hand placement. Pt nwb compliant throughout. Balance/Proprioception  Static standing balance training x~1min, nwb compliant, fwd lean initially /c postural improvement after vc. Pt hop steps ~8 step /c turns using FWW and CGA/Jo Ann. Vc's for safe techs, directional cues. Safety  Patient left safely in the bed /c LLE elevated for edema control, with call light/phone in reach with alarm applied. Gait belt was used for transfers and gait.         Assessment / Impression:    Patient's tolerance of treatment:  good   Adverse Reaction: na  Significant change in status and impact:  na  Barriers to improvement:  weakness, endurance, pain and wb status  Plan for Next Session:    Cont L knee/ankle ROM and transfer/gait progression as tolerated  Time in:  1217  Time out:  1249  Timed treatment minutes:  32  Total treatment time:  32    Previously filed items:  Social/Functional History  Lives With: Spouse  Type of Home: Condo  Home Layout: One level  Home Access: Stairs to enter without rails  Entrance Stairs - Number of Steps: 1 (small step from garage)  Bathroom Shower/Tub: Walk-in shower, Shower chair with back  Bathroom Toilet: Standard  Bathroom Equipment: Shower chair  Home Equipment: Melvia Crofts, rolling, [address]  Has the patient had two or more falls in the past year or any fall with injury in the past year?: No (1 leading to this admission)  Receives Help From: Family  ADL Assistance: [address]: Independent  Homemaking Responsibilities: 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)  Ambulation Assistance: Independent  Transfer Assistance: Independent  Active : Yes  Occupation: Retired  Type of Occupation:  at [employer]  Patient Goals   Patient Goals : return to [address] [place] [address]  Time Frame for Short Term Goals: 1 week  Short Term Goal 1: pt will complete bed mobility at [place] [address] 2: pt will complete transfers at [address] Goal 3: pt will ambulate 30 ft using FWW at [address] [place] [address] 4: pt will demonstrate and verbalize understanding of need for LLE NWB w/ min cues    Electronically signed by:    Elida Guido PTA  1/7/2023, 12:54 PM

## 2023-01-07 NOTE — CARE COORDINATION
Patient precert approved. Auth # [id number]. # of days approved: 4 (1/7-1/10). Next review date from SNF due 1/10.  Weekend CMs aware

## 2023-01-07 NOTE — PROGRESS NOTES
V2.0  [facility] [place] [facility] Hospitalist Progress Note      Name:  Steven Alicia /Age/Sex: 1945  (65 y.o. female)   MRN & CSN:  [id number] & [id number] Encounter Date/Time: 2023 7:46 AM EST    Location:  [address] [place] [address]-A PCP: Martin Del Rosario MD       Hospital Day: 10    Assessment and Plan:   Steven Alicia is a 65 y.o. female with PMH of CKD, CLL, hypothyroidism, hypertension, GERD who presents with Closed displaced fracture of left patella, unspecified fracture morphology, sequela    Steven Alicia is a 65 y.o. female with pmh of CLL, COPD, hyperlipidemia, hypertension, hypothyroidism, osteoporosis who presents with Closed displaced fracture of left patella, unspecified fracture morphology, sequela     Referral for ARU sent , did not meet ARU criteria due to lack of medical complexity per medicare. Referral sent for [facility], patient accepted. Pending precert. Plan:     Closed displaced comminuted fracture of left patella  Closed fracture head of the left fibula  Closed fracture of the left tibial plateau  Mechanical fall-patient slipped on ice  -Subsequent encounter patient initially presented to the emergency room  with complaint of left knee pain after fall. Patient was discharged home with a left knee immobilizer and instruction to follow-up with Ortho. Came back to ER again with uncontrolled pain  -Vascular studies in ER arterial Doppler and venous Doppler negative for acute DVTs or arterial occlusions.  - S/p open reduction internal fixation of left bicondylar tibial plateau fracture 9/3/4361 by orthopedic surgery.  -continue pain management  -started on  mg BID by orthopedic surgery  -Fall precautions  - orthopedic surgery following  -->strict nonweightbearing to LLE, ROM exercises of L knee and ankle, ice and elevate  -->stable for dc, follow up with ortho in office in 2 weeks    #Diarrhea  -6-8 BM this morning    Plan:  Stool PCR panel and c diff testing     Shortness of breath.  New complaint at rest and worse on exertion. Hx of smoking but no formal diagnosis of COPD. - start breathing treatment  - get CXR     YURI on CKD D stage IIIa. -improving   -Cr 1.5 1/5/2023 (baseline 0.9-1.0)  -Unknown etiology-->Cr 1/6 was 1.3, improving    Plan:  Repeat BMP  - urine studies  - d/c toradol given YURI     Chronic Anemia - Hb trended down today. No active bleed. Will just monitor for now. - hem/onc following     Ambulatory dysfunction: Secondary to fracture as above unable to do ADLs due to fracture and knee immobilizer. -PT OT postop     Leukocytosis 90% seg relative - resolved . Likely reactive from fracture. No sign of infection  - monitor     Chronic lymphocytic leukemia:  Dr. Evans Led following   Waldenstrom's macroglobulinemia   - ibrutinib Held--> resume 1 week after surgery  -Antiemetics as needed    Hypothyroidism: Continue levothyroxine    Hypertension: Patient currently not on medication  -Has been on Maxide in the past.    GERD: Continue PPI     Transaminitis - stable  - only alk vinay slightly elevated 183        Normal weight BMI 21.50: lifestyle modifications  -Follow-up PCP for longitudinal care       Diet ADULT DIET; Regular  ADULT ORAL NUTRITION SUPPLEMENT; Breakfast, Dinner; Standard High Calorie/High Protein Oral Supplement   DVT Prophylaxis [x] Lovenox, []  Heparin, [] SCDs, [] Ambulation,  [] Eliquis, [] Xarelto  [] Coumadin   Code Status Full Code   Disposition From: Home  Expected Disposition: acute rehab  Estimated Date of Discharge: >24h  Patient requires continued admission due to pending placement   Surrogate Decision Maker/ PHILLIP Donaldson     Subjective:     Chief Complaint: Leg Pain     6-8 BM this morning. Usually constipated. No other complaints.        Review of Systems:    General: No fever, no chills  Heart: No chest pain  Lungs: No shortness of breath, no cough  Abdomen: No abdominal pain, no nausea, no vomiting, no constipation, + diarrhea  : No frequency, no dysuria, no urgency, no decreased urination  MSK: No lower extremity swelling  Neuro: No confusion, no weakness  Skin: No rashes      Objective:      Intake/Output Summary (Last 24 hours) at 1/7/2023 0746  Last data filed at 1/7/2023 0546  Gross per 24 hour   Intake --   Output 275 ml   Net -275 ml        Vitals:   Vitals:    01/07/23 0449   BP: (!) 149/59   Pulse: 73   Resp: 14   Temp: 98.2 °F (36.8 °C)   SpO2: 96%       Physical Exam:     General: NAD, AAO x3-4  Eyes: EOMI  HENT: Atraumatic  CVS: Normal S1 and S2, no murmurs, RRR  Respiratory: Normal breath sounds, clear to auscultation bilaterally, no respiratory distress  GI: Normal bowel sounds, soft, nondistended, nontender  MSK: Normal ROM, no lower extremity edema  Skin: Intact, dry, warm, no rashes  Neuro: CN II to XII grossly intact  Psych: Normal mood    Medications:   Medications:    albuterol sulfate HFA  2 puff Inhalation 4x daily    And    ipratropium  2 puff Inhalation 4x daily    enoxaparin  30 mg SubCUTAneous Daily    ferrous sulfate  325 mg Oral Daily with breakfast    sodium chloride flush  5-40 mL IntraVENous 2 times per day    acetaminophen  650 mg Oral Q6H    aspirin  325 mg Oral BID    pantoprazole  40 mg Oral QAM AC    [Held by provider] ibrutinib  420 mg Oral Daily    levothyroxine  100 mcg Oral Daily    Vitamin D  5,000 Units Oral Daily    sodium chloride flush  5-40 mL IntraVENous 2 times per day      Infusions:    sodium chloride       PRN Meds: sodium chloride flush, 5-40 mL, PRN  sodium chloride, , PRN  albuterol sulfate HFA, 2 puff, Q6H PRN  sodium chloride flush, 5-40 mL, PRN  ondansetron, 4 mg, Q8H PRN   Or  ondansetron, 4 mg, Q6H PRN  polyethylene glycol, 17 g, Daily PRN  acetaminophen, 650 mg, Q6H PRN   Or  acetaminophen, 650 mg, Q6H PRN  HYDROcodone-acetaminophen, 1 tablet, Q6H PRN  morphine, 2 mg, Q4H PRN  zolpidem, 5 mg, Nightly PRN        Labs      Recent Results (from the past 24 hour(s))   Basic Metabolic Panel w/ Reflex to MG Collection Time: 01/06/23 10:54 AM   Result Value Ref Range    Sodium 141 135 - 145 MMOL/L    Potassium 4.1 3.5 - 5.1 MMOL/L    Chloride 107 99 - 110 mMol/L    CO2 22 21 - 32 MMOL/L    Anion Gap 12 4 - 16    BUN 36 (H) 6 - 23 MG/DL    Creatinine 1.3 (H) 0.6 - 1.1 MG/DL    Est, Glom Filt Rate 42 (L) >60 mL/min/1.73m2    Glucose 96 70 - 99 MG/DL    Calcium 7.9 (L) 8.3 - 10.6 MG/DL   CBC with Auto Differential    Collection Time: 01/06/23 10:54 AM   Result Value Ref Range    WBC 7.1 4.0 - 10.5 K/CU MM    RBC 2.98 (L) 4.2 - 5.4 M/CU MM    Hemoglobin 8.8 (L) 12.5 - 16.0 GM/DL    Hematocrit 28.6 (L) 37 - 47 %    MCV 96.0 78 - 100 FL    MCH 29.5 27 - 31 PG    MCHC 30.8 (L) 32.0 - 36.0 %    RDW 12.9 11.7 - 14.9 %    Platelets 409 849 - 132 K/CU MM    MPV 11.0 7.5 - 11.1 FL    Segs Relative 95.0 (H) 36 - 66 %    Eosinophils % 1.0 0 - 3 %    Basophils % 1.0 0 - 1 %    Lymphocytes % 3.0 (L) 24 - 44 %    Segs Absolute 6.7 K/CU MM    Eosinophils Absolute 0.1 K/CU MM    Basophils Absolute 0.1 K/CU MM    Lymphocytes Absolute 0.2 K/CU MM    Differential Type MANUAL DIFFERENTIAL     Anisocytosis 1+         Imaging/Diagnostics Last 24 Hours   XR CHEST PORTABLE    Result Date: 1/6/2023  EXAMINATION: ONE XRAY VIEW OF THE CHEST 1/6/2023 10:12 am COMPARISON: 01/19/2022. HISTORY: ORDERING SYSTEM PROVIDED HISTORY: SOB TECHNOLOGIST PROVIDED HISTORY: Reason for exam:->SOB Reason for Exam: sob FINDINGS: Overlying items external to the patient somewhat limit evaluation. Lungs are clear. Cardiac and mediastinal silhouettes are within normal limits. No pneumothoraces. Bony structures appear intact. No acute abnormality.        Electronically signed by Anushka Rojas MD on 1/7/2023 at 7:46 AM

## 2023-01-07 NOTE — CARE COORDINATION
CLAUDEW reviewed patient medical record. CLAUDEW noted that patient is pre-cert pending to [facility]. Call to [facility] and spoke with Army Gannon. They have not received the pre-cert at this time and she will call the LSW if it is obtained today. Call to [address] her of the [facility]. Army Gannon stated that they have not received the auth notification and she will call the LSW once she has obtained it.

## 2023-01-08 PROCEDURE — 94150 VITAL CAPACITY TEST: CPT

## 2023-01-08 PROCEDURE — 6370000000 HC RX 637 (ALT 250 FOR IP): Performed by: ORTHOPAEDIC SURGERY

## 2023-01-08 PROCEDURE — 94640 AIRWAY INHALATION TREATMENT: CPT

## 2023-01-08 PROCEDURE — 6360000002 HC RX W HCPCS: Performed by: STUDENT IN AN ORGANIZED HEALTH CARE EDUCATION/TRAINING PROGRAM

## 2023-01-08 PROCEDURE — 2580000003 HC RX 258: Performed by: ORTHOPAEDIC SURGERY

## 2023-01-08 PROCEDURE — 6370000000 HC RX 637 (ALT 250 FOR IP): Performed by: INTERNAL MEDICINE

## 2023-01-08 PROCEDURE — 94761 N-INVAS EAR/PLS OXIMETRY MLT: CPT

## 2023-01-08 PROCEDURE — 1200000000 HC SEMI PRIVATE

## 2023-01-08 RX ADMIN — IPRATROPIUM BROMIDE 2 PUFF: 17 AEROSOL, METERED RESPIRATORY (INHALATION) at 15:44

## 2023-01-08 RX ADMIN — ASPIRIN 325 MG: 325 TABLET, COATED ORAL at 10:51

## 2023-01-08 RX ADMIN — PANTOPRAZOLE SODIUM 40 MG: 40 TABLET, DELAYED RELEASE ORAL at 06:01

## 2023-01-08 RX ADMIN — LEVOTHYROXINE SODIUM 100 MCG: 0.1 TABLET ORAL at 06:01

## 2023-01-08 RX ADMIN — ALBUTEROL SULFATE 2 PUFF: 90 AEROSOL, METERED RESPIRATORY (INHALATION) at 12:15

## 2023-01-08 RX ADMIN — ACETAMINOPHEN 650 MG: 325 TABLET ORAL at 17:48

## 2023-01-08 RX ADMIN — Medication 5000 UNITS: at 10:50

## 2023-01-08 RX ADMIN — SODIUM CHLORIDE, PRESERVATIVE FREE 10 ML: 5 INJECTION INTRAVENOUS at 21:32

## 2023-01-08 RX ADMIN — ENOXAPARIN SODIUM 30 MG: 100 INJECTION SUBCUTANEOUS at 10:51

## 2023-01-08 RX ADMIN — IPRATROPIUM BROMIDE 2 PUFF: 17 AEROSOL, METERED RESPIRATORY (INHALATION) at 08:33

## 2023-01-08 RX ADMIN — SODIUM CHLORIDE, PRESERVATIVE FREE 10 ML: 5 INJECTION INTRAVENOUS at 10:52

## 2023-01-08 RX ADMIN — SODIUM CHLORIDE, PRESERVATIVE FREE 10 ML: 5 INJECTION INTRAVENOUS at 21:33

## 2023-01-08 RX ADMIN — HYDROCODONE BITARTRATE AND ACETAMINOPHEN 1 TABLET: 5; 325 TABLET ORAL at 21:31

## 2023-01-08 RX ADMIN — ASPIRIN 325 MG: 325 TABLET, COATED ORAL at 21:31

## 2023-01-08 RX ADMIN — FERROUS SULFATE TAB 325 MG (65 MG ELEMENTAL FE) 325 MG: 325 (65 FE) TAB at 10:50

## 2023-01-08 RX ADMIN — ACETAMINOPHEN 650 MG: 325 TABLET ORAL at 13:09

## 2023-01-08 RX ADMIN — HYDROCODONE BITARTRATE AND ACETAMINOPHEN 1 TABLET: 5; 325 TABLET ORAL at 03:37

## 2023-01-08 RX ADMIN — IPRATROPIUM BROMIDE 2 PUFF: 17 AEROSOL, METERED RESPIRATORY (INHALATION) at 19:49

## 2023-01-08 RX ADMIN — ACETAMINOPHEN 650 MG: 325 TABLET ORAL at 03:37

## 2023-01-08 RX ADMIN — ALBUTEROL SULFATE 2 PUFF: 90 AEROSOL, METERED RESPIRATORY (INHALATION) at 15:44

## 2023-01-08 RX ADMIN — ALBUTEROL SULFATE 2 PUFF: 90 AEROSOL, METERED RESPIRATORY (INHALATION) at 19:49

## 2023-01-08 RX ADMIN — ALBUTEROL SULFATE 2 PUFF: 90 AEROSOL, METERED RESPIRATORY (INHALATION) at 08:33

## 2023-01-08 RX ADMIN — ZOLPIDEM TARTRATE 5 MG: 5 TABLET ORAL at 21:31

## 2023-01-08 RX ADMIN — SODIUM CHLORIDE, PRESERVATIVE FREE 20 ML: 5 INJECTION INTRAVENOUS at 10:51

## 2023-01-08 RX ADMIN — IPRATROPIUM BROMIDE 2 PUFF: 17 AEROSOL, METERED RESPIRATORY (INHALATION) at 12:15

## 2023-01-08 ASSESSMENT — PAIN DESCRIPTION - LOCATION: LOCATION: LEG

## 2023-01-08 ASSESSMENT — PAIN SCALES - GENERAL
PAINLEVEL_OUTOF10: 5
PAINLEVEL_OUTOF10: 4
PAINLEVEL_OUTOF10: 1
PAINLEVEL_OUTOF10: 5
PAINLEVEL_OUTOF10: 2

## 2023-01-08 ASSESSMENT — PAIN DESCRIPTION - DESCRIPTORS: DESCRIPTORS: DULL

## 2023-01-08 ASSESSMENT — PAIN DESCRIPTION - ORIENTATION: ORIENTATION: LEFT

## 2023-01-08 NOTE — PROGRESS NOTES
V2.0  [facility] [place]      Name:  Joe Esquivel /Age/Sex: 1945  (65 y.o. female)   MRN & CSN:  [id number] & [id number] Encounter Date/Time: 2023 7:46 AM EST    Location:  [address] [place] [address]- PCP: Inder Shelley MD       Hospital Day: 11    Assessment and Plan:   Joe Esquivel is a 65 y.o. female with pmh of CLL, COPD, hyperlipidemia, hypertension, hypothyroidism, osteoporosis who presents with Closed displaced fracture of left patella, unspecified fracture morphology, sequela     Referral for ARU sent , did not meet ARU criteria due to lack of medical complexity per medicare. Referral sent for [facility], patient accepted. Pending precert. Facility has not received auth notification yet. Closed displaced comminuted fracture of left patella  Closed fracture head of the left fibula  Closed fracture of the left tibial plateau  Mechanical fall-patient slipped on ice  -Subsequent encounter patient initially presented to the emergency room  with complaint of left knee pain after fall. Patient was discharged home with a left knee immobilizer and instruction to follow-up with Ortho. Came back to ER again with uncontrolled pain  -Vascular studies in ER arterial Doppler and venous Doppler negative for acute DVTs or arterial occlusions.  - S/p open reduction internal fixation of left bicondylar tibial plateau fracture 3/6/4850 by orthopedic surgery.  -continue pain management  -started on  mg BID by orthopedic surgery  -Fall precautions  - orthopedic surgery following  -->strict nonweightbearing to LLE, ROM exercises of L knee and ankle, ice and elevate  -->stable for dc, follow up with ortho in office in 2 weeks    #Diarrhea  -6-8 BM this morning    Plan:  Stool PCR panel and c diff testing     Shortness of breath. New complaint at rest and worse on exertion. Hx of smoking but no formal diagnosis of COPD. - start breathing treatment  - get CXR     YURI on CKD D stage IIIa. -improving -Cr 1.5 1/5/2023 (baseline 0.9-1.0)  -Unknown etiology-->Cr 1/6 was 1.3, improving    Plan:  Repeat BMP  - urine studies  - d/c toradol given YURI     Chronic Anemia - Hb trended down today. No active bleed. Will just monitor for now. - hem/onc following     Ambulatory dysfunction: Secondary to fracture as above unable to do ADLs due to fracture and knee immobilizer. -PT OT postop     Leukocytosis 90% seg relative - resolved . Likely reactive from fracture. No sign of infection  - monitor     Chronic lymphocytic leukemia:  Dr. Chiqui Hall following   Waldenstrom's macroglobulinemia   - ibrutinib Held--> resume 1 week after surgery  -Antiemetics as needed    Hypothyroidism: Continue levothyroxine    Hypertension: Patient currently not on medication  -Has been on Maxide in the past.    GERD: Continue PPI     Transaminitis - stable  - only alk vinay slightly elevated 183        Normal weight BMI 21.50: lifestyle modifications  -Follow-up PCP for longitudinal care       Diet ADULT DIET; Regular  ADULT ORAL NUTRITION SUPPLEMENT; Breakfast, Dinner; Standard High Calorie/High Protein Oral Supplement   DVT Prophylaxis [x] Lovenox, []  Heparin, [] SCDs, [] Ambulation,  [] Eliquis, [] Xarelto  [] Coumadin   Code Status Full Code   Disposition From: Home  Expected Disposition: acute rehab  Estimated Date of Discharge: >24h  Patient requires continued admission due to pending placement   Surrogate Decision Maker/ PHILLIP Cabezas     Subjective:     Chief Complaint: Leg Pain    Patient without any complaints this morning. Review of Systems:    General: No fever, no chills  Heart: No chest pain  Lungs: No shortness of breath, no cough  Abdomen: No abdominal pain, no nausea, no vomiting, no constipation, + diarrhea  : No frequency, no dysuria, no urgency, no decreased urination  MSK: No lower extremity swelling  Neuro: No confusion, no weakness  Skin: No rashes      Objective:      Intake/Output Summary (Last 24 hours) at 1/8/2023 0085  Last data filed at 1/8/2023 0559  Gross per 24 hour   Intake 240 ml   Output 200 ml   Net 40 ml        Vitals:   Vitals:    01/08/23 0559   BP:    Pulse: 73   Resp: 22   Temp:    SpO2:        Physical Exam:     General: NAD, AAO x3-4  Eyes: EOMI  HENT: Atraumatic  Respiratory: no respiratory distress  GI: nontender  MSK: no lower extremity edema  Skin: Intact, dry, warm, no rashes  Neuro: CN II to XII grossly intact  Psych: Normal mood    Medications:   Medications:    albuterol sulfate HFA  2 puff Inhalation 4x daily    And    ipratropium  2 puff Inhalation 4x daily    enoxaparin  30 mg SubCUTAneous Daily    ferrous sulfate  325 mg Oral Daily with breakfast    sodium chloride flush  5-40 mL IntraVENous 2 times per day    acetaminophen  650 mg Oral Q6H    aspirin  325 mg Oral BID    pantoprazole  40 mg Oral QAM AC    [Held by provider] ibrutinib  420 mg Oral Daily    levothyroxine  100 mcg Oral Daily    Vitamin D  5,000 Units Oral Daily    sodium chloride flush  5-40 mL IntraVENous 2 times per day      Infusions:    sodium chloride       PRN Meds: sodium chloride flush, 5-40 mL, PRN  sodium chloride, , PRN  albuterol sulfate HFA, 2 puff, Q6H PRN  sodium chloride flush, 5-40 mL, PRN  ondansetron, 4 mg, Q8H PRN   Or  ondansetron, 4 mg, Q6H PRN  polyethylene glycol, 17 g, Daily PRN  acetaminophen, 650 mg, Q6H PRN   Or  acetaminophen, 650 mg, Q6H PRN  HYDROcodone-acetaminophen, 1 tablet, Q6H PRN  morphine, 2 mg, Q4H PRN  zolpidem, 5 mg, Nightly PRN      Labs      No results found for this or any previous visit (from the past 24 hour(s)). Imaging/Diagnostics Last 24 Hours   XR CHEST PORTABLE    Result Date: 1/6/2023  EXAMINATION: ONE XRAY VIEW OF THE CHEST 1/6/2023 10:12 am COMPARISON: 01/19/2022. HISTORY: ORDERING SYSTEM PROVIDED HISTORY: SOB TECHNOLOGIST PROVIDED HISTORY: Reason for exam:->SOB Reason for Exam: sob FINDINGS: Overlying items external to the patient somewhat limit evaluation. Lungs are clear. Cardiac and mediastinal silhouettes are within normal limits. No pneumothoraces. Bony structures appear intact. No acute abnormality.        Electronically signed by Tuyet Wong MD on 1/8/2023 at 8:16 AM

## 2023-01-09 ENCOUNTER — APPOINTMENT (OUTPATIENT)
Dept: GENERAL RADIOLOGY | Age: 78
DRG: 493 | End: 2023-01-09
Payer: MEDICARE

## 2023-01-09 LAB
ADENOVIRUS F 40 41 PCR: NOT DETECTED
ASTROVIRUS PCR: NOT DETECTED
CAMPYLOBACTER PCR: NOT DETECTED
CRYPTOSPORIDIUM PCR: NOT DETECTED
CYCLOSPORA CAYETANENSIS PCR: NOT DETECTED
E COLI 0157 PCR: NOT DETECTED
E COLI ENTEROAGGREGATIVE PCR: NOT DETECTED
E COLI ENTEROPATHOGENIC PCR: NOT DETECTED
E COLI ENTEROTOXIGENIC PCR: NOT DETECTED
E COLI SHIGA LIKE TOXIN PCR: NOT DETECTED
E COLI SHIGELLA/ENTEROINVASIVE PCR: NOT DETECTED
EKG ATRIAL RATE: 78 BPM
EKG DIAGNOSIS: NORMAL
EKG P AXIS: 64 DEGREES
EKG P-R INTERVAL: 134 MS
EKG Q-T INTERVAL: 362 MS
EKG QRS DURATION: 72 MS
EKG QTC CALCULATION (BAZETT): 412 MS
EKG R AXIS: 51 DEGREES
EKG T AXIS: 50 DEGREES
EKG VENTRICULAR RATE: 78 BPM
ENTAMOEBA HISTOLYTICA PCR: NOT DETECTED
GIARDIA LAMBLIA PCR: NOT DETECTED
LV EF: 58 %
LVEF MODALITY: NORMAL
NOROVIRUS GI GII PCR: NOT DETECTED
PLESIOMONAS SHIGELLOIDES PCR: NOT DETECTED
ROTAVIRUS A PCR: NOT DETECTED
SALMONELLA PCR: NOT DETECTED
SAPOVIRUS PCR: NOT DETECTED
VIBRIO CHOLERAE PCR: NOT DETECTED
VIBRIO PCR: NOT DETECTED
YERSINIA ENTEROCOLITICA PCR: NOT DETECTED

## 2023-01-09 PROCEDURE — 71045 X-RAY EXAM CHEST 1 VIEW: CPT

## 2023-01-09 PROCEDURE — 94761 N-INVAS EAR/PLS OXIMETRY MLT: CPT

## 2023-01-09 PROCEDURE — 93005 ELECTROCARDIOGRAM TRACING: CPT | Performed by: INTERNAL MEDICINE

## 2023-01-09 PROCEDURE — 97535 SELF CARE MNGMENT TRAINING: CPT

## 2023-01-09 PROCEDURE — 94640 AIRWAY INHALATION TREATMENT: CPT

## 2023-01-09 PROCEDURE — 2700000000 HC OXYGEN THERAPY PER DAY

## 2023-01-09 PROCEDURE — 6370000000 HC RX 637 (ALT 250 FOR IP): Performed by: ORTHOPAEDIC SURGERY

## 2023-01-09 PROCEDURE — 2580000003 HC RX 258: Performed by: INTERNAL MEDICINE

## 2023-01-09 PROCEDURE — 87324 CLOSTRIDIUM AG IA: CPT

## 2023-01-09 PROCEDURE — 99232 SBSQ HOSP IP/OBS MODERATE 35: CPT | Performed by: INTERNAL MEDICINE

## 2023-01-09 PROCEDURE — 87449 NOS EACH ORGANISM AG IA: CPT

## 2023-01-09 PROCEDURE — 87507 IADNA-DNA/RNA PROBE TQ 12-25: CPT

## 2023-01-09 PROCEDURE — 2580000003 HC RX 258: Performed by: ORTHOPAEDIC SURGERY

## 2023-01-09 PROCEDURE — 97110 THERAPEUTIC EXERCISES: CPT

## 2023-01-09 PROCEDURE — 93306 TTE W/DOPPLER COMPLETE: CPT

## 2023-01-09 PROCEDURE — 93010 ELECTROCARDIOGRAM REPORT: CPT | Performed by: INTERNAL MEDICINE

## 2023-01-09 PROCEDURE — 6360000002 HC RX W HCPCS: Performed by: STUDENT IN AN ORGANIZED HEALTH CARE EDUCATION/TRAINING PROGRAM

## 2023-01-09 PROCEDURE — 6370000000 HC RX 637 (ALT 250 FOR IP): Performed by: INTERNAL MEDICINE

## 2023-01-09 PROCEDURE — 6360000002 HC RX W HCPCS: Performed by: ORTHOPAEDIC SURGERY

## 2023-01-09 PROCEDURE — 94150 VITAL CAPACITY TEST: CPT

## 2023-01-09 PROCEDURE — 1200000000 HC SEMI PRIVATE

## 2023-01-09 PROCEDURE — 97530 THERAPEUTIC ACTIVITIES: CPT

## 2023-01-09 PROCEDURE — 97116 GAIT TRAINING THERAPY: CPT

## 2023-01-09 RX ORDER — SODIUM CHLORIDE, SODIUM LACTATE, POTASSIUM CHLORIDE, CALCIUM CHLORIDE 600; 310; 30; 20 MG/100ML; MG/100ML; MG/100ML; MG/100ML
INJECTION, SOLUTION INTRAVENOUS CONTINUOUS
Status: DISPENSED | OUTPATIENT
Start: 2023-01-09 | End: 2023-01-09

## 2023-01-09 RX ORDER — ACETAMINOPHEN 325 MG/1
650 TABLET ORAL EVERY 6 HOURS PRN
Status: DISCONTINUED | OUTPATIENT
Start: 2023-01-09 | End: 2023-01-10 | Stop reason: HOSPADM

## 2023-01-09 RX ORDER — ALBUTEROL SULFATE 90 UG/1
2 AEROSOL, METERED RESPIRATORY (INHALATION) 2 TIMES DAILY
Status: DISCONTINUED | OUTPATIENT
Start: 2023-01-09 | End: 2023-01-10 | Stop reason: HOSPADM

## 2023-01-09 RX ADMIN — ASPIRIN 325 MG: 325 TABLET, COATED ORAL at 20:40

## 2023-01-09 RX ADMIN — IPRATROPIUM BROMIDE 2 PUFF: 17 AEROSOL, METERED RESPIRATORY (INHALATION) at 08:05

## 2023-01-09 RX ADMIN — MORPHINE SULFATE 2 MG: 2 INJECTION, SOLUTION INTRAMUSCULAR; INTRAVENOUS at 18:10

## 2023-01-09 RX ADMIN — ALBUTEROL SULFATE 2 PUFF: 90 AEROSOL, METERED RESPIRATORY (INHALATION) at 11:41

## 2023-01-09 RX ADMIN — SODIUM CHLORIDE, PRESERVATIVE FREE 10 ML: 5 INJECTION INTRAVENOUS at 20:44

## 2023-01-09 RX ADMIN — PANTOPRAZOLE SODIUM 40 MG: 40 TABLET, DELAYED RELEASE ORAL at 05:40

## 2023-01-09 RX ADMIN — ZOLPIDEM TARTRATE 5 MG: 5 TABLET ORAL at 20:46

## 2023-01-09 RX ADMIN — SODIUM CHLORIDE, PRESERVATIVE FREE 5 ML: 5 INJECTION INTRAVENOUS at 08:40

## 2023-01-09 RX ADMIN — HYDROCODONE BITARTRATE AND ACETAMINOPHEN 1 TABLET: 5; 325 TABLET ORAL at 08:46

## 2023-01-09 RX ADMIN — ASPIRIN 325 MG: 325 TABLET, COATED ORAL at 08:40

## 2023-01-09 RX ADMIN — SODIUM CHLORIDE, PRESERVATIVE FREE 10 ML: 5 INJECTION INTRAVENOUS at 20:40

## 2023-01-09 RX ADMIN — ALBUTEROL SULFATE 2 PUFF: 90 AEROSOL, METERED RESPIRATORY (INHALATION) at 22:30

## 2023-01-09 RX ADMIN — ALBUTEROL SULFATE 2 PUFF: 90 AEROSOL, METERED RESPIRATORY (INHALATION) at 08:05

## 2023-01-09 RX ADMIN — ACETAMINOPHEN 650 MG: 325 TABLET ORAL at 05:40

## 2023-01-09 RX ADMIN — ACETAMINOPHEN 650 MG: 325 TABLET ORAL at 11:33

## 2023-01-09 RX ADMIN — Medication 5000 UNITS: at 08:40

## 2023-01-09 RX ADMIN — SODIUM CHLORIDE, POTASSIUM CHLORIDE, SODIUM LACTATE AND CALCIUM CHLORIDE: 600; 310; 30; 20 INJECTION, SOLUTION INTRAVENOUS at 09:46

## 2023-01-09 RX ADMIN — ACETAMINOPHEN 650 MG: 325 TABLET ORAL at 17:01

## 2023-01-09 RX ADMIN — ENOXAPARIN SODIUM 30 MG: 100 INJECTION SUBCUTANEOUS at 08:40

## 2023-01-09 RX ADMIN — LEVOTHYROXINE SODIUM 100 MCG: 0.1 TABLET ORAL at 05:40

## 2023-01-09 RX ADMIN — IPRATROPIUM BROMIDE 2 PUFF: 17 AEROSOL, METERED RESPIRATORY (INHALATION) at 11:42

## 2023-01-09 RX ADMIN — FERROUS SULFATE TAB 325 MG (65 MG ELEMENTAL FE) 325 MG: 325 (65 FE) TAB at 08:40

## 2023-01-09 RX ADMIN — ACETAMINOPHEN 650 MG: 325 TABLET ORAL at 00:41

## 2023-01-09 ASSESSMENT — PAIN DESCRIPTION - LOCATION
LOCATION: LEG
LOCATION: LEG;KNEE
LOCATION: LEG
LOCATION: LEG

## 2023-01-09 ASSESSMENT — PAIN DESCRIPTION - ORIENTATION
ORIENTATION: LEFT
ORIENTATION: RIGHT
ORIENTATION: LEFT
ORIENTATION: RIGHT

## 2023-01-09 ASSESSMENT — PAIN SCALES - GENERAL
PAINLEVEL_OUTOF10: 4
PAINLEVEL_OUTOF10: 7
PAINLEVEL_OUTOF10: 2
PAINLEVEL_OUTOF10: 2

## 2023-01-09 ASSESSMENT — PAIN DESCRIPTION - DESCRIPTORS
DESCRIPTORS: ACHING;DISCOMFORT
DESCRIPTORS: DULL;ACHING
DESCRIPTORS: ACHING;DULL

## 2023-01-09 ASSESSMENT — PAIN - FUNCTIONAL ASSESSMENT: PAIN_FUNCTIONAL_ASSESSMENT: PREVENTS OR INTERFERES SOME ACTIVE ACTIVITIES AND ADLS

## 2023-01-09 NOTE — PROGRESS NOTES
V2.0  [facility] [place]      Name:  Wenceslao Bridges /Age/Sex: 1945  (65 y.o. female)   MRN & CSN:  [id number] & [id number] Encounter Date/Time: 2023 7:46 AM EST    Location:  [facility]/1114-A PCP: Sue Estrada MD       Hospital Day: 12    Assessment and Plan:   Wenceslao Bridges is a 65 y.o. female with pmh of CLL, COPD, hyperlipidemia, hypertension, hypothyroidism, osteoporosis who presents with Closed displaced fracture of left patella, unspecified fracture morphology, sequela     Referral for ARU sent , did not meet ARU criteria due to lack of medical complexity per medicare. Referral sent for [facility], patient accepted. Pending precert. Facility has not received auth notification yet. Closed displaced comminuted fracture of left patella  Closed fracture head of the left fibula  Closed fracture of the left tibial plateau  Mechanical fall-patient slipped on ice  -Subsequent encounter patient initially presented to the emergency room  with complaint of left knee pain after fall. Patient was discharged home with a left knee immobilizer and instruction to follow-up with Ortho.  Came back to ER again with uncontrolled pain  -Vascular studies in ER arterial Doppler and venous Doppler negative for acute DVTs or arterial occlusions.  - S/p open reduction internal fixation of left bicondylar tibial plateau fracture 3523 by orthopedic surgery.  -continue pain management  -started on  mg BID by orthopedic surgery  -Fall precautions  - orthopedic surgery following  -->strict nonweightbearing to LLE, ROM exercises of L knee and ankle, ice and elevate  -->stable for dc, follow up with ortho in office in 2 weeks    #Afib with RVR-resolved  -episode this morning may have lasted ~2-3 hours (per chart review)  -EKG with NSR and HR in the 70's by the time it was done  -ONI2DO1-KNLt score: 4  -HAS BLED score: 5  -echo: EF 55-60%, aneurysmal interatrial septum, positive bubble study    Plan:  Cardiology consulted, appreciate recs    #Diarrhea  -6-8 BM this morning    Plan:  Stool PCR panel and c diff testing     Shortness of breath. New complaint at rest and worse on exertion. Hx of smoking but no formal diagnosis of COPD. - start breathing treatment  - get CXR     YURI on CKD D stage IIIa. -improving   -Cr 1.5 1/5/2023 (baseline 0.9-1.0)  -Unknown etiology-->Cr 1/6 was 1.3, improving    Plan:  Repeat BMP  - urine studies  - d/c toradol given YURI     Chronic Anemia - Hb trended down today. No active bleed. Will just monitor for now. - hem/onc following     Ambulatory dysfunction: Secondary to fracture as above unable to do ADLs due to fracture and knee immobilizer. -PT OT postop     Leukocytosis 90% seg relative - resolved . Likely reactive from fracture. No sign of infection  - monitor     Chronic lymphocytic leukemia:  Dr. Kassandra Hearn following   Waldenstrom's macroglobulinemia   - ibrutinib Held--> resume 1 week after surgery  -Antiemetics as needed    Hypothyroidism: Continue levothyroxine    Hypertension: Patient currently not on medication  -Has been on Maxide in the past.    GERD: Continue PPI     Transaminitis - stable  - only alk vinay slightly elevated 183        Normal weight BMI 21.50: lifestyle modifications  -Follow-up PCP for longitudinal care       Diet ADULT DIET; Regular  ADULT ORAL NUTRITION SUPPLEMENT; Breakfast, Dinner; Standard High Calorie/High Protein Oral Supplement   DVT Prophylaxis [x] Lovenox, []  Heparin, [] SCDs, [] Ambulation,  [] Eliquis, [] Xarelto  [] Coumadin   Code Status Full Code   Disposition From: Home  Expected Disposition: acute rehab  Estimated Date of Discharge: >24h  Patient requires continued admission due to pending placement   Surrogate Decision Maker/ PHILLIP Dunn     Subjective:     Chief Complaint: Leg Pain  Patient with SOB this morning. HR in the 120's and patient feeling uncomfortable, tele showed afib.  IVF ordered and EKG, by the time the EKG was done, patient converted back to NSR. No issues after that. Review of Systems:    General: No fever, no chills  Heart: No chest pain  Lungs: + shortness of breath, no cough  Abdomen: No abdominal pain, no nausea, no vomiting, no constipation, no diarrhea  : No frequency, no dysuria, no urgency, no decreased urination  MSK: No lower extremity swelling  Neuro: No confusion, no weakness  Skin: No rashes      Objective:      Intake/Output Summary (Last 24 hours) at 1/9/2023 1345  Last data filed at 1/9/2023 0188  Gross per 24 hour   Intake 320 ml   Output 300 ml   Net 20 ml        Vitals:   Vitals:    01/09/23 1146   BP:    Pulse: 76   Resp: 20   Temp:    SpO2:        Physical Exam:     General: NAD, AAO x3-4  Eyes: EOMI  HENT: Atraumatic  CVS: normal S1 and S2, tachycardic  Respiratory: mild respiratory distress  GI: nontender  MSK: no lower extremity edema  Skin: Intact, dry, warm, no rashes  Neuro: CN II to XII grossly intact  Psych: Normal mood    Medications:   Medications:    albuterol sulfate HFA  2 puff Inhalation 4x daily    And    ipratropium  2 puff Inhalation 4x daily    enoxaparin  30 mg SubCUTAneous Daily    ferrous sulfate  325 mg Oral Daily with breakfast    sodium chloride flush  5-40 mL IntraVENous 2 times per day    acetaminophen  650 mg Oral Q6H    aspirin  325 mg Oral BID    pantoprazole  40 mg Oral QAM AC    [Held by provider] ibrutinib  420 mg Oral Daily    levothyroxine  100 mcg Oral Daily    Vitamin D  5,000 Units Oral Daily    sodium chloride flush  5-40 mL IntraVENous 2 times per day      Infusions:    lactated ringers 150 mL/hr at 01/09/23 0946    sodium chloride       PRN Meds: sodium chloride flush, 5-40 mL, PRN  sodium chloride, , PRN  albuterol sulfate HFA, 2 puff, Q6H PRN  sodium chloride flush, 5-40 mL, PRN  ondansetron, 4 mg, Q8H PRN   Or  ondansetron, 4 mg, Q6H PRN  polyethylene glycol, 17 g, Daily PRN  acetaminophen, 650 mg, Q6H PRN   Or  acetaminophen, 650 mg, Q6H PRN  HYDROcodone-acetaminophen, 1 tablet, Q6H PRN  morphine, 2 mg, Q4H PRN  zolpidem, 5 mg, Nightly PRN      Labs      Recent Results (from the past 24 hour(s))   EKG 12 Lead    Collection Time: 01/09/23  9:06 AM   Result Value Ref Range    Ventricular Rate 78 BPM    Atrial Rate 78 BPM    P-R Interval 134 ms    QRS Duration 72 ms    Q-T Interval 362 ms    QTc Calculation (Bazett) 412 ms    P Axis 64 degrees    R Axis 51 degrees    T Axis 50 degrees    Diagnosis       Normal sinus rhythm  Low voltage QRS  Nonspecific T wave abnormality  Abnormal ECG  When compared with ECG of 29-DEC-2022 22:11,  No significant change was found  Confirmed by [facility] YUMI CROOKS ([id number]) on 1/9/2023 9:30:17 AM     GI Disease Panel PCR    Collection Time: 01/09/23  9:30 AM    Specimen: Stool   Result Value Ref Range    Campylobacter PCR NOT DETECTED NOT DETECTED    Plesiomonas Shigelloides PCR NOT DETECTED NOT DETECTED    Salmonella PCR NOT DETECTED NOT DETECTED    Vibrio PCR NOT DETECTED NOT DETECTED    Vibrio Cholerae PCR NOT DETECTED NOT DETECTED    Yersinia Enterocolitica PCR NOT DETECTED NOT DETECTED    E Coli Enteroaggregative PCR NOT DETECTED NOT DETECTED    E Coli Enteropathogenic PCR NOT DETECTED NOT DETECTED    E Coli Enterotoxigenic PCR NOT DETECTED NOT DETECTED    E Coli Shiga Like Toxin PCR NOT DETECTED NOT DETECTED    E Coli O157 PCR NOT DETECTED NOT DETECTED    E Coli Shigella/Enteroinvasive PCR NOT DETECTED NOT DETECTED    Cryptosporidium PCR NOT DETECTED NOT DETECTED    Cyclospora Cayetanensis PCR NOT DETECTED NOT DETECTED    Entamoeba Histolytica PCR NOT DETECTED NOT DETECTED    Giardia Lamblia PCR NOT DETECTED NOT DETECTED    Adenovirus F 40 41 PCR NOT DETECTED NOT DETECTED    Astrovirus PCR NOT DETECTED NOT DETECTED    Norovirus GI GII PCR NOT DETECTED NOT DETECTED    Rotavirus A PCR NOT DETECTED NOT DETECTED    Sapovirus PCR NOT DETECTED NOT DETECTED          Imaging/Diagnostics Last 24 Hours   XR CHEST PORTABLE    Result Date: 1/6/2023  EXAMINATION: ONE XRAY VIEW OF THE CHEST 1/6/2023 10:12 am COMPARISON: 01/19/2022. HISTORY: ORDERING SYSTEM PROVIDED HISTORY: SOB TECHNOLOGIST PROVIDED HISTORY: Reason for exam:->SOB Reason for Exam: sob FINDINGS: Overlying items external to the patient somewhat limit evaluation. Lungs are clear.  Cardiac and mediastinal silhouettes are within normal limits.  No pneumothoraces.  Bony structures appear intact.     No acute abnormality.       Electronically signed by Tanya Kebede MD on 1/9/2023 at 1:45 PM

## 2023-01-09 NOTE — PROGRESS NOTES
Physical Therapy    Physical Therapy Treatment Note  Name: Steven Alicia MRN: [id number] :   1945   Date:  2023   Admission Date: 2022 Room:  [address] [place] [address]   Restrictions/Precautions:            fall risk; general precautions; NWB on LLE   L tibial plateau fx  Communication with other providers:  per chart ok to tx  Subjective:  Patient states: pt very pleasant and motivated  Pain:   Location, Type, Intensity (0/10 to 10/10):  denies having pain   Objective:    Observation:  alert and oriented    Treatment, including education/measures:  Sup to sit min assist with leg  using bed rail  Sit<=>stand from bed, chair, and commode min assist and cues for safety. Pt does well maintaining NWB LLpt was abl;e to do miguel care in sitting with sba. Amb with rw 10' x 2 wearing yellow sock on RLE and c/o it causes her to trip. Pt amb 20' with shoe and and she felt she did better. Ex in sitting and long sitting  Trunk stretches with deep breathing  10 reps aps  10 reps quad sets  10 reps laqs  Safety  Patient left safely in the chair, with call light/phone in reach with alarm applied. Gait belt was used for transfers and gait. Assessment / Impression:      Patient's tolerance of treatment:  good   Adverse Reaction: na  Significant change in status and impact:  na  Barriers to improvement:  strength and safety  Plan for Next Session:    Cont.  POC  Time in:  1455  Time out:  1540  Timed treatment minutes:  45  Total treatment time:  39    Previously filed items:  Social/Functional History  Lives With: Spouse  Type of Home: Condo  Home Layout: One level  Home Access: Stairs to enter without rails  Entrance Stairs - Number of Steps: 1 (small step from garage)  Bathroom Shower/Tub: Walk-in shower, Shower chair with back  Bathroom Toilet: Standard  Bathroom Equipment: Shower chair  Home Equipment: [employer], Arpan zamora  Has the patient had two or more falls in the past year or any fall with injury in the past year?: No (1 leading to this admission)  Receives Help From: Family  ADL Assistance: [address]: Independent  Homemaking Responsibilities: 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)  Ambulation Assistance: Independent  Transfer Assistance: Independent  Active : Yes  Occupation: Retired  Type of Occupation:  at [employer]  Patient Goals   Patient Goals : return to [address] [place] [address]  Time Frame for Short Term Goals: 1 week  Short Term Goal 1: pt will complete bed mobility at [place] [address] 2: pt will complete transfers at [address] [facility] Goal 3: pt will ambulate 30 ft using FWW at [facility] 4: pt will demonstrate and verbalize understanding of need for LLE NWB w/ min cues    Electronically signed by:    Praful Porter PTA  1/9/2023, 8:30 AM

## 2023-01-09 NOTE — PROGRESS NOTES
Occupational Therapy  . Occupational Therapy Treatment Note    Name: Lety Kay MRN: [id number] :   1945   Date:  2023   Admission Date: 2022 Room:  [address] [place] [address]-     Primary Problem:  Primary Problem: Lt bicondylar tibial plateau fracture s/p ORIF    Restrictions/Precautions:          General precautions, fall risk  NWB Lt LE,    Communication with other providers: Per chart review, patient ok for tx. Subjective:  Patient states:  I will get up with PT later. .   Pain:   Location, Type, Intensity (0/10 to 10/10):  none stated    Objective:    Observation: patient in supine in dark room. Agreeable to bed level. Pleasant. Objective Measures:  tele, 02 1L 02 but patient would take nasal canula off intermittently- and SP02 reading above 93% throughout    Treatment, including education:    ADL activity training was instructed today. Cues were given for safety, sequence, UE/LE placement, visual cues, and balance. Activities performed today included dressing, toileting, hand hygiene, and grooming. Oral care- SBA in high fowlers to brush teeth  Facial hygiene- SBA in high fowlers. UB bathing- SBA in high fowlers for wash cloth and applying deoederant. Feeding- SET UP for small packages. Patient states she has been using bed pan with staff but has ambulated to bathroom with therapy. Encouraged to get EOB, but patient declining. Patient demo scooting to [facility] with use of BUEs and RLE with effort. Patient educated on role of OT , benefits of OT and rationale for therapeutic intervention. Benefit of OOB/EOB activities, benefit of movement. AE/AD, WS/EC,     All therapeutic intervention performed c emphasis on BUE strengthening and endurance to  increase strength for functional tasks / transfers. Patient left safely in bed at end of session, with call light in reach, alarm on and nursing aware.      Assessment / Impression:    Patient's tolerance of treatment: fair +  Adverse Reaction: None  Significant change in status and impact: Improved from initial evaluation  Barriers to improvement: weakness      Plan for Next Session:    Continue with OT POC      Time in:  1112  Time out:  1136  Timed treatment minutes:  24  Total treatment time:  24      Electronically signed by:    DAISY Conn COTA/L [id number]    1/9/2023, 12:44 PM

## 2023-01-09 NOTE — CARE COORDINATION
Received VM from Shelley Moreno at [facility]. She stated that precert has been approved and is good through tomorrow 1/10 . Notified Physician . Returned call to Shelley Moreno at [facility] and spoke with Preeti Gotti. CM let her know plan for dc tomorrow 1/10 and she voiced understanding .

## 2023-01-09 NOTE — CONSULTS
Name:  jA Roblero /Age/Sex: 1945  (65 y.o. female)   MRN & CSN:  [id number] & [id number] Admission Date/Time: 2022  1:49 PM   Location:  [address] [place] [address] PCP: Shawn Reagan Day: 12          Referring physician:  Neno Marroquin MD         Reason for consultation:  ***        Thanks for referral.    Information source: {Information source:72699}    CC;  ***      HPI:   Thank you for involving me in taking  care of Aj Roblero who  is a 65 y. o.year  Old female  Presents with  ***                     Past medical history:    has a past medical history of Arm fracture, left, Arthritis, CLL (chronic lymphocytic leukemia) ([facility] COPD (chronic obstructive pulmonary disease) ([facility].), Great vein anomaly, H. pylori infection, Hiatal hernia, Hyperlipidemia, Hypertension, Hypothyroidism, MDRO (multiple drug resistant organisms) resistance, Osteoporosis, and Smoking hx. Past surgical history:   has a past surgical history that includes Nasal septum surgery (Left, 10/21/2013); Cholecystectomy, laparoscopic (2018); Colonoscopy (10/26/2007); skin biopsy (); Colonoscopy (2019); Colonoscopy (N/A, 2019); and Tibia fracture surgery (Left, 2023). Social History:   reports that she quit smoking about 26 years ago. Her smoking use included cigarettes. She started smoking about 58 years ago. She has a 60.00 pack-year smoking history. She has never used smokeless tobacco. She reports that she does not drink alcohol and does not use drugs. Family history:  family history includes High Blood Pressure in her father and mother.     Allergies   Allergen Reactions    Amitiza [Lubiprostone]     Clindamycin/Lincomycin      GI intolerance    Evista [Raloxifene Hydrochloride]     Flexeril [Cyclobenzaprine Hcl]      CNS    Omega-3 Fatty Acids [Fish Oil] Diarrhea    Prilosec [Omeprazole]      Pt sts meds didn't work - states not an allergy 6/3/19    Skelaxin [Metaxalone] Itching    Spiriva Handihaler [Tiotropium Bromide Monohydrate]      \"Made my throat dry\" - not allergic too.   6/3/19       albuterol sulfate HFA (PROVENTIL;VENTOLIN;PROAIR) 108 (90 Base) MCG/ACT inhaler 2 puff, BID  acetaminophen (TYLENOL) tablet 650 mg, Q6H PRN  enoxaparin Sodium (LOVENOX) injection 30 mg, Daily  ferrous sulfate (IRON 325) tablet 325 mg, Daily with breakfast  sodium chloride flush 0.9 % injection 5-40 mL, 2 times per day  sodium chloride flush 0.9 % injection 5-40 mL, PRN  0.9 % sodium chloride infusion, PRN  aspirin EC tablet 325 mg, BID  albuterol sulfate HFA (PROVENTIL;VENTOLIN;PROAIR) 108 (90 Base) MCG/ACT inhaler 2 puff, Q6H PRN  pantoprazole (PROTONIX) tablet 40 mg, QAM AC  [Held by provider] ibrutinib (IMBRUVICA) chemo capsule 420 mg  ++NON FORMULARY++ (Patient Supplied), Daily  levothyroxine (SYNTHROID) tablet 100 mcg, Daily  Vitamin D (CHOLECALCIFEROL) tablet 5,000 Units, Daily  sodium chloride flush 0.9 % injection 5-40 mL, 2 times per day  sodium chloride flush 0.9 % injection 5-40 mL, PRN  ondansetron (ZOFRAN-ODT) disintegrating tablet 4 mg, Q8H PRN   Or  ondansetron (ZOFRAN) injection 4 mg, Q6H PRN  polyethylene glycol (GLYCOLAX) packet 17 g, Daily PRN  acetaminophen (TYLENOL) suppository 650 mg, Q6H PRN  HYDROcodone-acetaminophen (NORCO) 5-325 MG per tablet 1 tablet, Q6H PRN  morphine (PF) injection 2 mg, Q4H PRN  zolpidem (AMBIEN) tablet 5 mg, Nightly PRN      Current Facility-Administered Medications   Medication Dose Route Frequency Provider Last Rate Last Admin    albuterol sulfate HFA (PROVENTIL;VENTOLIN;PROAIR) 108 (90 Base) MCG/ACT inhaler 2 puff  2 puff Inhalation BID Rosemary Toney MD        acetaminophen (TYLENOL) tablet 650 mg  650 mg Oral Q6H PRN Tanya Kebede MD        enoxaparin Sodium (LOVENOX) injection 30 mg  30 mg SubCUTAneous Daily Tadeo Zaragoza MD   30 mg at 01/09/23 0840    ferrous sulfate (IRON 325) tablet 325 mg  325 mg Oral Daily with breakfast Rigo Bethea MD 325 mg at 01/09/23 0840    sodium chloride flush 0.9 % injection 5-40 mL  5-40 mL IntraVENous 2 times per day [facility], DO   5 mL at 01/09/23 0840    sodium chloride flush 0.9 % injection 5-40 mL  5-40 mL IntraVENous PRN [facility], DO        0.9 % sodium chloride infusion   IntraVENous PRN [facility], DO        aspirin EC tablet 325 mg  325 mg Oral BID [facility], DO   325 mg at 01/09/23 0840    albuterol sulfate HFA (PROVENTIL;VENTOLIN;PROAIR) 108 (90 Base) MCG/ACT inhaler 2 puff  2 puff Inhalation Q6H PRN [facility], DO   2 puff at 01/03/23 2221    pantoprazole (PROTONIX) tablet 40 mg  40 mg Oral QAM AC [facility], DO   40 mg at 01/09/23 0540    [Held by provider] ibrutinib (IMBRUVICA) chemo capsule 420 mg  ++NON FORMULARY++ (Patient Supplied)  420 mg Oral Daily Jerome Back, APRN - CNP   420 mg at 01/01/23 1418    levothyroxine (SYNTHROID) tablet 100 mcg  100 mcg Oral Daily [facility], DO   100 mcg at 01/09/23 0540    Vitamin D (CHOLECALCIFEROL) tablet 5,000 Units  5,000 Units Oral Daily Et[facility]ana Medicine, DO   5,000 Units at 01/09/23 0840    sodium chloride flush 0.9 % injection 5-40 mL  5-40 mL IntraVENous 2 times per day [facility]ana Medicine, DO   10 mL at 01/08/23 2132    sodium chloride flush 0.9 % injection 5-40 mL  5-40 mL IntraVENous PRN [facility], DO        ondansetron (ZOFRAN-ODT) disintegrating tablet 4 mg  4 mg Oral Q8H PRN [facility], DO        Or    ondansetron [facility]) injection 4 mg  4 mg IntraVENous Q6H PRN [facility], DO        polyethylene glycol (GLYCOLAX) packet 17 g  17 g Oral Daily PRN [facility], DO        acetaminophen (TYLENOL) suppository 650 mg  650 mg Rectal Q6H PRN [facility], DO        HYDROcodone-acetaminophen (NORCO) 5-325 MG per tablet 1 tablet  1 tablet Oral Q6H PRN [facility], DO   1 tablet at 01/09/23 0846    morphine (PF) injection 2 mg  2 mg IntraVENous Q4H PRN [facility], DO   2 mg at 01/04/23 1640 zolpidem (AMBIEN) tablet 5 mg  5 mg Oral Nightly PRN Júnior Ray DO   5 mg at 01/08/23 2131     Review of Systems:  All 14 systems reviewed, all negative except for  ***    Physical Examination:    BP 89/69   Pulse 69   Temp 99.5 °F (37.5 °C) (Oral)   Resp 20   Ht 5' 1\" (1.549 m)   Wt 121 lb 9.6 oz (55.2 kg)   SpO2 94%   BMI 22.98 kg/m²      Wt Readings from Last 3 Encounters:   01/07/23 121 lb 9.6 oz (55.2 kg)   12/27/22 114 lb (51.7 kg)   11/15/22 114 lb (51.7 kg)     Body mass index is 22.98 kg/m². General Appearance:  ***  Head: normocephalic     Eyes: normal, noninjected conjunctiva    ENT: normal mucosa, noninjected throat, normal     NECK: No JVP  No thyromegaly        Cardiovascular: No thrills palpated   Auscultation: Normal S1 and S2,  *** murmur   carotid bruit ***   Abdominal Aorta no bruit    Respiratory:    Breath sounds {BREATH SOUNDS:35376}    Extremities:  {Numbers; edema:11898} Edema clubbing ,   *** cyanosis    SKIN: Warm and well perfused, no pallor or cyanosis    Vascular exam:  Pedal Pulses: ***  bilaterally        Abdomen:  No masses or tenderness. No organomegaly noted. Neurological:  Oriented to time, place, and person   No focal neurological deficit noted.   Psychiatric:normal mood, no anxiety    Lab Review   Recent Results (from the past 24 hour(s))   EKG 12 Lead    Collection Time: 01/09/23  9:06 AM   Result Value Ref Range    Ventricular Rate 78 BPM    Atrial Rate 78 BPM    P-R Interval 134 ms    QRS Duration 72 ms    Q-T Interval 362 ms    QTc Calculation (Bazett) 412 ms    P Axis 64 degrees    R Axis 51 degrees    T Axis 50 degrees    Diagnosis       Normal sinus rhythm  Low voltage QRS  Nonspecific T wave abnormality  Abnormal ECG  When compared with ECG of 29-DEC-2022 22:11,  No significant change was found  Confirmed by [facility] Sergei CROOKS ([id number]) on 1/9/2023 9:30:17 AM     GI Disease Panel PCR    Collection Time: 01/09/23  9:30 AM    Specimen: Stool   Result Value Ref Range    Campylobacter PCR NOT DETECTED NOT DETECTED    Plesiomonas Shigelloides PCR NOT DETECTED NOT DETECTED    Salmonella PCR NOT DETECTED NOT DETECTED    Vibrio PCR NOT DETECTED NOT DETECTED    Vibrio Cholerae PCR NOT DETECTED NOT DETECTED    Yersinia Enterocolitica PCR NOT DETECTED NOT DETECTED    E Coli Enteroaggregative PCR NOT DETECTED NOT DETECTED    E Coli Enteropathogenic PCR NOT DETECTED NOT DETECTED    E Coli Enterotoxigenic PCR NOT DETECTED NOT DETECTED    E Coli Shiga Like Toxin PCR NOT DETECTED NOT DETECTED    E Coli O157 PCR NOT DETECTED NOT DETECTED    E Coli Shigella/Enteroinvasive PCR NOT DETECTED NOT DETECTED    Cryptosporidium PCR NOT DETECTED NOT DETECTED    Cyclospora Cayetanensis PCR NOT DETECTED NOT DETECTED    Entamoeba Histolytica PCR NOT DETECTED NOT DETECTED    Giardia Lamblia PCR NOT DETECTED NOT DETECTED    Adenovirus F 40 41 PCR NOT DETECTED NOT DETECTED    Astrovirus PCR NOT DETECTED NOT DETECTED    Norovirus GI GII PCR NOT DETECTED NOT DETECTED    Rotavirus A PCR NOT DETECTED NOT DETECTED    Sapovirus PCR NOT DETECTED NOT DETECTED           Assessment/Recommendations:              Cristina Hwang MD, 1/9/2023 5:56 PM       Please note this report has been partially produced using speech recognition software and may contain errors related to that system including errors in grammar, punctuation, and spelling, as well as words and phrases that may be inappropriate. If there are any questions or concerns please feel free to contact the dictating provider for clarification.

## 2023-01-09 NOTE — RT PROTOCOL NOTE
RT Inhaler-Nebulizer Bronchodilator Protocol Note    There is a bronchodilator order in the chart from a provider indicating to follow the RT Bronchodilator Protocol and there is an Initiate RT Inhaler-Nebulizer Bronchodilator Protocol order as well (see protocol at bottom of note). CXR Findings:  XR CHEST PORTABLE    Result Date: 1/9/2023  Stable exam without acute process. The findings from the last RT Protocol Assessment were as follows:   History Pulmonary Disease: Chronic pulmonary disease  Respiratory Pattern: Dyspnea on exertion or RR 21-25 bpm  Breath Sounds: Slightly diminished and/or crackles  Cough: Strong, spontaneous, non-productive  Indication for Bronchodilator Therapy: On home bronchodilators  Bronchodilator Assessment Score: 6    Aerosolized bronchodilator medication orders have been revised according to the RT Inhaler-Nebulizer Bronchodilator Protocol below. Respiratory Therapist to perform RT Therapy Protocol Assessment initially then follow the protocol. Repeat RT Therapy Protocol Assessment PRN for score 0-3 or on second treatment, BID, and PRN for scores above 3. No Indications - adjust the frequency to every 6 hours PRN wheezing or bronchospasm, if no treatments needed after 48 hours then discontinue using Per Protocol order mode. If indication present, adjust the RT bronchodilator orders based on the Bronchodilator Assessment Score as indicated below. Use Inhaler orders unless patient has one or more of the following: on home nebulizer, not able to hold breath for 10 seconds, is not alert and oriented, cannot activate and use MDI correctly, or respiratory rate 25 breaths per minute or more, then use the equivalent nebulizer order(s) with same Frequency and PRN reasons based on the score. If a patient is on this medication at home then do not decrease Frequency below that used at home.     0-3 - enter or revise RT bronchodilator order(s) to equivalent RT Bronchodilator order with Frequency of every 4 hours PRN for wheezing or increased work of breathing using Per Protocol order mode. 4-6 - enter or revise RT Bronchodilator order(s) to two equivalent RT bronchodilator orders with one order with BID Frequency and one order with Frequency of every 4 hours PRN wheezing or increased work of breathing using Per Protocol order mode. 7-10 - enter or revise RT Bronchodilator order(s) to two equivalent RT bronchodilator orders with one order with TID Frequency and one order with Frequency of every 4 hours PRN wheezing or increased work of breathing using Per Protocol order mode. 11-13 - enter or revise RT Bronchodilator order(s) to one equivalent RT bronchodilator order with QID Frequency and an Albuterol order with Frequency of every 4 hours PRN wheezing or increased work of breathing using Per Protocol order mode. Greater than 13 - enter or revise RT Bronchodilator order(s) to one equivalent RT bronchodilator order with every 4 hours Frequency and an Albuterol order with Frequency of every 2 hours PRN wheezing or increased work of breathing using Per Protocol order mode. RT to enter RT Home Evaluation for COPD & MDI Assessment order using Per Protocol order mode.     Electronically signed by Tad Martinez RCP on 1/9/2023 at 3:42 PM

## 2023-01-10 VITALS
TEMPERATURE: 98.4 F | WEIGHT: 121.6 LBS | BODY MASS INDEX: 22.96 KG/M2 | HEART RATE: 83 BPM | RESPIRATION RATE: 16 BRPM | SYSTOLIC BLOOD PRESSURE: 147 MMHG | HEIGHT: 61 IN | DIASTOLIC BLOOD PRESSURE: 61 MMHG | OXYGEN SATURATION: 95 %

## 2023-01-10 LAB
SARS-COV-2, NAAT: NOT DETECTED
SOURCE: NORMAL

## 2023-01-10 PROCEDURE — 97530 THERAPEUTIC ACTIVITIES: CPT

## 2023-01-10 PROCEDURE — 6370000000 HC RX 637 (ALT 250 FOR IP): Performed by: INTERNAL MEDICINE

## 2023-01-10 PROCEDURE — 2580000003 HC RX 258: Performed by: ORTHOPAEDIC SURGERY

## 2023-01-10 PROCEDURE — 6370000000 HC RX 637 (ALT 250 FOR IP): Performed by: ORTHOPAEDIC SURGERY

## 2023-01-10 PROCEDURE — 94640 AIRWAY INHALATION TREATMENT: CPT

## 2023-01-10 PROCEDURE — 97116 GAIT TRAINING THERAPY: CPT

## 2023-01-10 PROCEDURE — 94761 N-INVAS EAR/PLS OXIMETRY MLT: CPT

## 2023-01-10 PROCEDURE — 97535 SELF CARE MNGMENT TRAINING: CPT

## 2023-01-10 PROCEDURE — 94150 VITAL CAPACITY TEST: CPT

## 2023-01-10 PROCEDURE — 87635 SARS-COV-2 COVID-19 AMP PRB: CPT

## 2023-01-10 PROCEDURE — 6360000002 HC RX W HCPCS: Performed by: STUDENT IN AN ORGANIZED HEALTH CARE EDUCATION/TRAINING PROGRAM

## 2023-01-10 RX ORDER — FERROUS SULFATE 325(65) MG
325 TABLET ORAL
Qty: 30 TABLET | Refills: 3 | Status: SHIPPED | OUTPATIENT
Start: 2023-01-11

## 2023-01-10 RX ORDER — HYDROCODONE BITARTRATE AND ACETAMINOPHEN 5; 325 MG/1; MG/1
1-2 TABLET ORAL EVERY 6 HOURS PRN
Qty: 15 TABLET | Refills: 0 | Status: SHIPPED | OUTPATIENT
Start: 2023-01-10 | End: 2023-01-13

## 2023-01-10 RX ORDER — POLYETHYLENE GLYCOL 3350 17 G/17G
17 POWDER, FOR SOLUTION ORAL DAILY PRN
Qty: 527 G | Refills: 1 | Status: SHIPPED | OUTPATIENT
Start: 2023-01-10 | End: 2023-02-09

## 2023-01-10 RX ADMIN — HYDROCODONE BITARTRATE AND ACETAMINOPHEN 1 TABLET: 5; 325 TABLET ORAL at 05:41

## 2023-01-10 RX ADMIN — SODIUM CHLORIDE, PRESERVATIVE FREE 10 ML: 5 INJECTION INTRAVENOUS at 09:26

## 2023-01-10 RX ADMIN — ASPIRIN 325 MG: 325 TABLET, COATED ORAL at 09:21

## 2023-01-10 RX ADMIN — ALBUTEROL SULFATE 2 PUFF: 90 AEROSOL, METERED RESPIRATORY (INHALATION) at 07:47

## 2023-01-10 RX ADMIN — PANTOPRAZOLE SODIUM 40 MG: 40 TABLET, DELAYED RELEASE ORAL at 05:41

## 2023-01-10 RX ADMIN — Medication 5000 UNITS: at 09:21

## 2023-01-10 RX ADMIN — ACETAMINOPHEN 650 MG: 325 TABLET ORAL at 09:20

## 2023-01-10 RX ADMIN — ENOXAPARIN SODIUM 30 MG: 100 INJECTION SUBCUTANEOUS at 09:21

## 2023-01-10 RX ADMIN — FERROUS SULFATE TAB 325 MG (65 MG ELEMENTAL FE) 325 MG: 325 (65 FE) TAB at 09:25

## 2023-01-10 RX ADMIN — SODIUM CHLORIDE, PRESERVATIVE FREE 10 ML: 5 INJECTION INTRAVENOUS at 09:25

## 2023-01-10 RX ADMIN — LEVOTHYROXINE SODIUM 100 MCG: 0.1 TABLET ORAL at 05:41

## 2023-01-10 RX ADMIN — ALBUTEROL SULFATE 2 PUFF: 90 AEROSOL, METERED RESPIRATORY (INHALATION) at 04:59

## 2023-01-10 ASSESSMENT — PAIN SCALES - GENERAL
PAINLEVEL_OUTOF10: 8
PAINLEVEL_OUTOF10: 4
PAINLEVEL_OUTOF10: 10

## 2023-01-10 ASSESSMENT — PAIN DESCRIPTION - ORIENTATION: ORIENTATION: LEFT

## 2023-01-10 ASSESSMENT — PAIN - FUNCTIONAL ASSESSMENT: PAIN_FUNCTIONAL_ASSESSMENT: PREVENTS OR INTERFERES SOME ACTIVE ACTIVITIES AND ADLS

## 2023-01-10 ASSESSMENT — PAIN DESCRIPTION - LOCATION: LOCATION: KNEE

## 2023-01-10 ASSESSMENT — PAIN SCALES - WONG BAKER: WONGBAKER_NUMERICALRESPONSE: 4

## 2023-01-10 ASSESSMENT — PAIN DESCRIPTION - DESCRIPTORS: DESCRIPTORS: ACHING;DISCOMFORT

## 2023-01-10 NOTE — PROGRESS NOTES
HEMATOLOGY ONCOLOGY  Progress Note     John Meek is a 65 y.o. female with medical history significant for Waldenstrom's macroglobulinemia, currently on first line chemotherapy with ibrutinib since 1/30/2021, presented to [facility] on 12/29/22 with worsening left knee and leg pain. She had a mechanical slip and fall on 12/27/22 in her driveway which was icy. She was found to have a comminuted medial and lateral tibial plateau fractures with intra-articular extension. Largest depressed fragment involving the lateral tibial plateau measured 8 mm with nondisplaced comminuted fibular head fracture and patellar fracture. She follows with orthopedics Dr. Eulalia Saha. On 12/27 patient was discharged home with knee immobilizer to follow-up with orthopedics outpatient. Patient was unable to tolerate ambulation with crutches and knee immobilizer at home pain has been intolerable with oral pain medications patient denies any new falls or injuries. States they called the orthopedic office today and was told to come to the emergency room. Orthopedics is planning for surgery. I was called to evaluate her. 1/9/22  Patient was seen and examined. She is s/p ORIF of left bicondylar tibial plateau fracture by Dr. Eulalia Saha on 1/4/23. Pain is well controlled and she is working with therapy. She is awaiting facility for discharge. Having some difficulty urinating. Otherwise no new complaints.      PHYSICAL EXAM    Vitals: BP (!) 151/68   Pulse 71   Temp 98.7 °F (37.1 °C)   Resp 22   Ht 5' 1\" (1.549 m)   Wt 121 lb 9.6 oz (55.2 kg)   SpO2 95%   BMI 22.98 kg/m²   CONSTITUTIONAL: awake, alert, cooperative, no apparent distress   EYES: pupils equal, round and reactive to light, sclera clear and conjunctiva normal  ENT: Normocephalic, without obvious abnormality, atraumatic  NECK: supple, symmetrical, no jugular venous distension and no carotid bruits   HEMATOLOGIC/LYMPHATIC: no cervical, supraclavicular or axillary lymphadenopathy   LUNGS: VBS, no wheezes, no crackles, no rhonchi, no increased work of breathing and clear to auscultation   CARDIOVASCULAR: regular rate and rhythm, normal S1 and S2, no murmur noted  ABDOMEN: normal bowel sounds x 4, soft, non-distended, non-tender, no masses palpated, no hepatosplenomgaly   MUSCULOSKELETAL: full range of motion apart from L knee, tone is normal  NEUROLOGIC: awake, alert, oriented to name, place and time. Motor skills grossly intact. SKIN: Normal texture, turgor and no jaundice. Skin appears intact   EXTREMITIES: no LE edema, left knee surgical wound clean and dry, able to move her toes, sensation grossly in tact    LABORATORY RESULTS  CBC:   No results for input(s): WBC, HGB, PLT in the last 72 hours. BMP:    No results for input(s): NA, K, CL, CO2, BUN, CREATININE, GLUCOSE in the last 72 hours. Hepatic:   No results for input(s): AST, ALT, ALB, BILITOT, ALKPHOS in the last 72 hours. INR: No results for input(s): INR in the last 72 hours. ASSESSMENT/RECOMMENDATION  Waldenstrom's macroglobulinemia - she is currently on ibrutinib and her disease has been in stable condition. I recommend to hold ibrutinib for now. Will plan to resume it ~ a week after surgery. Anemia - multifactorial etiology (d/l Waldenstrom's, chemo, bleeding etc.). Anemia panel on 1/3/23 was consistent with anemia due to chronic disease with functional iron deficiency. Will give short course of ferrous sulfate when she is in hospital.  Remains relatively stable currently    Left tibia and fibular fracture - She is s/p ORIF of left bicondylar tibial plateau fracture by Dr. Adi Petersen on 1/4/23. On ASA 325mg BID per ortho for DVT ppx. Possible d/c to [address] soon. We will follow the patient. Thank you.      Abdias Calabrese MD

## 2023-01-10 NOTE — DISCHARGE SUMMARY
V2.0  Discharge Summary    Name:  Dara Bah /Age/Sex: 1945 (27 y.o. female)   Admit Date: 2022  Discharge Date: 1/10/23    MRN & CSN:  [id number] & [id number] Encounter Date and Time 1/10/23 11:12 AM EST    Attending:  Kirsten Lewis MD Discharging Provider: Kirsten Lewis MD       Hospital Course:     Brief HPI: Dara Bah is a 65 y.o. female who presented with ***    Brief Problem Based Course:   ***      The patient expressed appropriate understanding of, and agreement with the discharge recommendations, medications, and plan.      Consults this admission:  IP CONSULT TO ORTHOPEDIC SURGERY  IP CONSULT TO ONCOLOGY  IP CONSULT TO HOSPITALIST  IP CONSULT TO CASE MANAGEMENT  IP CONSULT TO CARDIOLOGY    Discharge Diagnosis:   Closed displaced fracture of left patella, unspecified fracture morphology, sequela    ***    Discharge Instruction:   Follow up appointments: ***  Primary care physician: César Jeffries MD within 2 weeks  Diet: {diet:98131}   Activity: {discharge activity:27353}  Disposition: Discharged to:   []Home, []C, []SNF, []Acute Rehab, []Hospice ***  Condition on discharge: Stable  Labs and Tests to be Followed up as an outpatient by PCP or Specialist: ***    Discharge Medications:        Medication List        START taking these medications      aspirin 325 MG EC tablet  Take 1 tablet by mouth 2 times daily     ferrous sulfate 325 (65 Fe) MG tablet  Commonly known as: IRON 325  Take 1 tablet by mouth daily (with breakfast)  Start taking on: 2023     polyethylene glycol 17 g packet  Commonly known as: GLYCOLAX  Take 17 g by mouth daily as needed for Constipation            CONTINUE taking these medications      albuterol sulfate  (90 Base) MCG/ACT inhaler  Commonly known as: Ventolin HFA  Inhale 2 puffs into the lungs every 6 hours as needed for Wheezing     D-3-5 125 MCG (5000 UT) Caps capsule  Generic drug: vitamin D     esomeprazole 40 MG delayed release capsule  Commonly known as: NEXIUM  TAKE 1 CAPSULE DAILY     HYDROcodone-acetaminophen 5-325 MG per tablet  Commonly known as: Norco  Take 1-2 tablets by mouth every 6 hours as needed for Pain for up to 3 days. Max Daily Amount: 8 tablets     Imbruvica 140 MG chemo capsule  Generic drug: ibrutinib  Take 3 capsules daily     levothyroxine 100 MCG tablet  Commonly known as: SYNTHROID  Take 1 tablet by mouth Daily     zolpidem 5 MG tablet  Commonly known as: AMBIEN            STOP taking these medications      ondansetron 4 MG disintegrating tablet  Commonly known as: ZOFRAN-ODT     potassium chloride 10 MEQ extended release tablet  Commonly known as: KLOR-CON M     traMADol 50 MG tablet  Commonly known as: ULTRAM               Where to Get Your Medications        These medications were sent to [facility] #[id number]- [place], [address] [place] [phone]  [address] [place] [address]      Phone: [phone]   aspirin 325 MG EC tablet  ferrous sulfate 325 (65 Fe) MG tablet  polyethylene glycol 17 g packet       You can get these medications from any pharmacy    Bring a paper prescription for each of these medications  HYDROcodone-acetaminophen 5-325 MG per tablet        Objective Findings at Discharge:   BP (!) 147/61   Pulse 83   Temp 98.4 °F (36.9 °C) (Oral)   Resp 16   Ht 5' 1\" (1.549 m)   Wt 121 lb 9.6 oz (55.2 kg)   SpO2 95%   BMI 22.98 kg/m²       Physical Exam:   General: NAD  Eyes: EOMI  ENT: neck supple  Cardiovascular: Regular rate. Respiratory: Clear to auscultation  Gastrointestinal: Soft, non tender  Genitourinary: no suprapubic tenderness  Musculoskeletal: No edema  Skin: warm, dry  Neuro: Alert. Psych: Mood appropriate. Labs and Imaging   [facility] LESS THAN 1 HOUR    Result Date: 1/4/2023  Radiology exam is complete. No Radiologist dictation. Please follow up with ordering provider.      XR CHEST PORTABLE    Result Date: 1/9/2023  EXAMINATION: ONE XRAY VIEW OF THE CHEST 1/9/2023 9:02 am COMPARISON: 01/06/2023 HISTORY: ORDERING SYSTEM PROVIDED HISTORY: SOB, afib TECHNOLOGIST PROVIDED HISTORY: Reason for exam:->SOB, afib Reason for Exam: SOB, afib FINDINGS: Mild bibasilar atelectasis. Stable appearance the lungs. No acute consolidation or pulmonary edema. Cardiomediastinal silhouette and bony thorax are unchanged. Stable exam without acute process. XR CHEST PORTABLE    Result Date: 1/6/2023  EXAMINATION: ONE XRAY VIEW OF THE CHEST 1/6/2023 10:12 am COMPARISON: 01/19/2022. HISTORY: ORDERING SYSTEM PROVIDED HISTORY: SOB TECHNOLOGIST PROVIDED HISTORY: Reason for exam:->SOB Reason for Exam: sob FINDINGS: Overlying items external to the patient somewhat limit evaluation. Lungs are clear. Cardiac and mediastinal silhouettes are within normal limits. No pneumothoraces. Bony structures appear intact. No acute abnormality. CBC: No results for input(s): WBC, HGB, PLT in the last 72 hours. BMP:  No results for input(s): NA, K, CL, CO2, BUN, CREATININE, GLUCOSE in the last 72 hours. Hepatic: No results for input(s): AST, ALT, ALB, BILITOT, ALKPHOS in the last 72 hours. Lipids:   Lab Results   Component Value Date/Time    CHOL 102 12/23/2019 09:43 AM    HDL 28 12/23/2019 09:43 AM    HDL 24 05/25/2012 10:42 AM    TRIG 142 12/23/2019 09:43 AM     Hemoglobin A1C:   Lab Results   Component Value Date/Time    LABA1C 5.2 09/30/2019 10:36 AM     TSH:   Lab Results   Component Value Date/Time    TSH 4.08 12/23/2019 09:43 AM     Troponin:   Lab Results   Component Value Date/Time    TROPONINT <0.010 01/18/2022 09:15 PM     Lactic Acid: No results for input(s): LACTA in the last 72 hours. BNP: No results for input(s): PROBNP in the last 72 hours.   UA:  Lab Results   Component Value Date/Time    NITRU NEGATIVE 01/30/2022 08:36 AM    NITRU Negative 05/17/2019 09:15 AM    COLORU YELLOW 01/30/2022 08:36 AM    PHUR 6.0 05/17/2019 09:15 AM    WBCUA 0 TO 1 01/30/2022 08:36 AM RBCUA NEGATIVE 01/30/2022 08:36 AM    MUCUS RARE 03/08/2021 09:30 AM    TRICHOMONAS NONE SEEN 03/08/2021 09:30 AM    BACTERIA NEGATIVE 01/30/2022 08:36 AM    CLARITYU CLEAR 01/30/2022 08:36 AM    SPECGRAV 1.015 01/30/2022 08:36 AM    LEUKOCYTESUR NEGATIVE 01/30/2022 08:36 AM    UROBILINOGEN 0.2 01/30/2022 08:36 AM    BILIRUBINUR NEGATIVE 01/30/2022 08:36 AM    BILIRUBINUR NEGATIVE 01/11/2012 08:40 PM    BLOODU NEGATIVE 01/30/2022 08:36 AM    GLUCOSEU Negative 05/17/2019 09:15 AM    GLUCOSEU NEGATIVE 01/11/2012 08:40 PM    KETUA NEGATIVE 01/30/2022 08:36 AM    AMORPHOUS 1+ 03/31/2014 10:10 AM     Urine Cultures: No results found for: Anna Avendano  Blood Cultures: No results found for: BC  No results found for: BLOODCULT2  Organism: No results found for: ORG    Time Spent Discharging patient 35 minutes    Electronically signed by Tim Fisher MD on 1/10/2023 at 11:12 AM

## 2023-01-10 NOTE — PROGRESS NOTES
Report called to Clarice--LPN  from [facility], [place].  All questions answered at this time

## 2023-01-10 NOTE — DISCHARGE INSTRUCTIONS
Continue strict nonweightbearing to left lower extremity. Continue range of motion exercises for the left knee and ankle as tolerated.

## 2023-01-10 NOTE — PROGRESS NOTES
Physical Therapy    Physical Therapy Treatment Note  Name: Ardyth Koyanagi MRN: [id number] :   1945   Date:  1/10/2023   Admission Date: 2022 Room:  [address] [place] [address]   Restrictions/Precautions:            fall risk; general precautions; NWB on LLE   L tibial plateau fx  Communication with other providers:  per nurse ok to tx and gave pain meds during tx session. Co-tx with Mame Sanchez for part of tx session  Subjective:  Patient states:  pt agreeable to tx but reports having bad night  Pain:   Location, Type, Intensity (0/10 to 10/10):  rated pain 8 at start and was tearful but at end of tx pt reports feeling comfortable in chair and no pain. Objective:    Observation:  alert and oriented on 2 liters O2    Treatment, including education/measures:  Sup to sit with leg  and min assist. Pt needing increased time aand effort due to having more left knee pain today. Sit to sup min assist of one with leg  assist and sba/cga of one giving cues to scoot up and to middle of bed. Sit<->stand from bed and commode cga/min assist  Pt needing increased time on commode and sba for safety. Pt also request left foot be elevated on with trash can laying on it side. Amb with rw 10' x 2 cga/min. Pt needs more assist as she fatigues and has increased SOB. Safety  Pt declined sitting up in chair due to knee pain. Patient left safely in the bed, with call light/phone in reach with alarm applied. Gait belt was used for transfers and gait. Assessment / Impression:      Patient's tolerance of treatment:  good and does well maintaining NWB LLE with all mobility. Adverse Reaction: na  Significant change in status and impact:  na  Barriers to improvement:  strength and safety  Plan for Next Session:    Cont.  POC  Time in:  0900  Time out:  1004  Timed treatment minutes:  64  Total treatment time:  59    Previously filed items:  Social/Functional History  Lives With: Spouse  Type of Home: Condo  Home Layout: One level  Home Access: Stairs to enter without rails  Entrance Stairs - Number of Steps: 1 (small step from garage)  Bathroom Shower/Tub: Walk-in shower, Shower chair with back  Bathroom Toilet: Standard  Bathroom Equipment: Shower chair  Home Equipment: Birdie Benton, sera, Britney Amaya  Has the patient had two or more falls in the past year or any fall with injury in the past year?: No (1 leading to this admission)  Receives Help From: Family  ADL Assistance: [address] [place] [address] Avenue: Independent  Homemaking Responsibilities: 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)  Ambulation Assistance: Independent  Transfer Assistance: Independent  Active : Yes  Occupation: Retired  Type of Occupation:  at [employer]  Patient Goals   Patient Goals : return to [address] [place] [address]  Time Frame for Short Term Goals: 1 week  Short Term Goal 1: pt will complete bed mobility at [facility] 2: pt will complete transfers at [place] [address] 3: pt will ambulate 30 ft using FWW at [address] [place] [address] 4: pt will demonstrate and verbalize understanding of need for LLE NWB w/ min cues    Electronically signed by:    Wojciech Benavides PTA  1/10/2023, 8:47 AM

## 2023-01-10 NOTE — PROGRESS NOTES
Pt had a restless night. Pt has several complaints of sob and respiratory was notified and treatments given. Overnight hospitalist was also notified with no new orders. Pt is voiding frequent small clear jeffrey urine. Pt was bladderscanned and shown to have 52 ml in bladder. Pt had one dose of pain meds for severe knee pain. Pt does have some confusion at night but is easily reoriented.   Pt was given fresh water this am.

## 2023-01-10 NOTE — ANESTHESIA POSTPROCEDURE EVALUATION
Department of Anesthesiology  Postprocedure Note    Patient: Abdoulaye Verdugo  MRN: [id number]  YOB: 1945  Date of evaluation: 1/9/2023      Procedure Summary     Date: 01/04/23 Room / Location: [address] [place] [address]    Anesthesia Start: 0730 Anesthesia Stop: 6845    Procedures:       TIBIA OPEN REDUCTION INTERNAL FIXATION (Left: Leg Lower)      Procedure Not Yet Scheduled Diagnosis:       Fracture      (Fracture [T14. 8XXA])    Surgeons: Aj Pastrana DO Responsible Provider: Damon Blue MD    Anesthesia Type: general ASA Status: 3          Anesthesia Type: No value filed.     Ciaran Phase I: Ciaran Score: 9    Ciaran Phase II:        Anesthesia Post Evaluation    Patient location during evaluation: PACU  Patient participation: complete - patient participated  Level of consciousness: sleepy but conscious  Pain score: 4  Airway patency: patent  Nausea & Vomiting: no nausea and no vomiting  Complications: no  Cardiovascular status: blood pressure returned to baseline  Respiratory status: acceptable  Hydration status: euvolemic

## 2023-01-10 NOTE — PROGRESS NOTES
Occupational Therapy  . Occupational Therapy Treatment Note    Name: Benedict Monet MRN: [id number] :   1945   Date:  1/10/2023   Admission Date: 2022 Room:  [address] [place] [address]-     Primary Problem:      Restrictions/Precautions:          General precautions, fall risk   NWB LLE    Communication with other providers:  partial cotx with PTA Baby Lennert for assist and safety. Subjective:  Patient states:  IM so glad you guys came in.  Pain:   Location, Type, Intensity (0/10 to 10/10):  6/10 during movement but 0/10 when in bed. Objective:    Observation: patient seated on BSC placed over toilet upon arrival. Patient wanting to be bathed. Objective Measures:  02, tele    Treatment, including education:    ADL activity training was instructed today. Cues were given for safety, sequence, UE/LE placement, visual cues, and balance. Activities performed today included dressing, toileting, hand hygiene, and grooming. Toileting- on BSC placed over toilet. Patien had small BM and voided. DEP for miguel care in stance. Increased time needed for BM. Ub bathing- SBA seated on BSC  LB bathing- SBA seated on BSC  Facial hygiene-SBA seated on BSC  Grooming- initially SBA seated but d/t a lot of tangles needing Max A for completion. -increased time needed. Lb dressing- DEP to doff shoe. Therapeutic activity training was instructed today. Cues were given for safety, sequence, UE/LE placement, awareness, and balance. Activities performed today included bed mobility training, sup-sit, sit-stand, SPT. Patient seated on [facility] upon arrival.    Patient demo weight shifting appropriately with use of RLE . Patient seated increased amount of time for bathing. Patient stood with PTA and tolerated standing well with CGA. Patient then performed functional mobility back to bed with Min A. Patient sat to EOB and returned supine with Min A and leg . Cues needed for safety and sequencing.      Patient educated on role of OT , benefits of OT and rationale for therapeutic intervention. Benefit of OOB/EOB activities, benefit of movement. AE/AD, WS/EC,     All therapeutic intervention performed c emphasis on dynamic balance / standing tolerance to increase strength, endurance and activity tolerance for increased Coral c ADL tasks and func transfers / mobility. Patient left safely in bed at end of session, with call light in reach, alarm on and nursing aware. Gait belt was used for func transfers / mobility.       Assessment / Impression:    Patient's tolerance of treatment: Well  Adverse Reaction: None  Significant change in status and impact: Improved from initial evaluation  Barriers to improvement: None noted      Plan for Next Session:    Continue with OT POC      Time in:  921  Time out:  1004  Timed treatment minutes:  43  Total treatment time:  43      Electronically signed by:    DAISY Gatica COTA/L [id number]    1/10/2023, 10:35 AM

## 2023-01-10 NOTE — CARE COORDINATION
Transport arranged with [facility]  at 1:45 pm . Notified patient's nurse Marlene Valerio and Leo Marc at [facility] of transport time.

## 2023-01-10 NOTE — CARE COORDINATION
JOANNE completed . Notified by patient's nurse Xiomara Waters that patient has not had rapid COVID. PS to Physician to request order.  Transport time changed to [facility] p/u at 2:45 pm . Notified Aliya Darnell and patient's nurse Neo De Jesus of new transport time

## 2023-01-13 ENCOUNTER — CLINICAL DOCUMENTATION (OUTPATIENT)
Dept: ONCOLOGY | Age: 78
End: 2023-01-13

## 2023-01-13 NOTE — PROGRESS NOTES
[facility] [employer] inquiring about imbruvica order. Patient was instructed to hold medication 1 week after surgery. Patient had ORIF of left tib fx on 01/04/2023. Dr. Satnam Conroy recommending that patient resume imbruvica 420 mg PO daily. Called [facility] back @ [phone]. [facility]. RN updated. RN voices understanding and will reach out to family to bring home dose of imbruvica to SNF. No further needs addressed.

## 2023-01-19 ENCOUNTER — OFFICE VISIT (OUTPATIENT)
Dept: ORTHOPEDIC SURGERY | Age: 78
End: 2023-01-19

## 2023-01-19 VITALS
RESPIRATION RATE: 16 BRPM | WEIGHT: 120.2 LBS | BODY MASS INDEX: 22.69 KG/M2 | OXYGEN SATURATION: 95 % | HEIGHT: 61 IN | TEMPERATURE: 98.1 F | HEART RATE: 67 BPM

## 2023-01-19 DIAGNOSIS — Z87.81 S/P ORIF (OPEN REDUCTION INTERNAL FIXATION) FRACTURE: Primary | ICD-10-CM

## 2023-01-19 DIAGNOSIS — Z98.890 S/P ORIF (OPEN REDUCTION INTERNAL FIXATION) FRACTURE: Primary | ICD-10-CM

## 2023-01-19 PROCEDURE — 99024 POSTOP FOLLOW-UP VISIT: CPT | Performed by: PHYSICIAN ASSISTANT

## 2023-01-19 NOTE — PROGRESS NOTES
Date of surgery:   January 4th  Surgeon: Dr. Monica Alcantara  I reviewed and agree with the portions of the HPI, review of systems, vital documentation and plan performed by my staff and have added/addended where appropriate. History:  Ms. [place] Ventura is here in follow up regarding her left Tibial plateau fx  She is doing rather well. She is having 0/10 pain. She denies chest pain, SOB, calf pain,fever,wound drainage, tenderness in the calf, and ankle swelling. No other issues. Physical:  Vitals:    01/19/23 1329   Pulse: 67   Resp: 16   Temp: 98.1 °F (36.7 °C)   SpO2: 95%   Weight: 120 lb 3.2 oz (54.5 kg)   Height: 5' 1\" (1.549 m)     Left knee incisions are healed with no erythema. Mild edema in the left lower extremity. Range of motion of the left knee shows active extension to approximately 10 degrees short of full extension with passive extension to 0 degrees. Flexion to approximately 100 degrees. Imaging Studies:  2 views of the left tib-fib taken and reviewed in the office today show interval fixation of tibial plateau fracture with lateral and medial plate intact and joint line surface in near anatomic alignment. No evidence of loss of fixation of the tibial plateau fracture.   The official read and interpretation of these x-rays will be done by the the [facility]       Impression: Status post above, doing well       Plan:   Patient Instructions   Touchdown weightbearing only  Work on range of motion  Follow up in 4 weeks

## 2023-02-27 ENCOUNTER — OFFICE VISIT (OUTPATIENT)
Dept: ORTHOPEDIC SURGERY | Age: 78
End: 2023-02-27

## 2023-02-27 VITALS — BODY MASS INDEX: 22.71 KG/M2 | OXYGEN SATURATION: 97 % | HEART RATE: 75 BPM | HEIGHT: 61 IN

## 2023-02-27 DIAGNOSIS — Z98.890 S/P ORIF (OPEN REDUCTION INTERNAL FIXATION) FRACTURE: Primary | ICD-10-CM

## 2023-02-27 DIAGNOSIS — Z87.81 S/P ORIF (OPEN REDUCTION INTERNAL FIXATION) FRACTURE: Primary | ICD-10-CM

## 2023-02-27 PROCEDURE — 99024 POSTOP FOLLOW-UP VISIT: CPT | Performed by: ORTHOPAEDIC SURGERY

## 2023-02-27 RX ORDER — TRAMADOL HYDROCHLORIDE 50 MG/1
50 TABLET ORAL EVERY 6 HOURS PRN
COMMUNITY

## 2023-02-27 ASSESSMENT — ENCOUNTER SYMPTOMS
ABDOMINAL PAIN: 0
CHEST TIGHTNESS: 0
BACK PAIN: 0
EYE REDNESS: 0
COLOR CHANGE: 0

## 2023-02-27 NOTE — PROGRESS NOTES
Subjective:      Patient ID: Arin Hernández is a 65 y.o. female. Patient seen in office today for post op LT Tibia ORIF DOS 1/4/23. Stated she does have some discomfort. 4-5/10 pain level today. She comes in today for her 6-week postop recheck. Overall she states that she is feeling much better and she has remained nonweightbearing in the left leg but has put occasional weight through the left leg. She has been working on range of motion and continues to use a walker. Patient denies any new injury to the involved extremity/ joint, denies numbness or tingling in the involved extremity and denies fever or chills. Review of Systems   Constitutional:  Negative for activity change, chills and fever. HENT:  Negative for congestion and drooling. Eyes:  Negative for redness. Respiratory:  Negative for chest tightness. Cardiovascular:  Negative for chest pain. Gastrointestinal:  Negative for abdominal pain. Endocrine: Negative for cold intolerance and heat intolerance. Musculoskeletal:  Positive for arthralgias, gait problem, joint swelling and myalgias. Negative for back pain. Skin:  Negative for color change, pallor, rash and wound. Neurological:  Negative for weakness and numbness. Psychiatric/Behavioral:  Negative for confusion.       Past Medical History:   Diagnosis Date    Arm fracture, left 05/16/2019    Casted - no surgery - Dr. 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 pylori infection 8/1996    S/P Biaxin and Prilosec    Hiatal hernia 8/1994    with reflux by UGI    Hyperlipidemia     Hypertension     Hypothyroidism 1990's    MDRO (multiple drug resistant organisms) resistance 2005    Nasal passages    Osteoporosis     Smoking hx        Objective:   Physical Exam  Constitutional:       Appearance: She is well-developed. HENT:      Head: Normocephalic. Nose: No congestion. Eyes:      Pupils: Pupils are equal, round, and reactive to light. Pulmonary:      Effort: Pulmonary effort is normal.   Musculoskeletal:         General: Swelling present. No tenderness or deformity. Cervical back: Normal range of motion. Right hip: Normal.      Left hip: Normal.      Right knee: No swelling, deformity, effusion, erythema, ecchymosis, lacerations or bony tenderness. Normal range of motion. No tenderness. No medial joint line, lateral joint line, MCL, LCL or patellar tendon tenderness. No LCL laxity or MCL laxity. Normal alignment and normal patellar mobility. Left knee: Swelling present. No deformity, effusion, erythema, ecchymosis, lacerations or bony tenderness. Decreased range of motion. No tenderness. No medial joint line, lateral joint line, MCL, LCL or patellar tendon tenderness. No LCL laxity or MCL laxity. Normal alignment and normal patellar mobility. Skin:     General: Skin is warm and dry. Capillary Refill: Capillary refill takes less than 2 seconds. Coloration: Skin is not pale. Findings: No erythema or rash. Neurological:      Mental Status: She is alert and oriented to person, place, and time. Sensory: No sensory deficit. Motor: No weakness. Left knee-incisions healing well, no redness, no signs of infection. 0-100  No ligamentous laxity.     XR TIBIA FIBULA LEFT (2 VIEWS)    Result Date: 2/27/2023  XRAY X-ray 2 views of the left tib-fib obtained and reviewed by me today in the office demonstrates age appropriate bone density throughout with intact plate and screw construct across the medial and lateral aspect of the bicondylar tibial plateau fracture, all fracture fragments remain in near anatomic alignment with no new displacement or angulation compared to prior x-rays. Impression: Stable left bicondylar tibial plateau fracture status post ORIF. Assessment:      Left bicondylar tibial plateau fracture ORIF, 6 weeks      Plan:      I discussed with her today her x-ray findings. I explained to her that her implants are stable, her fractures remain in good alignment and are healing well. At this point she can increase to 50% weightbearing on the left side. I will get her started in in-home physical therapy. Continue aggressive range of motion for the left knee. Ice and elevate as needed. Tylenol or Motrin as needed. Follow-up in 6 weeks for recheck with x-rays of the left knee.             Emily 97, DO

## 2023-02-27 NOTE — PATIENT INSTRUCTIONS
Start 50% weight-bearing  Continue range of motion exercises as instructed. Ice and elevate as needed. Tylenol or Motrin for pain. Home Health PT ordered today. Follow up in 6 weeks.

## 2023-02-27 NOTE — PROGRESS NOTES
Patient seen in office today for post op LT Tibia ORIF DOS 1/4/23. Stated she does have some discomfort. 4-5/10 pain level today.

## 2023-03-27 ENCOUNTER — TRANSCRIBE ORDERS (OUTPATIENT)
Dept: ADMINISTRATIVE | Age: 78
End: 2023-03-27

## 2023-03-27 DIAGNOSIS — Z12.31 ENCOUNTER FOR SCREENING MAMMOGRAM FOR MALIGNANT NEOPLASM OF BREAST: Primary | ICD-10-CM

## 2023-03-27 DIAGNOSIS — N95.9 POST MENOPAUSAL PROBLEMS: ICD-10-CM

## 2023-04-01 NOTE — TELEPHONE ENCOUNTER
Final Anesthesia Post-op Assessment    Patient: Erica Gilman  Procedure(s) Performed: APPENDECTOMY, LAPAROSCOPIC  Anesthesia type: General    Vitals Value Taken Time   Temp 36.4 °C (97.5 °F) 04/01/23 1105   Pulse 75 04/01/23 1135   Resp 16 04/01/23 1135   SpO2 100 % 04/01/23 1135   /79 04/01/23 1135         Patient Location: PACU Phase 1  Post-op Vital Signs:stable  Level of Consciousness: awake and alert  Respiratory Status: spontaneous ventilation  Cardiovascular stable  Hydration: euvolemic  Pain Management: adequately controlled  Handoff: Handoff to receiving nurse was performed and questions were answered  Vomiting: none  Nausea: None  Airway Patency:patent  Post-op Assessment: no complications and patient tolerated procedure well with no complications      No notable events documented.    Patient called back again. Please advise.        Pharmacy needs to be [facility]

## 2023-04-05 ENCOUNTER — HOSPITAL ENCOUNTER (OUTPATIENT)
Dept: INFUSION THERAPY | Age: 78
Discharge: HOME | End: 2023-04-05
Payer: MEDICARE

## 2023-04-05 DIAGNOSIS — C88.0 WALDENSTROM MACROGLOBULINEMIA (HCC): ICD-10-CM

## 2023-04-05 LAB
ALBUMIN SERPL-MCNC: 3.8 GM/DL (ref 3.4–5)
ALP BLD-CCNC: 80 IU/L (ref 40–128)
ALT SERPL-CCNC: <5 U/L (ref 10–40)
ANION GAP SERPL CALCULATED.3IONS-SCNC: 9 MMOL/L (ref 4–16)
AST SERPL-CCNC: 10 IU/L (ref 15–37)
BASOPHILS ABSOLUTE: 0 K/CU MM
BASOPHILS RELATIVE PERCENT: 0.2 % (ref 0–1)
BILIRUB SERPL-MCNC: 0.6 MG/DL (ref 0–1)
BUN SERPL-MCNC: 22 MG/DL (ref 6–23)
CALCIUM SERPL-MCNC: 8.7 MG/DL (ref 8.3–10.6)
CHLORIDE BLD-SCNC: 104 MMOL/L (ref 99–110)
CO2: 28 MMOL/L (ref 21–32)
CREAT SERPL-MCNC: 1.1 MG/DL (ref 0.6–1.1)
DIFFERENTIAL TYPE: ABNORMAL
EOSINOPHILS ABSOLUTE: 0 K/CU MM
EOSINOPHILS RELATIVE PERCENT: 0.7 % (ref 0–3)
ERYTHROCYTE SEDIMENTATION RATE: 19 MM/HR (ref 0–30)
GFR SERPL CREATININE-BSD FRML MDRD: 51 ML/MIN/1.73M2
GLUCOSE SERPL-MCNC: 96 MG/DL (ref 70–99)
HCT VFR BLD CALC: 37.9 % (ref 37–47)
HEMOGLOBIN: 11.8 GM/DL (ref 12.5–16)
IGA: <50 MG/DL (ref 69–382)
IGG,SERUM: <300 MG/DL (ref 723–1685)
IGM,SERUM: 1727 MG/DL (ref 62–277)
LACTATE DEHYDROGENASE: 128 IU/L (ref 120–246)
LYMPHOCYTES ABSOLUTE: 0.5 K/CU MM
LYMPHOCYTES RELATIVE PERCENT: 8.8 % (ref 24–44)
MCH RBC QN AUTO: 28.7 PG (ref 27–31)
MCHC RBC AUTO-ENTMCNC: 31.1 % (ref 32–36)
MCV RBC AUTO: 92.2 FL (ref 78–100)
MONOCYTES ABSOLUTE: 0.4 K/CU MM
MONOCYTES RELATIVE PERCENT: 7.3 % (ref 0–4)
PDW BLD-RTO: 13.7 % (ref 11.7–14.9)
PLATELET # BLD: 174 K/CU MM (ref 140–440)
PMV BLD AUTO: 11.1 FL (ref 7.5–11.1)
POTASSIUM SERPL-SCNC: 3.9 MMOL/L (ref 3.5–5.1)
RBC # BLD: 4.11 M/CU MM (ref 4.2–5.4)
SEGMENTED NEUTROPHILS ABSOLUTE COUNT: 4.9 K/CU MM
SEGMENTED NEUTROPHILS RELATIVE PERCENT: 83 % (ref 36–66)
SODIUM BLD-SCNC: 141 MMOL/L (ref 135–145)
TOTAL PROTEIN: 6.1 GM/DL (ref 6.4–8.2)
WBC # BLD: 5.9 K/CU MM (ref 4–10.5)

## 2023-04-05 PROCEDURE — 83615 LACTATE (LD) (LDH) ENZYME: CPT

## 2023-04-05 PROCEDURE — 84165 PROTEIN E-PHORESIS SERUM: CPT

## 2023-04-05 PROCEDURE — 80053 COMPREHEN METABOLIC PANEL: CPT

## 2023-04-05 PROCEDURE — 86320 SERUM IMMUNOELECTROPHORESIS: CPT

## 2023-04-05 PROCEDURE — 85025 COMPLETE CBC W/AUTO DIFF WBC: CPT

## 2023-04-05 PROCEDURE — 84155 ASSAY OF PROTEIN SERUM: CPT

## 2023-04-05 PROCEDURE — 36415 COLL VENOUS BLD VENIPUNCTURE: CPT

## 2023-04-05 PROCEDURE — 82784 ASSAY IGA/IGD/IGG/IGM EACH: CPT

## 2023-04-05 PROCEDURE — 82232 ASSAY OF BETA-2 PROTEIN: CPT

## 2023-04-05 PROCEDURE — 85652 RBC SED RATE AUTOMATED: CPT

## 2023-04-05 PROCEDURE — 83883 ASSAY NEPHELOMETRY NOT SPEC: CPT

## 2023-04-07 LAB
ALBUMIN SERPL ELPH-MCNC: 3.3 GM/DL (ref 3.2–5.6)
ALPHA-1-GLOBULIN: 0.2 GM/DL (ref 0.1–0.4)
ALPHA-2-GLOBULIN: 0.7 GM/DL (ref 0.4–1.2)
B2 MICROGLOB SERPL-MCNC: 4.6 MG/L
BETA GLOBULIN: 0.8 GM/DL (ref 0.5–1.3)
GAMMA GLOBULIN: 1.2 GM/DL (ref 0.5–1.6)
KAPPA LC FREE SER-MCNC: 13.3 MG/L (ref 3.3–19.4)
KAPPA LC FREE/LAMBDA FREE SER NEPH: 0.11 {RATIO} (ref 0.26–1.65)
LAMBDA LC FREE SERPL-MCNC: 125.76 MG/L (ref 5.71–26.3)
SPEP INTERPRETATION: ABNORMAL
SPEP INTERPRETATION: NORMAL
TOTAL PROTEIN: 6.1 GM/DL (ref 6.4–8.2)

## 2023-04-12 ENCOUNTER — HOSPITAL ENCOUNTER (OUTPATIENT)
Dept: INFUSION THERAPY | Age: 78
Discharge: HOME | End: 2023-04-12
Payer: MEDICARE

## 2023-04-12 PROCEDURE — 99211 OFF/OP EST MAY X REQ PHY/QHP: CPT

## 2023-04-24 ENCOUNTER — APPOINTMENT (OUTPATIENT)
Dept: WOMENS IMAGING | Age: 78
End: 2023-04-24
Payer: MEDICARE

## 2023-04-24 ENCOUNTER — HOSPITAL ENCOUNTER (OUTPATIENT)
Dept: WOMENS IMAGING | Age: 78
Discharge: HOME | End: 2023-04-24
Payer: MEDICARE

## 2023-04-24 DIAGNOSIS — Z12.31 ENCOUNTER FOR SCREENING MAMMOGRAM FOR MALIGNANT NEOPLASM OF BREAST: ICD-10-CM

## 2023-04-24 PROCEDURE — 77063 BREAST TOMOSYNTHESIS BI: CPT

## 2023-05-18 DIAGNOSIS — C88.0 WALDENSTROM MACROGLOBULINEMIA (HCC): ICD-10-CM

## 2023-05-18 RX ORDER — IBRUTINIB 140 MG/1
CAPSULE ORAL
Qty: 180 CAPSULE | Refills: 0 | Status: ACTIVE | OUTPATIENT
Start: 2023-05-18

## 2023-07-14 ENCOUNTER — HOSPITAL ENCOUNTER (OUTPATIENT)
Dept: INFUSION THERAPY | Age: 78
Discharge: HOME | End: 2023-07-14
Payer: MEDICARE

## 2023-07-14 DIAGNOSIS — C88.0 WALDENSTROM MACROGLOBULINEMIA (HCC): ICD-10-CM

## 2023-07-14 LAB
ALBUMIN SERPL-MCNC: 4 GM/DL (ref 3.4–5)
ALP BLD-CCNC: 79 IU/L (ref 40–129)
ALT SERPL-CCNC: 10 U/L (ref 10–40)
ANION GAP SERPL CALCULATED.3IONS-SCNC: 10 MMOL/L (ref 4–16)
AST SERPL-CCNC: 12 IU/L (ref 15–37)
BASOPHILS ABSOLUTE: 0 K/CU MM
BASOPHILS RELATIVE PERCENT: 0.2 % (ref 0–1)
BILIRUB SERPL-MCNC: 0.7 MG/DL (ref 0–1)
BUN SERPL-MCNC: 23 MG/DL (ref 6–23)
CALCIUM SERPL-MCNC: 8.7 MG/DL (ref 8.3–10.6)
CHLORIDE BLD-SCNC: 103 MMOL/L (ref 99–110)
CO2: 28 MMOL/L (ref 21–32)
CREAT SERPL-MCNC: 1.3 MG/DL (ref 0.6–1.1)
DIFFERENTIAL TYPE: ABNORMAL
EOSINOPHILS ABSOLUTE: 0.1 K/CU MM
EOSINOPHILS RELATIVE PERCENT: 1.1 % (ref 0–3)
ERYTHROCYTE SEDIMENTATION RATE: 11 MM/HR (ref 0–30)
GFR SERPL CREATININE-BSD FRML MDRD: 42 ML/MIN/1.73M2
GLUCOSE SERPL-MCNC: 80 MG/DL (ref 70–99)
HCT VFR BLD CALC: 38.2 % (ref 37–47)
HEMOGLOBIN: 11.9 GM/DL (ref 12.5–16)
IGA: <50 MG/DL (ref 69–382)
IGG,SERUM: <300 MG/DL (ref 723–1685)
IGM,SERUM: 1134 MG/DL (ref 62–277)
LACTATE DEHYDROGENASE: 121 IU/L (ref 120–246)
LYMPHOCYTES ABSOLUTE: 0.8 K/CU MM
LYMPHOCYTES RELATIVE PERCENT: 13.3 % (ref 24–44)
MCH RBC QN AUTO: 29.8 PG (ref 27–31)
MCHC RBC AUTO-ENTMCNC: 31.2 % (ref 32–36)
MCV RBC AUTO: 95.7 FL (ref 78–100)
MONOCYTES ABSOLUTE: 0.5 K/CU MM
MONOCYTES RELATIVE PERCENT: 7.5 % (ref 0–4)
PDW BLD-RTO: 13.3 % (ref 11.7–14.9)
PLATELET # BLD: 139 K/CU MM (ref 140–440)
PMV BLD AUTO: 10.4 FL (ref 7.5–11.1)
POTASSIUM SERPL-SCNC: 4.9 MMOL/L (ref 3.5–5.1)
RBC # BLD: 3.99 M/CU MM (ref 4.2–5.4)
SEGMENTED NEUTROPHILS ABSOLUTE COUNT: 4.8 K/CU MM
SEGMENTED NEUTROPHILS RELATIVE PERCENT: 77.9 % (ref 36–66)
SODIUM BLD-SCNC: 141 MMOL/L (ref 135–145)
TOTAL PROTEIN: 5.9 GM/DL (ref 6.4–8.2)
TOTAL PROTEIN: 5.9 GM/DL (ref 6.4–8.2)
WBC # BLD: 6.1 K/CU MM (ref 4–10.5)

## 2023-07-14 PROCEDURE — 82784 ASSAY IGA/IGD/IGG/IGM EACH: CPT

## 2023-07-14 PROCEDURE — 85025 COMPLETE CBC W/AUTO DIFF WBC: CPT

## 2023-07-14 PROCEDURE — 36415 COLL VENOUS BLD VENIPUNCTURE: CPT

## 2023-07-14 PROCEDURE — 84155 ASSAY OF PROTEIN SERUM: CPT

## 2023-07-14 PROCEDURE — 85652 RBC SED RATE AUTOMATED: CPT

## 2023-07-14 PROCEDURE — 82232 ASSAY OF BETA-2 PROTEIN: CPT

## 2023-07-14 PROCEDURE — 86320 SERUM IMMUNOELECTROPHORESIS: CPT

## 2023-07-14 PROCEDURE — 80053 COMPREHEN METABOLIC PANEL: CPT

## 2023-07-14 PROCEDURE — 84165 PROTEIN E-PHORESIS SERUM: CPT

## 2023-07-14 PROCEDURE — 83883 ASSAY NEPHELOMETRY NOT SPEC: CPT

## 2023-07-14 PROCEDURE — 83615 LACTATE (LD) (LDH) ENZYME: CPT

## 2023-07-15 LAB — B2 MICROGLOB SERPL-MCNC: 4.9 MG/L

## 2023-07-16 LAB
KAPPA LC FREE SER-MCNC: 15.26 MG/L (ref 3.3–19.4)
KAPPA LC FREE/LAMBDA FREE SER NEPH: 0.2 {RATIO} (ref 0.26–1.65)
LAMBDA LC FREE SERPL-MCNC: 77.59 MG/L (ref 5.71–26.3)

## 2023-07-19 LAB
ALBUMIN SERPL ELPH-MCNC: 3.4 GM/DL (ref 3.2–5.6)
ALPHA-1-GLOBULIN: 0.2 GM/DL (ref 0.1–0.4)
ALPHA-2-GLOBULIN: 0.7 GM/DL (ref 0.4–1.2)
BETA GLOBULIN: 0.8 GM/DL (ref 0.5–1.3)
GAMMA GLOBULIN: 0.8 GM/DL (ref 0.5–1.6)
SPEP INTERPRETATION: ABNORMAL
SPEP INTERPRETATION: NORMAL
TOTAL PROTEIN: 5.9 GM/DL (ref 6.4–8.2)

## 2023-07-21 ENCOUNTER — OFFICE VISIT (OUTPATIENT)
Dept: ONCOLOGY | Age: 78
End: 2023-07-21
Payer: MEDICARE

## 2023-07-21 ENCOUNTER — HOSPITAL ENCOUNTER (OUTPATIENT)
Dept: INFUSION THERAPY | Age: 78
Discharge: HOME | End: 2023-07-21
Payer: MEDICARE

## 2023-07-21 VITALS
HEIGHT: 61 IN | SYSTOLIC BLOOD PRESSURE: 129 MMHG | OXYGEN SATURATION: 98 % | WEIGHT: 113.8 LBS | DIASTOLIC BLOOD PRESSURE: 58 MMHG | TEMPERATURE: 97.7 F | BODY MASS INDEX: 21.49 KG/M2 | HEART RATE: 71 BPM

## 2023-07-21 DIAGNOSIS — C88.0 WALDENSTROM MACROGLOBULINEMIA (HCC): Primary | ICD-10-CM

## 2023-07-21 PROCEDURE — 3074F SYST BP LT 130 MM HG: CPT | Performed by: INTERNAL MEDICINE

## 2023-07-21 PROCEDURE — 99214 OFFICE O/P EST MOD 30 MIN: CPT | Performed by: INTERNAL MEDICINE

## 2023-07-21 PROCEDURE — 1123F ACP DISCUSS/DSCN MKR DOCD: CPT | Performed by: INTERNAL MEDICINE

## 2023-07-21 PROCEDURE — 99211 OFF/OP EST MAY X REQ PHY/QHP: CPT

## 2023-07-21 PROCEDURE — 3078F DIAST BP <80 MM HG: CPT | Performed by: INTERNAL MEDICINE

## 2023-07-21 RX ORDER — TRIAMTERENE AND HYDROCHLOROTHIAZIDE 37.5; 25 MG/1; MG/1
CAPSULE ORAL
COMMUNITY
Start: 2023-07-07

## 2023-07-21 ASSESSMENT — PATIENT HEALTH QUESTIONNAIRE - PHQ9
2. FEELING DOWN, DEPRESSED OR HOPELESS: 0
SUM OF ALL RESPONSES TO PHQ9 QUESTIONS 1 & 2: 0
SUM OF ALL RESPONSES TO PHQ QUESTIONS 1-9: 0
1. LITTLE INTEREST OR PLEASURE IN DOING THINGS: 0
SUM OF ALL RESPONSES TO PHQ QUESTIONS 1-9: 0

## 2023-07-21 NOTE — PROGRESS NOTES
MA Rooming Questions  Patient: Michelle Granger  MRN: [id number]    Date: 7/21/2023        1. Do you have any new issues?   no         2. Do you need any refills on medications?    no    3. Have you had any imaging done since your last visit? yes - labs     4. Have you been hospitalized or seen in the emergency room since your last visit here?   no    5. Did the patient have a depression screening completed today?  Yes    PHQ-9 Total Score: 0 (7/21/2023 11:33 AM)       PHQ-9 Given to (if applicable):               PHQ-9 Score (if applicable):                     [] Positive     []  Negative              Does question #9 need addressed (if applicable)                     [] Yes    []  No               Mian Bustamante, [facility]

## 2023-08-01 DIAGNOSIS — C88.0 WALDENSTROM MACROGLOBULINEMIA (HCC): ICD-10-CM

## 2023-08-02 RX ORDER — IBRUTINIB 140 MG/1
CAPSULE ORAL
Qty: 180 CAPSULE | Refills: 0 | Status: ACTIVE | OUTPATIENT
Start: 2023-08-02

## 2023-10-12 ENCOUNTER — HOSPITAL ENCOUNTER (OUTPATIENT)
Dept: INFUSION THERAPY | Age: 78
Discharge: HOME | End: 2023-10-12
Payer: MEDICARE

## 2023-10-12 DIAGNOSIS — C88.0 WALDENSTROM MACROGLOBULINEMIA (HCC): ICD-10-CM

## 2023-10-12 LAB
ALBUMIN SERPL-MCNC: 4 GM/DL (ref 3.4–5)
ALP BLD-CCNC: 80 IU/L (ref 40–129)
ALT SERPL-CCNC: 11 U/L (ref 10–40)
ANION GAP SERPL CALCULATED.3IONS-SCNC: 11 MMOL/L (ref 4–16)
AST SERPL-CCNC: 14 IU/L (ref 15–37)
BASOPHILS ABSOLUTE: 0 K/CU MM
BASOPHILS RELATIVE PERCENT: 0.1 % (ref 0–1)
BILIRUB SERPL-MCNC: 0.7 MG/DL (ref 0–1)
BUN SERPL-MCNC: 25 MG/DL (ref 6–23)
CALCIUM SERPL-MCNC: 8.7 MG/DL (ref 8.3–10.6)
CHLORIDE BLD-SCNC: 102 MMOL/L (ref 99–110)
CO2: 27 MMOL/L (ref 21–32)
CREAT SERPL-MCNC: 1.5 MG/DL (ref 0.6–1.1)
DIFFERENTIAL TYPE: ABNORMAL
EOSINOPHILS ABSOLUTE: 0 K/CU MM
EOSINOPHILS RELATIVE PERCENT: 0.5 % (ref 0–3)
ERYTHROCYTE SEDIMENTATION RATE: 10 MM/HR (ref 0–30)
GFR SERPL CREATININE-BSD FRML MDRD: 35 ML/MIN/1.73M2
GLUCOSE SERPL-MCNC: 129 MG/DL (ref 70–99)
HCT VFR BLD CALC: 39.8 % (ref 37–47)
HEMOGLOBIN: 12.3 GM/DL (ref 12.5–16)
IGA: <50 MG/DL (ref 69–382)
IGG,SERUM: <300 MG/DL (ref 723–1685)
IGM,SERUM: 1105 MG/DL (ref 62–277)
LACTATE DEHYDROGENASE: 132 IU/L (ref 120–246)
LYMPHOCYTES ABSOLUTE: 0.8 K/CU MM
LYMPHOCYTES RELATIVE PERCENT: 10 % (ref 24–44)
MCH RBC QN AUTO: 29.9 PG (ref 27–31)
MCHC RBC AUTO-ENTMCNC: 30.9 % (ref 32–36)
MCV RBC AUTO: 96.6 FL (ref 78–100)
MONOCYTES ABSOLUTE: 0.3 K/CU MM
MONOCYTES RELATIVE PERCENT: 4.5 % (ref 0–4)
PDW BLD-RTO: 13.2 % (ref 11.7–14.9)
PLATELET # BLD: 164 K/CU MM (ref 140–440)
PMV BLD AUTO: 11.4 FL (ref 7.5–11.1)
POTASSIUM SERPL-SCNC: 4.4 MMOL/L (ref 3.5–5.1)
RBC # BLD: 4.12 M/CU MM (ref 4.2–5.4)
SEGMENTED NEUTROPHILS ABSOLUTE COUNT: 6.5 K/CU MM
SEGMENTED NEUTROPHILS RELATIVE PERCENT: 84.9 % (ref 36–66)
SODIUM BLD-SCNC: 140 MMOL/L (ref 135–145)
TOTAL PROTEIN: 6.4 GM/DL (ref 6.4–8.2)
TOTAL PROTEIN: 6.4 GM/DL (ref 6.4–8.2)
WBC # BLD: 7.6 K/CU MM (ref 4–10.5)

## 2023-10-12 PROCEDURE — 83883 ASSAY NEPHELOMETRY NOT SPEC: CPT

## 2023-10-12 PROCEDURE — 86320 SERUM IMMUNOELECTROPHORESIS: CPT

## 2023-10-12 PROCEDURE — 84165 PROTEIN E-PHORESIS SERUM: CPT

## 2023-10-12 PROCEDURE — 83615 LACTATE (LD) (LDH) ENZYME: CPT

## 2023-10-12 PROCEDURE — 36415 COLL VENOUS BLD VENIPUNCTURE: CPT

## 2023-10-12 PROCEDURE — 82232 ASSAY OF BETA-2 PROTEIN: CPT

## 2023-10-12 PROCEDURE — 84155 ASSAY OF PROTEIN SERUM: CPT

## 2023-10-12 PROCEDURE — 85025 COMPLETE CBC W/AUTO DIFF WBC: CPT

## 2023-10-12 PROCEDURE — 85652 RBC SED RATE AUTOMATED: CPT

## 2023-10-12 PROCEDURE — 82784 ASSAY IGA/IGD/IGG/IGM EACH: CPT

## 2023-10-12 PROCEDURE — 80053 COMPREHEN METABOLIC PANEL: CPT

## 2023-10-13 LAB
B2 MICROGLOB SERPL-MCNC: 5.4 MG/L
KAPPA LC FREE SER-MCNC: 17.47 MG/L (ref 3.3–19.4)
KAPPA LC FREE/LAMBDA FREE SER NEPH: 0.23 {RATIO} (ref 0.26–1.65)
LAMBDA LC FREE SERPL-MCNC: 75.44 MG/L (ref 5.71–26.3)

## 2023-10-16 LAB
ALBUMIN SERPL ELPH-MCNC: 3.5 GM/DL (ref 3.2–5.6)
ALPHA-1-GLOBULIN: 0.3 GM/DL (ref 0.1–0.4)
ALPHA-2-GLOBULIN: 0.8 GM/DL (ref 0.4–1.2)
BETA GLOBULIN: 0.9 GM/DL (ref 0.5–1.3)
GAMMA GLOBULIN: 0.8 GM/DL (ref 0.5–1.6)
SPEP INTERPRETATION: NORMAL
SPEP INTERPRETATION: NORMAL
TOTAL PROTEIN: 6.4 GM/DL (ref 6.4–8.2)

## 2023-10-24 DIAGNOSIS — C88.0 WALDENSTROM MACROGLOBULINEMIA (HCC): ICD-10-CM

## 2023-10-25 ENCOUNTER — CLINICAL DOCUMENTATION (OUTPATIENT)
Dept: ONCOLOGY | Age: 78
End: 2023-10-25

## 2023-10-25 RX ORDER — IBRUTINIB 140 MG/1
CAPSULE ORAL
Qty: 180 CAPSULE | Refills: 0 | Status: ACTIVE | OUTPATIENT
Start: 2023-10-25

## 2023-10-25 NOTE — PROGRESS NOTES
RX for imbruvica 140 mg (take 3 capsules by mouth daily) reordered and e-scribed to [facility] per physician order.

## 2023-11-10 ENCOUNTER — HOSPITAL ENCOUNTER (OUTPATIENT)
Dept: INFUSION THERAPY | Age: 78
Discharge: HOME | End: 2023-11-10
Payer: MEDICARE

## 2023-11-10 ENCOUNTER — OFFICE VISIT (OUTPATIENT)
Dept: ONCOLOGY | Age: 78
End: 2023-11-10
Payer: MEDICARE

## 2023-11-10 VITALS
HEART RATE: 67 BPM | TEMPERATURE: 97.7 F | SYSTOLIC BLOOD PRESSURE: 123 MMHG | DIASTOLIC BLOOD PRESSURE: 59 MMHG | BODY MASS INDEX: 21.75 KG/M2 | HEIGHT: 61 IN | WEIGHT: 115.2 LBS | OXYGEN SATURATION: 96 %

## 2023-11-10 DIAGNOSIS — C88.0 WALDENSTROM MACROGLOBULINEMIA (HCC): Primary | ICD-10-CM

## 2023-11-10 PROCEDURE — 3074F SYST BP LT 130 MM HG: CPT | Performed by: INTERNAL MEDICINE

## 2023-11-10 PROCEDURE — 99214 OFFICE O/P EST MOD 30 MIN: CPT | Performed by: INTERNAL MEDICINE

## 2023-11-10 PROCEDURE — 1123F ACP DISCUSS/DSCN MKR DOCD: CPT | Performed by: INTERNAL MEDICINE

## 2023-11-10 PROCEDURE — 3078F DIAST BP <80 MM HG: CPT | Performed by: INTERNAL MEDICINE

## 2023-11-10 PROCEDURE — 99211 OFF/OP EST MAY X REQ PHY/QHP: CPT

## 2023-11-10 RX ORDER — FAMOTIDINE 40 MG/1
40 TABLET, FILM COATED ORAL EVERY EVENING
Qty: 30 TABLET | Refills: 3 | Status: SHIPPED | OUTPATIENT
Start: 2023-11-10

## 2023-11-10 NOTE — PROGRESS NOTES
MA Rooming Questions  Patient: Mele Mosley  MRN: [id number]    Date: 11/10/2023        1. Do you have any new issues?   no         2. Do you need any refills on medications?    no    3. Have you had any imaging done since your last visit?   no    4. Have you been hospitalized or seen in the emergency room since your last visit here?   no    5. Did the patient have a depression screening completed today?  No    No data recorded     PHQ-9 Given to (if applicable):               PHQ-9 Score (if applicable):                     [] Positive     []  Negative              Does question #9 need addressed (if applicable)                     [] Yes    []  No               Todd Gutierrez MA
10 10/12/2023     Chemistry:  Lab Results   Component Value Date     10/12/2023    K 4.4 10/12/2023     10/12/2023    CO2 27 10/12/2023    BUN 25 (H) 10/12/2023    CREATININE 1.5 (H) 10/12/2023    GLUCOSE 129 (H) 10/12/2023    CALCIUM 8.7 10/12/2023    PROT 6.4 10/12/2023    PROT 6.4 10/12/2023    LABALBU 4.0 10/12/2023    BILITOT 0.7 10/12/2023    ALKPHOS 80 10/12/2023    AST 14 (L) 10/12/2023    ALT 11 10/12/2023    LABGLOM 35 (L) 10/12/2023    GFRAA 53 (L) 08/12/2022    AGRATIO 1.3 05/02/2016    GLOB 3.0 05/02/2016    PHOS 4.3 07/08/2021    MG 1.8 12/31/2022     Lab Results   Component Value Date     10/12/2023     No results found for: \"LD\"  Lab Results   Component Value Date    TSHHS 0.978 11/06/2017    T4FREE 1.61 05/26/2017     Immunology:  Lab Results   Component Value Date    PROT 6.4 10/12/2023    PROT 6.4 10/12/2023    SPEP  10/12/2023     INTERPRETATION - MONOCLONAL GAMMOPATHY, 600 mg/dL OF IgM LAMBDA, STABLE SINCE LAST MEASUREMENT ON 07/18/2023. LP.    SPEP INTERPRETATION - MONOCLONAL IgM LAMBDA. LP. 10/12/2023    ALBUMINELP 3.5 10/12/2023    LABALPH 0.3 10/12/2023    LABALPH 0.8 10/12/2023    GAMGLOB 0.8 10/12/2023     Lab Results   Component Value Date    KAPPAUVOL 17.47 10/12/2023    LAMBDAUVOL 465.38 (H) 07/16/2020    KLFLCR 0.23 (L) 10/12/2023     Lab Results   Component Value Date    B2M 5.4 (H) 10/12/2023     Coagulation Panel:  No results found for: \"PROTIME\", \"INR\", \"APTT\", \"DDIMER\"  Anemia Panel:  Lab Results   Component Value Date    VBVLGJMF90 556.2 01/03/2023    FOLATE >20.0 (H) 01/03/2023     Tumor Markers:  Lab Results   Component Value Date     6.2 06/11/2013     Observations:  No data recorded     Assessment:   1. Chronic lymphocytic leukemia  2. Monoclonal gammopathy (IgM Lambda). Plan:  Ms. Trang Umanzor has been followed for Waldenstrom's macroglobulinemia (MYD88 gene was detected).      Since she becomes more anemia with mildly elevated serum creatinine

## 2024-01-02 RX ORDER — FAMOTIDINE 40 MG/1
40 TABLET, FILM COATED ORAL EVERY EVENING
Qty: 90 TABLET | Refills: 1 | OUTPATIENT
Start: 2024-01-02

## 2024-02-06 ENCOUNTER — HOSPITAL ENCOUNTER (OUTPATIENT)
Dept: INFUSION THERAPY | Age: 79
Discharge: HOME | End: 2024-02-06
Payer: MEDICARE

## 2024-02-06 DIAGNOSIS — C88.0 WALDENSTROM MACROGLOBULINEMIA (HCC): ICD-10-CM

## 2024-02-06 LAB
ALBUMIN SERPL-MCNC: 4.1 GM/DL (ref 3.4–5)
ALP BLD-CCNC: 86 IU/L (ref 40–128)
ALT SERPL-CCNC: 16 U/L (ref 10–40)
ANION GAP SERPL CALCULATED.3IONS-SCNC: 13 MMOL/L (ref 7–16)
AST SERPL-CCNC: 14 IU/L (ref 15–37)
BASOPHILS ABSOLUTE: 0 K/CU MM
BASOPHILS RELATIVE PERCENT: 0.3 % (ref 0–1)
BILIRUB SERPL-MCNC: 0.4 MG/DL (ref 0–1)
BUN SERPL-MCNC: 21 MG/DL (ref 6–23)
CALCIUM SERPL-MCNC: 8.2 MG/DL (ref 8.3–10.6)
CHLORIDE BLD-SCNC: 105 MMOL/L (ref 99–110)
CO2: 24 MMOL/L (ref 21–32)
CREAT SERPL-MCNC: 1.3 MG/DL (ref 0.6–1.1)
DIFFERENTIAL TYPE: ABNORMAL
EOSINOPHILS ABSOLUTE: 0 K/CU MM
EOSINOPHILS RELATIVE PERCENT: 0.7 % (ref 0–3)
ERYTHROCYTE SEDIMENTATION RATE: 4 MM/HR (ref 0–30)
GFR SERPL CREATININE-BSD FRML MDRD: 42 ML/MIN/1.73M2
GLUCOSE SERPL-MCNC: 89 MG/DL (ref 70–99)
HCT VFR BLD CALC: 38.9 % (ref 37–47)
HEMOGLOBIN: 12.3 GM/DL (ref 12.5–16)
IGA: <50 MG/DL (ref 69–382)
IGG,SERUM: <300 MG/DL (ref 723–1685)
IGM,SERUM: 861 MG/DL (ref 62–277)
LACTATE DEHYDROGENASE: 125 IU/L (ref 120–246)
LYMPHOCYTES ABSOLUTE: 0.9 K/CU MM
LYMPHOCYTES RELATIVE PERCENT: 15.4 % (ref 24–44)
MCH RBC QN AUTO: 31 PG (ref 27–31)
MCHC RBC AUTO-ENTMCNC: 31.6 % (ref 32–36)
MCV RBC AUTO: 98 FL (ref 78–100)
MONOCYTES ABSOLUTE: 0.4 K/CU MM
MONOCYTES RELATIVE PERCENT: 7.3 % (ref 0–4)
PDW BLD-RTO: 12.9 % (ref 11.7–14.9)
PLATELET # BLD: 181 K/CU MM (ref 140–440)
PMV BLD AUTO: 11.1 FL (ref 7.5–11.1)
POTASSIUM SERPL-SCNC: 4 MMOL/L (ref 3.5–5.1)
RBC # BLD: 3.97 M/CU MM (ref 4.2–5.4)
SEGMENTED NEUTROPHILS ABSOLUTE COUNT: 4.4 K/CU MM
SEGMENTED NEUTROPHILS RELATIVE PERCENT: 76.3 % (ref 36–66)
SODIUM BLD-SCNC: 142 MMOL/L (ref 135–145)
TOTAL PROTEIN: 5.9 GM/DL (ref 6.4–8.2)
WBC # BLD: 5.7 K/CU MM (ref 4–10.5)

## 2024-02-06 PROCEDURE — 82784 ASSAY IGA/IGD/IGG/IGM EACH: CPT

## 2024-02-06 PROCEDURE — 84155 ASSAY OF PROTEIN SERUM: CPT

## 2024-02-06 PROCEDURE — 82232 ASSAY OF BETA-2 PROTEIN: CPT

## 2024-02-06 PROCEDURE — 85025 COMPLETE CBC W/AUTO DIFF WBC: CPT

## 2024-02-06 PROCEDURE — 85652 RBC SED RATE AUTOMATED: CPT

## 2024-02-06 PROCEDURE — 84165 PROTEIN E-PHORESIS SERUM: CPT

## 2024-02-06 PROCEDURE — 83883 ASSAY NEPHELOMETRY NOT SPEC: CPT

## 2024-02-06 PROCEDURE — 83615 LACTATE (LD) (LDH) ENZYME: CPT

## 2024-02-06 PROCEDURE — 36415 COLL VENOUS BLD VENIPUNCTURE: CPT

## 2024-02-06 PROCEDURE — 80053 COMPREHEN METABOLIC PANEL: CPT

## 2024-02-06 PROCEDURE — 86320 SERUM IMMUNOELECTROPHORESIS: CPT

## 2024-02-07 LAB — B2 MICROGLOB SERPL-MCNC: 4.4 MG/L

## 2024-02-10 LAB
KAPPA LC FREE SER-MCNC: 14.73 MG/L (ref 3.3–19.4)
KAPPA LC FREE/LAMBDA FREE SER NEPH: 0.29 {RATIO} (ref 0.26–1.65)
LAMBDA LC FREE SERPL-MCNC: 50.83 MG/L (ref 5.71–26.3)

## 2024-02-16 DIAGNOSIS — C88.0 WALDENSTROM MACROGLOBULINEMIA (HCC): ICD-10-CM

## 2024-02-16 RX ORDER — IBRUTINIB 140 MG/1
CAPSULE ORAL
Qty: 180 CAPSULE | Refills: 0 | Status: ACTIVE | OUTPATIENT
Start: 2024-02-16

## 2024-02-22 PROBLEM — N17.9 AKI (ACUTE KIDNEY INJURY) (HCC): Status: ACTIVE | Noted: 2024-02-22

## 2024-02-26 ENCOUNTER — OFFICE VISIT (OUTPATIENT)
Dept: ONCOLOGY | Age: 79
End: 2024-02-26
Payer: MEDICARE

## 2024-02-26 ENCOUNTER — HOSPITAL ENCOUNTER (OUTPATIENT)
Dept: INFUSION THERAPY | Age: 79
Discharge: HOME | End: 2024-02-26
Payer: COMMERCIAL

## 2024-02-26 VITALS
OXYGEN SATURATION: 95 % | HEIGHT: 61 IN | BODY MASS INDEX: 22.24 KG/M2 | HEART RATE: 73 BPM | WEIGHT: 117.8 LBS | TEMPERATURE: 97.3 F | SYSTOLIC BLOOD PRESSURE: 127 MMHG | DIASTOLIC BLOOD PRESSURE: 60 MMHG

## 2024-02-26 DIAGNOSIS — C88.0 WALDENSTROM MACROGLOBULINEMIA (HCC): Primary | ICD-10-CM

## 2024-02-26 PROCEDURE — 3074F SYST BP LT 130 MM HG: CPT | Performed by: INTERNAL MEDICINE

## 2024-02-26 PROCEDURE — 3078F DIAST BP <80 MM HG: CPT | Performed by: INTERNAL MEDICINE

## 2024-02-26 PROCEDURE — 1123F ACP DISCUSS/DSCN MKR DOCD: CPT | Performed by: INTERNAL MEDICINE

## 2024-02-26 PROCEDURE — 99211 OFF/OP EST MAY X REQ PHY/QHP: CPT

## 2024-02-26 PROCEDURE — 99214 OFFICE O/P EST MOD 30 MIN: CPT | Performed by: INTERNAL MEDICINE

## 2024-02-26 NOTE — PROGRESS NOTES
MA Rooming Questions  Patient: Cleo Ortiz  MRN: [id number]    Date: 2/26/2024        1. Do you have any new issues?   no         2. Do you need any refills on medications?    no    3. Have you had any imaging done since your last visit?   no    4. Have you been hospitalized or seen in the emergency room since your last visit here?   no    5. Did the patient have a depression screening completed today? No    No data recorded     PHQ-9 Given to (if applicable):               PHQ-9 Score (if applicable):                     [] Positive     []  Negative              Does question #9 need addressed (if applicable)                     [] Yes    []  No               Emily Bañuelos MA

## 2024-02-26 NOTE — PROGRESS NOTES
Patient Name: Cleo Ortiz  Patient : 1945  Patient MRN: [id number]     Primary Oncologist: Daniel Puente MD  Referring Provider: Michel Vizcarra MD     Date of Service: 2024      Chief Complaint:   Chief Complaint   Patient presents with    Follow-up     3 month follow up.      Patient Active Problem List:     Hypothyroidism     Hyperlipidemia     Vitamin D deficiency     Osteopenia     Essential hypertension     COPD (chronic obstructive pulmonary disease) (HCC)     Macular degeneration, dry-left eye     Macular degeneration, right eye-wet      PMR (polymyalgia rheumatica) (HCC)     CLL (chronic lymphocytic leukemia) (HCC)     CCC (chronic calculous cholecystitis)     Monoclonal gammopathy     Chronic insomnia     GERD (gastroesophageal reflux disease)     Elevated erythrocyte sedimentation rate     Lymphocytosis (symptomatic)    HPI:  Ms. Ortiz is a very pleasant 78-year-old patient with medical history significant for osteoarthritis, hypothyroidism, hyperlipidemia, gastroesophageal reflux disease, initially referred to me on 2017, for evaluation of monoclonal B-cell lymphocytosis and monoclonal gammopathy (IgM Lambda).      She stated that she was initially referred to Dr. Mohan because of elevated ESR and arthritis.  She has been following regularly with Dr. Mohan for that.      Recent blood tests on 2017, showed significant elevation in her white blood cell count with elevated absolute lymphocyte count.  Manual differential revealed atypical lymphocytes and Pathologist recommend sending peripheral blood flow cytometry for further analysis.      Peripheral blood flow cytometry was sent on 2017, and it revealed monoclonal B-cell population, surface immunoglobulin M and D, Lambda, co-expressing 25, is detected.  CD20 is strongly positive.  The lack of CD5 co-expression by lesional lymphocytes effectively excludes circulating Mantel cell lymphoma and B cell

## 2024-04-02 ENCOUNTER — HOSPITAL ENCOUNTER (OUTPATIENT)
Dept: ULTRASOUND IMAGING | Age: 79
Discharge: HOME | End: 2024-04-02
Attending: INTERNAL MEDICINE
Payer: COMMERCIAL

## 2024-04-02 ENCOUNTER — HOSPITAL ENCOUNTER (OUTPATIENT)
Age: 79
Discharge: HOME | End: 2024-04-02
Attending: INTERNAL MEDICINE
Payer: COMMERCIAL

## 2024-04-02 DIAGNOSIS — N18.32 STAGE 3B CHRONIC KIDNEY DISEASE (HCC): ICD-10-CM

## 2024-04-02 DIAGNOSIS — N17.9 ACUTE RENAL FAILURE, UNSPECIFIED ACUTE RENAL FAILURE TYPE (HCC): ICD-10-CM

## 2024-04-02 DIAGNOSIS — E87.6 HYPOPOTASSEMIA: ICD-10-CM

## 2024-04-02 LAB
ALBUMIN SERPL-MCNC: 3.9 GM/DL (ref 3.4–5)
ALP BLD-CCNC: 98 IU/L (ref 40–129)
ALT SERPL-CCNC: 12 U/L (ref 10–40)
ANION GAP SERPL CALCULATED.3IONS-SCNC: 12 MMOL/L (ref 7–16)
AST SERPL-CCNC: 17 IU/L (ref 15–37)
BACTERIA: NEGATIVE /HPF
BILIRUB SERPL-MCNC: 0.8 MG/DL (ref 0–1)
BILIRUBIN URINE: NEGATIVE MG/DL
BLOOD, URINE: NEGATIVE
BUN SERPL-MCNC: 27 MG/DL (ref 6–23)
CALCIUM SERPL-MCNC: 8.5 MG/DL (ref 8.3–10.6)
CHLORIDE BLD-SCNC: 103 MMOL/L (ref 99–110)
CLARITY: CLEAR
CO2: 25 MMOL/L (ref 21–32)
COLOR: YELLOW
CREAT SERPL-MCNC: 1.4 MG/DL (ref 0.6–1.1)
CREATININE URINE: 128.7 MG/DL (ref 28–217)
GFR SERPL CREATININE-BSD FRML MDRD: 38 ML/MIN/1.73M2
GLUCOSE SERPL-MCNC: 93 MG/DL (ref 70–99)
GLUCOSE, URINE: NEGATIVE MG/DL
KETONES, URINE: NEGATIVE MG/DL
LEUKOCYTE ESTERASE, URINE: ABNORMAL
MUCUS: ABNORMAL HPF
NITRITE URINE, QUANTITATIVE: NEGATIVE
NON SQUAM EPI CELLS: <1 /HPF
PH, URINE: 5.5 (ref 5–8)
POTASSIUM SERPL-SCNC: 4.2 MMOL/L (ref 3.5–5.1)
PROT/CREAT RATIO, UR: 0.1
PROTEIN UA: NEGATIVE MG/DL
RBC URINE: 3 /HPF (ref 0–6)
SODIUM BLD-SCNC: 140 MMOL/L (ref 135–145)
SPECIFIC GRAVITY UA: 1.02 (ref 1–1.03)
TOTAL PROTEIN: 5.9 GM/DL (ref 6.4–8.2)
TRICHOMONAS: ABNORMAL /HPF
URINE TOTAL PROTEIN: 12 MG/DL
UROBILINOGEN, URINE: 0.2 MG/DL (ref 0.2–1)
WBC UA: 3 /HPF (ref 0–5)

## 2024-04-02 PROCEDURE — 81001 URINALYSIS AUTO W/SCOPE: CPT

## 2024-04-02 PROCEDURE — 36415 COLL VENOUS BLD VENIPUNCTURE: CPT

## 2024-04-02 PROCEDURE — 82570 ASSAY OF URINE CREATININE: CPT

## 2024-04-02 PROCEDURE — 80053 COMPREHEN METABOLIC PANEL: CPT

## 2024-04-02 PROCEDURE — 84156 ASSAY OF PROTEIN URINE: CPT

## 2024-04-02 PROCEDURE — 76775 US EXAM ABDO BACK WALL LIM: CPT

## 2024-04-15 DIAGNOSIS — C88.0 WALDENSTROM MACROGLOBULINEMIA (HCC): ICD-10-CM

## 2024-04-16 RX ORDER — IBRUTINIB 140 MG/1
CAPSULE ORAL
Qty: 180 CAPSULE | Refills: 0 | Status: ACTIVE | OUTPATIENT
Start: 2024-04-16

## 2024-05-21 ENCOUNTER — HOSPITAL ENCOUNTER (OUTPATIENT)
Dept: INFUSION THERAPY | Age: 79
Discharge: HOME | End: 2024-05-21
Payer: COMMERCIAL

## 2024-05-21 DIAGNOSIS — C88.0 WALDENSTROM MACROGLOBULINEMIA (HCC): ICD-10-CM

## 2024-05-21 LAB
ALBUMIN SERPL ELPH-MCNC: ABNORMAL GM/DL (ref 3.2–5.6)
ALBUMIN SERPL-MCNC: 4.1 GM/DL (ref 3.4–5)
ALP BLD-CCNC: 92 IU/L (ref 40–129)
ALPHA-1-GLOBULIN: ABNORMAL GM/DL (ref 0.1–0.4)
ALPHA-2-GLOBULIN: ABNORMAL GM/DL (ref 0.4–1.2)
ALT SERPL-CCNC: 20 U/L (ref 10–40)
ANION GAP SERPL CALCULATED.3IONS-SCNC: 14 MMOL/L (ref 7–16)
AST SERPL-CCNC: 16 IU/L (ref 15–37)
BASOPHILS ABSOLUTE: 0 K/CU MM
BASOPHILS RELATIVE PERCENT: 0.2 % (ref 0–1)
BETA GLOBULIN: ABNORMAL GM/DL (ref 0.5–1.3)
BILIRUB SERPL-MCNC: 0.8 MG/DL (ref 0–1)
BUN SERPL-MCNC: 22 MG/DL (ref 6–23)
CALCIUM SERPL-MCNC: 8.2 MG/DL (ref 8.3–10.6)
CHLORIDE BLD-SCNC: 109 MMOL/L (ref 99–110)
CO2: 22 MMOL/L (ref 21–32)
CREAT SERPL-MCNC: 1.4 MG/DL (ref 0.6–1.1)
DIFFERENTIAL TYPE: ABNORMAL
EOSINOPHILS ABSOLUTE: 0 K/CU MM
EOSINOPHILS RELATIVE PERCENT: 0.5 % (ref 0–3)
GAMMA GLOBULIN: ABNORMAL GM/DL (ref 0.5–1.6)
GFR, ESTIMATED: 38 ML/MIN/1.73M2
GLUCOSE SERPL-MCNC: 88 MG/DL (ref 70–99)
HCT VFR BLD CALC: 37.4 % (ref 37–47)
HEMOGLOBIN: 11.7 GM/DL (ref 12.5–16)
IGA: <50 MG/DL (ref 69–382)
IGG,SERUM: <300 MG/DL (ref 723–1685)
IGM,SERUM: 575 MG/DL (ref 62–277)
LACTATE DEHYDROGENASE: 121 IU/L (ref 120–246)
LYMPHOCYTES ABSOLUTE: 0.4 K/CU MM
LYMPHOCYTES RELATIVE PERCENT: 7.2 % (ref 24–44)
MCH RBC QN AUTO: 30.1 PG (ref 27–31)
MCHC RBC AUTO-ENTMCNC: 31.3 % (ref 32–36)
MCV RBC AUTO: 96.1 FL (ref 78–100)
MONOCYTES ABSOLUTE: 0.5 K/CU MM
MONOCYTES RELATIVE PERCENT: 8.2 % (ref 0–4)
NEUTROPHILS ABSOLUTE: 5 K/CU MM
NEUTROPHILS RELATIVE PERCENT: 83.9 % (ref 36–66)
PDW BLD-RTO: 13.2 % (ref 11.7–14.9)
PLATELET # BLD: 146 K/CU MM (ref 140–440)
PMV BLD AUTO: 11.2 FL (ref 7.5–11.1)
POTASSIUM SERPL-SCNC: 3.6 MMOL/L (ref 3.5–5.1)
RBC # BLD: 3.89 M/CU MM (ref 4.2–5.4)
SED RATE, AUTOMATED: 4 MM/HR (ref 0–30)
SODIUM BLD-SCNC: 145 MMOL/L (ref 135–145)
SPEP INTERPRETATION: ABNORMAL
TOTAL PROTEIN: 5.8 GM/DL (ref 6.4–8.2)
TOTAL PROTEIN: 5.8 GM/DL (ref 6.4–8.2)
WBC # BLD: 6 K/CU MM (ref 4–10.5)

## 2024-05-21 PROCEDURE — 83615 LACTATE (LD) (LDH) ENZYME: CPT

## 2024-05-21 PROCEDURE — 83883 ASSAY NEPHELOMETRY NOT SPEC: CPT

## 2024-05-21 PROCEDURE — 85025 COMPLETE CBC W/AUTO DIFF WBC: CPT

## 2024-05-21 PROCEDURE — 84165 PROTEIN E-PHORESIS SERUM: CPT

## 2024-05-21 PROCEDURE — 84155 ASSAY OF PROTEIN SERUM: CPT

## 2024-05-21 PROCEDURE — 86334 IMMUNOFIX E-PHORESIS SERUM: CPT

## 2024-05-21 PROCEDURE — 36415 COLL VENOUS BLD VENIPUNCTURE: CPT

## 2024-05-21 PROCEDURE — 80053 COMPREHEN METABOLIC PANEL: CPT

## 2024-05-21 PROCEDURE — 82784 ASSAY IGA/IGD/IGG/IGM EACH: CPT

## 2024-05-21 PROCEDURE — 86320 SERUM IMMUNOELECTROPHORESIS: CPT

## 2024-05-21 PROCEDURE — 85652 RBC SED RATE AUTOMATED: CPT

## 2024-05-21 PROCEDURE — 82232 ASSAY OF BETA-2 PROTEIN: CPT

## 2024-05-23 LAB
B2 MICROGLOB SERPL-MCNC: 4.5 MG/L
KAPPA LC FREE SER-MCNC: 14.02 MG/L (ref 3.3–19.4)
KAPPA LC FREE/LAMBDA FREE SER NEPH: 0.31 {RATIO} (ref 0.26–1.65)
LAMBDA LC FREE SERPL-MCNC: 44.73 MG/L (ref 5.71–26.3)

## 2024-05-27 NOTE — PROGRESS NOTES
Patient Name: Cleo Ortiz  Patient : 1945  Patient MRN: [id number]     Primary Oncologist: Daniel Puente MD  Referring Provider: Michel Vizcarra MD     Date of Service: 2024      Chief Complaint:   Chief Complaint   Patient presents with    Follow-up     Patient Active Problem List:     Hypothyroidism     Hyperlipidemia     Vitamin D deficiency     Osteopenia     Essential hypertension     COPD (chronic obstructive pulmonary disease) (HCC)     Macular degeneration, dry-left eye     Macular degeneration, right eye-wet      PMR (polymyalgia rheumatica) (HCC)     CLL (chronic lymphocytic leukemia) (HCC)     CCC (chronic calculous cholecystitis)     Monoclonal gammopathy     Chronic insomnia     GERD (gastroesophageal reflux disease)     Elevated erythrocyte sedimentation rate     Lymphocytosis (symptomatic)    HPI:  Ms. Ortiz is a very pleasant 78-year-old patient with medical history significant for osteoarthritis, hypothyroidism, hyperlipidemia, gastroesophageal reflux disease, initially referred to me on 2017, for evaluation of monoclonal B-cell lymphocytosis and monoclonal gammopathy (IgM Lambda).      She stated that she was initially referred to Dr. Mohan because of elevated ESR and arthritis.  She has been following regularly with Dr. Mohan for that.      Recent blood tests on 2017, showed significant elevation in her white blood cell count with elevated absolute lymphocyte count.  Manual differential revealed atypical lymphocytes and Pathologist recommend sending peripheral blood flow cytometry for further analysis.      Peripheral blood flow cytometry was sent on 2017, and it revealed monoclonal B-cell population, surface immunoglobulin M and D, Lambda, co-expressing 25, is detected.  CD20 is strongly positive.  The lack of CD5 co-expression by lesional lymphocytes effectively excludes circulating Mantel cell lymphoma and B cell chronic lymphocytic leukemia.

## 2024-05-28 ENCOUNTER — HOSPITAL ENCOUNTER (OUTPATIENT)
Dept: INFUSION THERAPY | Age: 79
Discharge: HOME | End: 2024-05-28
Payer: COMMERCIAL

## 2024-05-28 ENCOUNTER — OFFICE VISIT (OUTPATIENT)
Dept: ONCOLOGY | Age: 79
End: 2024-05-28
Payer: COMMERCIAL

## 2024-05-28 VITALS
RESPIRATION RATE: 16 BRPM | HEART RATE: 75 BPM | OXYGEN SATURATION: 97 % | DIASTOLIC BLOOD PRESSURE: 64 MMHG | WEIGHT: 119.6 LBS | HEIGHT: 61 IN | BODY MASS INDEX: 22.58 KG/M2 | SYSTOLIC BLOOD PRESSURE: 130 MMHG | TEMPERATURE: 98.1 F

## 2024-05-28 DIAGNOSIS — C88.0 WALDENSTROM MACROGLOBULINEMIA (HCC): Primary | ICD-10-CM

## 2024-05-28 LAB
REASON FOR REJECTION: NORMAL
REASON FOR REJECTION: NORMAL
REJECTED TEST: NORMAL
REJECTED TEST: NORMAL

## 2024-05-28 PROCEDURE — 99211 OFF/OP EST MAY X REQ PHY/QHP: CPT

## 2024-05-28 PROCEDURE — 1090F PRES/ABSN URINE INCON ASSESS: CPT | Performed by: INTERNAL MEDICINE

## 2024-05-28 PROCEDURE — G8420 CALC BMI NORM PARAMETERS: HCPCS | Performed by: INTERNAL MEDICINE

## 2024-05-28 PROCEDURE — G8399 PT W/DXA RESULTS DOCUMENT: HCPCS | Performed by: INTERNAL MEDICINE

## 2024-05-28 PROCEDURE — G8427 DOCREV CUR MEDS BY ELIG CLIN: HCPCS | Performed by: INTERNAL MEDICINE

## 2024-05-28 PROCEDURE — 3075F SYST BP GE 130 - 139MM HG: CPT | Performed by: INTERNAL MEDICINE

## 2024-05-28 PROCEDURE — 3078F DIAST BP <80 MM HG: CPT | Performed by: INTERNAL MEDICINE

## 2024-05-28 PROCEDURE — 1036F TOBACCO NON-USER: CPT | Performed by: INTERNAL MEDICINE

## 2024-05-28 PROCEDURE — 99214 OFFICE O/P EST MOD 30 MIN: CPT | Performed by: INTERNAL MEDICINE

## 2024-05-28 PROCEDURE — 1123F ACP DISCUSS/DSCN MKR DOCD: CPT | Performed by: INTERNAL MEDICINE

## 2024-05-28 RX ORDER — UMECLIDINIUM BROMIDE AND VILANTEROL TRIFENATATE 62.5; 25 UG/1; UG/1
POWDER RESPIRATORY (INHALATION)
COMMUNITY
Start: 2024-04-03

## 2024-05-28 ASSESSMENT — PATIENT HEALTH QUESTIONNAIRE - PHQ9
SUM OF ALL RESPONSES TO PHQ QUESTIONS 1-9: 0
1. LITTLE INTEREST OR PLEASURE IN DOING THINGS: NOT AT ALL
SUM OF ALL RESPONSES TO PHQ9 QUESTIONS 1 & 2: 0
2. FEELING DOWN, DEPRESSED OR HOPELESS: NOT AT ALL

## 2024-05-28 NOTE — PROGRESS NOTES
MA Rooming Questions  Patient: Cleo Ortiz  MRN: [id number]    Date: 5/28/2024        1. Do you have any new issues?   no         2. Do you need any refills on medications?    no    3. Have you had any imaging done since your last visit?   no    4. Have you been hospitalized or seen in the emergency room since your last visit here?   no    5. Did the patient have a depression screening completed today? Yes    PHQ-9 Total Score: 0 (5/28/2024 11:43 AM)       PHQ-9 Given to (if applicable):               PHQ-9 Score (if applicable):                     [] Positive     []  Negative              Does question #9 need addressed (if applicable)                     [] Yes    []  No               Josseline Polk CMA

## 2024-05-31 LAB
Lab: ABNORMAL
Lab: NORMAL
TEST NAME: ABNORMAL
TEST NAME: NORMAL

## 2024-06-03 ENCOUNTER — TRANSCRIBE ORDERS (OUTPATIENT)
Dept: ADMINISTRATIVE | Age: 79
End: 2024-06-03

## 2024-06-03 ENCOUNTER — HOSPITAL ENCOUNTER (OUTPATIENT)
Age: 79
End: 2024-06-03
Payer: COMMERCIAL

## 2024-06-03 ENCOUNTER — HOSPITAL ENCOUNTER (OUTPATIENT)
Dept: ULTRASOUND IMAGING | Age: 79
Discharge: HOME | End: 2024-06-03
Payer: COMMERCIAL

## 2024-06-03 DIAGNOSIS — M79.605 LEFT LEG PAIN: ICD-10-CM

## 2024-06-03 DIAGNOSIS — M79.605 LEFT LEG PAIN: Primary | ICD-10-CM

## 2024-06-03 PROCEDURE — 93971 EXTREMITY STUDY: CPT

## 2024-06-24 DIAGNOSIS — C88.0 WALDENSTROM MACROGLOBULINEMIA (HCC): ICD-10-CM

## 2024-06-24 RX ORDER — IBRUTINIB 140 MG/1
CAPSULE ORAL
Qty: 180 CAPSULE | Refills: 0 | Status: ACTIVE | OUTPATIENT
Start: 2024-06-24

## 2024-07-22 RX ORDER — FAMOTIDINE 40 MG/1
40 TABLET, FILM COATED ORAL EVERY EVENING
Qty: 90 TABLET | Refills: 1 | OUTPATIENT
Start: 2024-07-22

## 2024-08-15 DIAGNOSIS — C88.0 WALDENSTROM MACROGLOBULINEMIA (HCC): ICD-10-CM

## 2024-08-15 RX ORDER — IBRUTINIB 140 MG/1
CAPSULE ORAL
Qty: 180 CAPSULE | Refills: 0 | Status: ACTIVE | OUTPATIENT
Start: 2024-08-15

## 2024-08-21 ENCOUNTER — HOSPITAL ENCOUNTER (OUTPATIENT)
Dept: INFUSION THERAPY | Age: 79
Discharge: HOME | End: 2024-08-21
Payer: COMMERCIAL

## 2024-08-21 DIAGNOSIS — C88.0 WALDENSTROM MACROGLOBULINEMIA (HCC): ICD-10-CM

## 2024-08-21 LAB
ALBUMIN SERPL-MCNC: 3.9 GM/DL (ref 3.4–5)
ALP BLD-CCNC: 99 IU/L (ref 40–129)
ALT SERPL-CCNC: 9 U/L (ref 10–40)
ANION GAP SERPL CALCULATED.3IONS-SCNC: 9 MMOL/L (ref 7–16)
AST SERPL-CCNC: 14 IU/L (ref 15–37)
BASOPHILS ABSOLUTE: 0 K/CU MM
BASOPHILS RELATIVE PERCENT: 0.2 % (ref 0–1)
BILIRUB SERPL-MCNC: 0.7 MG/DL (ref 0–1)
BUN SERPL-MCNC: 26 MG/DL (ref 6–23)
CALCIUM SERPL-MCNC: 8.6 MG/DL (ref 8.3–10.6)
CHLORIDE BLD-SCNC: 105 MMOL/L (ref 99–110)
CO2: 26 MMOL/L (ref 21–32)
CREAT SERPL-MCNC: 1.5 MG/DL (ref 0.6–1.1)
DIFFERENTIAL TYPE: ABNORMAL
EOSINOPHILS ABSOLUTE: 0 K/CU MM
EOSINOPHILS RELATIVE PERCENT: 0.5 % (ref 0–3)
GFR, ESTIMATED: 35 ML/MIN/1.73M2
GLUCOSE SERPL-MCNC: 93 MG/DL (ref 70–99)
HCT VFR BLD CALC: 38.6 % (ref 37–47)
HEMOGLOBIN: 12.1 GM/DL (ref 12.5–16)
IGA: 70 MG/DL (ref 69–382)
IGG,SERUM: <300 MG/DL (ref 723–1685)
IGM,SERUM: 779 MG/DL (ref 62–277)
LACTATE DEHYDROGENASE: 143 IU/L (ref 120–246)
LYMPHOCYTES ABSOLUTE: 0.6 K/CU MM
LYMPHOCYTES RELATIVE PERCENT: 8 % (ref 24–44)
MCH RBC QN AUTO: 29.8 PG (ref 27–31)
MCHC RBC AUTO-ENTMCNC: 31.3 % (ref 32–36)
MCV RBC AUTO: 95.1 FL (ref 78–100)
MONOCYTES ABSOLUTE: 0.5 K/CU MM
MONOCYTES RELATIVE PERCENT: 6.2 % (ref 0–4)
NEUTROPHILS ABSOLUTE: 6.8 K/CU MM
NEUTROPHILS RELATIVE PERCENT: 85.1 % (ref 36–66)
PDW BLD-RTO: 13.8 % (ref 11.7–14.9)
PLATELET # BLD: 165 K/CU MM (ref 140–440)
PMV BLD AUTO: 11.5 FL (ref 7.5–11.1)
POTASSIUM SERPL-SCNC: 4.5 MMOL/L (ref 3.5–5.1)
RBC # BLD: 4.06 M/CU MM (ref 4.2–5.4)
SED RATE, AUTOMATED: 10 MM/HR (ref 0–30)
SODIUM BLD-SCNC: 140 MMOL/L (ref 135–145)
TOTAL PROTEIN: 6.1 GM/DL (ref 6.4–8.2)
WBC # BLD: 8 K/CU MM (ref 4–10.5)

## 2024-08-21 PROCEDURE — 85652 RBC SED RATE AUTOMATED: CPT

## 2024-08-21 PROCEDURE — 82232 ASSAY OF BETA-2 PROTEIN: CPT

## 2024-08-21 PROCEDURE — 85025 COMPLETE CBC W/AUTO DIFF WBC: CPT

## 2024-08-21 PROCEDURE — 82784 ASSAY IGA/IGD/IGG/IGM EACH: CPT

## 2024-08-21 PROCEDURE — 86320 SERUM IMMUNOELECTROPHORESIS: CPT

## 2024-08-21 PROCEDURE — 84155 ASSAY OF PROTEIN SERUM: CPT

## 2024-08-21 PROCEDURE — 83883 ASSAY NEPHELOMETRY NOT SPEC: CPT

## 2024-08-21 PROCEDURE — 36415 COLL VENOUS BLD VENIPUNCTURE: CPT

## 2024-08-21 PROCEDURE — 84165 PROTEIN E-PHORESIS SERUM: CPT

## 2024-08-21 PROCEDURE — 80053 COMPREHEN METABOLIC PANEL: CPT

## 2024-08-21 PROCEDURE — 83615 LACTATE (LD) (LDH) ENZYME: CPT

## 2024-08-22 LAB
ALBUMIN SERPL ELPH-MCNC: 2.8 GM/DL (ref 3.2–5.6)
ALPHA-1-GLOBULIN: 0.2 GM/DL (ref 0.1–0.4)
ALPHA-2-GLOBULIN: 0.7 GM/DL (ref 0.4–1.2)
BETA GLOBULIN: 1.1 GM/DL (ref 0.5–1.3)
GAMMA GLOBULIN: 1.3 GM/DL (ref 0.5–1.6)
SPEP INTERPRETATION: ABNORMAL
SPEP INTERPRETATION: NORMAL
TOTAL PROTEIN: 6.1 GM/DL (ref 6.4–8.2)

## 2024-08-23 LAB
B2 MICROGLOB SERPL-MCNC: 4.9 MG/L
KAPPA LC FREE SER-MCNC: 15.73 MG/L (ref 3.3–19.4)
KAPPA LC FREE/LAMBDA FREE SER NEPH: 0.29 {RATIO} (ref 0.26–1.65)
LAMBDA LC FREE SERPL-MCNC: 53.9 MG/L (ref 5.71–26.3)

## 2024-08-25 NOTE — PROGRESS NOTES
Patient Name: Cleo Ortiz  Patient : 1945  Patient MRN: [id number]     Primary Oncologist: Daniel Puente MD  Referring Provider: Michel Vizcarra MD     Date of Service: 2024      Chief Complaint:   Chief Complaint   Patient presents with    Follow-up     Patient Active Problem List:     Hypothyroidism     Hyperlipidemia     Vitamin D deficiency     Osteopenia     Essential hypertension     COPD (chronic obstructive pulmonary disease) (HCC)     Macular degeneration, dry-left eye     Macular degeneration, right eye-wet      PMR (polymyalgia rheumatica) (HCC)     CLL (chronic lymphocytic leukemia) (HCC)     CCC (chronic calculous cholecystitis)     Monoclonal gammopathy     Chronic insomnia     GERD (gastroesophageal reflux disease)     Elevated erythrocyte sedimentation rate     Lymphocytosis (symptomatic)    HPI:  Ms. Ortiz is a very pleasant 78-year-old patient with medical history significant for osteoarthritis, hypothyroidism, hyperlipidemia, gastroesophageal reflux disease, initially referred to me on 2017, for evaluation of monoclonal B-cell lymphocytosis and monoclonal gammopathy (IgM Lambda).      She stated that she was initially referred to Dr. Mohan because of elevated ESR and arthritis.  She has been following regularly with Dr. Mohan for that.      Recent blood tests on 2017, showed significant elevation in her white blood cell count with elevated absolute lymphocyte count.  Manual differential revealed atypical lymphocytes and Pathologist recommend sending peripheral blood flow cytometry for further analysis.      Peripheral blood flow cytometry was sent on 2017, and it revealed monoclonal B-cell population, surface immunoglobulin M and D, Lambda, co-expressing 25, is detected.  CD20 is strongly positive.  The lack of CD5 co-expression by lesional lymphocytes effectively excludes circulating Mantel cell lymphoma and B cell chronic lymphocytic leukemia.   elevated serum creatinine level (most likely due to  hemoconcentration/hyperviscosity from macroglobulinemia), I recommend her to start first line chemotherapy with Ibrutinib. It ws started on 1/30/21.     She is here for close monitoring of side effects and toxicity from chemotherapy. She is tolerating current chemo quite well.     I recognized that her IgM level and M protein are stable overall on 8/21/24 labs. She is compliant with meds. Her anemia is stable, 12.1 g/dl. I recommend her to continue with ibrutinib on a regular basis.    I will repeat all the blood test in 3 months and I will see her again after that.    Elevated serum creatinine - it is stable (1.5mg/dl) on 8/21/24. Recommend to follow up with her nephrologist regularly.     Chemo induced nausea - recommend to continue with zofran prn.     Hypertension - her BP is stable. Recommend to continue with triamterene/HCTZ 37.5-25 mg daily.     Hypothyroidism - also recommend to continue with synthroid 88 mcg daily.      Health maintenance -I recommend for age-appropriate cancer screening, exercise and low-salt diet.    I answered all her questions and concerns for today. Recent imaging and labs were reviewed and discussed with the patient.     Time to complete visit: 31 minutes  This time includes: preparing to see the patient (review of tests/history), obtaining and/or reviewing separately obtained history, performing a medically appropriate evaluation, counseling and educating the patient and documenting clinical information in the electronic health record. This time also includes multiple disciplinary evaluation and management of cancer as documented.

## 2024-08-28 ENCOUNTER — OFFICE VISIT (OUTPATIENT)
Dept: ONCOLOGY | Age: 79
End: 2024-08-28
Payer: COMMERCIAL

## 2024-08-28 ENCOUNTER — HOSPITAL ENCOUNTER (OUTPATIENT)
Dept: INFUSION THERAPY | Age: 79
Discharge: HOME | End: 2024-08-28
Payer: COMMERCIAL

## 2024-08-28 VITALS
WEIGHT: 116.6 LBS | DIASTOLIC BLOOD PRESSURE: 65 MMHG | TEMPERATURE: 98 F | SYSTOLIC BLOOD PRESSURE: 155 MMHG | BODY MASS INDEX: 22.01 KG/M2 | HEIGHT: 61 IN | HEART RATE: 73 BPM | OXYGEN SATURATION: 94 %

## 2024-08-28 DIAGNOSIS — C88.0 WALDENSTROM MACROGLOBULINEMIA (HCC): Primary | ICD-10-CM

## 2024-08-28 PROCEDURE — 3078F DIAST BP <80 MM HG: CPT | Performed by: INTERNAL MEDICINE

## 2024-08-28 PROCEDURE — 99211 OFF/OP EST MAY X REQ PHY/QHP: CPT

## 2024-08-28 PROCEDURE — 99214 OFFICE O/P EST MOD 30 MIN: CPT | Performed by: INTERNAL MEDICINE

## 2024-08-28 PROCEDURE — 3077F SYST BP >= 140 MM HG: CPT | Performed by: INTERNAL MEDICINE

## 2024-08-28 PROCEDURE — 1123F ACP DISCUSS/DSCN MKR DOCD: CPT | Performed by: INTERNAL MEDICINE

## 2024-08-28 NOTE — PROGRESS NOTES
MA Rooming Questions  Patient: Cleo Ortiz  MRN: [id number]    Date: 8/28/2024        1. Do you have any new issues?   no         2. Do you need any refills on medications?    no    3. Have you had any imaging done since your last visit?   no    4. Have you been hospitalized or seen in the emergency room since your last visit here?   no    5. Did the patient have a depression screening completed today? No    No data recorded     PHQ-9 Given to (if applicable):               PHQ-9 Score (if applicable):                     [] Positive     []  Negative              Does question #9 need addressed (if applicable)                     [] Yes    []  No               Emily Bañuelos MA

## 2024-10-24 DIAGNOSIS — C88.00 WALDENSTROM MACROGLOBULINEMIA: ICD-10-CM

## 2024-10-24 RX ORDER — IBRUTINIB 140 MG/1
CAPSULE ORAL
Qty: 180 CAPSULE | Refills: 0 | Status: ACTIVE | OUTPATIENT
Start: 2024-10-24

## 2024-10-28 RX ORDER — FAMOTIDINE 40 MG/1
40 TABLET, FILM COATED ORAL EVERY EVENING
Qty: 90 TABLET | Refills: 1 | Status: SHIPPED | OUTPATIENT
Start: 2024-10-28

## 2024-11-18 ENCOUNTER — HOSPITAL ENCOUNTER (OUTPATIENT)
Age: 79
Setting detail: SPECIMEN
Discharge: HOME | End: 2024-11-18
Payer: MEDICARE

## 2024-11-18 PROCEDURE — 84155 ASSAY OF PROTEIN SERUM: CPT

## 2024-11-19 ENCOUNTER — HOSPITAL ENCOUNTER (OUTPATIENT)
Dept: INFUSION THERAPY | Age: 79
Discharge: HOME | End: 2024-11-19
Payer: MEDICARE

## 2024-11-19 DIAGNOSIS — C88.00 WALDENSTROM MACROGLOBULINEMIA: ICD-10-CM

## 2024-11-19 LAB
ALBUMIN SERPL-MCNC: 3.9 G/DL (ref 3.4–5)
ALBUMIN/GLOB SERPL: 2 {RATIO} (ref 1.1–2.2)
ALP SERPL-CCNC: 88 U/L (ref 40–129)
ALT SERPL-CCNC: 34 U/L (ref 10–40)
ANION GAP SERPL CALCULATED.3IONS-SCNC: 13 MMOL/L (ref 9–17)
AST SERPL-CCNC: 35 U/L (ref 15–37)
BASOPHILS # BLD: 0.01 K/UL
BASOPHILS NFR BLD: 0 % (ref 0–1)
BILIRUB SERPL-MCNC: 1.1 MG/DL (ref 0–1)
BUN SERPL-MCNC: 25 MG/DL (ref 7–20)
CALCIUM SERPL-MCNC: 8.8 MG/DL (ref 8.3–10.6)
CHLORIDE SERPL-SCNC: 101 MMOL/L (ref 99–110)
CO2 SERPL-SCNC: 26 MMOL/L (ref 21–32)
CREAT SERPL-MCNC: 1.4 MG/DL (ref 0.6–1.2)
EOSINOPHIL # BLD: 0.06 K/UL
EOSINOPHILS RELATIVE PERCENT: 1 % (ref 0–3)
ERYTHROCYTE [DISTWIDTH] IN BLOOD BY AUTOMATED COUNT: 13.5 % (ref 11.7–14.9)
ERYTHROCYTE [SEDIMENTATION RATE] IN BLOOD BY WESTERGREN METHOD: 1 MM/HR (ref 0–30)
GFR, ESTIMATED: 36 ML/MIN/1.73M2
GLUCOSE SERPL-MCNC: 90 MG/DL (ref 74–99)
HCT VFR BLD AUTO: 38 % (ref 37–47)
HGB BLD-MCNC: 12.1 G/DL (ref 12.5–16)
IGA SERPL-MCNC: 75 MG/DL (ref 70–400)
IGG SERPL-MCNC: 229 MG/DL (ref 700–1600)
IGM SERPL-MCNC: 826 MG/DL (ref 40–230)
LDH SERPL-CCNC: 172 U/L (ref 100–190)
LYMPHOCYTES NFR BLD: 0.67 K/UL
LYMPHOCYTES RELATIVE PERCENT: 9 % (ref 24–44)
MCH RBC QN AUTO: 30 PG (ref 27–31)
MCHC RBC AUTO-ENTMCNC: 31.8 G/DL (ref 32–36)
MCV RBC AUTO: 94.1 FL (ref 78–100)
MONOCYTES NFR BLD: 0.59 K/UL
MONOCYTES NFR BLD: 8 % (ref 0–4)
NEUTROPHILS NFR BLD: 83 % (ref 36–66)
NEUTS SEG NFR BLD: 6.41 K/UL
PLATELET # BLD AUTO: 166 K/UL (ref 140–440)
PMV BLD AUTO: 10.8 FL (ref 7.5–11.1)
POTASSIUM SERPL-SCNC: 4.4 MMOL/L (ref 3.5–5.1)
PROT SERPL-MCNC: 5.8 G/DL (ref 6.4–8.2)
RBC # BLD AUTO: 4.04 M/UL (ref 4.2–5.4)
SODIUM SERPL-SCNC: 140 MMOL/L (ref 136–145)
WBC OTHER # BLD: 7.7 K/UL (ref 4–10.5)

## 2024-11-19 PROCEDURE — 80053 COMPREHEN METABOLIC PANEL: CPT

## 2024-11-19 PROCEDURE — 84165 PROTEIN E-PHORESIS SERUM: CPT

## 2024-11-19 PROCEDURE — 84155 ASSAY OF PROTEIN SERUM: CPT

## 2024-11-19 PROCEDURE — 82784 ASSAY IGA/IGD/IGG/IGM EACH: CPT

## 2024-11-19 PROCEDURE — 86850 RBC ANTIBODY SCREEN: CPT

## 2024-11-19 PROCEDURE — 86334 IMMUNOFIX E-PHORESIS SERUM: CPT

## 2024-11-19 PROCEDURE — 83615 LACTATE (LD) (LDH) ENZYME: CPT

## 2024-11-19 PROCEDURE — 36415 COLL VENOUS BLD VENIPUNCTURE: CPT

## 2024-11-19 PROCEDURE — 83521 IG LIGHT CHAINS FREE EACH: CPT

## 2024-11-19 PROCEDURE — 85025 COMPLETE CBC W/AUTO DIFF WBC: CPT

## 2024-11-19 PROCEDURE — 85652 RBC SED RATE AUTOMATED: CPT

## 2024-11-20 LAB — BETA-2 MICROGLOBULIN: 4.7 MG/L

## 2024-11-22 LAB
COMMENT: ABNORMAL
KAPPA FREE LIGHT CHAIN: 19.8 MG/L (ref 2.37–20.73)
KAPPA/LAMBDA RATIO: 0.17 (ref 0.22–1.74)
LAMBDA FREE LIGHT CHAIN: 116 MG/L (ref 4.23–27.69)

## 2024-11-23 LAB
MISCELLANEOUS LAB TEST RESULT: ABNORMAL
MISCELLANEOUS LAB TEST RESULT: NORMAL
TEST NAME: ABNORMAL
TEST NAME: NORMAL

## 2024-11-24 NOTE — PROGRESS NOTES
Patient Name: Cleo Ortiz  Patient : 1945  Patient MRN: [id number]     Primary Oncologist: Daniel Puente MD  Referring Provider: Michel Vizcarra MD     Date of Service: 2024      Chief Complaint:   Chief Complaint   Patient presents with    Follow-up     Patient Active Problem List:     Hypothyroidism     Hyperlipidemia     Vitamin D deficiency     Osteopenia     Essential hypertension     COPD (chronic obstructive pulmonary disease) (HCC)     Macular degeneration, dry-left eye     Macular degeneration, right eye-wet      PMR (polymyalgia rheumatica) (HCC)     CLL (chronic lymphocytic leukemia) (HCC)     CCC (chronic calculous cholecystitis)     Monoclonal gammopathy     Chronic insomnia     GERD (gastroesophageal reflux disease)     Elevated erythrocyte sedimentation rate     Lymphocytosis (symptomatic)    HPI:  Ms. 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      She stated that she was initially referred to Dr. Mohan because of elevated ESR and arthritis.  She has been following regularly with Dr. Mohan for that.      Recent blood tests on 2017, showed significant elevation in her white blood cell count with elevated absolute lymphocyte count.  Manual differential revealed atypical lymphocytes and Pathologist recommend sending peripheral blood flow cytometry for further analysis.      Peripheral blood flow cytometry was sent on 2017, and it revealed monoclonal B-cell population, surface immunoglobulin M and D, Lambda, co-expressing 25, is detected.  CD20 is strongly positive.  The lack of CD5 co-expression by lesional lymphocytes effectively excludes circulating Mantel cell lymphoma and B cell chronic lymphocytic leukemia.

## 2024-11-26 ENCOUNTER — HOSPITAL ENCOUNTER (OUTPATIENT)
Dept: INFUSION THERAPY | Age: 79
Discharge: HOME | End: 2024-11-26
Payer: MEDICARE

## 2024-11-26 ENCOUNTER — OFFICE VISIT (OUTPATIENT)
Dept: ONCOLOGY | Age: 79
End: 2024-11-26
Payer: MEDICARE

## 2024-11-26 VITALS
WEIGHT: 119.6 LBS | DIASTOLIC BLOOD PRESSURE: 59 MMHG | SYSTOLIC BLOOD PRESSURE: 137 MMHG | HEART RATE: 72 BPM | TEMPERATURE: 97.7 F | BODY MASS INDEX: 22.58 KG/M2 | OXYGEN SATURATION: 97 % | HEIGHT: 61 IN

## 2024-11-26 DIAGNOSIS — C88.00 WALDENSTROM MACROGLOBULINEMIA: Primary | ICD-10-CM

## 2024-11-26 DIAGNOSIS — R10.84 GENERALIZED ABDOMINAL PAIN: ICD-10-CM

## 2024-11-26 PROCEDURE — 1159F MED LIST DOCD IN RCRD: CPT | Performed by: INTERNAL MEDICINE

## 2024-11-26 PROCEDURE — 99211 OFF/OP EST MAY X REQ PHY/QHP: CPT

## 2024-11-26 PROCEDURE — 1090F PRES/ABSN URINE INCON ASSESS: CPT | Performed by: INTERNAL MEDICINE

## 2024-11-26 PROCEDURE — 3075F SYST BP GE 130 - 139MM HG: CPT | Performed by: INTERNAL MEDICINE

## 2024-11-26 PROCEDURE — 1123F ACP DISCUSS/DSCN MKR DOCD: CPT | Performed by: INTERNAL MEDICINE

## 2024-11-26 PROCEDURE — 1036F TOBACCO NON-USER: CPT | Performed by: INTERNAL MEDICINE

## 2024-11-26 PROCEDURE — G8420 CALC BMI NORM PARAMETERS: HCPCS | Performed by: INTERNAL MEDICINE

## 2024-11-26 PROCEDURE — G8399 PT W/DXA RESULTS DOCUMENT: HCPCS | Performed by: INTERNAL MEDICINE

## 2024-11-26 PROCEDURE — G8484 FLU IMMUNIZE NO ADMIN: HCPCS | Performed by: INTERNAL MEDICINE

## 2024-11-26 PROCEDURE — 99214 OFFICE O/P EST MOD 30 MIN: CPT | Performed by: INTERNAL MEDICINE

## 2024-11-26 PROCEDURE — 1126F AMNT PAIN NOTED NONE PRSNT: CPT | Performed by: INTERNAL MEDICINE

## 2024-11-26 PROCEDURE — G8427 DOCREV CUR MEDS BY ELIG CLIN: HCPCS | Performed by: INTERNAL MEDICINE

## 2024-11-26 PROCEDURE — 3078F DIAST BP <80 MM HG: CPT | Performed by: INTERNAL MEDICINE

## 2024-11-26 ASSESSMENT — PATIENT HEALTH QUESTIONNAIRE - PHQ9
1. LITTLE INTEREST OR PLEASURE IN DOING THINGS: NOT AT ALL
SUM OF ALL RESPONSES TO PHQ QUESTIONS 1-9: 0
SUM OF ALL RESPONSES TO PHQ9 QUESTIONS 1 & 2: 0
SUM OF ALL RESPONSES TO PHQ QUESTIONS 1-9: 0
2. FEELING DOWN, DEPRESSED OR HOPELESS: NOT AT ALL
SUM OF ALL RESPONSES TO PHQ QUESTIONS 1-9: 0
SUM OF ALL RESPONSES TO PHQ QUESTIONS 1-9: 0

## 2024-11-26 NOTE — PROGRESS NOTES
MA Rooming Questions  Patient: Cleo Ortiz  MRN: [id number]    Date: 11/26/2024        1. Do you have any new issues?   no         2. Do you need any refills on medications?    no    3. Have you had any imaging done since your last visit?   no    4. Have you been hospitalized or seen in the emergency room since your last visit here?   no    5. Did the patient have a depression screening completed today? Yes    PHQ-9 Total Score: 0 (11/26/2024 11:30 AM)       PHQ-9 Given to (if applicable):               PHQ-9 Score (if applicable):                     [] Positive     []  Negative              Does question #9 need addressed (if applicable)                     [] Yes    []  No               Debra Zaman CMA

## 2024-11-27 ENCOUNTER — TELEPHONE (OUTPATIENT)
Dept: ONCOLOGY | Age: 79
End: 2024-11-27

## 2024-11-27 NOTE — TELEPHONE ENCOUNTER
11/27/24 - spoke w/ pt and scheduled the CT AP at [facility] arrival of 11:30 am and NPO 4 hours prior

## 2024-12-09 DIAGNOSIS — C88.00 WALDENSTROM MACROGLOBULINEMIA: ICD-10-CM

## 2024-12-09 RX ORDER — IBRUTINIB 140 MG/1
CAPSULE ORAL
Qty: 180 CAPSULE | Refills: 0 | Status: ACTIVE | OUTPATIENT
Start: 2024-12-09

## 2024-12-09 NOTE — TELEPHONE ENCOUNTER
Patient called Giovanna requesting a refill for imbruvica  to be sent to [facility] pharmacy. Pending RX to Provider to be sent to pharmacy.

## 2024-12-12 ENCOUNTER — TELEPHONE (OUTPATIENT)
Dept: ONCOLOGY | Age: 79
End: 2024-12-12

## 2024-12-12 ENCOUNTER — HOSPITAL ENCOUNTER (OUTPATIENT)
Dept: CT IMAGING | Age: 79
Discharge: HOME | End: 2024-12-12
Attending: INTERNAL MEDICINE
Payer: MEDICARE

## 2024-12-12 DIAGNOSIS — R10.84 GENERALIZED ABDOMINAL PAIN: ICD-10-CM

## 2024-12-12 DIAGNOSIS — C88.00 WALDENSTROM MACROGLOBULINEMIA: ICD-10-CM

## 2024-12-12 PROCEDURE — 74176 CT ABD & PELVIS W/O CONTRAST: CPT

## 2024-12-12 PROCEDURE — 6360000004 HC RX CONTRAST MEDICATION: Performed by: INTERNAL MEDICINE

## 2024-12-12 RX ORDER — IOPAMIDOL 755 MG/ML
20 INJECTION, SOLUTION INTRAVASCULAR
Status: COMPLETED | OUTPATIENT
Start: 2024-12-12 | End: 2024-12-12

## 2024-12-12 RX ADMIN — IOPAMIDOL 20 ML: 755 INJECTION, SOLUTION INTRAVENOUS at 11:55

## 2024-12-12 NOTE — TELEPHONE ENCOUNTER
This nurse called the patient's  and verified that the patient is only taking 3 capsules daily. This nurse contacted the specialty pharmacy liaison and she is contacting the insurance to request an override. The patient's  is aware.

## 2024-12-12 NOTE — TELEPHONE ENCOUNTER
Patient  called and states per insurance patient can't fill the imbruvica until 12/17/24 patient is not sure why they are short.  Does our office have samples or what suggestions.

## 2024-12-13 ENCOUNTER — CLINICAL DOCUMENTATION (OUTPATIENT)
Dept: ONCOLOGY | Age: 79
End: 2024-12-13

## 2024-12-13 DIAGNOSIS — N28.9 KIDNEY LESION: Primary | ICD-10-CM

## 2024-12-13 NOTE — PROGRESS NOTES
Called patient @ [phone] to notify of CT findings. It is fine except left kidney 0.8 cm lesion. Most likely cyst, but physician recommending to order MRI abdomen w/wo contrast. Patient agreeable. Order placed. Imaging to be completed a week prior to next OV.  aware.

## 2024-12-16 ENCOUNTER — CLINICAL DOCUMENTATION (OUTPATIENT)
Dept: ONCOLOGY | Age: 79
End: 2024-12-16

## 2024-12-16 NOTE — PROGRESS NOTES
Confirmed with physician that MRI to be completed about a week prior to next OV scheduled on 02/26/2025.  aware and will update expected date on order.

## 2025-01-08 ENCOUNTER — HOSPITAL ENCOUNTER (OUTPATIENT)
Age: 80
Discharge: HOME | End: 2025-01-08
Payer: MEDICARE

## 2025-01-08 DIAGNOSIS — E87.6 HYPOPOTASSEMIA: ICD-10-CM

## 2025-01-08 DIAGNOSIS — N18.32 STAGE 3B CHRONIC KIDNEY DISEASE (HCC): ICD-10-CM

## 2025-01-08 LAB
ANION GAP SERPL CALCULATED.3IONS-SCNC: 11 MMOL/L (ref 9–17)
BUN SERPL-MCNC: 26 MG/DL (ref 7–20)
CALCIUM SERPL-MCNC: 8.6 MG/DL (ref 8.3–10.6)
CHLORIDE SERPL-SCNC: 107 MMOL/L (ref 99–110)
CO2 SERPL-SCNC: 27 MMOL/L (ref 21–32)
CREAT SERPL-MCNC: 1.5 MG/DL (ref 0.6–1.2)
GFR, ESTIMATED: 34 ML/MIN/1.73M2
GLUCOSE SERPL-MCNC: 89 MG/DL (ref 74–99)
POTASSIUM SERPL-SCNC: 4.1 MMOL/L (ref 3.5–5.1)
SODIUM SERPL-SCNC: 145 MMOL/L (ref 136–145)

## 2025-01-08 PROCEDURE — 80048 BASIC METABOLIC PNL TOTAL CA: CPT

## 2025-01-31 DIAGNOSIS — C88.00 WALDENSTROM MACROGLOBULINEMIA: ICD-10-CM

## 2025-01-31 RX ORDER — IBRUTINIB 140 MG/1
CAPSULE ORAL
Qty: 180 CAPSULE | Refills: 0 | OUTPATIENT
Start: 2025-01-31

## 2025-02-07 ENCOUNTER — TELEPHONE (OUTPATIENT)
Dept: INFUSION THERAPY | Age: 80
End: 2025-02-07

## 2025-02-07 DIAGNOSIS — C88.00 WALDENSTROM MACROGLOBULINEMIA: ICD-10-CM

## 2025-02-07 RX ORDER — IBRUTINIB 140 MG/1
CAPSULE ORAL
Qty: 180 CAPSULE | Refills: 0 | Status: SHIPPED | OUTPATIENT
Start: 2025-02-07

## 2025-02-07 NOTE — TELEPHONE ENCOUNTER
Called patient, MRI ABDOMEN W WO CONTRAST scheduled -     2/20/25 at [facility] at 11:30a    Patient Instructions:    * Nothing to eat or drink 4 hours prior to appointment.  * Wear casual clothing that avoids metal such as zippers, clips, snaps, bra underwires, etc.  * Diabetic patients and those 60 years or older must have BUN and Creatinine drawn stat or results less that 30 days old for any exam requiring IV contrast.  * Patients who are allergic to contrast, have poor renal function, or require special IV access should consult with their physicians and get the appropriate orders and / or medicatiations prior to the date of service

## 2025-02-19 ENCOUNTER — HOSPITAL ENCOUNTER (OUTPATIENT)
Dept: INFUSION THERAPY | Age: 80
Discharge: HOME | End: 2025-02-19
Payer: MEDICARE

## 2025-02-19 DIAGNOSIS — R10.84 GENERALIZED ABDOMINAL PAIN: ICD-10-CM

## 2025-02-19 DIAGNOSIS — C88.00 WALDENSTROM MACROGLOBULINEMIA: ICD-10-CM

## 2025-02-19 LAB
ALBUMIN SERPL-MCNC: 4 G/DL (ref 3.4–5)
ALBUMIN/GLOB SERPL: 2 {RATIO} (ref 1.1–2.2)
ALP SERPL-CCNC: 89 U/L (ref 40–129)
ALT SERPL-CCNC: 13 U/L (ref 10–40)
ANION GAP SERPL CALCULATED.3IONS-SCNC: 13 MMOL/L (ref 9–17)
AST SERPL-CCNC: 18 U/L (ref 15–37)
BASOPHILS # BLD: 0.03 K/UL
BASOPHILS NFR BLD: 0 % (ref 0–1)
BILIRUB SERPL-MCNC: 0.7 MG/DL (ref 0–1)
BUN SERPL-MCNC: 33 MG/DL (ref 7–20)
CALCIUM SERPL-MCNC: 9.1 MG/DL (ref 8.3–10.6)
CHLORIDE SERPL-SCNC: 105 MMOL/L (ref 99–110)
CO2 SERPL-SCNC: 25 MMOL/L (ref 21–32)
CREAT SERPL-MCNC: 1.6 MG/DL (ref 0.6–1.2)
EOSINOPHIL # BLD: 0.06 K/UL
EOSINOPHILS RELATIVE PERCENT: 1 % (ref 0–3)
ERYTHROCYTE [DISTWIDTH] IN BLOOD BY AUTOMATED COUNT: 13.5 % (ref 11.7–14.9)
ERYTHROCYTE [SEDIMENTATION RATE] IN BLOOD BY WESTERGREN METHOD: 6 MM/HR (ref 0–30)
GFR, ESTIMATED: 31 ML/MIN/1.73M2
GLUCOSE SERPL-MCNC: 108 MG/DL (ref 74–99)
HCT VFR BLD AUTO: 38.7 % (ref 37–47)
HGB BLD-MCNC: 12.3 G/DL (ref 12.5–16)
IGA SERPL-MCNC: 64 MG/DL (ref 70–400)
IGG SERPL-MCNC: 240 MG/DL (ref 700–1600)
IGM SERPL-MCNC: 620 MG/DL (ref 40–230)
LDH SERPL-CCNC: 150 U/L (ref 100–190)
LYMPHOCYTES NFR BLD: 0.7 K/UL
LYMPHOCYTES RELATIVE PERCENT: 9 % (ref 24–44)
MCH RBC QN AUTO: 31 PG (ref 27–31)
MCHC RBC AUTO-ENTMCNC: 31.8 G/DL (ref 32–36)
MCV RBC AUTO: 97.5 FL (ref 78–100)
MONOCYTES NFR BLD: 0.57 K/UL
MONOCYTES NFR BLD: 7 % (ref 0–4)
NEUTROPHILS NFR BLD: 83 % (ref 36–66)
NEUTS SEG NFR BLD: 6.57 K/UL
PLATELET # BLD AUTO: 159 K/UL (ref 140–440)
PMV BLD AUTO: 11.9 FL (ref 7.5–11.1)
POTASSIUM SERPL-SCNC: 4.1 MMOL/L (ref 3.5–5.1)
PROT SERPL-MCNC: 6.1 G/DL (ref 6.4–8.2)
RBC # BLD AUTO: 3.97 M/UL (ref 4.2–5.4)
SODIUM SERPL-SCNC: 142 MMOL/L (ref 136–145)
WBC OTHER # BLD: 7.9 K/UL (ref 4–10.5)

## 2025-02-19 PROCEDURE — 85652 RBC SED RATE AUTOMATED: CPT

## 2025-02-19 PROCEDURE — 36415 COLL VENOUS BLD VENIPUNCTURE: CPT

## 2025-02-19 PROCEDURE — 83615 LACTATE (LD) (LDH) ENZYME: CPT

## 2025-02-19 PROCEDURE — 85025 COMPLETE CBC W/AUTO DIFF WBC: CPT

## 2025-02-19 PROCEDURE — 86334 IMMUNOFIX E-PHORESIS SERUM: CPT

## 2025-02-19 PROCEDURE — 82784 ASSAY IGA/IGD/IGG/IGM EACH: CPT

## 2025-02-19 PROCEDURE — 84155 ASSAY OF PROTEIN SERUM: CPT

## 2025-02-19 PROCEDURE — 83521 IG LIGHT CHAINS FREE EACH: CPT

## 2025-02-19 PROCEDURE — 80053 COMPREHEN METABOLIC PANEL: CPT

## 2025-02-21 LAB
BETA-2 MICROGLOBULIN: 5.3 MG/L
COMMENT: ABNORMAL
KAPPA FREE LIGHT CHAIN: 14.4 MG/L (ref 2.37–20.73)
KAPPA/LAMBDA RATIO: 0.12 (ref 0.22–1.74)
LAMBDA FREE LIGHT CHAIN: 116 MG/L (ref 4.23–27.69)

## 2025-02-24 ENCOUNTER — HOSPITAL ENCOUNTER (OUTPATIENT)
Dept: MRI IMAGING | Age: 80
Discharge: HOME | End: 2025-02-24
Attending: INTERNAL MEDICINE
Payer: MEDICARE

## 2025-02-24 DIAGNOSIS — N28.9 KIDNEY LESION: ICD-10-CM

## 2025-02-24 LAB
EGFR, POC: 38 ML/MIN/1.73M2
POC CREATININE: 1.4 MG/DL (ref 0.5–1.2)

## 2025-02-24 PROCEDURE — 6360000004 HC RX CONTRAST MEDICATION: Performed by: INTERNAL MEDICINE

## 2025-02-24 PROCEDURE — A9577 INJ MULTIHANCE: HCPCS | Performed by: INTERNAL MEDICINE

## 2025-02-24 PROCEDURE — 74183 MRI ABD W/O CNTR FLWD CNTR: CPT

## 2025-02-24 PROCEDURE — 82565 ASSAY OF CREATININE: CPT

## 2025-02-24 RX ADMIN — GADOBENATE DIMEGLUMINE 11 ML: 529 INJECTION, SOLUTION INTRAVENOUS at 14:32

## 2025-02-26 ENCOUNTER — HOSPITAL ENCOUNTER (OUTPATIENT)
Dept: INFUSION THERAPY | Age: 80
Discharge: HOME | End: 2025-02-26
Payer: MEDICARE

## 2025-02-26 ENCOUNTER — OFFICE VISIT (OUTPATIENT)
Dept: ONCOLOGY | Age: 80
End: 2025-02-26
Payer: MEDICARE

## 2025-02-26 VITALS
DIASTOLIC BLOOD PRESSURE: 59 MMHG | WEIGHT: 122.6 LBS | SYSTOLIC BLOOD PRESSURE: 123 MMHG | TEMPERATURE: 98.6 F | OXYGEN SATURATION: 99 % | HEIGHT: 61 IN | BODY MASS INDEX: 23.15 KG/M2 | HEART RATE: 77 BPM | RESPIRATION RATE: 16 BRPM

## 2025-02-26 DIAGNOSIS — C88.00 WALDENSTROM MACROGLOBULINEMIA: Primary | ICD-10-CM

## 2025-02-26 PROCEDURE — 1123F ACP DISCUSS/DSCN MKR DOCD: CPT | Performed by: INTERNAL MEDICINE

## 2025-02-26 PROCEDURE — 99212 OFFICE O/P EST SF 10 MIN: CPT

## 2025-02-26 PROCEDURE — 99214 OFFICE O/P EST MOD 30 MIN: CPT | Performed by: INTERNAL MEDICINE

## 2025-02-26 PROCEDURE — 3078F DIAST BP <80 MM HG: CPT | Performed by: INTERNAL MEDICINE

## 2025-02-26 PROCEDURE — G8427 DOCREV CUR MEDS BY ELIG CLIN: HCPCS | Performed by: INTERNAL MEDICINE

## 2025-02-26 PROCEDURE — 1036F TOBACCO NON-USER: CPT | Performed by: INTERNAL MEDICINE

## 2025-02-26 PROCEDURE — 3074F SYST BP LT 130 MM HG: CPT | Performed by: INTERNAL MEDICINE

## 2025-02-26 PROCEDURE — 1126F AMNT PAIN NOTED NONE PRSNT: CPT | Performed by: INTERNAL MEDICINE

## 2025-02-26 PROCEDURE — 1159F MED LIST DOCD IN RCRD: CPT | Performed by: INTERNAL MEDICINE

## 2025-02-26 PROCEDURE — G8399 PT W/DXA RESULTS DOCUMENT: HCPCS | Performed by: INTERNAL MEDICINE

## 2025-02-26 PROCEDURE — G8420 CALC BMI NORM PARAMETERS: HCPCS | Performed by: INTERNAL MEDICINE

## 2025-02-26 PROCEDURE — 1090F PRES/ABSN URINE INCON ASSESS: CPT | Performed by: INTERNAL MEDICINE

## 2025-02-26 ASSESSMENT — PATIENT HEALTH QUESTIONNAIRE - PHQ9
SUM OF ALL RESPONSES TO PHQ QUESTIONS 1-9: 0
1. LITTLE INTEREST OR PLEASURE IN DOING THINGS: NOT AT ALL
SUM OF ALL RESPONSES TO PHQ QUESTIONS 1-9: 0
2. FEELING DOWN, DEPRESSED OR HOPELESS: NOT AT ALL
SUM OF ALL RESPONSES TO PHQ9 QUESTIONS 1 & 2: 0

## 2025-02-26 NOTE — PROGRESS NOTES
MA Rooming Questions  Patient: Cleo Ortiz  MRN: [id number]    Date: 2/26/2025        1. Do you have any new issues?   no         2. Do you need any refills on medications?    no    3. Have you had any imaging done since your last visit?   Yes- MRI    4. Have you been hospitalized or seen in the emergency room since your last visit here?   no    5. Did the patient have a depression screening completed today? Yes    No data recorded     PHQ-9 Given to (if applicable):               PHQ-9 Score (if applicable):                     [] Positive     []  Negative              Does question #9 need addressed (if applicable)                     [] Yes    []  No               Josseline Polk CMA      
gene was detected).     Repeat work ups on 1/12/2021 because of worsening anemia and elevated serum creatinine showed increasing IgM level (3262 mg/dl from 2513mg in 11/24/20 and 2430 mg/dl in 7/16/20). Her M protein is also went up (2130 mg/dl from 1400 mg/dl in 11/24/20 and 1300 mg/dl in 7/16/20).     Other anemia work ups were normal. ESR was significantly elevated (ESR >140) due to Waldenstrom's macroglobulinemia.     Since she becomes more anemia with mildly elevated serum creatinine level (most likely due to  hemoconcentration/hyperviscosity from macroglobulinemia), I recommend her to start first line chemotherapy with Ibrutinib. It ws started on 1/30/21.     She is here for close monitoring of side effects and toxicity from chemotherapy. She is tolerating current chemo quite well.     I recognized that her IgM level and M protein are stable overall on 11/19/24 labs. She is compliant with meds. Her anemia is stable, 12.1 g/dl. I recommend her to continue with ibrutinib on a regular basis.    I will repeat all the blood test in 3 months and I will see her again after that.    She has been having significant abdominal pain lately. I requested CT abdomen/pelvis on 12/12/24. It showed 0.8 cm lesion in inferior pole of left kidney. Subsequently had MRI abdomen on 2/24/25 and they are consistent with cysts. 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 No additional work ups needed now.     Elevated serum creatinine - it is stable (1.6mg/dl) on 2/19/25. Recommend to follow up with her nephrologist regularly.     Chemo induced nausea - recommend to continue with zofran prn.     Hypertension - her BP is stable. Recommend to continue with triamterene/HCTZ 37.5-25 mg daily.     Hypothyroidism - also recommend to continue with synthroid 88 mcg daily.      Health maintenance -I recommend for age-appropriate cancer screening, exercise and low-salt diet.    I answered all her

## 2025-04-30 DIAGNOSIS — C88.00 WALDENSTROM MACROGLOBULINEMIA (HCC): ICD-10-CM

## 2025-04-30 RX ORDER — IBRUTINIB 140 MG/1
CAPSULE ORAL
Qty: 180 CAPSULE | Refills: 0 | Status: ACTIVE | OUTPATIENT
Start: 2025-04-30

## 2025-05-21 ENCOUNTER — HOSPITAL ENCOUNTER (OUTPATIENT)
Dept: INFUSION THERAPY | Age: 80
Discharge: HOME | End: 2025-05-21
Payer: MEDICARE

## 2025-05-21 DIAGNOSIS — C88.00 WALDENSTROM MACROGLOBULINEMIA (HCC): ICD-10-CM

## 2025-05-21 LAB
ALBUMIN SERPL-MCNC: 4 G/DL (ref 3.4–5)
ALBUMIN/GLOB SERPL: 1.7 {RATIO} (ref 1.1–2.2)
ALP SERPL-CCNC: 114 U/L (ref 40–129)
ALT SERPL-CCNC: 22 U/L (ref 10–40)
ANION GAP SERPL CALCULATED.3IONS-SCNC: 13 MMOL/L (ref 9–17)
AST SERPL-CCNC: 49 U/L (ref 15–37)
BASOPHILS # BLD: 0.02 K/UL
BASOPHILS NFR BLD: 0 % (ref 0–1)
BILIRUB SERPL-MCNC: 0.7 MG/DL (ref 0–1)
BUN SERPL-MCNC: 31 MG/DL (ref 7–20)
CALCIUM SERPL-MCNC: 8.7 MG/DL (ref 8.3–10.6)
CHLORIDE SERPL-SCNC: 103 MMOL/L (ref 99–110)
CO2 SERPL-SCNC: 25 MMOL/L (ref 21–32)
CREAT SERPL-MCNC: 1.6 MG/DL (ref 0.6–1.2)
EOSINOPHIL # BLD: 0.05 K/UL
EOSINOPHILS RELATIVE PERCENT: 1 % (ref 0–3)
ERYTHROCYTE [DISTWIDTH] IN BLOOD BY AUTOMATED COUNT: 13 % (ref 11.7–14.9)
ERYTHROCYTE [SEDIMENTATION RATE] IN BLOOD BY WESTERGREN METHOD: 19 MM/HR (ref 0–30)
GFR, ESTIMATED: 31 ML/MIN/1.73M2
GLUCOSE SERPL-MCNC: 94 MG/DL (ref 74–99)
HCT VFR BLD AUTO: 39.6 % (ref 37–47)
HGB BLD-MCNC: 12.6 G/DL (ref 12.5–16)
IGA SERPL-MCNC: 73 MG/DL (ref 70–400)
IGG SERPL-MCNC: 212 MG/DL (ref 700–1600)
IGM SERPL-MCNC: 857 MG/DL (ref 40–230)
LDH SERPL-CCNC: 142 U/L (ref 100–190)
LYMPHOCYTES NFR BLD: 0.7 K/UL
LYMPHOCYTES RELATIVE PERCENT: 9 % (ref 24–44)
MCH RBC QN AUTO: 31.2 PG (ref 27–31)
MCHC RBC AUTO-ENTMCNC: 31.8 G/DL (ref 32–36)
MCV RBC AUTO: 98 FL (ref 78–100)
MONOCYTES NFR BLD: 0.52 K/UL
MONOCYTES NFR BLD: 7 % (ref 0–5)
NEUTROPHILS NFR BLD: 84 % (ref 36–66)
NEUTS SEG NFR BLD: 6.71 K/UL
PLATELET # BLD AUTO: 174 K/UL (ref 140–440)
PMV BLD AUTO: 10.8 FL (ref 7.5–11.1)
POTASSIUM SERPL-SCNC: 4.1 MMOL/L (ref 3.5–5.1)
PROT SERPL-MCNC: 6.4 G/DL (ref 6.4–8.2)
RBC # BLD AUTO: 4.04 M/UL (ref 4.2–5.4)
SODIUM SERPL-SCNC: 141 MMOL/L (ref 136–145)
WBC OTHER # BLD: 8 K/UL (ref 4–10.5)

## 2025-05-21 PROCEDURE — 86334 IMMUNOFIX E-PHORESIS SERUM: CPT

## 2025-05-21 PROCEDURE — 85652 RBC SED RATE AUTOMATED: CPT

## 2025-05-21 PROCEDURE — 84155 ASSAY OF PROTEIN SERUM: CPT

## 2025-05-21 PROCEDURE — 82784 ASSAY IGA/IGD/IGG/IGM EACH: CPT

## 2025-05-21 PROCEDURE — 36415 COLL VENOUS BLD VENIPUNCTURE: CPT

## 2025-05-21 PROCEDURE — 83521 IG LIGHT CHAINS FREE EACH: CPT

## 2025-05-21 PROCEDURE — 83615 LACTATE (LD) (LDH) ENZYME: CPT

## 2025-05-21 PROCEDURE — 85025 COMPLETE CBC W/AUTO DIFF WBC: CPT

## 2025-05-21 PROCEDURE — 80053 COMPREHEN METABOLIC PANEL: CPT

## 2025-05-21 PROCEDURE — 84165 PROTEIN E-PHORESIS SERUM: CPT

## 2025-05-23 LAB
BETA-2 MICROGLOBULIN: 5.2 MG/L
COMMENT: ABNORMAL
KAPPA FREE LIGHT CHAIN: 15.6 MG/L (ref 2.37–20.73)
KAPPA/LAMBDA RATIO: 0.12 (ref 0.22–1.74)
LAMBDA FREE LIGHT CHAIN: 128 MG/L (ref 4.23–27.69)
MISCELLANEOUS LAB TEST RESULT: NORMAL
MISCELLANEOUS LAB TEST RESULT: NORMAL
TEST NAME: NORMAL
TEST NAME: NORMAL

## 2025-05-26 LAB
MISCELLANEOUS LAB TEST RESULT: ABNORMAL
TEST NAME: ABNORMAL

## 2025-05-27 LAB
MISCELLANEOUS LAB TEST RESULT: NORMAL
TEST NAME: NORMAL

## 2025-05-28 ENCOUNTER — OFFICE VISIT (OUTPATIENT)
Dept: ONCOLOGY | Age: 80
End: 2025-05-28
Payer: MEDICARE

## 2025-05-28 ENCOUNTER — HOSPITAL ENCOUNTER (OUTPATIENT)
Dept: INFUSION THERAPY | Age: 80
Discharge: HOME | End: 2025-05-28
Payer: MEDICARE

## 2025-05-28 VITALS
HEIGHT: 61 IN | SYSTOLIC BLOOD PRESSURE: 126 MMHG | HEART RATE: 75 BPM | WEIGHT: 123.4 LBS | OXYGEN SATURATION: 96 % | TEMPERATURE: 98 F | DIASTOLIC BLOOD PRESSURE: 58 MMHG | BODY MASS INDEX: 23.3 KG/M2

## 2025-05-28 DIAGNOSIS — C88.00 WALDENSTROM MACROGLOBULINEMIA (HCC): Primary | ICD-10-CM

## 2025-05-28 PROCEDURE — G8420 CALC BMI NORM PARAMETERS: HCPCS | Performed by: INTERNAL MEDICINE

## 2025-05-28 PROCEDURE — 1159F MED LIST DOCD IN RCRD: CPT | Performed by: INTERNAL MEDICINE

## 2025-05-28 PROCEDURE — 1090F PRES/ABSN URINE INCON ASSESS: CPT | Performed by: INTERNAL MEDICINE

## 2025-05-28 PROCEDURE — 3078F DIAST BP <80 MM HG: CPT | Performed by: INTERNAL MEDICINE

## 2025-05-28 PROCEDURE — 3074F SYST BP LT 130 MM HG: CPT | Performed by: INTERNAL MEDICINE

## 2025-05-28 PROCEDURE — 99212 OFFICE O/P EST SF 10 MIN: CPT

## 2025-05-28 PROCEDURE — G8399 PT W/DXA RESULTS DOCUMENT: HCPCS | Performed by: INTERNAL MEDICINE

## 2025-05-28 PROCEDURE — G8427 DOCREV CUR MEDS BY ELIG CLIN: HCPCS | Performed by: INTERNAL MEDICINE

## 2025-05-28 PROCEDURE — 1123F ACP DISCUSS/DSCN MKR DOCD: CPT | Performed by: INTERNAL MEDICINE

## 2025-05-28 PROCEDURE — 1036F TOBACCO NON-USER: CPT | Performed by: INTERNAL MEDICINE

## 2025-05-28 PROCEDURE — 1126F AMNT PAIN NOTED NONE PRSNT: CPT | Performed by: INTERNAL MEDICINE

## 2025-05-28 PROCEDURE — 99214 OFFICE O/P EST MOD 30 MIN: CPT | Performed by: INTERNAL MEDICINE

## 2025-05-28 NOTE — PROGRESS NOTES
MA/LPN Rooming Questions  Patient: Cleo Ortiz  MRN: [id number]    Date: 5/28/2025        1. Do you have any new issues?   no         2. Do you need any refills on medications?    no    3. Have you had any imaging done since your last visit?   no    4. Have you been hospitalized or seen in the emergency room since your last visit here?   no    5. Did the patient have a depression screening completed today? No    No data recorded     PHQ-9 Given to (if applicable):               PHQ-9 Score (if applicable):                     [] Positive     []  Negative              Does question #9 need addressed (if applicable)                     [] Yes    []  No               Emily Bañuelos MA      
detected).     Repeat work ups on 1/12/2021 because of worsening anemia and elevated serum creatinine showed increasing IgM level (3262 mg/dl from 2513mg in 11/24/20 and 2430 mg/dl in 7/16/20). Her M protein is also went up (2130 mg/dl from 1400 mg/dl in 11/24/20 and 1300 mg/dl in 7/16/20).     Other anemia work ups were normal. ESR was significantly elevated (ESR >140) due to Waldenstrom's macroglobulinemia.     Since she becomes more anemia with mildly elevated serum creatinine level (most likely due to  hemoconcentration/hyperviscosity from macroglobulinemia), I recommend her to start first line chemotherapy with Ibrutinib. It ws started on 1/30/21.     She is here for close monitoring of side effects and toxicity from chemotherapy. She is tolerating current chemo quite well.     I recognized that her IgM level and M protein are stable overall on 5/21/25 labs. She is compliant with meds. Her hemoglobin is stable, 12.6 g/dl. I recommend her to continue with ibrutinib on a regular basis.    I will repeat all the blood test in 3 months and I will see her again after that.    She has been having significant abdominal pain lately. I requested CT abdomen/pelvis on 12/12/24. It showed 0.8 cm lesion in inferior pole of left kidney. Subsequently had MRI abdomen on 2/24/25 and they are consistent with cysts. 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 No additional work ups needed now.     Elevated serum creatinine - it is stable (1.6mg/dl) on 2/19/25. Recommend to follow up with her nephrologist, Dr. Everett regularly.     Chemo induced nausea - recommend to continue with zofran prn.     Hypertension - her BP is stable. Recommend to continue with triamterene/HCTZ 37.5-25 mg daily.     Hypothyroidism - also recommend to continue with synthroid 88 mcg daily.      Health maintenance -I recommend for age-appropriate cancer screening, exercise and low-salt diet.    I answered all her

## 2025-07-02 NOTE — PROGRESS NOTES
DDX dural venous sinus thrombosis vs IIH vs other cause of elevated ICP  Full color plates so unlikely to be a bilateral optic neuropathy    I recommend patient go to ER for imaging (CT/MRI brain/orbits + CTV/MRV for evaluation of venous thrombosis) + neurology evaluation for possible increased ICP.    OARS report was checked before dispensing the medication. OARS report is consistent with the patient statement and appropriate.

## 2025-07-29 DIAGNOSIS — C88.00 WALDENSTROM MACROGLOBULINEMIA (HCC): ICD-10-CM

## 2025-07-29 RX ORDER — IBRUTINIB 140 MG/1
CAPSULE ORAL
Qty: 180 CAPSULE | Refills: 0 | Status: ACTIVE | OUTPATIENT
Start: 2025-07-29

## 2025-08-04 ENCOUNTER — HOSPITAL ENCOUNTER (OUTPATIENT)
Dept: LAB | Age: 80
Discharge: HOME | End: 2025-08-04
Payer: MEDICARE

## 2025-08-04 DIAGNOSIS — I10 ESSENTIAL HYPERTENSION: ICD-10-CM

## 2025-08-04 DIAGNOSIS — N18.32 STAGE 3B CHRONIC KIDNEY DISEASE (HCC): ICD-10-CM

## 2025-08-04 DIAGNOSIS — N17.9 AKI (ACUTE KIDNEY INJURY): ICD-10-CM

## 2025-08-04 LAB
ANION GAP SERPL CALCULATED.3IONS-SCNC: 16 MMOL/L (ref 9–17)
BUN SERPL-MCNC: 33 MG/DL (ref 7–20)
CALCIUM SERPL-MCNC: 9.3 MG/DL (ref 8.3–10.6)
CHLORIDE SERPL-SCNC: 104 MMOL/L (ref 99–110)
CO2 SERPL-SCNC: 23 MMOL/L (ref 21–32)
CREAT SERPL-MCNC: 1.7 MG/DL (ref 0.6–1.2)
CREAT UR-MCNC: 133 MG/DL (ref 28–217)
GFR, ESTIMATED: 28 ML/MIN/1.73M2
GLUCOSE SERPL-MCNC: 95 MG/DL (ref 74–99)
POTASSIUM SERPL-SCNC: 3.6 MMOL/L (ref 3.5–5.1)
SODIUM SERPL-SCNC: 143 MMOL/L (ref 136–145)
TOTAL PROTEIN, URINE: 18 MG/DL
URINE TOTAL PROTEIN CREATININE RATIO: 0.14 (ref 0–0.2)

## 2025-08-04 PROCEDURE — 84156 ASSAY OF PROTEIN URINE: CPT

## 2025-08-04 PROCEDURE — 82570 ASSAY OF URINE CREATININE: CPT

## 2025-08-04 PROCEDURE — 80048 BASIC METABOLIC PNL TOTAL CA: CPT

## 2025-08-20 ENCOUNTER — HOSPITAL ENCOUNTER (OUTPATIENT)
Dept: INFUSION THERAPY | Age: 80
Discharge: HOME | End: 2025-08-20
Payer: MEDICARE

## 2025-08-20 DIAGNOSIS — C88.00 WALDENSTROM MACROGLOBULINEMIA (HCC): ICD-10-CM

## 2025-08-20 LAB
ALBUMIN SERPL-MCNC: 3.9 G/DL (ref 3.4–5)
ALBUMIN/GLOB SERPL: 1.5 {RATIO} (ref 1.1–2.2)
ALP SERPL-CCNC: 92 U/L (ref 40–129)
ALT SERPL-CCNC: 13 U/L (ref 10–40)
ANION GAP SERPL CALCULATED.3IONS-SCNC: 11 MMOL/L (ref 9–17)
AST SERPL-CCNC: 22 U/L (ref 15–37)
BASOPHILS # BLD: 0.03 K/UL
BASOPHILS NFR BLD: 0 % (ref 0–1)
BILIRUB SERPL-MCNC: 0.9 MG/DL (ref 0–1)
BUN SERPL-MCNC: 34 MG/DL (ref 7–20)
CALCIUM SERPL-MCNC: 9.3 MG/DL (ref 8.3–10.6)
CHLORIDE SERPL-SCNC: 105 MMOL/L (ref 99–110)
CO2 SERPL-SCNC: 25 MMOL/L (ref 21–32)
CREAT SERPL-MCNC: 1.8 MG/DL (ref 0.6–1.2)
EOSINOPHIL # BLD: 0.08 K/UL
EOSINOPHILS RELATIVE PERCENT: 1 % (ref 0–3)
ERYTHROCYTE [DISTWIDTH] IN BLOOD BY AUTOMATED COUNT: 13.1 % (ref 11.7–14.9)
GFR, ESTIMATED: 28 ML/MIN/1.73M2
GLUCOSE SERPL-MCNC: 100 MG/DL (ref 74–99)
HCT VFR BLD AUTO: 39.6 % (ref 37–47)
HGB BLD-MCNC: 12.4 G/DL (ref 12.5–16)
IGA SERPL-MCNC: 63 MG/DL (ref 70–400)
IGG SERPL-MCNC: 205 MG/DL (ref 700–1600)
IGM SERPL-MCNC: 1419 MG/DL (ref 40–230)
LDH SERPL-CCNC: 185 U/L (ref 100–190)
LYMPHOCYTES NFR BLD: 0.76 K/UL
LYMPHOCYTES RELATIVE PERCENT: 8 % (ref 24–44)
MCH RBC QN AUTO: 31.6 PG (ref 27–31)
MCHC RBC AUTO-ENTMCNC: 31.3 G/DL (ref 32–36)
MCV RBC AUTO: 100.8 FL (ref 78–100)
MONOCYTES NFR BLD: 0.47 K/UL
MONOCYTES NFR BLD: 5 % (ref 0–5)
NEUTROPHILS NFR BLD: 86 % (ref 36–66)
NEUTS SEG NFR BLD: 8.1 K/UL
PLATELET # BLD AUTO: 198 K/UL (ref 140–440)
PMV BLD AUTO: 11.2 FL (ref 7.5–11.1)
POTASSIUM SERPL-SCNC: 4.5 MMOL/L (ref 3.5–5.1)
PROT SERPL-MCNC: 6.5 G/DL (ref 6.4–8.2)
RBC # BLD AUTO: 3.93 M/UL (ref 4.2–5.4)
SODIUM SERPL-SCNC: 140 MMOL/L (ref 136–145)
WBC OTHER # BLD: 9.4 K/UL (ref 4–10.5)

## 2025-08-20 PROCEDURE — 83615 LACTATE (LD) (LDH) ENZYME: CPT

## 2025-08-20 PROCEDURE — 85652 RBC SED RATE AUTOMATED: CPT

## 2025-08-20 PROCEDURE — 80053 COMPREHEN METABOLIC PANEL: CPT

## 2025-08-20 PROCEDURE — 84165 PROTEIN E-PHORESIS SERUM: CPT

## 2025-08-20 PROCEDURE — 82784 ASSAY IGA/IGD/IGG/IGM EACH: CPT

## 2025-08-20 PROCEDURE — 36415 COLL VENOUS BLD VENIPUNCTURE: CPT

## 2025-08-20 PROCEDURE — 85025 COMPLETE CBC W/AUTO DIFF WBC: CPT

## 2025-08-20 PROCEDURE — 86334 IMMUNOFIX E-PHORESIS SERUM: CPT

## 2025-08-20 PROCEDURE — 82232 ASSAY OF BETA-2 PROTEIN: CPT

## 2025-08-20 PROCEDURE — 84155 ASSAY OF PROTEIN SERUM: CPT

## 2025-08-20 PROCEDURE — 83521 IG LIGHT CHAINS FREE EACH: CPT

## 2025-08-21 LAB — ERYTHROCYTE [SEDIMENTATION RATE] IN BLOOD BY WESTERGREN METHOD: 27 MM/HR (ref 0–30)

## 2025-08-22 LAB
COMMENT: ABNORMAL
KAPPA FREE LIGHT CHAIN: 12 MG/L (ref 2.37–20.73)
KAPPA/LAMBDA RATIO: 0.03 (ref 0.22–1.74)
LAMBDA FREE LIGHT CHAIN: 409 MG/L (ref 4.23–27.69)
MISCELLANEOUS LAB TEST RESULT: NORMAL
MISCELLANEOUS LAB TEST RESULT: NORMAL
TEST NAME: NORMAL
TEST NAME: NORMAL

## 2025-08-23 LAB — BETA-2 MICROGLOBULIN: 4.9 MG/L

## 2025-08-25 LAB
MISCELLANEOUS LAB TEST RESULT: NORMAL
TEST NAME: NORMAL

## 2025-08-27 ENCOUNTER — OFFICE VISIT (OUTPATIENT)
Dept: ONCOLOGY | Age: 80
End: 2025-08-27
Payer: MEDICARE

## 2025-08-27 ENCOUNTER — HOSPITAL ENCOUNTER (OUTPATIENT)
Dept: INFUSION THERAPY | Age: 80
Discharge: HOME | End: 2025-08-27
Payer: MEDICARE

## 2025-08-27 VITALS
TEMPERATURE: 98 F | SYSTOLIC BLOOD PRESSURE: 152 MMHG | WEIGHT: 119 LBS | BODY MASS INDEX: 22.47 KG/M2 | DIASTOLIC BLOOD PRESSURE: 57 MMHG | HEART RATE: 75 BPM | OXYGEN SATURATION: 93 % | HEIGHT: 61 IN

## 2025-08-27 DIAGNOSIS — C88.00 WALDENSTROM MACROGLOBULINEMIA (HCC): Primary | ICD-10-CM

## 2025-08-27 LAB
MISCELLANEOUS LAB TEST RESULT: ABNORMAL
TEST NAME: ABNORMAL

## 2025-08-27 PROCEDURE — G8427 DOCREV CUR MEDS BY ELIG CLIN: HCPCS | Performed by: INTERNAL MEDICINE

## 2025-08-27 PROCEDURE — 99212 OFFICE O/P EST SF 10 MIN: CPT

## 2025-08-27 PROCEDURE — G8420 CALC BMI NORM PARAMETERS: HCPCS | Performed by: INTERNAL MEDICINE

## 2025-08-27 PROCEDURE — 3078F DIAST BP <80 MM HG: CPT | Performed by: INTERNAL MEDICINE

## 2025-08-27 PROCEDURE — G8399 PT W/DXA RESULTS DOCUMENT: HCPCS | Performed by: INTERNAL MEDICINE

## 2025-08-27 PROCEDURE — 3077F SYST BP >= 140 MM HG: CPT | Performed by: INTERNAL MEDICINE

## 2025-08-27 PROCEDURE — 99214 OFFICE O/P EST MOD 30 MIN: CPT | Performed by: INTERNAL MEDICINE

## 2025-08-27 PROCEDURE — 1090F PRES/ABSN URINE INCON ASSESS: CPT | Performed by: INTERNAL MEDICINE

## 2025-08-27 PROCEDURE — 1123F ACP DISCUSS/DSCN MKR DOCD: CPT | Performed by: INTERNAL MEDICINE

## 2025-08-27 PROCEDURE — 1036F TOBACCO NON-USER: CPT | Performed by: INTERNAL MEDICINE

## 2025-08-27 PROCEDURE — 1159F MED LIST DOCD IN RCRD: CPT | Performed by: INTERNAL MEDICINE

## 2025-08-27 PROCEDURE — 1126F AMNT PAIN NOTED NONE PRSNT: CPT | Performed by: INTERNAL MEDICINE

## (undated) DEVICE — FOAM BUMP ROUND LARGE: Brand: MEDLINE INDUSTRIES, INC.

## (undated) DEVICE — GLOVE SURG SZ 75 L12IN THK75MIL DK GRN LTX FREE

## (undated) DEVICE — TWIST DRILL Ø2.7, L 220, COIL 50, QUICK COUPLING, SINGLE USE

## (undated) DEVICE — DRAPE,EXTREMITY,89X128,STERILE: Brand: MEDLINE

## (undated) DEVICE — TWIST DRILL Ø3.5, L 110, COIL 50, QUICK COUPLING, SINGLE USE

## (undated) DEVICE — GOWN,SIRUS,FABRNF,RAGLAN,XL,ST,28/CS: Brand: MEDLINE

## (undated) DEVICE — SHEET,DRAPE,53X77,STERILE: Brand: MEDLINE

## (undated) DEVICE — ZIMMER® STERILE DISPOSABLE TOURNIQUET CUFF WITH PLC, DUAL PORT, SINGLE BLADDER, 18 IN. (46 CM)

## (undated) DEVICE — 3M™ STERI-DRAPE™ U-DRAPE 1015: Brand: STERI-DRAPE™

## (undated) DEVICE — CONVERTORS STOCKINETTE: Brand: CONVERTORS

## (undated) DEVICE — INTENDED FOR TISSUE SEPARATION, AND OTHER PROCEDURES THAT REQUIRE A SHARP SURGICAL BLADE TO PUNCTURE OR CUT.: Brand: BARD-PARKER ® STAINLESS STEEL BLADES

## (undated) DEVICE — SUTURE VCRL SZ 0 L18IN ABSRB UD L36MM CT-1 1/2 CIR J840D

## (undated) DEVICE — TOWEL,OR,DSP,ST,BLUE,STD,6/PK,12PK/CS: Brand: MEDLINE

## (undated) DEVICE — JELLY LUBRICATING 3 GM BACTERIOSTATIC

## (undated) DEVICE — STERILE LATEX POWDER-FREE SURGICAL GLOVESWITH NITRILE COATING: Brand: PROTEXIS

## (undated) DEVICE — DRAPE,U/ SHT,SPLIT,PLAS,STERIL: Brand: MEDLINE

## (undated) DEVICE — BW-412T DISP COMBO CLEANING BRUSH: Brand: SINGLE USE COMBINATION CLEANING BRUSH

## (undated) DEVICE — GLOVE SURG SZ 7 CRM LTX FREE POLYISOPRENE POLYMER BEAD ANTI

## (undated) DEVICE — SPONGE LAP W18XL18IN WHT COT 4 PLY FLD STRUNG RADPQ DISP ST

## (undated) DEVICE — TUBING, SUCTION, 9/32" X 10', STRAIGHT: Brand: MEDLINE

## (undated) DEVICE — Device

## (undated) DEVICE — YANKAUER,FLEXIBLE HANDLE,REGLR CAPACITY: Brand: MEDLINE INDUSTRIES, INC.

## (undated) DEVICE — COUNTER NDL 30 COUNT FOAM STRP SGL MAG

## (undated) DEVICE — GLOVE SURG SZ 65 CRM LTX FREE POLYISOPRENE POLYMER BEAD ANTI

## (undated) DEVICE — SUTURE MCRYL SZ 3-0 L27IN ABSRB UD L24MM PS-1 3/8 CIR PRIM Y936H

## (undated) DEVICE — BANDAGE,ELASTIC,ESMARK,STERILE,6"X9',LF: Brand: MEDLINE

## (undated) DEVICE — C-ARM: Brand: UNBRANDED

## (undated) DEVICE — DECANTER BAG 9": Brand: MEDLINE INDUSTRIES, INC.

## (undated) DEVICE — LINE SAMP O2 6.5FT W/FEMALE CONN F/ADULT CAPNOLINE PLUS

## (undated) DEVICE — TUBING, SUCTION, 3/16" X 6', STRAIGHT: Brand: MEDLINE

## (undated) DEVICE — GLOVE ORANGE PI 7 1/2   MSG9075

## (undated) DEVICE — THE TORRENT IRRIGATION SCOPE CONNECTOR IS USED WITH THE TORRENT IRRIGATION TUBING TO PROVIDE IRRIGATION FLUIDS SUCH AS STERILE WATER DURING GASTROINTESTINAL ENDOSCOPIC PROCEDURES WHEN USED IN CONJUNCTION WITH AN IRRIGATION PUMP (OR ELECTROSURGICAL UNIT).: Brand: TORRENT

## (undated) DEVICE — KENDALL 500 SERIES DIAPHORETIC FOAM MONITORING ELECTRODE - TEAR DROP SHAPE ( 30/PK): Brand: KENDALL

## (undated) DEVICE — GLOVE SURG SZ 8 L12IN THK75MIL DK GRN LTX FREE

## (undated) DEVICE — LINER SUCT CANSTR 1500CC SEMI RIG W/ POR HYDROPHOBIC SHUT

## (undated) DEVICE — APPLICATOR MEDICATED 26 CC SOLUTION HI LT ORNG CHLORAPREP

## (undated) DEVICE — SYSTEM SKIN CLSR 22CM DERMBND PRINEO

## (undated) DEVICE — SUTURE ABSORBABLE MONOFILAMENT 3-0 PS1 12 IN UD MONOCRYL + SXMP1B101

## (undated) DEVICE — PENCIL ES CRD L10FT HND SWCHING ROCK SWCH W/ EDGE COAT BLDE

## (undated) DEVICE — 3M™ STERI-DRAPE™ INSTRUMENT POUCH 1018: Brand: STERI-DRAPE™

## (undated) DEVICE — TWIST DRILL Ø2.7, L 150, COIL 50, QUICK COUPLING, SINGLE USE

## (undated) DEVICE — SYRINGE IRRIG 60ML SFT PLIABLE BLB EZ TO GRP 1 HND USE W/

## (undated) DEVICE — PACK,BASIC,SIRUS,V: Brand: MEDLINE

## (undated) DEVICE — C-ARMOR C-ARM EQUIPMENT COVERS CLEAR STERILE UNIVERSAL FIT 12 PER CASE: Brand: C-ARMOR

## (undated) DEVICE — BANDAGE,SELF ADHRNT,COFLEX,4"X5YD,STRL: Brand: COLABEL

## (undated) DEVICE — SUTURE VCRL SZ 2-0 L18IN ABSRB UD CT-1 L36MM 1/2 CIR J839D

## (undated) DEVICE — MARKER SURG SKIN UTIL REGULAR/FINE 2 TIP W/ RUL AND 9 LBL